# Patient Record
Sex: FEMALE | Race: WHITE | NOT HISPANIC OR LATINO | Employment: OTHER | URBAN - METROPOLITAN AREA
[De-identification: names, ages, dates, MRNs, and addresses within clinical notes are randomized per-mention and may not be internally consistent; named-entity substitution may affect disease eponyms.]

---

## 2017-02-17 ENCOUNTER — OFFICE VISIT (OUTPATIENT)
Dept: ORTHOPEDIC SURGERY | Facility: CLINIC | Age: 52
End: 2017-02-17

## 2017-02-17 VITALS — BODY MASS INDEX: 30.08 KG/M2 | HEIGHT: 66 IN | WEIGHT: 187.2 LBS | TEMPERATURE: 98 F

## 2017-02-17 DIAGNOSIS — M79.605 LOW BACK PAIN RADIATING TO BOTH LEGS: Primary | ICD-10-CM

## 2017-02-17 DIAGNOSIS — M54.50 LOW BACK PAIN RADIATING TO BOTH LEGS: Primary | ICD-10-CM

## 2017-02-17 DIAGNOSIS — M79.604 LOW BACK PAIN RADIATING TO BOTH LEGS: Primary | ICD-10-CM

## 2017-02-17 DIAGNOSIS — M70.62 TROCHANTERIC BURSITIS, LEFT HIP: ICD-10-CM

## 2017-02-17 PROCEDURE — 73521 X-RAY EXAM HIPS BI 2 VIEWS: CPT | Performed by: ORTHOPAEDIC SURGERY

## 2017-02-17 PROCEDURE — 72100 X-RAY EXAM L-S SPINE 2/3 VWS: CPT | Performed by: ORTHOPAEDIC SURGERY

## 2017-02-17 PROCEDURE — 99204 OFFICE O/P NEW MOD 45 MIN: CPT | Performed by: ORTHOPAEDIC SURGERY

## 2017-02-17 PROCEDURE — 20610 DRAIN/INJ JOINT/BURSA W/O US: CPT | Performed by: ORTHOPAEDIC SURGERY

## 2017-02-17 RX ORDER — TRAMADOL HYDROCHLORIDE 50 MG/1
50 TABLET ORAL EVERY 6 HOURS PRN
COMMUNITY
Start: 2017-02-01 | End: 2018-02-20 | Stop reason: RX

## 2017-02-17 RX ADMIN — BUPIVACAINE HYDROCHLORIDE 2 ML: 5 INJECTION, SOLUTION PERINEURAL at 20:25

## 2017-02-17 RX ADMIN — METHYLPREDNISOLONE ACETATE 160 MG: 80 INJECTION, SUSPENSION INTRA-ARTICULAR; INTRALESIONAL; INTRAMUSCULAR; SOFT TISSUE at 20:25

## 2017-02-17 RX ADMIN — LIDOCAINE HYDROCHLORIDE 2 ML: 10 INJECTION, SOLUTION INFILTRATION; PERINEURAL at 20:25

## 2017-02-20 ENCOUNTER — TELEPHONE (OUTPATIENT)
Dept: ORTHOPEDIC SURGERY | Facility: CLINIC | Age: 52
End: 2017-02-20

## 2017-02-20 RX ORDER — BUPIVACAINE HYDROCHLORIDE 5 MG/ML
2 INJECTION, SOLUTION PERINEURAL
Status: COMPLETED | OUTPATIENT
Start: 2017-02-17 | End: 2017-02-17

## 2017-02-20 RX ORDER — METHYLPREDNISOLONE ACETATE 80 MG/ML
160 INJECTION, SUSPENSION INTRA-ARTICULAR; INTRALESIONAL; INTRAMUSCULAR; SOFT TISSUE
Status: COMPLETED | OUTPATIENT
Start: 2017-02-17 | End: 2017-02-17

## 2017-02-20 RX ORDER — LIDOCAINE HYDROCHLORIDE 10 MG/ML
2 INJECTION, SOLUTION INFILTRATION; PERINEURAL
Status: COMPLETED | OUTPATIENT
Start: 2017-02-17 | End: 2017-02-17

## 2017-02-21 ENCOUNTER — TELEPHONE (OUTPATIENT)
Dept: ORTHOPEDIC SURGERY | Facility: CLINIC | Age: 52
End: 2017-02-21

## 2017-02-21 NOTE — PROGRESS NOTES
Patient: Ema Cisse    YOB: 1965    Medical Record Number: [id number]    Chief Complaints:  Left hip and leg pain    History of Present Illness:     51 y.o. female patient who presents for evaluation of her hip and leg.  She has a history of radiculopathy on the left side for which she previously had epidural steroid injections.  She tells me that those worked well approximately 2-3 years ago.  She started a new job recently and has been on her feet quite a bit.  This new job has aggravated her symptoms.  She is now having pain shooting down the back and into the posterior aspect of her left hip and leg, similar to previously.  Denies any weakness, numbness, bowel or bladder dysfunction.  She describes the pain as shooting and burning.  Moderate in severity.  She has not noticed any alleviating factors.  She has also been having some intermittent lateral sided hip pain.  This pain is moderate and aching.  This pain is typically worse at night if she rolls over onto her left side.    Allergies:   Allergies   Allergen Reactions   • Sulfa Antibiotics Rash       Home Medications:      Current Outpatient Prescriptions:   •  Cetirizine HCl 10 MG capsule, Take 10 mg by mouth Daily., Disp: , Rfl:   •  pantoprazole (PROTONIX) 40 MG EC tablet, 40 mg Daily., Disp: , Rfl:   •  SUMAtriptan (IMITREX) 50 MG tablet, Take 50 mg by mouth 1 (One) Time As Needed., Disp: , Rfl:   •  tiZANidine (ZANAFLEX) 4 MG tablet, Take 4 mg by mouth Every 8 (Eight) Hours As Needed., Disp: , Rfl:   •  traMADol (ULTRAM) 50 MG tablet, Take 50 mg by mouth Every 6 (Six) Hours As Needed., Disp: , Rfl:   •  traZODone (DESYREL) 50 MG tablet, Take 50 mg by mouth Every Night., Disp: , Rfl:     Past Medical History   Diagnosis Date   • Bilateral leg pain    • Low back pain    • Trochanteric bursitis, left hip    • Uterine leiomyoma        Past Surgical History   Procedure Laterality Date   • Cholecystectomy  1984   • Sinus surgery  2010   •  "Bunionectomy  2008       Social History     Occupational History   • Not on file.     Social History Main Topics   • Smoking status: Never Smoker   • Smokeless tobacco: Never Used   • Alcohol use No   • Drug use: No   • Sexual activity: Not on file      Social History     Social History Narrative       Family History   Problem Relation Age of Onset   • Hypertension Other    • Heart disease Other        Review of Systems:      Constitutional: Denies fever, shaking or chills   Eyes: Denies change in visual acuity   HEENT: Denies nasal congestion or sore throat   Respiratory: Denies cough or shortness of breath   Cardiovascular: Denies chest pain or edema  Endocrine: Denies tremors, palpitations, intolerance of heat or cold, polyuria, polydipsia.  GI: Denies abdominal pain, nausea, vomiting, bloody stools or diarrhea  : Denies frequency, urgency, incontinence, retention, or nocturia.  Musculoskeletal: Denies numbness tingling or loss of motor function except as above  Integument: Denies rash, lesion or ulceration   Neurologic: Denies headache or focal weakness, deficits  Heme: Denies epistaxis, spontaneous or excessive bleeding, epistaxis, hematuria, melena, fatigue, enlarged or tender lymph nodes.      All other pertinent positives and negatives as noted above in HPI.      Physical Exam: 51 y.o. female    Vitals:    02/17/17 1441   Temp: 98 °F (36.7 °C)   TempSrc: Temporal Artery    Weight: 187 lb 3.2 oz (84.9 kg)   Height: 66\" (167.6 cm)       General:  Patient is awake and alert.  Appears in no acute distress or discomfort.    Psych:  Affect and demeanor are appropriate.    Eyes:  Conjunctiva and sclera appear grossly normal.  Eyes track well and EOM seem to be intact.    Ears:  No gross abnormalities.  Hearing adequate for the exam.    Cardiovascular:  Regular rate and rhythm.    Lungs:  Good chest expansion.  Breathing unlabored.    Lymph:  No palpable masses or adenopathy in the left lower extremity    Back:  " Skin is benign.  No tenderness or step-offs.  Good motion.  Straight leg raise on the left does reproduce pain shooting down the back of her hip and upper thigh.    Extremities:  Left hip is examined.  Skin is benign.  No palpable swellings or masses.  No adenopathy.  Focal tenderness over the trochanteric bursa.  Good hip motion.  No instability.  Good strength with hip flexion and abduction.  Thigh and calf are soft.  Good strength with plantar flexion and dorsi flexion of the ankle and toes.  Intact sensation.  Brisk capillary refill in the toes with good skin turgor.         Radiology:   AP pelvis and lateral view left hip are ordered and reviewed.  AP and lateral views of lumbar spine are also ordered and reviewed.  No comparison films are immediately available.  I do not see any significant degenerative changes, lesions, masses, malalignment, or other concerning findings on any of the x-rays.    Assessment/Plan:  1.  Lumbar radiculopathy  2.  Left hip trochanteric bursitis    The radiculopathy has been a long-standing issue for her to which she previously responded well to epidurals.  She is interested in getting those repeated.  I have suggested that we start with a new MRI.  She was instructed to call for the results of that study.  We will come up with a plan pending review of the MRI.    For the hip, we discussed options, I recommended that we try an injection.  The risks, benefits, and alternatives were thoroughly discussed.  She consented to proceed as described below.  She can follow-up as needed for the hip going forward.    Large Joint Arthrocentesis  Date/Time: 2/17/2017 8:25 PM  Consent given by: patient  Site marked: site marked  Timeout: Immediately prior to procedure a time out was called to verify the correct patient, procedure, equipment, support staff and site/side marked as required   Supporting Documentation  Indications: pain   Procedure Details  Location: hip -   Preparation: Patient was  prepped and draped in the usual sterile fashion  Needle size: 25 G  Approach: anterolateral  Medications administered: 2 mL lidocaine 1 %; 160 mg methylPREDNISolone acetate 80 MG/ML; 2 mL bupivacaine  Patient tolerance: patient tolerated the procedure well with no immediate complications            William Shirley MD    02/17/2017    CC to SHELBIE Borges

## 2017-02-22 ENCOUNTER — TELEPHONE (OUTPATIENT)
Dept: ORTHOPEDIC SURGERY | Facility: CLINIC | Age: 52
End: 2017-02-22

## 2017-02-27 ENCOUNTER — TELEPHONE (OUTPATIENT)
Dept: ORTHOPEDIC SURGERY | Facility: CLINIC | Age: 52
End: 2017-02-27

## 2017-02-28 ENCOUNTER — TELEPHONE (OUTPATIENT)
Dept: ORTHOPEDIC SURGERY | Facility: CLINIC | Age: 52
End: 2017-02-28

## 2017-03-14 ENCOUNTER — CONSULT (OUTPATIENT)
Dept: ORTHOPEDIC SURGERY | Facility: CLINIC | Age: 52
End: 2017-03-14

## 2017-03-14 VITALS — TEMPERATURE: 98 F | HEIGHT: 66 IN | BODY MASS INDEX: 30.05 KG/M2 | WEIGHT: 187 LBS

## 2017-03-14 DIAGNOSIS — M53.3 SACROILIAC JOINT PAIN: Primary | ICD-10-CM

## 2017-03-14 DIAGNOSIS — M51.26 HERNIATED LUMBAR INTERVERTEBRAL DISC: ICD-10-CM

## 2017-03-14 PROCEDURE — 99214 OFFICE O/P EST MOD 30 MIN: CPT | Performed by: ORTHOPAEDIC SURGERY

## 2017-03-14 RX ORDER — CEFDINIR 300 MG/1
300 CAPSULE ORAL DAILY
COMMUNITY
Start: 2017-03-06 | End: 2017-04-26

## 2017-03-14 RX ORDER — ESTRADIOL AND NORETHINDRONE ACETATE 1; .5 MG/1; MG/1
1 TABLET, FILM COATED ORAL DAILY
COMMUNITY
Start: 2017-02-21 | End: 2017-12-19 | Stop reason: SDUPTHER

## 2017-03-14 NOTE — PROGRESS NOTES
New patient or new problem visit    Chief Complaint   Patient presents with   • Lumbar Spine - Establish Care       HPI: She complains of back pain radiating to the left hip ongoing chronically responding favorably last summer to epidural injections.  Not so well to trochanteric injections.  Physical therapy and pain pills.  Not helped much.  Pain is chronic severe when present intermittent worse with standing.  Thenar visit she mentioned some neck pain.  No radiation.    PFSH: See chart- reviewed    Review of Systems   Constitutional: Negative for chills, fever and unexpected weight change.   HENT: Negative.  Negative for trouble swallowing and voice change.    Eyes: Negative.  Negative for visual disturbance.   Respiratory: Negative.  Negative for cough and shortness of breath.    Cardiovascular: Negative.  Negative for chest pain and leg swelling.   Gastrointestinal: Negative.  Negative for abdominal pain, nausea and vomiting.   Endocrine: Negative.  Negative for cold intolerance and heat intolerance.   Genitourinary: Negative.  Negative for difficulty urinating, frequency and urgency.   Musculoskeletal: Positive for arthralgias, back pain, gait problem, neck pain and neck stiffness.   Skin: Negative.  Negative for rash and wound.   Allergic/Immunologic: Negative.  Negative for immunocompromised state.   Neurological: Positive for weakness and numbness (back, leg, hip).   Hematological: Negative.  Does not bruise/bleed easily.   Psychiatric/Behavioral: Negative.  Negative for dysphoric mood. The patient is not nervous/anxious.        PE: Constitutional: Vital signs above-noted.  Awake, alert and oriented    Psychiatric: Affect and insight do not appear grossly disturbed.    Pulmonary: Breathing is unlabored, color is good.    Skin: Warm, dry and normal turgor    Cardiac:  pedal pulses intact.  No edema.    Eyesight and hearing appear adequate for examination purposes      Musculoskeletal:  There is mild  tenderness to percussion and palpation of the spine. Motion appears undisturbed.  Posture is unremarkable to coronal and sagittal inspection.    The skin about the area is intact.  There is no palpable or visible deformity.  There is no local spasm.  Figure 4 test is positive on the left.       Neurologic:   Reflexes are 2+ and symmetrical in the patellae and achilles.   Motor function is undisturbed in quadriceps, EHL, and gastrocnemius      Sensation appears symmetrically intact to light touch   .  In the bilateral lower extremities there is no evidence of atrophy.   Clonus is absent..  Gait appears undisturbed.  SLR test negative      MEDICAL DECISION MAKING    XRAY: Plain film x-rays of lumbar spine show L3 4 spondylolisthesis and then disc degeneration at 4551.  Fairly well-preserved lordosis.  MRI scan the lumbar spine shows a left the extruded herniated disc at L23 and the degenerative changes otherwise MRI report reviewed I agree with it do not think that the herniation is source of her pain.    Other: n/a    Impression: Left sacroiliac pain.  The lumbar back pain including L2 3 herniated disc    Plan: I've recommended pain management to include left sacroiliac injection and epidural injections, possibly even the guided the left nerve root sleeve injection at L2-3.  Not sure what causing her buttock pain but that seems unlikely to emanate from L2 3.  She inquired about laser surgery and I roundly condemned it.

## 2017-04-26 ENCOUNTER — OFFICE VISIT (OUTPATIENT)
Dept: PAIN MEDICINE | Facility: CLINIC | Age: 52
End: 2017-04-26

## 2017-04-26 VITALS
HEIGHT: 66 IN | DIASTOLIC BLOOD PRESSURE: 93 MMHG | BODY MASS INDEX: 29.41 KG/M2 | WEIGHT: 183 LBS | OXYGEN SATURATION: 98 % | HEART RATE: 91 BPM | SYSTOLIC BLOOD PRESSURE: 134 MMHG | RESPIRATION RATE: 16 BRPM | TEMPERATURE: 98 F

## 2017-04-26 DIAGNOSIS — G89.29 OTHER CHRONIC PAIN: ICD-10-CM

## 2017-04-26 DIAGNOSIS — M51.9 LUMBAR DISC DISORDER: ICD-10-CM

## 2017-04-26 DIAGNOSIS — M99.04 SOMATIC DYSFUNCTION OF SACROILIAC JOINT: Primary | ICD-10-CM

## 2017-04-26 PROCEDURE — 99203 OFFICE O/P NEW LOW 30 MIN: CPT | Performed by: ANESTHESIOLOGY

## 2017-04-26 NOTE — PROGRESS NOTES
"CHIEF COMPLAINT  Pt has had bilateral \"sciatic\" area pain,L > R with L sided groin and L upper leg pain,since about 6-9 months ago,gradually worsening. No back surgery.  Hx of 2 LESIs,most recently in 2015 in [facility]with moderate relief for 6 months.  Hx of PT for neck but not back.  No chiropractic treatment for current back pain.  Standing,walking > .5 mile increases pain.  Heat and ice provides some relief. Pt does not take Tramadol daily.    Subjective   Ema Cisse is a 51 y.o. female.   She presents to the office for evaluation of lumbrosacral area pain. She was referred here by Dr. Mcallister, with a request to consider diagnostic Sacroiliac injection.         Back Pain   This is a chronic problem. The current episode started more than 1 month ago. The problem occurs constantly. The problem has been gradually worsening since onset. The pain is present in the sacro-iliac. The quality of the pain is described as aching. The pain radiates to the left thigh. The pain is moderate. The symptoms are aggravated by bending, standing and sitting. Pertinent negatives include no chest pain, dysuria or numbness. She has tried home exercises, heat and ice for the symptoms. The treatment provided no relief.        PEG Assessment   What number best describes your pain on average in the past week?8  What number best describes how, during the past week, pain has interfered with your enjoyment of life?7  What number best describes how, during the past week, pain has interfered with your general activity?  7        Current Outpatient Prescriptions:   •  Cetirizine HCl 10 MG capsule, Take 10 mg by mouth Daily., Disp: , Rfl:   •  MIMVEY 1-0.5 MG per tablet, Take 1 tablet by mouth Daily., Disp: , Rfl:   •  pantoprazole (PROTONIX) 40 MG EC tablet, 40 mg Daily., Disp: , Rfl:   •  SUMAtriptan (IMITREX) 50 MG tablet, Take 50 mg by mouth 1 (One) Time As Needed., Disp: , Rfl:   •  tiZANidine (ZANAFLEX) 4 MG tablet, Take 4 mg " "by mouth Every 8 (Eight) Hours As Needed., Disp: , Rfl:   •  traMADol (ULTRAM) 50 MG tablet, Take 50 mg by mouth Every 6 (Six) Hours As Needed., Disp: , Rfl:   •  traZODone (DESYREL) 50 MG tablet, Take 50 mg by mouth Every Night., Disp: , Rfl:     The following portions of the patient's history were reviewed and updated as appropriate: allergies, current medications, past family history, past medical history, past social history, past surgical history and problem list.      REVIEW OF PERTINENT MEDICAL DATA    MRI lumbar, from [facility]... .4-10-15...  Leftward 10mm L2-3 disc herniation, with moderate to severe left lateral recess and left intervertebral foraminal stenosis... effectng the left L2 nerve root.  MInimal L45 bulge.        Review of Systems   Constitutional: Positive for activity change (decreased) and appetite change (decreased).   Respiratory: Negative for apnea, chest tightness and shortness of breath.    Cardiovascular: Negative for chest pain.   Gastrointestinal: Negative for constipation (IBS), diarrhea and nausea.   Genitourinary: Negative for difficulty urinating and dysuria.   Musculoskeletal: Positive for back pain.   Allergic/Immunologic: Negative for immunocompromised state.   Neurological: Negative for dizziness, light-headedness and numbness.   Hematological: Does not bruise/bleed easily.   Psychiatric/Behavioral: Positive for sleep disturbance. Negative for suicidal ideas.         Vitals:    04/26/17 1341   BP: 134/93   Pulse: 91   Resp: 16   Temp: 98 °F (36.7 °C)   SpO2: 98%   Weight: 183 lb (83 kg)   Height: 66\" (167.6 cm)   PainSc: 5  Comment: LBP bilaterally ranges from 5-8/10   PainLoc: Back         Objective   Physical Exam   Constitutional: She is oriented to person, place, and time. Vital signs are normal. She appears well-developed and well-nourished.  Non-toxic appearance. No distress.   HENT:   Head: Normocephalic and atraumatic.   Right Ear: Hearing and external ear " normal.   Left Ear: Hearing and external ear normal.   Nose: Nose normal.   Eyes: Conjunctivae and lids are normal. Pupils are equal, round, and reactive to light.   Pulmonary/Chest: Effort normal. No respiratory distress.   Abdominal: Normal appearance.   Musculoskeletal:        Right hip: She exhibits tenderness (tender of the SI joint). She exhibits normal range of motion and normal strength.        Left hip: She exhibits tenderness (Tender of the left SI joint.  Much more tender than the right). She exhibits normal range of motion and normal strength.        Lumbar back: She exhibits tenderness and bony tenderness. She exhibits no spasm.   Gen positive left, gen equivocal right.    Little to no pain extension past 10 degrees and also flexion of the low back past 75 degrees.   Neurological: She is alert and oriented to person, place, and time. She has normal strength. No cranial nerve deficit or sensory deficit. Gait normal.   Reflex Scores:       Patellar reflexes are 2+ on the right side and 2+ on the left side.  Psychiatric: She has a normal mood and affect. Her behavior is normal.   Nursing note and vitals reviewed.      Assessment/Plan   Ema was seen today for back pain.    Diagnoses and all orders for this visit:    Somatic dysfunction of sacroiliac joint  -     Case Request    Other chronic pain    Lumbar disc disorder        --- Follow-up for procedure  -- Plan A:  Diagnostic Left Sacroiliac joint injection, x2, 3-4 wks apart  (avoid Fentanyl for sedation)  -- Plan B:  If the SI injection is diagnostically negative, then the plan would be for Left L2 LTFESI, x2, 3 wks apart         ----------  Education about Sacroiliac joint injections:  This Sacroiliac joint injection (blockade) we have suggested is intended for diagnostic purposes, with the intent of offering the patient Radiofrequency thermal rhizotomy (RF) of the sensory branches to the joint if the block is diagnostically effective.  The  diagnostic blockade is necessary to determine the likelihood that RF therapy could be efficacious in providing long term relief to the patient.    In this procedure, the sacroiliac joint is aligned with imaging, and under image guidance a needle is placed with the needle tip into the joint.  The needle position is confirmed to be appropriately in the joint before injection of medication into the joint.  When xray fluoroscopy is used, contrast dye is used to confirm a proper arthrogram (i.e., outline of the joint).  When ultrasound is used, IV fluid (normal saline) is injected to see the flow of the fluid into the joint.  Once confirmed, then the medication can be injected into the joint.  Oftentimes this medication is a combination of local anesthetics (for diagnostic purposes) and also a steroid (to decrease irritation & inflammation in the joint, also known as sacroilitis).      Medically, a successful RF procedure should provide a patient with 50% pain relief or more for at least 6 months.  Clinical experience suggests that successful patients receive relief more in the range of 12 months on average.  We also discussed that many patients receive therapeutic success from the intraarticular joint injection, and may not require RF ablation.  If a patient receives more than 8 weeks of relief from joint injection, then occasional repeat joint blocks for therapeutic purposes is a very reasonable alternative therapy.  This course of therapy is consistent with our LCDs according to our CMS  in the area, and therefore other insurance providers should follow accordingly.  We will monitor our patients to screen for these therapeutic responders and will offer RF therapy only when necessary.      We discussed that joint injections & also RF procedures are not without risks.  Best practices regarding anticoagulant use & neuraxial procedures will be respected.  Oftentimes a patient on an anticoagulant can be  offered a joint injection safely, but again this is not risk-free, and such patients give consent with regards to this increased bleeding risk, which could cause problems including but not limited to worsening of pain, nerve damage, or muscle damage.  Patients that are ill or otherwise may be at risk for sepsis will not have their spines accessed by neuraxial injections of any type.  This patient will not be offered these therapies if there is an increased risk.   We discussed that there is a risk of postprocedural pain and also a risk of worsening of clinical picture with these procedures as with any neuraxial procedure.    ----------        EMR Dragon/Transcription disclaimer:   Much of this encounter note is an electronic transcription/translation of spoken language to printed text. The electronic translation of spoken language may permit erroneous, or at times, nonsensical words or phrases to be inadvertently transcribed; Although I have reviewed the note for such errors, some may still exist.

## 2017-04-26 NOTE — PATIENT INSTRUCTIONS
----------  Education about Sacroiliac joint injections:  This Sacroiliac joint injection (blockade) we have suggested is intended for diagnostic purposes, with the intent of offering the patient Radiofrequency thermal rhizotomy (RF) of the sensory branches to the joint if the block is diagnostically effective.  The diagnostic blockade is necessary to determine the likelihood that RF therapy could be efficacious in providing long term relief to the patient.    In this procedure, the sacroiliac joint is aligned with imaging, and under image guidance a needle is placed with the needle tip into the joint.  The needle position is confirmed to be appropriately in the joint before injection of medication into the joint.  When xray fluoroscopy is used, contrast dye is used to confirm a proper arthrogram (i.e., outline of the joint).  When ultrasound is used, IV fluid (normal saline) is injected to see the flow of the fluid into the joint.  Once confirmed, then the medication can be injected into the joint.  Oftentimes this medication is a combination of local anesthetics (for diagnostic purposes) and also a steroid (to decrease irritation & inflammation in the joint, also known as sacroilitis).      Medically, a successful RF procedure should provide a patient with 50% pain relief or more for at least 6 months.  Clinical experience suggests that successful patients receive relief more in the range of 12 months on average.  We also discussed that many patients receive therapeutic success from the intraarticular joint injection, and may not require RF ablation.  If a patient receives more than 8 weeks of relief from joint injection, then occasional repeat joint blocks for therapeutic purposes is a very reasonable alternative therapy.  This course of therapy is consistent with our LCDs according to our CMS  in the area, and therefore other insurance providers should follow accordingly.  We will monitor our  patients to screen for these therapeutic responders and will offer RF therapy only when necessary.      We discussed that joint injections & also RF procedures are not without risks.  Best practices regarding anticoagulant use & neuraxial procedures will be respected.  Oftentimes a patient on an anticoagulant can be offered a joint injection safely, but again this is not risk-free, and such patients give consent with regards to this increased bleeding risk, which could cause problems including but not limited to worsening of pain, nerve damage, or muscle damage.  Patients that are ill or otherwise may be at risk for sepsis will not have their spines accessed by neuraxial injections of any type.  This patient will not be offered these therapies if there is an increased risk.   We discussed that there is a risk of postprocedural pain and also a risk of worsening of clinical picture with these procedures as with any neuraxial procedure.    ----------

## 2017-05-18 ENCOUNTER — OUTSIDE FACILITY SERVICE (OUTPATIENT)
Dept: PAIN MEDICINE | Facility: CLINIC | Age: 52
End: 2017-05-18

## 2017-05-18 PROCEDURE — 27096 INJECT SACROILIAC JOINT: CPT | Performed by: ANESTHESIOLOGY

## 2017-06-15 ENCOUNTER — OUTSIDE FACILITY SERVICE (OUTPATIENT)
Dept: PAIN MEDICINE | Facility: CLINIC | Age: 52
End: 2017-06-15

## 2017-06-15 PROCEDURE — 27096 INJECT SACROILIAC JOINT: CPT | Performed by: ANESTHESIOLOGY

## 2017-06-29 ENCOUNTER — OFFICE VISIT (OUTPATIENT)
Dept: PAIN MEDICINE | Facility: CLINIC | Age: 52
End: 2017-06-29

## 2017-06-29 VITALS
SYSTOLIC BLOOD PRESSURE: 135 MMHG | RESPIRATION RATE: 16 BRPM | HEART RATE: 93 BPM | TEMPERATURE: 97.7 F | DIASTOLIC BLOOD PRESSURE: 95 MMHG | WEIGHT: 184.4 LBS | OXYGEN SATURATION: 99 % | HEIGHT: 66 IN | BODY MASS INDEX: 29.63 KG/M2

## 2017-06-29 DIAGNOSIS — M99.04 SOMATIC DYSFUNCTION OF SACROILIAC JOINT: Primary | ICD-10-CM

## 2017-06-29 DIAGNOSIS — M51.9 LUMBAR DISC DISORDER: ICD-10-CM

## 2017-06-29 PROCEDURE — 99213 OFFICE O/P EST LOW 20 MIN: CPT | Performed by: NURSE PRACTITIONER

## 2017-06-29 NOTE — PROGRESS NOTES
"CHIEF COMPLAINT    Pt had left SI joint injection on 5-18 and 6-15 for left lower back, left leg sciatica pain. She states after the first injection she was having no pain afterwards, and after the second she just had a little pain. Overall she reports 80% relief. Now, she is experiencing more pain in her right side, except it is not radiating down the right leg, and she states it is \"not that bad.\"    Initial Evaluation by Hermila MORFIN  Subjective   Ema Cisse is a 51 y.o. female  who presents to the office for follow-up of procedure.  She completed a left SI joint injection   on  6-15-17 and 5-18-17 performed by Dr. Lind for management of low back pain. Patient reports 80% ongoing relief from the procedure.     Patient was initially evaluated as new patient by Dr. Lind on 4/26/17.  She is being seen for low back pain.  She is ordered have a diagnostic left SI joint injection ×2-4 weeks apart.  She completed these.    After the initial procedure, she had almost no pain for a few weeks. After the second procedure, she was pain free for one day. Noticed a slight increase in stiffness the following day. Is no longer having leg stiffness and pain. Now it feels as if her pain has moved to the right side.    Complains of pain in her right low back. Today her pain is 3/10VAS. Describes the pain as intermittent.  Pain increases with standing, walking and working; pain decreases rest and injections. ADL's by self.     Back Pain   This is a chronic problem. The current episode started more than 1 month ago. The problem occurs constantly. Progression since onset: improved since last office visit. The pain is present in the sacro-iliac. The quality of the pain is described as aching. The pain radiates to the left thigh. The pain is at a severity of 3/10. The pain is moderate. The symptoms are aggravated by bending, standing and sitting. Pertinent negatives include no chest pain, dysuria, headaches or numbness. " "She has tried home exercises, heat and ice (LEFT SI joint injection--significant ongoing relief) for the symptoms. The treatment provided no relief.      PEG Assessment   What number best describes your pain on average in the past week?3  What number best describes how, during the past week, pain has interfered with your enjoyment of life?0  What number best describes how, during the past week, pain has interfered with your general activity?  1    The following portions of the patient's history were reviewed and updated as appropriate: allergies, current medications, past family history, past medical history, past social history, past surgical history and problem list.    Review of Systems   Constitutional: Positive for activity change (increased) and appetite change (increased).   Respiratory: Negative for apnea, chest tightness and shortness of breath.    Cardiovascular: Negative for chest pain.   Gastrointestinal: Negative for constipation (IBS), diarrhea and nausea.   Genitourinary: Negative for difficulty urinating and dysuria.   Musculoskeletal: Positive for back pain.   Allergic/Immunologic: Negative for immunocompromised state.   Neurological: Negative for dizziness, light-headedness, numbness and headaches.   Hematological: Does not bruise/bleed easily.   Psychiatric/Behavioral: Positive for sleep disturbance (pt states somewhat but not due to the pain). Negative for confusion, hallucinations and suicidal ideas.       Vitals:    06/29/17 0949   BP: 135/95   Pulse: 93   Resp: 16   Temp: 97.7 °F (36.5 °C)   SpO2: 99%   Weight: 184 lb 6.4 oz (83.6 kg)   Height: 66\" (167.6 cm)   PainSc:   3   PainLoc: Back  Comment: and leg     Objective   Physical Exam   Constitutional: She is oriented to person, place, and time. Vital signs are normal. She appears well-developed and well-nourished.  Non-toxic appearance. No distress.   HENT:   Head: Normocephalic and atraumatic.   Right Ear: Hearing and external ear normal. "   Left Ear: Hearing and external ear normal.   Nose: Nose normal.   Eyes: Conjunctivae and lids are normal. Pupils are equal, round, and reactive to light.   Pulmonary/Chest: Effort normal. No respiratory distress.   Abdominal: Normal appearance.   Musculoskeletal:        Right hip: She exhibits tenderness (moderate tenderness of the SI joint). She exhibits normal range of motion and normal strength.        Left hip: She exhibits tenderness (minimal tenderness of left SI joint). She exhibits normal range of motion and normal strength.        Lumbar back: She exhibits tenderness and bony tenderness. She exhibits no spasm.   Negative SLR bilaterally   Neurological: She is alert and oriented to person, place, and time. She has normal strength. No cranial nerve deficit or sensory deficit. Gait normal.   Reflex Scores:       Patellar reflexes are 2+ on the right side and 2+ on the left side.  Psychiatric: She has a normal mood and affect. Her behavior is normal.   Nursing note and vitals reviewed.      Assessment/Plan   Ema was seen today for back pain.    Diagnoses and all orders for this visit:    Somatic dysfunction of sacroiliac joint  -     Case Request    Lumbar disc disorder      -- Right SI joint injection. No blood thinners. Reviewed the procedure at length with the patient.  Included in the review was expectations, complications, risk and benefits.The procedure was described in detail and the risks, benefits and alternatives were discussed with the patient (including but not limited to: bleeding, infection, nerve damage, worsening of pain, inability to perform injection, paralysis, seizures, and death) who agreed to proceed.  Discussed the potential for sedation if warranted/wanted.  Questions were answered and in a way the patient could understand.  Patient verbalized understanding and wishes to proceed.  This intervention will be ordered.  --- Left Si joint--improved with interventions.   --- Follow-up  after procedure or sooner if needed.       PATTI REPORT    PATTI report has been reviewed and scanned into the patient's chart.    Date of last PATTI : 6-28-17          EMR Dragon/Transcription disclaimer:   Much of this encounter note is an electronic transcription/translation of spoken language to printed text. The electronic translation of spoken language may permit erroneous, or at times, nonsensical words or phrases to be inadvertently transcribed; Although I have reviewed the note for such errors, some may still exist.

## 2017-07-13 ENCOUNTER — OUTSIDE FACILITY SERVICE (OUTPATIENT)
Dept: PAIN MEDICINE | Facility: CLINIC | Age: 52
End: 2017-07-13

## 2017-07-13 ENCOUNTER — DOCUMENTATION (OUTPATIENT)
Dept: PAIN MEDICINE | Facility: CLINIC | Age: 52
End: 2017-07-13

## 2017-07-13 PROCEDURE — 27096 INJECT SACROILIAC JOINT: CPT | Performed by: ANESTHESIOLOGY

## 2017-07-13 NOTE — PROGRESS NOTES
RIGHT Sacroiliac Joint Injection    [facility]      PREOPERATIVE DIAGNOSIS:   Sacroiliac joint dysfunction on the RIGHT    POSTOPERATIVE DIAGNOSIS:  Sacroiliac joint dysfunction on the RIGHT    PROCEDURE:  Sacroiliac Joint Injection, on the RIGHT, with fluoroscopic guidance    PRE-PROCEDURE DISCUSSION WITH PATIENT:    Risks and complications were discussed with the patient prior to starting the procedure and informed consent was obtained.  We discussed various topics including but not limited to bleeding, infection, injury, postprocedural site soreness, painful flareup, worsening of clinical picture, paralysis, coma, and death.     SURGEON:  Jeronimo Lind MD    REASON FOR PROCEDURE:    This very pleasant 51-year-old woman has a history of sacroiliitis.  Previously the bilateral sacroiliitis was worse on the left.  After a left sacroiliac joint injection she continues to have significant therapeutic response.  The right side is flared therefore diagnostic injection in the right sacroiliac joint is indicated.  She does have a history of lumbar disc disorder with his lumbosacral area pain seems more consistent with a sacroiliac pathology as she has a positive pointer finger test and positive palpation and positive Fabere and positive Gaenslen's.        SEDATION:  Versed 2 mg fentanyl 100 µg  ANESTHETIC AGENT:  Marcaine 0.5%  STEROID AGENT:  40mg DepoMedrol    DESCRIPTON OF PROCEDURE:  After obtaining informed consent, IV access was obtained in the preoperative area.  The patient was transported to the operative suite and placed in the prone position with a pillow under the pelvic area. EKG, blood pressure, and pulse oximeter were monitored. The lumbosacral area was prepped with Chloraprep and draped in a sterile fashion.     Under fluoroscopic guidance the inferior most portion of the RIGHT sacroiliac joint was identified. The overlying skin and subcutaneous tissue was anesthetized with 1%  lidocaine. A 22-gauge, 3.5-inch spinal needle was introduced from the inferior most portion of the joint into the sacroiliac joint under fluoroscopic guidance in the AP dimension with slight oblique rotation to the contralateral side.  Aspiration was negative.  After confirming the position of the needle with fluoroscopy, 1 mL of Omnipaque was injected and after seeing appropriate spread into the joint a total of 2.5 mL of Marcaine, with approximately 40 mg of DepoMedrol, was injected very slowly.  Vital signs remained stable.  The onset of analgesia was noted.    ESTIMATED BLOOD LOSS:  minimal  SPECIMENS:  None    COMPLICATIONS:  None.  There was no indication of vascular uptake on live Injection of contrast dye.    TOLERANCE & DISCHARGE CONDITION:    The patient tolerated the procedure well.  The patient was transported to the recovery area without difficulties.  The patient was discharged to home under the care of family in stable and satisfactory condition.    PLAN OF CARE:  1. The patient was given our standard instruction sheet and will resume all medications as per the medication reconciliation sheet.  2. The patient will return for an office visit in 2 weeks with the nurse practitioner.  3. The patient is instructed to keep a pain log hourly for 8 hours after the procedure.

## 2017-09-20 ENCOUNTER — OFFICE VISIT (OUTPATIENT)
Dept: PAIN MEDICINE | Facility: CLINIC | Age: 52
End: 2017-09-20

## 2017-09-20 VITALS
SYSTOLIC BLOOD PRESSURE: 151 MMHG | WEIGHT: 186.8 LBS | HEIGHT: 66 IN | TEMPERATURE: 98.3 F | HEART RATE: 92 BPM | OXYGEN SATURATION: 98 % | BODY MASS INDEX: 30.02 KG/M2 | RESPIRATION RATE: 18 BRPM | DIASTOLIC BLOOD PRESSURE: 98 MMHG

## 2017-09-20 DIAGNOSIS — M47.816 LUMBAR FACET ARTHROPATHY: ICD-10-CM

## 2017-09-20 DIAGNOSIS — M99.04 SOMATIC DYSFUNCTION OF SACROILIAC JOINT: ICD-10-CM

## 2017-09-20 DIAGNOSIS — M51.9 LUMBAR DISC DISORDER: ICD-10-CM

## 2017-09-20 DIAGNOSIS — G89.29 OTHER CHRONIC PAIN: Primary | ICD-10-CM

## 2017-09-20 PROCEDURE — 99213 OFFICE O/P EST LOW 20 MIN: CPT | Performed by: NURSE PRACTITIONER

## 2017-09-20 RX ORDER — IBUPROFEN 800 MG/1
TABLET ORAL
COMMUNITY
Start: 2017-08-10 | End: 2017-11-22

## 2017-09-20 NOTE — PROGRESS NOTES
CHIEF COMPLAINT  Follow-up back pain.    Subjective   Ema Cisse is a 51 y.o. female  who presents to the office for follow-up of procedure.  She completed a RIGHT Sacroiliac Joint Injection  on  7/13/17 performed by Dr. Lind for management of back pain. Patient reports 100% relief from the procedure for 2 weeks.     She completed a left SI joint injection   on  6-15-17 and 5-18-17 performed by Dr. Lind for management of low back pain. Patient reported 80% ongoing relief from the procedure at her last office visit.    Complains of pain in her low back, neither side worse. Today her pain is 6/10VAS. Describes the pain as continuous and waxing/waning.  Pain increases with bending, standing, walking and sitting; pain decreases with injections and rest.  ADL's by self.     Back Pain   This is a chronic problem. The current episode started more than 1 month ago. The problem occurs constantly. Progression since onset: improved since last office visit. The pain is present in the sacro-iliac. The quality of the pain is described as aching. The pain radiates to the left thigh. The pain is at a severity of 6/10. The pain is moderate. The symptoms are aggravated by bending, standing and sitting. Pertinent negatives include no chest pain, dysuria, headaches, numbness or weakness. She has tried home exercises, heat and ice (LEFT SI joint injection--significant ongoing relief; right SI jointi njection--significant relief for 2 weeks) for the symptoms. The treatment provided no relief.      PEG Assessment   What number best describes your pain on average in the past week?8  What number best describes how, during the past week, pain has interfered with your enjoyment of life?7  What number best describes how, during the past week, pain has interfered with your general activity?  7    The following portions of the patient's history were reviewed and updated as appropriate: allergies, current medications, past family  "history, past medical history, past social history, past surgical history and problem list.    Review of Systems   Constitutional: Negative for fatigue.   HENT: Negative for congestion.    Eyes: Negative for visual disturbance.   Respiratory: Negative for cough, shortness of breath and wheezing.    Cardiovascular: Negative.  Negative for chest pain.   Gastrointestinal: Negative for constipation and diarrhea.   Genitourinary: Negative for difficulty urinating and dysuria.   Musculoskeletal: Positive for back pain.   Neurological: Negative for weakness, numbness and headaches.   Psychiatric/Behavioral: Positive for sleep disturbance. Negative for suicidal ideas. The patient is nervous/anxious.        Vitals:    09/20/17 1458   BP: 151/98   Pulse: 92   Resp: 18   Temp: 98.3 °F (36.8 °C)   SpO2: 98%   Weight: 186 lb 12.8 oz (84.7 kg)   Height: 66\" (167.6 cm)   PainSc:   6   PainLoc: Back     Objective   Physical Exam   Constitutional: She is oriented to person, place, and time. Vital signs are normal. She appears well-developed and well-nourished.  Non-toxic appearance. No distress.   HENT:   Head: Normocephalic and atraumatic.   Right Ear: Hearing and external ear normal.   Left Ear: Hearing and external ear normal.   Nose: Nose normal.   Eyes: Conjunctivae and lids are normal. Pupils are equal, round, and reactive to light.   Pulmonary/Chest: Effort normal. No respiratory distress.   Abdominal: Normal appearance.   Musculoskeletal:        Right hip: She exhibits tenderness (moderate tenderness of the SI joint). She exhibits normal range of motion and normal strength.        Left hip: She exhibits tenderness (minimal tenderness of left SI joint). She exhibits normal range of motion and normal strength.        Lumbar back: She exhibits tenderness and bony tenderness (moderate tenderness bilateral L4-S3 facets-- + facet loading manuever). She exhibits no spasm.   Negative SLR bilaterally   Neurological: She is alert and " "oriented to person, place, and time. Gait normal.   Reflex Scores:       Patellar reflexes are 2+ on the right side and 2+ on the left side.  Psychiatric: She has a normal mood and affect. Her behavior is normal.   Nursing note and vitals reviewed.      Assessment/Plan   Ema was seen today for back pain.    Diagnoses and all orders for this visit:    Other chronic pain    Somatic dysfunction of sacroiliac joint    Lumbar disc disorder    Lumbar facet arthropathy  -     Case Request      --- bilateral L4-S3 FJN x2, 1-2 weeks apart. No blood thinners. Reviewed the procedure at length with the patient.  Included in the review was expectations, complications, risk and benefits.The procedure was described in detail and the risks, benefits and alternatives were discussed with the patient (including but not limited to: bleeding, infection, nerve damage, worsening of pain, inability to perform injection, paralysis, seizures, and death) who agreed to proceed.  Discussed the potential for sedation if warranted/wanted.  Questions were answered and in a way the patient could understand.  Patient verbalized understanding and wishes to proceed.  This intervention will be ordered.  --- Follow-up after procedure or sooner if needed.    -------  Education about Medial Branch Blockade and RF Therapy:    This medial branch blockade (MBB) suggested is intended for diagnostic purposes, with the intent of offering the patient Radiofrequency thermal rhizotomy (RF) if the MBB is diagnostically effective.  The diagnostic blockade is necessary to determine the likelihood that RF therapy could be efficacious in providing long term relief to the patient.    Medial branches are sensory nerve branches that connect to a facet joint and transmit sensations & pain signals from that joint.  Facet is a term for the type of joints found in the spine.  Medial branches are the nerves that go to a facet, and therefore are also sometimes called \"facet " "joint nerves\" (FJNs).      In a medial branch blockade procedure, xray fluoroscopy is used to verify the locations of the outside of the joint lines which are being targeted.  Under xray guidance, needles are placed to these areas.  Contrast dye is injected to confirm proper placement, with dye flowing over the joint area, and to ensure that the dye does not flow into unintended areas such as a vein.  When this is confirmed, local anesthetic is injected to block the medial branch at that joint level.      If MBBs are diagnostically successful in blocking pain, then the patient is most likely a great candidate for Radiofrequency of those facet joint nerves.  In the RF procedure, needles are placed to the joint lines in the same fashion, and after testing, the needle tips are heated to thermally treat the nerves, blocking the nerves by in essence damaging the nerves with the heat treatment.       Medically, a successful RF procedure should provide a patient with 50% pain relief or more for at least 6 months.  Clinical experience suggests that successful patients receive relief more in the range of 12 months on average.  We also discussed that a fortunate minority of patients receive therapeutic success from the MBB, and may not require RF ablation.  If a patient receives more than 8 weeks of relief from MBB, then occasional repeat MBB for therapeutic purposes is a very reasonable alternative therapy.  This course of therapy is consistent with our LCDs according to our CMS  in the area, and therefore other insurance providers should follow accordingly.  We will monitor our patients to screen for these therapeutic responders and will offer RF therapy only when necessary.        We discussed that MBB & RF are not without risks.  Guidelines regarding anticoagulant use & neuraxial procedures will be respected.  Patients that are ill or otherwise may be at risk for sepsis will not have their spines accessed by " neuraxial injections of any type.  This patient will not be offered these therapies if there is an increased risk.   We discussed that there is a risk of postprocedural pain and also a risk of worsening of clinical picture with these procedures as with any neuraxial procedure.    -------      ----------  Education about Sacroiliac joint injections:  This Sacroiliac joint injection (blockade) we have suggested is intended for diagnostic purposes, with the intent of offering the patient Radiofrequency thermal rhizotomy (RF) of the sensory branches to the joint if the block is diagnostically effective.  The diagnostic blockade is necessary to determine the likelihood that RF therapy could be efficacious in providing long term relief to the patient.    In this procedure, the sacroiliac joint is aligned with imaging, and under image guidance a needle is placed with the needle tip into the joint.  The needle position is confirmed to be appropriately in the joint before injection of medication into the joint.  When xray fluoroscopy is used, contrast dye is used to confirm a proper arthrogram (i.e., outline of the joint).  When ultrasound is used, IV fluid (normal saline) is injected to see the flow of the fluid into the joint.  Once confirmed, then the medication can be injected into the joint.  Oftentimes this medication is a combination of local anesthetics (for diagnostic purposes) and also a steroid (to decrease irritation & inflammation in the joint, also known as sacroilitis).      Medically, a successful RF procedure should provide a patient with 50% pain relief or more for at least 6 months.  Clinical experience suggests that successful patients receive relief more in the range of 12 months on average.  We also discussed that many patients receive therapeutic success from the intraarticular joint injection, and may not require RF ablation.  If a patient receives more than 8 weeks of relief from joint injection,  then occasional repeat joint blocks for therapeutic purposes is a very reasonable alternative therapy.  This course of therapy is consistent with our LCDs according to our CMS  in the area, and therefore other insurance providers should follow accordingly.  We will monitor our patients to screen for these therapeutic responders and will offer RF therapy only when necessary.      We discussed that joint injections & also RF procedures are not without risks.  Best practices regarding anticoagulant use & neuraxial procedures will be respected.  Oftentimes a patient on an anticoagulant can be offered a joint injection safely, but again this is not risk-free, and such patients give consent with regards to this increased bleeding risk, which could cause problems including but not limited to worsening of pain, nerve damage, or muscle damage.  Patients that are ill or otherwise may be at risk for sepsis will not have their spines accessed by neuraxial injections of any type.  This patient will not be offered these therapies if there is an increased risk.   We discussed that there is a risk of postprocedural pain and also a risk of worsening of clinical picture with these procedures as with any neuraxial procedure.    ----------         PATTI REPORT    PATTI report has been reviewed and scanned into the patient's chart.    Date of last PATTI : 9-18-17          EMR Dragon/Transcription disclaimer:   Much of this encounter note is an electronic transcription/translation of spoken language to printed text. The electronic translation of spoken language may permit erroneous, or at times, nonsensical words or phrases to be inadvertently transcribed; Although I have reviewed the note for such errors, some may still exist.

## 2017-09-20 NOTE — PATIENT INSTRUCTIONS
"-------  Education about Medial Branch Blockade and RF Therapy:    This medial branch blockade (MBB) suggested is intended for diagnostic purposes, with the intent of offering the patient Radiofrequency thermal rhizotomy (RF) if the MBB is diagnostically effective.  The diagnostic blockade is necessary to determine the likelihood that RF therapy could be efficacious in providing long term relief to the patient.    Medial branches are sensory nerve branches that connect to a facet joint and transmit sensations & pain signals from that joint.  Facet is a term for the type of joints found in the spine.  Medial branches are the nerves that go to a facet, and therefore are also sometimes called \"facet joint nerves\" (FJNs).      In a medial branch blockade procedure, xray fluoroscopy is used to verify the locations of the outside of the joint lines which are being targeted.  Under xray guidance, needles are placed to these areas.  Contrast dye is injected to confirm proper placement, with dye flowing over the joint area, and to ensure that the dye does not flow into unintended areas such as a vein.  When this is confirmed, local anesthetic is injected to block the medial branch at that joint level.      If MBBs are diagnostically successful in blocking pain, then the patient is most likely a great candidate for Radiofrequency of those facet joint nerves.  In the RF procedure, needles are placed to the joint lines in the same fashion, and after testing, the needle tips are heated to thermally treat the nerves, blocking the nerves by in essence damaging the nerves with the heat treatment.       Medically, a successful RF procedure should provide a patient with 50% pain relief or more for at least 6 months.  Clinical experience suggests that successful patients receive relief more in the range of 12 months on average.  We also discussed that a fortunate minority of patients receive therapeutic success from the MBB, and may " not require RF ablation.  If a patient receives more than 8 weeks of relief from MBB, then occasional repeat MBB for therapeutic purposes is a very reasonable alternative therapy.  This course of therapy is consistent with our LCDs according to our CMS  in the area, and therefore other insurance providers should follow accordingly.  We will monitor our patients to screen for these therapeutic responders and will offer RF therapy only when necessary.        We discussed that MBB & RF are not without risks.  Guidelines regarding anticoagulant use & neuraxial procedures will be respected.  Patients that are ill or otherwise may be at risk for sepsis will not have their spines accessed by neuraxial injections of any type.  This patient will not be offered these therapies if there is an increased risk.   We discussed that there is a risk of postprocedural pain and also a risk of worsening of clinical picture with these procedures as with any neuraxial procedure.    -------      ----------  Education about Sacroiliac joint injections:  This Sacroiliac joint injection (blockade) we have suggested is intended for diagnostic purposes, with the intent of offering the patient Radiofrequency thermal rhizotomy (RF) of the sensory branches to the joint if the block is diagnostically effective.  The diagnostic blockade is necessary to determine the likelihood that RF therapy could be efficacious in providing long term relief to the patient.    In this procedure, the sacroiliac joint is aligned with imaging, and under image guidance a needle is placed with the needle tip into the joint.  The needle position is confirmed to be appropriately in the joint before injection of medication into the joint.  When xray fluoroscopy is used, contrast dye is used to confirm a proper arthrogram (i.e., outline of the joint).  When ultrasound is used, IV fluid (normal saline) is injected to see the flow of the fluid into the joint.   Once confirmed, then the medication can be injected into the joint.  Oftentimes this medication is a combination of local anesthetics (for diagnostic purposes) and also a steroid (to decrease irritation & inflammation in the joint, also known as sacroilitis).      Medically, a successful RF procedure should provide a patient with 50% pain relief or more for at least 6 months.  Clinical experience suggests that successful patients receive relief more in the range of 12 months on average.  We also discussed that many patients receive therapeutic success from the intraarticular joint injection, and may not require RF ablation.  If a patient receives more than 8 weeks of relief from joint injection, then occasional repeat joint blocks for therapeutic purposes is a very reasonable alternative therapy.  This course of therapy is consistent with our LCDs according to our CMS  in the area, and therefore other insurance providers should follow accordingly.  We will monitor our patients to screen for these therapeutic responders and will offer RF therapy only when necessary.      We discussed that joint injections & also RF procedures are not without risks.  Best practices regarding anticoagulant use & neuraxial procedures will be respected.  Oftentimes a patient on an anticoagulant can be offered a joint injection safely, but again this is not risk-free, and such patients give consent with regards to this increased bleeding risk, which could cause problems including but not limited to worsening of pain, nerve damage, or muscle damage.  Patients that are ill or otherwise may be at risk for sepsis will not have their spines accessed by neuraxial injections of any type.  This patient will not be offered these therapies if there is an increased risk.   We discussed that there is a risk of postprocedural pain and also a risk of worsening of clinical picture with these procedures as with any neuraxial procedure.     ----------

## 2017-10-03 ENCOUNTER — DOCUMENTATION (OUTPATIENT)
Dept: PAIN MEDICINE | Facility: CLINIC | Age: 52
End: 2017-10-03

## 2017-10-03 ENCOUNTER — OUTSIDE FACILITY SERVICE (OUTPATIENT)
Dept: PAIN MEDICINE | Facility: CLINIC | Age: 52
End: 2017-10-03

## 2017-10-03 PROCEDURE — 64494 INJ PARAVERT F JNT L/S 2 LEV: CPT | Performed by: ANESTHESIOLOGY

## 2017-10-03 PROCEDURE — 64493 INJ PARAVERT F JNT L/S 1 LEV: CPT | Performed by: ANESTHESIOLOGY

## 2017-10-03 NOTE — PROGRESS NOTES
Bilateral S1-S3 Dorsal Branch Blockade  [facility]      PREOPERATIVE DIAGNOSIS:  Sacroiliac Dysfunction, bilaterally.   POSTOPERATIVE DIAGNOSIS:  Same as above.     PROCEDURE:   Diagnostic Bilateral Dorsal Branch Nerve Blockades, with fluoroscopy:   - Bilateral Dorsal Branches of S1-3      PRE-PROCEDURE DISCUSSION WITH PATIENT:    Risks and complications were discussed with the patient prior to starting the procedure and informed consent was obtained.      SURGEON:  Jeronimo Lind MD    REASON FOR PROCEDURE:    Patient has pain consistent with SI pathology on history and physical exam. Previous diagnostic positivity of SI joint injection.   Positive Benjie Test. On careful exam under fluoro, she does not have pain of the L5S1 facets on deep palpation bilaterally      SEDATION:  Versed 2mg & Fentanyl 100 mcg IV  ANESTHETIC:  Marcaine 0.5%  STEROID:  Methylprednisolone (DEPO MEDROL) 60mg  TOTAL VOLUME OF SOLUTION:  6 mL    DESCRIPTON OF PROCEDURE:  After obtaining informed consent, IV access was  obtained in the preoperative area.   The patient was taken to the operating room.  The patient was placed in the prone position with a pillow under the abdomen. All pressure points were well padded.  EKG, blood pressure, and pulse oximeter were monitored.  The patient was monitored and sedated by the RN under my direction. The lumbosacral area was prepped with Chloraprep and draped in a sterile fashion.     Under fluoroscopic guidance I identified the points on the lateral margin of the S1 & S2 & S3 posterior foramina bilaterally.  Skin and subcutaneous tissue were anesthetized with 1% lidocaine above each of these points. A spinal needle was introduced under fluoroscopic guidance at the above junctions. Aspiration was negative for blood and CSF.  After confirming the position of the needle with fluoroscope in all views, 0.25 mL of Omnipaque was injected, and after seeing the proper spread along the  lateral aspect of each joint line, a total of 1 mL of the anesthetic solution noted above was injected at each of these points.  Needles were removed intact from each of the areas.  A similar procedure was repeated to block the S1-S3 nerves on the contralateral side.   Onset of analgesia was noted.  Vital signs remained stable throughout.      ESTIMATED BLOOD LOSS:  <5 mL  SPECIMENS:  none    COMPLICATIONS:   No complications were noted. and There was no indication of vascular uptake on live injection of contrast dye.    TOLERANCE & DISCHARGE CONDITION:    The patient tolerated the procedure well.  The patient was transported to the recovery area without difficulties.  The patient was discharged to home under the care of family in stable and satisfactory condition.    PLAN OF CARE:  1. The patient was given our standard instruction sheet.  2. We discussed that Lumbar Medial Branch Blockade is a diagnostic procedure in consideration for radiofrequency ablation if two diagnostic procedures prove to be positive for significant benefit.  If sustained relief of 6 to eight weeks is obtained, then an alternative plan could be therapeutic lumbar branch blockades.  3. The patient is asked to keep a pain log each hour for 8 hours after the procedure today.  4. The patient will  Repeat injection 2 wks  5. The patient will resume all medications as per the medication reconciliation sheet.

## 2017-10-09 ENCOUNTER — OFFICE VISIT (OUTPATIENT)
Dept: ORTHOPEDIC SURGERY | Facility: CLINIC | Age: 52
End: 2017-10-09

## 2017-10-09 VITALS — HEIGHT: 65 IN | TEMPERATURE: 98.9 F | WEIGHT: 185 LBS | BODY MASS INDEX: 30.82 KG/M2

## 2017-10-09 DIAGNOSIS — M77.42 METATARSALGIA OF LEFT FOOT: ICD-10-CM

## 2017-10-09 DIAGNOSIS — M79.672 FOOT PAIN, LEFT: Primary | ICD-10-CM

## 2017-10-09 PROCEDURE — 73630 X-RAY EXAM OF FOOT: CPT | Performed by: ORTHOPAEDIC SURGERY

## 2017-10-09 PROCEDURE — 99214 OFFICE O/P EST MOD 30 MIN: CPT | Performed by: ORTHOPAEDIC SURGERY

## 2017-10-09 NOTE — PATIENT INSTRUCTIONS
When you know when test is scheduled, call office immediately to make appt for at least 3 days after test is complete

## 2017-10-10 ENCOUNTER — DOCUMENTATION (OUTPATIENT)
Dept: PAIN MEDICINE | Facility: CLINIC | Age: 52
End: 2017-10-10

## 2017-10-10 ENCOUNTER — OUTSIDE FACILITY SERVICE (OUTPATIENT)
Dept: PAIN MEDICINE | Facility: CLINIC | Age: 52
End: 2017-10-10

## 2017-10-10 PROCEDURE — 64450 NJX AA&/STRD OTHER PN/BRANCH: CPT | Performed by: ANESTHESIOLOGY

## 2017-10-10 NOTE — PROGRESS NOTES
New Patient Complaint      Patient: Ema Cisse  YOB: 1965 51 y.o. female  Medical Record Number: [id number]    Chief Complaints: Foot hurts    History of Present Illness:   Patient reports a 12 week history of severe intermittent aching pain and swelling in the left forefoot worse with walking and improved somewhat ice and rest.    She has a history of bunionectomy performed November 2009 by podiatrist and has not had any problems until recently.       HPI    Allergies:   Allergies   Allergen Reactions   • Sulfa Antibiotics Rash       Medications:   Current Outpatient Prescriptions on File Prior to Visit   Medication Sig   • Cetirizine HCl 10 MG capsule Take 10 mg by mouth Daily.   • SUMAtriptan (IMITREX) 50 MG tablet Take 50 mg by mouth 1 (One) Time As Needed.   • traZODone (DESYREL) 50 MG tablet Take 50 mg by mouth Every Night.   • ibuprofen (ADVIL,MOTRIN) 800 MG tablet    • MIMVEY 1-0.5 MG per tablet Take 1 tablet by mouth Daily.   • pantoprazole (PROTONIX) 40 MG EC tablet 40 mg Daily.   • tiZANidine (ZANAFLEX) 4 MG tablet Take 4 mg by mouth Every 8 (Eight) Hours As Needed.   • traMADol (ULTRAM) 50 MG tablet Take 50 mg by mouth Every 6 (Six) Hours As Needed.     No current facility-administered medications on file prior to visit.        Past Medical History:   Diagnosis Date   • Bilateral leg pain    • Low back pain    • Peripheral neuropathy    • Stress fracture     left foot   • Trochanteric bursitis, left hip    • Uterine leiomyoma      Past Surgical History:   Procedure Laterality Date   • BUNIONECTOMY  2008   • CHOLECYSTECTOMY  1984   • KNEE SURGERY  07/2005    ACL Removal   • SINUS SURGERY  2010     Social History     Occupational History   • Not on file.     Social History Main Topics   • Smoking status: Never Smoker   • Smokeless tobacco: Never Used   • Alcohol use Yes      Comment: socially   • Drug use: No   • Sexual activity: Defer      Social History     Social History Narrative  "    Family History   Problem Relation Age of Onset   • Hypertension Other    • Heart disease Other    • Hypertension Mother    • Cancer Father      lung   • Heart disease Father        Review of Systems: 14 point review of systems performed, positive pertinent findings identified in HPI. All remaining systems negative Except stiffness    Review of Systems      Physical Exam:   Vitals:    10/09/17 1317   Temp: 98.9 °F (37.2 °C)   TempSrc: Temporal Artery    Weight: 185 lb (83.9 kg)   Height: 65\" (165.1 cm)     Physical Exam   Constitutional: pleasant, well developed   Eyes: sclera non icteric  Hearing : adequate for exam  Cardiovascular: palpable pulses in left foot, left calf/ thigh NT without sign of DVT  Respiratoy: breathing unlabored   Neurological: grossly sensate to LT throughout left LE  Psychiatric: oriented with normal mood and affect.   Lymphatic: No palpable popliteal lymphadenopathy left LE  Skin: intact throughout left leg/foot  Musculoskeletal: Nonantalgic gait.  Left foot showed no warmth erythema and mild swelling distally.  Well-healed incision about the first MTP joint.  Minimal if any discomfort with range of motion of first MTP joint.  There is mild discomfort over the second and third metatarsals with discomfort on anterior drawer bilaterally but no gross irritability or instability.  There was slight valgus deviation of the PIP joint of the second toe discomfort beneath the second and third metatarsal heads.  I was unable to elicit any neuritic symptoms in the second or third webspace.  Neutral dorsiflexion of the heel cord  Physical Exam  Ortho Exam    Radiology: 3 views of the left foot were ordered to evaluate pain reviewed and there are no prior x-rays available for comparison.  There is a well-healed osteotomy of the first metatarsal distally with some relative shortening of the first metatarsal and some mild arthritic changes of first MTP joint.  There is relative elongation of the second " and third metatarsals with some questionable flattening of the third more so than second metatarsal head with some sclerotic change.  There is valgus deviation of the DIP joint of the second toe    Assessment/Plan: Left foot metatarsalgia of unclear etiology.  The major concern review stress fracture or osteonecrosis.  This may be metatarsal overload secondary to relative elongation and a tight heel cord.    As pain is worsening when head and fitted her with a short boot today allow weightbearing as tolerated over this will help unload her metatarsals.    Also demonstrated heel cord stretching exercises.  Instruction she was provided and demonstrated for her and discussed etiology of heel cord stretching to forefoot overload.    We need to get an MRI of her left forefoot to evaluate for stress fracture or osteonecrosis.    She understands to call to schedule follow-up appointment after MRI has been scheduled

## 2017-10-10 NOTE — PROGRESS NOTES
Bilateral S1-S3 Dorsal Branch Blockade  [facility]      PREOPERATIVE DIAGNOSIS:  Sacroiliac Dysfunction, bilaterally.   POSTOPERATIVE DIAGNOSIS:  Same as above.     PROCEDURE:   Diagnostic Bilateral Dorsal Branch Nerve Blockades, with fluoroscopy:   - Bilateral Dorsal Branches of S1-3      PRE-PROCEDURE DISCUSSION WITH PATIENT:    Risks and complications were discussed with the patient prior to starting the procedure and informed consent was obtained.      SURGEON:  Jeronimo Lind MD    REASON FOR PROCEDURE:    Tender to palpation over the affected SI joint. Previous diagnostic positivity of medial/dorsal branch blockades of the sensory innervation to the SI joint.   Positive Benjie Test.      SEDATION:  Versed 2mg & Fentanyl 100 mcg IV  ANESTHETIC:  Marcaine 0.5%  STEROID:  Methylprednisolone (DEPO MEDROL) 60mg  TOTAL VOLUME OF SOLUTION:  6 mL    DESCRIPTON OF PROCEDURE:  After obtaining informed consent, IV access was  obtained in the preoperative area.   The patient was taken to the operating room.  The patient was placed in the prone position with a pillow under the abdomen. All pressure points were well padded.  EKG, blood pressure, and pulse oximeter were monitored.  The patient was monitored and sedated by the RN under my direction. The lumbosacral area was prepped with Chloraprep and draped in a sterile fashion.     Under fluoroscopic guidance I identified the points on the lateral margin of the S1 & S2 & S3 posterior foramina bilaterally.  Skin and subcutaneous tissue were anesthetized with 1% lidocaine above each of these points. A spinal needle was introduced under fluoroscopic guidance at the above junctions. Aspiration was negative for blood and CSF.  After confirming the position of the needle with fluoroscope in all views, 0.25 mL of Omnipaque was injected, and after seeing the proper spread along the lateral aspect of each joint line, a total of 1 mL of the anesthetic solution  noted above was injected at each of these points.  Needles were removed intact from each of the areas.  A similar procedure was repeated to block the S1-S3 nerves on the contralateral side.   Onset of analgesia was noted.  Vital signs remained stable throughout.      ESTIMATED BLOOD LOSS:  <5 mL  SPECIMENS:  none    COMPLICATIONS:   No complications were noted. and There was no indication of vascular uptake on live injection of contrast dye.    TOLERANCE & DISCHARGE CONDITION:    The patient tolerated the procedure well.  The patient was transported to the recovery area without difficulties.  The patient was discharged to home under the care of family in stable and satisfactory condition.    PLAN OF CARE:  1. The patient was given our standard instruction sheet.  2. We discussed that Lumbar Medial Branch Blockade is a diagnostic procedure in consideration for radiofrequency ablation if two diagnostic procedures prove to be positive for significant benefit.  If sustained relief of 6 to eight weeks is obtained, then an alternative plan could be therapeutic lumbar branch blockades.  3. The patient is asked to keep a pain log each hour for 8 hours after the procedure today.  4. The patient will  Return to clinic 1 wk  5. The patient will resume all medications as per the medication reconciliation sheet.

## 2017-10-19 ENCOUNTER — OFFICE VISIT (OUTPATIENT)
Dept: ORTHOPEDIC SURGERY | Facility: CLINIC | Age: 52
End: 2017-10-19

## 2017-10-19 ENCOUNTER — OFFICE VISIT (OUTPATIENT)
Dept: PAIN MEDICINE | Facility: CLINIC | Age: 52
End: 2017-10-19

## 2017-10-19 VITALS
HEART RATE: 82 BPM | RESPIRATION RATE: 16 BRPM | OXYGEN SATURATION: 100 % | BODY MASS INDEX: 30.82 KG/M2 | SYSTOLIC BLOOD PRESSURE: 145 MMHG | TEMPERATURE: 97.9 F | WEIGHT: 185 LBS | HEIGHT: 65 IN | DIASTOLIC BLOOD PRESSURE: 96 MMHG

## 2017-10-19 VITALS — HEIGHT: 65 IN | BODY MASS INDEX: 30.99 KG/M2 | WEIGHT: 186 LBS | TEMPERATURE: 97.9 F

## 2017-10-19 DIAGNOSIS — M87.875: ICD-10-CM

## 2017-10-19 DIAGNOSIS — G89.29 OTHER CHRONIC PAIN: Primary | ICD-10-CM

## 2017-10-19 DIAGNOSIS — M99.04 SOMATIC DYSFUNCTION OF SACROILIAC JOINT: ICD-10-CM

## 2017-10-19 DIAGNOSIS — M47.817 LUMBAR AND SACRAL ARTHRITIS: ICD-10-CM

## 2017-10-19 DIAGNOSIS — M77.42 METATARSALGIA OF LEFT FOOT: Primary | ICD-10-CM

## 2017-10-19 DIAGNOSIS — M47.816 LUMBAR FACET ARTHROPATHY: ICD-10-CM

## 2017-10-19 PROCEDURE — 99214 OFFICE O/P EST MOD 30 MIN: CPT | Performed by: NURSE PRACTITIONER

## 2017-10-19 PROCEDURE — 99213 OFFICE O/P EST LOW 20 MIN: CPT | Performed by: ORTHOPAEDIC SURGERY

## 2017-10-19 NOTE — PATIENT INSTRUCTIONS
--------  Education about Radiofrequency Lesioning of Medial Branches:    The medial branch blockade was intended for diagnostic purposes, with the intent of offering the patient Radiofrequency thermal rhizotomy if the MBB is diagnostically effective.  The diagnostic blockade is necessary to determine the likelihood that RF therapy could be efficacious in providing long term relief to the patient.  As indicated above, diagnostic efficacy was established.      In the RF procedure, needles are placed to the joint lines in the same fashion, and after testing, the needle tips are heated to thermally treat the nerves, blocking the nerves by in essence damaging the nerves with the heat treatment.      Medically, a successful RF procedure should provide a patient with 50% pain relief or more for at least 6 months.  We estimate a likelihood of about an 80% chance that medical success will be realized.  We discussed & educated once again that not all patients have a medically successful result, and the patient voices understanding.  However, our clinical experience suggests that successful patients receive relief more in the range of 12 months on average.  (We also discussed that a fortunate minority of patients receive therapeutic success from the MBB, and may not require RF ablation.  If a patient receives more than 8 weeks of relief from MBB, then occasional repeat MBB for therapeutic purposes is a very reasonable alternative therapy.  This course of therapy is consistent with our LCDs according to our CMS  in the area, and therefore other insurance providers should follow accordingly.  We will monitor our patients to screen for these therapeutic responders and will offer RF therapy only when necessary.  However, in this clinical scenario, this therapeutic result was not realized, and therefore Radiofrequency Lesioning is medically necessary.)      We discussed that MBB & RF are not without risks.  Guidelines  regarding anticoagulant use & neuraxial procedures will be respected.  Patients that are ill or otherwise may be at risk for sepsis will not have their spines accessed by neuraxial injections of any type.  This patient will not be offered these therapies if there is an increased risk.   We discussed that there is a risk of postprocedural pain and also a risk of worsening of clinical picture with these procedures as with any neuraxial procedure.  All invasive procedures have risks including but not limited to bleeding, infection, injury, nerve injury, paralysis, coma, death, lack of pain relief, and worsening of clinical picture.      In this education session, all of these topics were covered and the patient voiced understanding.    ---------

## 2017-10-19 NOTE — PROGRESS NOTES
CHIEF COMPLAINT  F/U back pain. Pt has had 100% relief up until yesterday and now only feeling mild pain. She states she thinks she has a stress fracture in left foot and has been wearing a boot for 13-14 weeks. She originally saw her podiatrist for it but got a second opinion from an ortho doctor and is seeing him to go over MRI.    Subjective   Ema Cisse is a 51 y.o. female  who presents to the office for follow-up of procedure.  She completed a Bilateral S1-S3 Dorsal Branch Block   on  10-3-2017 and 10- performed by Dr. Lind for management of back pain. Patient reports 100% relief from the procedure X 1 week.  She has also had previously diagnostically positive SI joint injections on  7/13/17, 6-15-17, and 5-18-17.      Back Pain   This is a chronic problem. The current episode started more than 1 month ago. The problem occurs constantly. Progression since onset: improved since last office visit. The pain is present in the sacro-iliac. The quality of the pain is described as aching. The pain radiates to the left thigh. The pain is at a severity of 1/10. The pain is moderate. The symptoms are aggravated by bending, standing and sitting. Associated symptoms include headaches (on and off). Pertinent negatives include no chest pain, dysuria, numbness or weakness. She has tried home exercises, heat and ice (LEFT SI joint injection--significant ongoing relief; right SI jointi njection--significant relief for 2 weeks) for the symptoms. The treatment provided no relief.      PEG Assessment   What number best describes your pain on average in the past week?1  What number best describes how, during the past week, pain has interfered with your enjoyment of life?0  What number best describes how, during the past week, pain has interfered with your general activity?  0    The following portions of the patient's history were reviewed and updated as appropriate: allergies, current medications, past family  "history, past medical history, past social history, past surgical history and problem list.     Review of Systems   Constitutional: Negative for fatigue.   HENT: Negative for congestion.    Eyes: Negative for visual disturbance.   Respiratory: Negative for cough, shortness of breath and wheezing.    Cardiovascular: Negative.  Negative for chest pain.   Gastrointestinal: Negative for constipation and diarrhea.   Genitourinary: Negative for difficulty urinating and dysuria.   Musculoskeletal: Positive for back pain.   Neurological: Positive for headaches (on and off). Negative for dizziness, weakness and numbness.   Psychiatric/Behavioral: Positive for sleep disturbance (off and on). Negative for confusion, hallucinations and suicidal ideas. The patient is nervous/anxious.        Vitals:    10/19/17 1256   BP: 145/96   Pulse: 82   Resp: 16   Temp: 97.9 °F (36.6 °C)   SpO2: 100%   Weight: 185 lb (83.9 kg)   Height: 65\" (165.1 cm)   PainSc:   1   PainLoc: Back         Objective   Physical Exam   Constitutional: She is oriented to person, place, and time. Vital signs are normal. She appears well-developed and well-nourished.  Non-toxic appearance. No distress.   HENT:   Head: Normocephalic and atraumatic.   Right Ear: Hearing normal.   Left Ear: Hearing normal.   Nose: Nose normal.   Eyes: Conjunctivae and lids are normal. Pupils are equal, round, and reactive to light.   Pulmonary/Chest: Effort normal. No respiratory distress.   Abdominal: Normal appearance.   Musculoskeletal:        Lumbar back: She exhibits decreased range of motion, tenderness and bony tenderness. She exhibits no spasm.   Negative SLR bilaterally  +lumbar facet tenderness/SI joint tenderness   Neurological: She is alert and oriented to person, place, and time. Gait normal.   Psychiatric: She has a normal mood and affect. Her behavior is normal.   Nursing note and vitals reviewed.      Assessment/Plan   Ema was seen today for back " pain.    Diagnoses and all orders for this visit:    Other chronic pain    Somatic dysfunction of sacroiliac joint  -     Case Request    Lumbar facet arthropathy    Lumbar and sacral arthritis  -     Case Request      --- Bilateral S1-S3 RFL   --------  Education about Radiofrequency Lesioning of Medial Branches:    The medial branch blockade was intended for diagnostic purposes, with the intent of offering the patient Radiofrequency thermal rhizotomy if the MBB is diagnostically effective.  The diagnostic blockade is necessary to determine the likelihood that RF therapy could be efficacious in providing long term relief to the patient.  As indicated above, diagnostic efficacy was established.      In the RF procedure, needles are placed to the joint lines in the same fashion, and after testing, the needle tips are heated to thermally treat the nerves, blocking the nerves by in essence damaging the nerves with the heat treatment.      Medically, a successful RF procedure should provide a patient with 50% pain relief or more for at least 6 months.  We estimate a likelihood of about an 80% chance that medical success will be realized.  We discussed & educated once again that not all patients have a medically successful result, and the patient voices understanding.  However, our clinical experience suggests that successful patients receive relief more in the range of 12 months on average.  (We also discussed that a fortunate minority of patients receive therapeutic success from the MBB, and may not require RF ablation.  If a patient receives more than 8 weeks of relief from MBB, then occasional repeat MBB for therapeutic purposes is a very reasonable alternative therapy.  This course of therapy is consistent with our LCDs according to our CMS  in the area, and therefore other insurance providers should follow accordingly.  We will monitor our patients to screen for these therapeutic responders and will  offer RF therapy only when necessary.  However, in this clinical scenario, this therapeutic result was not realized, and therefore Radiofrequency Lesioning is medically necessary.)      We discussed that MBB & RF are not without risks.  Guidelines regarding anticoagulant use & neuraxial procedures will be respected.  Patients that are ill or otherwise may be at risk for sepsis will not have their spines accessed by neuraxial injections of any type.  This patient will not be offered these therapies if there is an increased risk.   We discussed that there is a risk of postprocedural pain and also a risk of worsening of clinical picture with these procedures as with any neuraxial procedure.  All invasive procedures have risks including but not limited to bleeding, infection, injury, nerve injury, paralysis, coma, death, lack of pain relief, and worsening of clinical picture.      In this education session, all of these topics were covered and the patient voiced understanding.    ---------    --- Follow-up after procedure          PATTI REPORT  PATTI report has been reviewed and scanned into the patient's chart.    Date of last PATTI : 10/18/2017    Initial visit with SEHLBIE Najera

## 2017-10-19 NOTE — PROGRESS NOTES
"Foot Follow Up      Patient: Ema Cisse    YOB: 1965 51 y.o. female    Chief Complaints: Foot feels a little better    History of Present Illness: Follows up 13 week is now mild to moderate intermittent aching pain in the left forefoot.  At her last visit it was more around the second but is now more around the third metatarsal according to her.  It has been somewhat improved by use of the boot and she's been stretching twice a day although she does recall I recommended at least 4-5 times per day.    She had a history of bunionectomy done by podiatrist in 2009 but has not had any problems with the foot until recently.  HPI    ROS: Foot pain  Past Medical History:   Diagnosis Date   • Bilateral leg pain    • Low back pain    • Peripheral neuropathy    • Stress fracture     left foot   • Trochanteric bursitis, left hip    • Uterine leiomyoma      Physical Exam:   Vitals:    10/19/17 1452   Temp: 97.9 °F (36.6 °C)   Weight: 186 lb (84.4 kg)   Height: 65\" (165.1 cm)     Well developed with normal mood.Left foot shows no warmth or erythema there is some mild swelling around the third MTP joint but no warmth erythema.  There is slightly restricted motion to the third MTP joint but no gross irritability.  She really has no tenderness or irritability or instability to the second MTP joint today.      Radiology: MRI films and report of the left foot dated 10/12/17 from [facility] were reviewed and showed bone marrow edema in the distal head and neck of the third metatarsal extending proximally more mildly in the proximal metaphysis.    There was some cortical flattening and sclerosis of the distal head of the third metatarsal with surrounding soft tissue.  There is also arthritic changes of first MTP joint in the first metatarsal sesamoid joint.  Mild talonavicular arthrosis with marginal osseous ridging      Assessment/Plan:  1.  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  Left third " metatarsal head and neck and somewhat proximal edema suggestive of osteonecrosis or possible stress fracture.    I reviewed treatment options with her today and nothing is bothering her bad enough consider surgical treatment nor would I recommended at this time.    She will continue with her boot for now as she has been improved over the last 10 days with use of this.  Also recommended that she increase her stretching exercises as recommended to at least 4-5 times per day.    I will see her back in 4 weeks after she returns from her daughter's wedding in [place].    X-rays of her left foot at follow-up

## 2017-11-22 ENCOUNTER — OFFICE VISIT (OUTPATIENT)
Dept: ORTHOPEDIC SURGERY | Facility: CLINIC | Age: 52
End: 2017-11-22

## 2017-11-22 VITALS — TEMPERATURE: 98.4 F | WEIGHT: 185 LBS | BODY MASS INDEX: 30.82 KG/M2 | HEIGHT: 65 IN

## 2017-11-22 DIAGNOSIS — E55.9 VITAMIN D DEFICIENCY: Primary | ICD-10-CM

## 2017-11-22 DIAGNOSIS — M77.42 METATARSALGIA OF LEFT FOOT: ICD-10-CM

## 2017-11-22 DIAGNOSIS — M87.875: ICD-10-CM

## 2017-11-22 PROCEDURE — 99213 OFFICE O/P EST LOW 20 MIN: CPT | Performed by: ORTHOPAEDIC SURGERY

## 2017-11-22 PROCEDURE — 73630 X-RAY EXAM OF FOOT: CPT | Performed by: ORTHOPAEDIC SURGERY

## 2017-11-22 RX ORDER — MELOXICAM 15 MG/1
TABLET ORAL
Qty: 30 TABLET | Refills: 1 | Status: SHIPPED | OUTPATIENT
Start: 2017-11-22 | End: 2017-12-28

## 2017-11-23 NOTE — PROGRESS NOTES
"Foot Follow Up      Patient: Ema Cisse    YOB: 1965 52 y.o. female    Chief Complaints: Foot pain    History of Present Illness: Patient is seen back today with about an 18 week history of mild to moderate intermittent aching pain in the forefoot.  At her last visit she had been in her boot for about 10 days with some improvement.  Since her last visit she went to [place] and did not wear her boot for a week and has been back in  It since then for the last several weeks and it is some better when she is in the boot.  She's not really had much improvement with use of 600 mg ibuprofen twice daily.    At her last visit she had MRI reviewed shows marrow edema in the distal head and neck of the third metatarsal with some flattening and sclerosis of the third metatarsal head.  She had previous bunionectomy with this significantly shortened first metatarsal with some first MTP arthrosis but doesn't have really any focal pain around the first MTP joint  HPI    ROS: Foot pain  Past Medical History:   Diagnosis Date   • Bilateral leg pain    • Low back pain    • Peripheral neuropathy    • Stress fracture     left foot   • Trochanteric bursitis, left hip    • Uterine leiomyoma      Physical Exam:   Vitals:    11/22/17 0854   Temp: 98.4 °F (36.9 °C)   TempSrc: Temporal Artery    Weight: 185 lb (83.9 kg)   Height: 65\" (165.1 cm)     Well developed with normal mood.Left foot shows tenderness to palpation over the second and third MTP joints with some discomfort on range of motion.    There is neutral alignment of the first toe with really no focal discomfort with range of motion of the first MTP joint.      Radiology: 3 views left foot were ordered to evaluate pain reviewed and compared with previous x-rays.  There is not changed compared with previous views there appears to be some flattening of the third metatarsal head and relative elongation of the second metatarsal.  There is some mild arthritic change of " the first MTP joint with neutral alignment and congruent joint      Assessment/Plan:  1.  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    2 left third metatarsal head and neck edema suggestive of possible stress fracture with probable osteonecrosis of the metatarsal head.    3.  Second metatarsalgia most likely due to overload    We discussed treatment options and nothing I would know to do from an operative standpoint at this time , but we may need to do possible realignment osteotomy the third metatarsal or possible third metatarsal head partial resection with shortening osteotomy of the second.    I recommended that she use her boot and really unload this she would need to use a scooter and get office completely which she says she really can't do at this time.    We'll have her stop using intermittent ibuprofen and begin meloxicam 15 mg one by mouth daily with GI precautions #30 with 1 refill    We'll check a vitamin D level on her as this may need to be augmented MRI also going to get a CT scan with 3-D recon of her left foot to evaluate the extent of change in the third metatarsal head.    She understands to call to schedule follow-up appointment once CT

## 2017-11-29 ENCOUNTER — HOSPITAL ENCOUNTER (OUTPATIENT)
Dept: CT IMAGING | Facility: HOSPITAL | Age: 52
Discharge: HOME | End: 2017-11-29
Attending: ORTHOPAEDIC SURGERY | Admitting: ORTHOPAEDIC SURGERY

## 2017-11-29 ENCOUNTER — LAB (OUTPATIENT)
Dept: LAB | Facility: HOSPITAL | Age: 52
End: 2017-11-29

## 2017-11-29 DIAGNOSIS — M77.42 METATARSALGIA OF LEFT FOOT: ICD-10-CM

## 2017-11-29 DIAGNOSIS — M87.875: ICD-10-CM

## 2017-11-29 DIAGNOSIS — E55.9 VITAMIN D DEFICIENCY: ICD-10-CM

## 2017-11-29 LAB — 25(OH)D3 SERPL-MCNC: 70.1 NG/ML (ref 30–100)

## 2017-11-29 PROCEDURE — 73700 CT LOWER EXTREMITY W/O DYE: CPT

## 2017-11-29 PROCEDURE — 82306 VITAMIN D 25 HYDROXY: CPT

## 2017-11-29 PROCEDURE — 36415 COLL VENOUS BLD VENIPUNCTURE: CPT

## 2017-11-29 PROCEDURE — 76377 3D RENDER W/INTRP POSTPROCES: CPT

## 2017-12-04 ENCOUNTER — TELEPHONE (OUTPATIENT)
Dept: ORTHOPEDIC SURGERY | Facility: CLINIC | Age: 52
End: 2017-12-04

## 2017-12-04 DIAGNOSIS — M77.42 METATARSALGIA OF LEFT FOOT: ICD-10-CM

## 2017-12-04 DIAGNOSIS — M19.079 ARTHRITIS OF JOINT OF TOE: Primary | ICD-10-CM

## 2017-12-04 NOTE — TELEPHONE ENCOUNTER
I spoke with patient she still having quite a bit of discomfort and is using her boot.  Her vitamin D level was very good at around 70.    Her CT scan did show some cartilage abnormalities of the third metatarsal phalangeal joint.    We discussed operative treatment options with can tell debridement of that joint and probable shortening osteotomy of the second metatarsal.    She like to hold on anything from a surgical standpoint at this time.    We discussed temporizing measures and we'll see about having her get a radiographically guided very small steroid injection in the left third MTP joint.  She clearly understands to call to schedule follow-up appointment once this has been scheduled in her new point he should be about 2 weeks after the injection.  She appreciated the call

## 2017-12-07 ENCOUNTER — TELEPHONE (OUTPATIENT)
Dept: ORTHOPEDIC SURGERY | Facility: CLINIC | Age: 52
End: 2017-12-07

## 2017-12-07 RX ORDER — DIAZEPAM 10 MG/1
10 TABLET ORAL ONCE
Qty: 1 TABLET | Refills: 0 | Status: SHIPPED | OUTPATIENT
Start: 2017-12-07 | End: 2017-12-07

## 2017-12-07 NOTE — TELEPHONE ENCOUNTER
PT CALLED WANTS TO KNOW IF SHE CAN BE PUT UNDER WHEN SHE HAS INJECTION IN HER FOOT NEXT WEEK YOU ORDERED FOR HER, PLEASE ADVISE/DM

## 2017-12-07 NOTE — TELEPHONE ENCOUNTER
"I spoke with patient she was somewhat anxious about her joint injection to be done under fluoroscopic guidance by the radiology department next week.  I told her that she could not be \"put under\" for this.  That they will use numbing medicine prior to the injection and I could call in Valium 10 mg per to take about an hour before the procedure but she should not drive to her from the procedure or for at least 12 hours after.  Also instructed her to continue to use her boot after the injection until follow-up appointment  "

## 2017-12-13 ENCOUNTER — HOSPITAL ENCOUNTER (OUTPATIENT)
Dept: GENERAL RADIOLOGY | Facility: HOSPITAL | Age: 52
Discharge: HOME | End: 2017-12-13
Attending: ORTHOPAEDIC SURGERY | Admitting: ORTHOPAEDIC SURGERY

## 2017-12-13 DIAGNOSIS — M77.42 METATARSALGIA OF LEFT FOOT: ICD-10-CM

## 2017-12-13 DIAGNOSIS — M19.079 ARTHRITIS OF JOINT OF TOE: ICD-10-CM

## 2017-12-13 PROCEDURE — 25010000002 METHYLPREDNISOLONE PER 40 MG: Performed by: RADIOLOGY

## 2017-12-13 PROCEDURE — 77002 NEEDLE LOCALIZATION BY XRAY: CPT

## 2017-12-13 PROCEDURE — 0 IOPAMIDOL 61 % SOLUTION: Performed by: RADIOLOGY

## 2017-12-13 RX ORDER — METHYLPREDNISOLONE SODIUM SUCCINATE 40 MG/ML
40 INJECTION, POWDER, LYOPHILIZED, FOR SOLUTION INTRAMUSCULAR; INTRAVENOUS
Status: COMPLETED | OUTPATIENT
Start: 2017-12-13 | End: 2017-12-13

## 2017-12-13 RX ORDER — BUPIVACAINE HYDROCHLORIDE 2.5 MG/ML
10 INJECTION, SOLUTION EPIDURAL; INFILTRATION; INTRACAUDAL ONCE
Status: COMPLETED | OUTPATIENT
Start: 2017-12-13 | End: 2017-12-13

## 2017-12-13 RX ORDER — LIDOCAINE HYDROCHLORIDE 10 MG/ML
10 INJECTION, SOLUTION INFILTRATION; PERINEURAL ONCE
Status: COMPLETED | OUTPATIENT
Start: 2017-12-13 | End: 2017-12-13

## 2017-12-13 RX ADMIN — IOPAMIDOL 0.5 ML: 612 INJECTION, SOLUTION INTRAVENOUS at 14:34

## 2017-12-13 RX ADMIN — LIDOCAINE HYDROCHLORIDE 1 ML: 10 INJECTION, SOLUTION INFILTRATION; PERINEURAL at 14:34

## 2017-12-13 RX ADMIN — METHYLPREDNISOLONE SODIUM SUCCINATE 20 MG: 40 INJECTION, POWDER, LYOPHILIZED, FOR SOLUTION INTRAMUSCULAR; INTRAVENOUS at 14:34

## 2017-12-13 RX ADMIN — BUPIVACAINE HYDROCHLORIDE 0.5 ML: 2.5 INJECTION, SOLUTION EPIDURAL; INFILTRATION; INTRACAUDAL; PERINEURAL at 14:34

## 2017-12-19 ENCOUNTER — OFFICE VISIT (OUTPATIENT)
Dept: OBSTETRICS AND GYNECOLOGY | Age: 52
End: 2017-12-19

## 2017-12-19 VITALS — DIASTOLIC BLOOD PRESSURE: 78 MMHG | SYSTOLIC BLOOD PRESSURE: 138 MMHG | HEIGHT: 64 IN

## 2017-12-19 DIAGNOSIS — K21.9 GASTROESOPHAGEAL REFLUX DISEASE, ESOPHAGITIS PRESENCE NOT SPECIFIED: ICD-10-CM

## 2017-12-19 DIAGNOSIS — Z00.00 ANNUAL PHYSICAL EXAM: Primary | ICD-10-CM

## 2017-12-19 PROCEDURE — 99396 PREV VISIT EST AGE 40-64: CPT | Performed by: OBSTETRICS & GYNECOLOGY

## 2017-12-19 RX ORDER — ESTRADIOL AND NORETHINDRONE ACETATE 1; .5 MG/1; MG/1
1 TABLET, FILM COATED ORAL DAILY
Qty: 90 TABLET | Refills: 3 | Status: SHIPPED | OUTPATIENT
Start: 2017-12-19 | End: 2018-12-26

## 2017-12-19 RX ORDER — VORTIOXETINE 20 MG/1
TABLET, FILM COATED ORAL
COMMUNITY
Start: 2017-12-07 | End: 2017-12-19

## 2017-12-19 NOTE — PROGRESS NOTES
Subjective   Ema Cisse is a 52 y.o. female is being seen today for an annual exam.  Chief Complaint   Patient presents with   • Gynecologic Exam   .    History of Present Illness  Patient is here for an annual check.  Overall she doing well so she GYN wise no problems there bowel some bladder doing well there is no new family history.  Her daughter did get  a month ago [place] that went well.  1 major problem is she's had a foot problem since July has 1 a boot since July said MRI CT scan and we went over the dose in her in the office and she did seek a second opinion and distal try to figure out exactly what's going on with possible surgery.  She also discussed and I brought up again breast reduction again at this point was good for done first and go from there.  No other complaints  The following portions of the patient's history were reviewed and updated as appropriate: allergies, current medications, past family history, past medical history, past social history, past surgical history and problem list.    Vitals:    12/19/17 1429   BP: 138/78       PAST MEDICAL HISTORY  Past Medical History:   Diagnosis Date   • Bilateral leg pain    • Low back pain    • Peripheral neuropathy    • Stress fracture     left foot   • Trochanteric bursitis, left hip    • Uterine leiomyoma      OB History     No data available        Past Surgical History:   Procedure Laterality Date   • BUNIONECTOMY  2008   • CHOLECYSTECTOMY  1984   • KNEE SURGERY  07/2005    ACL Removal   • SINUS SURGERY  2010     Family History   Problem Relation Age of Onset   • Hypertension Other    • Heart disease Other    • Hypertension Mother    • Cancer Father      lung   • Heart disease Father      History   Smoking Status   • Never Smoker   Smokeless Tobacco   • Never Used       Current Outpatient Prescriptions:   •  Cetirizine HCl 10 MG capsule, Take 10 mg by mouth Daily., Disp: , Rfl:   •  meloxicam (MOBIC) 15 MG tablet, 1 PO Daily with food.,  Disp: 30 tablet, Rfl: 1  •  MIMVEY 1-0.5 MG per tablet, Take 1 tablet by mouth Daily., Disp: 90 tablet, Rfl: 3  •  pantoprazole (PROTONIX) 40 MG EC tablet, 40 mg Daily., Disp: , Rfl:   •  SUMAtriptan (IMITREX) 50 MG tablet, Take 50 mg by mouth 1 (One) Time As Needed., Disp: , Rfl:   •  tiZANidine (ZANAFLEX) 4 MG tablet, Take 4 mg by mouth Every 8 (Eight) Hours As Needed., Disp: , Rfl:   •  traMADol (ULTRAM) 50 MG tablet, Take 50 mg by mouth Every 6 (Six) Hours As Needed., Disp: , Rfl:   •  traZODone (DESYREL) 50 MG tablet, Take 50 mg by mouth Every Night., Disp: , Rfl:     There is no immunization history on file for this patient.    Review of Systems   Constitutional: Negative for chills, fatigue, fever and unexpected weight change.   Respiratory: Negative for shortness of breath and wheezing.    Cardiovascular: Negative for chest pain.   Gastrointestinal: Negative for abdominal distention, abdominal pain, blood in stool, constipation, diarrhea and nausea.   Genitourinary: Negative for difficulty urinating, dyspareunia, dysuria, frequency, hematuria, menstrual problem, pelvic pain, urgency and vaginal discharge.   Musculoskeletal: Positive for gait problem.   Skin: Negative for rash.       Objective   Physical Exam   Constitutional: She is oriented to person, place, and time. Vital signs are normal. She appears well-developed and well-nourished.   Neck: No thyromegaly present.   Cardiovascular: Normal rate, regular rhythm and normal heart sounds.    Pulmonary/Chest: Effort normal. Right breast exhibits no inverted nipple, no mass, no nipple discharge, no skin change and no tenderness. Left breast exhibits no inverted nipple, no mass, no nipple discharge, no skin change and no tenderness. Breasts are symmetrical. There is no breast swelling.   Abdominal: Soft.   Genitourinary: Vagina normal and uterus normal. No breast tenderness, discharge or bleeding. Pelvic exam was performed with patient supine. No labial  fusion. There is no rash, tenderness, lesion or injury on the right labia. There is no rash, tenderness, lesion or injury on the left labia. Cervix exhibits no motion tenderness, no discharge and no friability. Right adnexum displays no mass, no tenderness and no fullness. Left adnexum displays no mass, no tenderness and no fullness.   Neurological: She is alert and oriented to person, place, and time.   Psychiatric: She has a normal mood and affect.   Vitals reviewed.        Assessment/Plan   Ema was seen today for gynecologic exam.    Diagnoses and all orders for this visit:    Annual physical exam    Gastroesophageal reflux disease, esophagitis presence not specified    Other orders  -     MIMVEY 1-0.5 MG per tablet; Take 1 tablet by mouth Daily.    Overall normal exam today.  Pap smear will be due next year.  Mammogram was done today.  Colonoscopy in bones are up-to-date.  Diet and excise were discussed.  I did refill her HRT we talked by cutting one half each week.  Come back in a year

## 2017-12-28 ENCOUNTER — OFFICE VISIT (OUTPATIENT)
Dept: ORTHOPEDIC SURGERY | Facility: CLINIC | Age: 52
End: 2017-12-28

## 2017-12-28 VITALS — WEIGHT: 192.8 LBS | BODY MASS INDEX: 32.91 KG/M2 | TEMPERATURE: 97.2 F | HEIGHT: 64 IN

## 2017-12-28 DIAGNOSIS — M87.875: ICD-10-CM

## 2017-12-28 DIAGNOSIS — M77.42 METATARSALGIA OF LEFT FOOT: Primary | ICD-10-CM

## 2017-12-28 PROCEDURE — 99213 OFFICE O/P EST LOW 20 MIN: CPT | Performed by: ORTHOPAEDIC SURGERY

## 2017-12-28 RX ORDER — ALBUTEROL SULFATE 2.5 MG/3ML
SOLUTION RESPIRATORY (INHALATION)
COMMUNITY
Start: 2017-12-27 | End: 2018-02-20

## 2017-12-28 RX ORDER — PREDNISONE 1 MG/1
TABLET ORAL
COMMUNITY
Start: 2017-12-27 | End: 2018-02-20

## 2017-12-28 RX ORDER — PROMETHAZINE HYDROCHLORIDE AND CODEINE PHOSPHATE 6.25; 1 MG/5ML; MG/5ML
SYRUP ORAL
COMMUNITY
Start: 2017-12-26 | End: 2018-02-20

## 2017-12-29 NOTE — PROGRESS NOTES
"Foot Follow Up      Patient: Ema Cisse    YOB: 1965 52 y.o. female    Chief Complaints: Toe feels better    History of Present Illness: Patient follows up with a chronic history about 6 months mild to moderate intermittent aching pain in the forefoot mainly around the third MTP joint.  She's had previous bunion surgery and MRI had shown some marrow edema in the distal head and neck of the third metatarsal with some flattening and sclerosis of the third metatarsal head.    Since her last visit she had a CT scan and I spoke with her on the phone about this and she was socially sent for radiographically guided injection of the third MTP joint and is had significant pain relief from that.  She has no complaints of pain in the second today.    She's been getting around the house without wearing the boot very much and going barefoot area and only bothers her a little bit but \"not a lot\" she's only been stretching once or twice a day.  At her last visit she was also sent to check her vitamin D level  HPI    ROS: Foot pain  Past Medical History:   Diagnosis Date   • Bilateral leg pain    • Low back pain    • Peripheral neuropathy    • Stress fracture     left foot   • Trochanteric bursitis, left hip    • Uterine leiomyoma      Physical Exam:   Vitals:    12/28/17 1435   Temp: 97.2 °F (36.2 °C)   TempSrc: Temporal Artery    Weight: 87.5 kg (192 lb 12.8 oz)   Height: 162.6 cm (64\")     Well developed with normal mood.She is with her mother.  Left foot showed no warmth erythema or swelling.  There was neutral alignment to the second and third toes and really had no pain irritability or instability to the second or third MTP joints.  Neutral dorsiflexion a heel cord.      Radiology: CT scan films and report of the left foot dated 11/29/17 were reviewed shows flattening and deformity of the articular surface of the third metatarsal head with a 3-4 mm subcortical cyst and small ridge of marginal osteophyte " along the lateral side of the metatarsal head.  There is some partially calcified material within the joint capsule dorsal to the metatarsal head measuring about 8 x 3 x 5 mm there is some degenerative changes of first MTP joint with prior bunionectomy    Vitamin D 11/29/17 70.1          Assessment/Plan: 1.  History of prior bunion correction with subsequent second and third metatarsal overload with probable osteonecrosis and stress reaction of the third metatarsal head with flattening and subcortical cyst formation and calcified material.    We discussed treatment options and as her toe is feeling much better I would not recommend surgical treatment.  After review the CT scans a don't feel that she would be a candidate for rotational osteotomy and should she fail to continue to improve with conservative measures would most likely require debridement of the third metatarsal head and may possibly need to do a second metatarsal shortening osteotomy to prevent overload.    For now we'll have her wean out of the boot into accommodative sturdy shoes with good cushion an counseled her to increase her stretching at least 3-4 times daily.    Her vitamin D level was in the normal range and does not need to be augmented.    She'll continue with meloxicam and I will see her back in 6 weeks' x-rays of her left foot if she still having pain

## 2018-01-09 ENCOUNTER — DOCUMENTATION (OUTPATIENT)
Dept: PAIN MEDICINE | Facility: CLINIC | Age: 53
End: 2018-01-09

## 2018-01-09 ENCOUNTER — OUTSIDE FACILITY SERVICE (OUTPATIENT)
Dept: PAIN MEDICINE | Facility: CLINIC | Age: 53
End: 2018-01-09

## 2018-01-09 PROCEDURE — 64636 DESTROY L/S FACET JNT ADDL: CPT | Performed by: ANESTHESIOLOGY

## 2018-01-09 PROCEDURE — 64635 DESTROY LUMB/SAC FACET JNT: CPT | Performed by: ANESTHESIOLOGY

## 2018-01-09 NOTE — PROGRESS NOTES
Bilateral S1-S3 Medial and Dorsal Branch Radiofrequency Lesioning  [facility]      PREOPERATIVE DIAGNOSIS:  Sacroiliac Dysfunction, bilaterally.      POSTOPERATIVE DIAGNOSIS:  Same as above.      PROCEDURE:   Bilateral Lumbrosacral Medial Branch and Sacral Dorsal Branch Nerve Radiofrequency lesioning, with fluoroscopy:  The Dorsal Branches of S1-3      PRE-PROCEDURE DISCUSSION WITH PATIENT:    Risks and complications were discussed with the patient prior to starting the procedure and informed consent was obtained.      SURGEON:  Jeronimo Lind MD    REASON FOR PROCEDURE:    Previous Dx+ blockades.  SI dysf on hist & phys exam    SEDATION:  Versed 2mg and Fentanyl 200mcg IV  TIME OF PROCEDURE:  The intraoperative procedure time after administration of the sedatives was 28 minutes.      ANESTHETIC:  Lidocaine 2%  STEROID:  NONE  TOTAL VOLUME OF SOLUTION:  18 ml      DESCRIPTON OF PROCEDURE:  After obtaining informed consent, IV access was  obtained in the preoperative area.   The patient was taken to the operating room.  The patient was placed in the prone position with a pillow under the abdomen. All pressure points were well padded.  EKG, blood pressure, and pulse oximeter were monitored.  The patient was monitored and sedated by the RN under my direction. The lumbosacral area was prepped with Chloraprep and draped in a sterile fashion.     Under fluoroscopic guidance was identified the points on the lateral margin of the S1 & S2 & S3 posterior foramina.  Skin and subcutaneous tissue were anesthetized with 1% lidocaine above each of these points.     At the lateral margins of the S1-S3 foramina bilaterally, two needles were placed in parallel, about 2-3 mm apart, contacting periosteum at the lateral margin of the foramen at each level and not entering any of the foramina.  Lateral fluoroscopic imaging confirmed.   Aspiration was negative for blood and CSF.  Motor testing was confirmed to be  negative at 3V and 2Hz for any radicular stimulation.  Then 1mL of the local anesthetic was instilled in each needle.  Two minutes elapsed, and during this time a lateral fluoroscopic view was confirmed again to ensure the needles had not advanced nor retracted.  Then, Radiofrequency Lesioning was initiated for 1.5 minutes at 80 degrees Celsius.  Needles were removed intact from each of the areas.       Vital signs remained stable throughout.        ESTIMATED BLOOD LOSS:  minimal  SPECIMENS:  none    COMPLICATIONS:   No complications were noted.    TOLERANCE & DISCHARGE CONDITION:    The patient tolerated the procedure well.  The patient was transported to the recovery area without difficulties.  The patient was discharged to home under the care of family in stable and satisfactory condition.    PLAN OF CARE:  1. The patient was given our standard instruction sheet.  2. The patient is asked to keep a pain log each hour for 8 hours after the procedure today.  3. The patient will  Return to clinic 6 wks  4. The patient will resume all medications as per the medication reconciliation sheet.

## 2018-01-29 ENCOUNTER — OFFICE VISIT CONVERTED (OUTPATIENT)
Dept: FAMILY MEDICINE CLINIC | Age: 53
End: 2018-01-29
Attending: NURSE PRACTITIONER

## 2018-02-09 ENCOUNTER — OFFICE VISIT (OUTPATIENT)
Dept: ORTHOPEDIC SURGERY | Facility: CLINIC | Age: 53
End: 2018-02-09

## 2018-02-09 VITALS — TEMPERATURE: 99.2 F | BODY MASS INDEX: 33.73 KG/M2 | HEIGHT: 64 IN | WEIGHT: 197.6 LBS

## 2018-02-09 DIAGNOSIS — M77.42 METATARSALGIA OF LEFT FOOT: ICD-10-CM

## 2018-02-09 DIAGNOSIS — M87.875: Primary | ICD-10-CM

## 2018-02-09 PROCEDURE — 99213 OFFICE O/P EST LOW 20 MIN: CPT | Performed by: ORTHOPAEDIC SURGERY

## 2018-02-09 PROCEDURE — 73630 X-RAY EXAM OF FOOT: CPT | Performed by: ORTHOPAEDIC SURGERY

## 2018-02-09 NOTE — PROGRESS NOTES
"Foot Follow Up      Patient: Ema Cisse    YOB: 1965 52 y.o. female    Chief Complaints: Foot pain    History of Present Illness: Patient has been followed for chronic history of about 7-8 months mild to moderate intermittent aching pain in the forefoot mainly around the third MTP joint.  She had radiographically guided injection on 12/13/17 with significant relief and was still experiencing decent relief at her last visit on 12/18/17.  However about 7-10 days ago she had increased pain not only at the third MTP joint but also some along the first and second with some associated swelling.  HPI    ROS: Foot pain  Past Medical History:   Diagnosis Date   • Bilateral leg pain    • Low back pain    • Peripheral neuropathy    • Stress fracture     left foot   • Trochanteric bursitis, left hip    • Uterine leiomyoma      Physical Exam:   Vitals:    02/09/18 1538   Temp: 99.2 °F (37.3 °C)   TempSrc: Temporal Artery    Weight: 89.6 kg (197 lb 9.6 oz)   Height: 162.6 cm (64\")     Well developed with normal mood.She is with a family member today.  There is mild to moderate swelling around the third MTP joint but no warmth erythema there is some palpable prominence and discomfort with range of motion but no instability.  There was some discomfort along the second metatarsal neck and first metatarsal head/neck area with palpation.      Radiology: 3 views left foot were ordered to evaluate pain reviewed with patient and her family member and compared with previous x-rays.  There is been some increased flattening and sclerosis of the third metatarsal head with some questionable area of increased periosteal thickening along the second.  There is some degenerative change the first MTP joint      Assessment/Plan:  1.  Left third metatarsal head stress fracture or Freiberg's infraction  2.  Onset of new second and first metatarsal pain    She can get back into her boot which she already has we need to get a new MRI " as her symptoms have changed to evaluate her third metatarsal head as well as to make sure there is no stress fracture of the first or second metatarsal.  Hopefully this is just an exacerbation of arthritis of the first MTP joint possible stress overload of the second metatarsal.    We discussed possible surgical treatment options would include debridement of the third MTP joint and possible shortening osteotomy of second.    She says she really can't do anything from a surgical standpoint at least until the summer.    We really need to get this MRI to see if there is a stress fracture as this will change our treatment algorithm it involves the first or second.    We had a pleasant visit and she clearly understands to call to schedule follow-up appointment after MRI has been scheduled to review results in the office.

## 2018-02-20 ENCOUNTER — OFFICE VISIT (OUTPATIENT)
Dept: PAIN MEDICINE | Facility: CLINIC | Age: 53
End: 2018-02-20

## 2018-02-20 VITALS
WEIGHT: 200 LBS | SYSTOLIC BLOOD PRESSURE: 139 MMHG | HEART RATE: 89 BPM | HEIGHT: 64 IN | DIASTOLIC BLOOD PRESSURE: 97 MMHG | RESPIRATION RATE: 16 BRPM | OXYGEN SATURATION: 99 % | TEMPERATURE: 97.7 F | BODY MASS INDEX: 34.15 KG/M2

## 2018-02-20 DIAGNOSIS — G89.29 OTHER CHRONIC PAIN: Primary | ICD-10-CM

## 2018-02-20 DIAGNOSIS — M47.816 LUMBAR FACET ARTHROPATHY: ICD-10-CM

## 2018-02-20 DIAGNOSIS — M99.04 SOMATIC DYSFUNCTION OF SACROILIAC JOINT: ICD-10-CM

## 2018-02-20 PROCEDURE — 99214 OFFICE O/P EST MOD 30 MIN: CPT | Performed by: NURSE PRACTITIONER

## 2018-02-20 RX ORDER — NAPROXEN 500 MG/1
500 TABLET ORAL 2 TIMES DAILY WITH MEALS
Qty: 60 TABLET | Refills: 4 | Status: SHIPPED | OUTPATIENT
Start: 2018-02-20 | End: 2019-01-21 | Stop reason: SDUPTHER

## 2018-02-20 NOTE — PROGRESS NOTES
CHIEF COMPLAINT  Pt continues to have adequate (40%) pain reduction following 1/9/18 Bilat. S1-S3 RF.    Subjective   Ema Cisse is a 52 y.o. female  who presents to the office for follow-up of procedure.  She completed a bilateral S1-S3   on  1/9/2018 performed by Dr. Lind for management of low back pain, sacroiliac joint pain. Patient reports 40% relief from the procedure. She reports that she does still have some pain and stiffness first thing in the mornings.  It tends to improve with activity. She sometimes takes OTC Naproxen or Meloxicam, she does not see much relief from these.      Back Pain   This is a chronic problem. The current episode started more than 1 month ago. The problem occurs constantly. The problem has been gradually improving since onset. The pain is present in the sacro-iliac. The quality of the pain is described as aching. The pain radiates to the left thigh. The pain is at a severity of 0/10 (0-3/10 depending on activity). The pain is moderate. The symptoms are aggravated by bending, standing and sitting. Pertinent negatives include no chest pain, dysuria, headaches (on and off), numbness or weakness. She has tried home exercises, heat and ice (lumbar/si RFL) for the symptoms. The treatment provided moderate relief.      PEG Assessment   What number best describes your pain on average in the past week?2  What number best describes how, during the past week, pain has interfered with your enjoyment of life?3  What number best describes how, during the past week, pain has interfered with your general activity?  3    The following portions of the patient's history were reviewed and updated as appropriate: allergies, current medications, past family history, past medical history, past social history, past surgical history and problem list.    Review of Systems   Constitutional: Negative for activity change, appetite change and fatigue.   HENT: Negative for congestion.    Eyes: Negative  "for visual disturbance.   Respiratory: Negative for cough, shortness of breath and wheezing.    Cardiovascular: Negative.  Negative for chest pain.   Gastrointestinal: Negative for constipation, diarrhea and nausea.   Genitourinary: Negative for difficulty urinating and dysuria.   Musculoskeletal: Positive for back pain.   Neurological: Negative for dizziness, weakness, numbness and headaches (on and off).   Psychiatric/Behavioral: Positive for sleep disturbance (off and on). Negative for confusion, hallucinations and suicidal ideas. The patient is nervous/anxious.      Vitals:    02/20/18 1527   BP: 139/97   Pulse: 89   Resp: 16   Temp: 97.7 °F (36.5 °C)   SpO2: 99%   Weight: 90.7 kg (200 lb)   Height: 162.6 cm (64.02\")   PainSc: 0-No pain  Comment: LBP ranges from 0-3/10   PainLoc: Back       Objective   Physical Exam   Constitutional: She is oriented to person, place, and time. Vital signs are normal. She appears well-developed and well-nourished.  Non-toxic appearance. No distress.   HENT:   Head: Normocephalic and atraumatic.   Right Ear: Hearing normal.   Left Ear: Hearing normal.   Nose: Nose normal.   Eyes: Conjunctivae and lids are normal. Pupils are equal, round, and reactive to light.   Pulmonary/Chest: Effort normal. No respiratory distress.   Abdominal: Normal appearance.   Musculoskeletal:        Lumbar back: She exhibits no spasm.   Negative SLR bilaterally  +lumbar facet tenderness/SI joint tenderness   Neurological: She is alert and oriented to person, place, and time. Gait (wearing walking boot) abnormal.   Psychiatric: She has a normal mood and affect. Her behavior is normal.   Nursing note and vitals reviewed.      Assessment/Plan   Ema was seen today for back pain.    Diagnoses and all orders for this visit:    Other chronic pain    Somatic dysfunction of sacroiliac joint    Lumbar facet arthropathy    Other orders  -     naproxen (NAPROSYN) 500 MG tablet; Take 1 tablet by mouth 2 (Two) Times " a Day With Meals.      --- Trial of Naproxen.  Discussed medication with the patient.  Included in this discussion was the potential for side effects and adverse events.  Patient verbalized understanding and wished to proceed.  Prescription will be sent to pharmacy.  --- Follow-up PRN         PATTI REPORT    As part of the patient's treatment plan, I am prescribing controlled substances. The patient has been made aware of appropriate use of such medications, including potential risk of somnolence, limited ability to drive and/or work safely, and the potential for dependence or overdose. It has also bee made clear that these medications are for use by this patient only, without concomitant use of alcohol or other substances unless prescribed.     Patient has completed prescribing agreement detailing terms of continued prescribing of controlled substances, including monitoring PATTI reports, urine drug screening, and pill counts if necessary. The patient is aware that inappropriate use will results in cessation of prescribing such medications.    PATTI report has been reviewed and scanned into the patient's chart.    Date of last PATTI : 2/19/2018    History and physical exam exhibit continued safe and appropriate use of controlled substances.     EMR Dragon/Transcription disclaimer:   Much of this encounter note is an electronic transcription/translation of spoken language to printed text. The electronic translation of spoken language may permit erroneous, or at times, nonsensical words or phrases to be inadvertently transcribed; Although I have reviewed the note for such errors, some may still exist.

## 2018-03-15 ENCOUNTER — OFFICE VISIT (OUTPATIENT)
Dept: ORTHOPEDIC SURGERY | Facility: CLINIC | Age: 53
End: 2018-03-15

## 2018-03-15 VITALS — BODY MASS INDEX: 33.97 KG/M2 | TEMPERATURE: 99.5 F | WEIGHT: 199 LBS | HEIGHT: 64 IN

## 2018-03-15 DIAGNOSIS — M77.42 METATARSALGIA OF LEFT FOOT: Primary | ICD-10-CM

## 2018-03-15 DIAGNOSIS — M87.875: ICD-10-CM

## 2018-03-15 PROCEDURE — 99213 OFFICE O/P EST LOW 20 MIN: CPT | Performed by: ORTHOPAEDIC SURGERY

## 2018-03-15 NOTE — PROGRESS NOTES
"Foot Follow Up      Patient: Ema Cisse    YOB: 1965 52 y.o. female    Chief Complaints: Foot pain    History of Present Illness: Patient is followed with an 8-9 month the chronic moderate intermittent aching pain in the forefoot mainly around the third more so than second MTP joint.  She had injection done on 12/13/17 with significant relief that lasted for about a month or so.    She was last seen on 2/9/18 and at that point had some increased pain to that area also along the first and second metatarsal with some associated swelling.  She was placed back into her boot and sent for MRI.  She canceled several follow-up appointments and has remained in the boot with some improvement in the pain that she was having along the first more so than second metatarsal but still with some to the third MTP joint.  HPI    ROS: Foot pain  Past Medical History:   Diagnosis Date   • Bilateral leg pain    • Low back pain    • Peripheral neuropathy    • Stress fracture     left foot   • Trochanteric bursitis, left hip    • Uterine leiomyoma      Physical Exam:   Vitals:    03/15/18 1427   Temp: 99.5 °F (37.5 °C)   TempSrc: Temporal Artery    Weight: 90.3 kg (199 lb)   Height: 162.6 cm (64\")     Well developed with normal mood. She is with her mother.  Examination of left foot shows mild swelling around the third MTP joint with limited motion with mild irritability but no gross instability.  There was mild discomfort beneath the second metatarsal head but minimal if any swelling around the second MTP joint and no gross instability or irritability.      Radiology: MRI films and report of the left foot dated 2/20/18 from [facility] were reviewed which showed persistent moderate cortical flattening with impaction deformity of the third metatarsal head with some bony edema in the metatarsal head and metatarsal neck.  However the degree of bony edema has improved slightly since previous exam last fall.  There is some " thickening of the MTP capsule but no gross extensile effusion.  There are postoperative changes from her bunion correction with some mild edema and spurring at the sesamoid articulations.      Assessment/Plan:  Left third metatarsal head subchondral impaction injury with cortical deformity  2.  Left first MTP mild arthritic change    Thankfully did not see any stress fracture the second metatarsal or the first    We discussed treatment options and she really can't do anything from an operative standpoint until the summer if it comes to that.  She asked about getting another cortisone injection which I think should be okay to do that short he has significant cartilage injury.    She will continue with stretching exercises but is only been doing it twice a day and I redemonstrated this for her to do at least 4 times a day to help offload this area and we'll send her for radiographically guided steroid injection of the third MTP joint.    Subsequent to that she will try weaning out of her boot around the house but using a sturdy stiff soled shoe.  If she does well with that for a period of 2 weeks she will think about it boot altogether.    We'll check her back in 6 weeks x-rays of her left foot

## 2018-04-03 ENCOUNTER — HOSPITAL ENCOUNTER (OUTPATIENT)
Dept: GENERAL RADIOLOGY | Facility: HOSPITAL | Age: 53
Discharge: HOME | End: 2018-04-03
Attending: ORTHOPAEDIC SURGERY | Admitting: ORTHOPAEDIC SURGERY

## 2018-04-03 DIAGNOSIS — M87.875: ICD-10-CM

## 2018-04-03 DIAGNOSIS — M77.42 METATARSALGIA OF LEFT FOOT: ICD-10-CM

## 2018-04-03 PROCEDURE — 25010000002 METHYLPREDNISOLONE PER 40 MG: Performed by: RADIOLOGY

## 2018-04-03 PROCEDURE — 77002 NEEDLE LOCALIZATION BY XRAY: CPT

## 2018-04-03 PROCEDURE — 25010000002 IOPAMIDOL 61 % SOLUTION: Performed by: RADIOLOGY

## 2018-04-03 RX ORDER — LIDOCAINE HYDROCHLORIDE 10 MG/ML
10 INJECTION, SOLUTION INFILTRATION; PERINEURAL ONCE
Status: COMPLETED | OUTPATIENT
Start: 2018-04-03 | End: 2018-04-03

## 2018-04-03 RX ORDER — METHYLPREDNISOLONE SODIUM SUCCINATE 40 MG/ML
40 INJECTION, POWDER, LYOPHILIZED, FOR SOLUTION INTRAMUSCULAR; INTRAVENOUS
Status: COMPLETED | OUTPATIENT
Start: 2018-04-03 | End: 2018-04-03

## 2018-04-03 RX ORDER — BUPIVACAINE HYDROCHLORIDE 2.5 MG/ML
10 INJECTION, SOLUTION EPIDURAL; INFILTRATION; INTRACAUDAL ONCE
Status: COMPLETED | OUTPATIENT
Start: 2018-04-03 | End: 2018-04-03

## 2018-04-03 RX ADMIN — METHYLPREDNISOLONE SODIUM SUCCINATE 40 MG: 40 INJECTION, POWDER, LYOPHILIZED, FOR SOLUTION INTRAMUSCULAR; INTRAVENOUS at 13:47

## 2018-04-03 RX ADMIN — LIDOCAINE HYDROCHLORIDE 1 ML: 10 INJECTION, SOLUTION INFILTRATION; PERINEURAL at 13:47

## 2018-04-03 RX ADMIN — BUPIVACAINE HYDROCHLORIDE 1 ML: 2.5 INJECTION, SOLUTION EPIDURAL; INFILTRATION; INTRACAUDAL; PERINEURAL at 13:47

## 2018-04-03 RX ADMIN — IOPAMIDOL 1 ML: 612 INJECTION, SOLUTION INTRAVENOUS at 13:47

## 2018-10-22 ENCOUNTER — OFFICE (OUTPATIENT)
Dept: URBAN - METROPOLITAN AREA CLINIC 66 | Facility: CLINIC | Age: 53
End: 2018-10-22

## 2018-10-22 VITALS
DIASTOLIC BLOOD PRESSURE: 98 MMHG | WEIGHT: 205 LBS | HEART RATE: 76 BPM | SYSTOLIC BLOOD PRESSURE: 130 MMHG | HEIGHT: 65 IN

## 2018-10-22 DIAGNOSIS — Z12.11 ENCOUNTER FOR SCREENING FOR MALIGNANT NEOPLASM OF COLON: ICD-10-CM

## 2018-10-22 DIAGNOSIS — K21.9 GASTRO-ESOPHAGEAL REFLUX DISEASE WITHOUT ESOPHAGITIS: ICD-10-CM

## 2018-10-22 DIAGNOSIS — Z83.71 FAMILY HISTORY OF COLONIC POLYPS: ICD-10-CM

## 2018-10-22 DIAGNOSIS — K30 FUNCTIONAL DYSPEPSIA: ICD-10-CM

## 2018-10-22 DIAGNOSIS — K58.0 IRRITABLE BOWEL SYNDROME WITH DIARRHEA: ICD-10-CM

## 2018-10-22 PROCEDURE — 99243 OFF/OP CNSLTJ NEW/EST LOW 30: CPT | Performed by: NURSE PRACTITIONER

## 2018-11-12 ENCOUNTER — OFFICE (OUTPATIENT)
Dept: URBAN - METROPOLITAN AREA PATHOLOGY 4 | Facility: PATHOLOGY | Age: 53
End: 2018-11-12

## 2018-11-12 ENCOUNTER — AMBULATORY SURGICAL CENTER (OUTPATIENT)
Dept: URBAN - METROPOLITAN AREA SURGERY 20 | Facility: SURGERY | Age: 53
End: 2018-11-12
Payer: COMMERCIAL

## 2018-11-12 VITALS — HEIGHT: 65 IN

## 2018-11-12 DIAGNOSIS — K58.0 IRRITABLE BOWEL SYNDROME WITH DIARRHEA: ICD-10-CM

## 2018-11-12 DIAGNOSIS — K30 FUNCTIONAL DYSPEPSIA: ICD-10-CM

## 2018-11-12 DIAGNOSIS — Z83.71 FAMILY HISTORY OF COLONIC POLYPS: ICD-10-CM

## 2018-11-12 DIAGNOSIS — K44.9 DIAPHRAGMATIC HERNIA WITHOUT OBSTRUCTION OR GANGRENE: ICD-10-CM

## 2018-11-12 DIAGNOSIS — K31.7 POLYP OF STOMACH AND DUODENUM: ICD-10-CM

## 2018-11-12 DIAGNOSIS — K31.89 OTHER DISEASES OF STOMACH AND DUODENUM: ICD-10-CM

## 2018-11-12 DIAGNOSIS — K21.9 GASTRO-ESOPHAGEAL REFLUX DISEASE WITHOUT ESOPHAGITIS: ICD-10-CM

## 2018-11-12 DIAGNOSIS — K52.9 NONINFECTIVE GASTROENTERITIS AND COLITIS, UNSPECIFIED: ICD-10-CM

## 2018-11-12 DIAGNOSIS — K29.70 GASTRITIS, UNSPECIFIED, WITHOUT BLEEDING: ICD-10-CM

## 2018-11-12 LAB
GI HISTOLOGY: A. SELECT: (no result)
GI HISTOLOGY: B. SELECT: (no result)
GI HISTOLOGY: C. SELECT: (no result)
GI HISTOLOGY: D. SELECT: (no result)
GI HISTOLOGY: E. SELECT: (no result)
GI HISTOLOGY: PDF REPORT: (no result)

## 2018-11-12 PROCEDURE — 43250 EGD CAUTERY TUMOR POLYP: CPT | Performed by: INTERNAL MEDICINE

## 2018-11-12 PROCEDURE — 43239 EGD BIOPSY SINGLE/MULTIPLE: CPT | Mod: 59 | Performed by: INTERNAL MEDICINE

## 2018-11-12 PROCEDURE — 88305 TISSUE EXAM BY PATHOLOGIST: CPT | Performed by: INTERNAL MEDICINE

## 2018-11-12 PROCEDURE — 45380 COLONOSCOPY AND BIOPSY: CPT | Performed by: INTERNAL MEDICINE

## 2018-11-27 ENCOUNTER — OFFICE (OUTPATIENT)
Dept: URBAN - METROPOLITAN AREA CLINIC 66 | Facility: CLINIC | Age: 53
End: 2018-11-27

## 2018-11-27 VITALS
SYSTOLIC BLOOD PRESSURE: 136 MMHG | WEIGHT: 200 LBS | HEART RATE: 86 BPM | HEIGHT: 65 IN | DIASTOLIC BLOOD PRESSURE: 90 MMHG

## 2018-11-27 DIAGNOSIS — K21.9 GASTRO-ESOPHAGEAL REFLUX DISEASE WITHOUT ESOPHAGITIS: ICD-10-CM

## 2018-11-27 DIAGNOSIS — K58.2 MIXED IRRITABLE BOWEL SYNDROME: ICD-10-CM

## 2018-11-27 PROCEDURE — 99213 OFFICE O/P EST LOW 20 MIN: CPT | Performed by: NURSE PRACTITIONER

## 2018-11-30 ENCOUNTER — OFFICE VISIT CONVERTED (OUTPATIENT)
Dept: FAMILY MEDICINE CLINIC | Age: 53
End: 2018-11-30
Attending: NURSE PRACTITIONER

## 2018-12-26 ENCOUNTER — PROCEDURE VISIT (OUTPATIENT)
Dept: OBSTETRICS AND GYNECOLOGY | Age: 53
End: 2018-12-26

## 2018-12-26 ENCOUNTER — OFFICE VISIT (OUTPATIENT)
Dept: OBSTETRICS AND GYNECOLOGY | Age: 53
End: 2018-12-26

## 2018-12-26 ENCOUNTER — APPOINTMENT (OUTPATIENT)
Dept: WOMENS IMAGING | Facility: HOSPITAL | Age: 53
End: 2018-12-26

## 2018-12-26 VITALS
WEIGHT: 193.8 LBS | SYSTOLIC BLOOD PRESSURE: 110 MMHG | HEIGHT: 64 IN | DIASTOLIC BLOOD PRESSURE: 70 MMHG | BODY MASS INDEX: 33.09 KG/M2

## 2018-12-26 VITALS — HEIGHT: 64 IN | BODY MASS INDEX: 34.16 KG/M2

## 2018-12-26 DIAGNOSIS — Z00.00 ANNUAL PHYSICAL EXAM: ICD-10-CM

## 2018-12-26 DIAGNOSIS — K64.8 OTHER HEMORRHOIDS: ICD-10-CM

## 2018-12-26 DIAGNOSIS — Z12.31 VISIT FOR SCREENING MAMMOGRAM: Primary | ICD-10-CM

## 2018-12-26 DIAGNOSIS — K58.2 IRRITABLE BOWEL SYNDROME WITH BOTH CONSTIPATION AND DIARRHEA: ICD-10-CM

## 2018-12-26 DIAGNOSIS — N95.1 SYMPTOMS, SUCH AS FLUSHING, SLEEPLESSNESS, HEADACHE, LACK OF CONCENTRATION, ASSOCIATED WITH THE MENOPAUSE: ICD-10-CM

## 2018-12-26 DIAGNOSIS — Z00.00 ENCOUNTER FOR WELLNESS EXAMINATION IN ADULT: Primary | ICD-10-CM

## 2018-12-26 PROCEDURE — 99396 PREV VISIT EST AGE 40-64: CPT | Performed by: OBSTETRICS & GYNECOLOGY

## 2018-12-26 PROCEDURE — 77067 SCR MAMMO BI INCL CAD: CPT | Performed by: RADIOLOGY

## 2018-12-26 RX ORDER — ESTRADIOL AND NORETHINDRONE ACETATE .5; .1 MG/1; MG/1
1 TABLET ORAL DAILY
Qty: 90 TABLET | Refills: 1 | Status: SHIPPED | OUTPATIENT
Start: 2018-12-26 | End: 2019-12-26

## 2018-12-26 NOTE — PROGRESS NOTES
Subjective   Ema Cisse is a 53 y.o. female is being seen today for   Chief Complaint   Patient presents with   • Gynecologic Exam     MG 17, DEXA 16   .    History of Present Illness  Patient is here for her annual check.  She doing well overall still on HRT takes a half a tablet a day.  Also would like something for hemorrhoids and we did discuss her IBS and she has back-and-forth constipation diarrhea.  She had colonoscopy recently negative for any polyps and EGD which so some stomach polyps.  Her family is doing well daughter is  up in [facility] nothing new in the family and a bladder is appropriate and she exercises.  No other complaints  The following portions of the patient's history were reviewed and updated as appropriate: allergies, current medications, past family history, past medical history, past social history, past surgical history and problem list.    There were no vitals filed for this visit.    PAST MEDICAL HISTORY  Past Medical History:   Diagnosis Date   • Bilateral leg pain    • Low back pain    • Peripheral neuropathy    • Stress fracture     left foot   • Trochanteric bursitis, left hip    • Uterine leiomyoma      OB History      Para Term  AB Living        0          SAB TAB Ectopic Molar Multiple Live Births                       Past Surgical History:   Procedure Laterality Date   • BUNIONECTOMY     • CHOLECYSTECTOMY     • KNEE SURGERY  2005    ACL Removal   • SINUS SURGERY       Family History   Problem Relation Age of Onset   • Hypertension Other    • Heart disease Other    • Hypertension Mother    • Cancer Father         lung   • Heart disease Father    • No Known Problems Sister    • No Known Problems Brother    • No Known Problems Daughter    • No Known Problems Son    • No Known Problems Maternal Grandmother    • No Known Problems Paternal Grandmother    • No Known Problems Maternal Aunt    • No Known Problems Paternal Aunt     • BRCA 1/2 Neg Hx    • Breast cancer Neg Hx    • Colon cancer Neg Hx    • Endometrial cancer Neg Hx    • Ovarian cancer Neg Hx      Social History     Tobacco Use   Smoking Status Never Smoker   Smokeless Tobacco Never Used       Current Outpatient Medications:   •  Cetirizine HCl 10 MG capsule, Take 10 mg by mouth Daily., Disp: , Rfl:   •  Estradiol-Norethindrone Acet (MIMVEY LO) 0.5-0.1 MG per tablet, Take 1 tablet by mouth Daily., Disp: 90 tablet, Rfl: 1  •  hydrocortisone (ANUSOL-HC) 2.5 % rectal cream, Insert  into the rectum Daily., Disp: 15 g, Rfl: 1  •  naproxen (NAPROSYN) 500 MG tablet, Take 1 tablet by mouth 2 (Two) Times a Day With Meals., Disp: 60 tablet, Rfl: 4  •  pantoprazole (PROTONIX) 40 MG EC tablet, 40 mg Daily., Disp: , Rfl:   •  SUMAtriptan (IMITREX) 50 MG tablet, Take 50 mg by mouth 1 (One) Time As Needed., Disp: , Rfl:   •  traZODone (DESYREL) 50 MG tablet, Take 50 mg by mouth Every Night., Disp: , Rfl:     There is no immunization history on file for this patient.    Review of Systems   Constitutional: Negative for chills, fatigue, fever and unexpected weight change.   Respiratory: Negative for shortness of breath and wheezing.    Cardiovascular: Negative for chest pain.   Gastrointestinal: Positive for constipation and diarrhea. Negative for abdominal distention, abdominal pain, blood in stool and nausea.   Genitourinary: Negative for difficulty urinating, dyspareunia, dysuria, frequency, hematuria, menstrual problem, pelvic pain, urgency and vaginal discharge.   Skin: Negative for rash.       Objective   Physical Exam   Constitutional: She is oriented to person, place, and time. Vital signs are normal. She appears well-developed and well-nourished.   Neck: No thyromegaly present.   Cardiovascular: Normal rate, regular rhythm and normal heart sounds.   Pulmonary/Chest: Effort normal. Right breast exhibits no inverted nipple, no mass, no nipple discharge, no skin change and no tenderness.  Left breast exhibits no inverted nipple, no mass, no nipple discharge, no skin change and no tenderness. Breasts are symmetrical. There is no breast swelling.   Abdominal: Soft.   Genitourinary: Vagina normal and uterus normal. No breast tenderness, discharge or bleeding. Pelvic exam was performed with patient supine. No labial fusion. There is no rash, tenderness, lesion or injury on the right labia. There is no rash, tenderness, lesion or injury on the left labia. Cervix exhibits no motion tenderness, no discharge and no friability. Right adnexum displays no mass, no tenderness and no fullness. Left adnexum displays no mass, no tenderness and no fullness.   Neurological: She is alert and oriented to person, place, and time.   Psychiatric: She has a normal mood and affect.   Vitals reviewed.        Assessment/Plan   Ema was seen today for gynecologic exam.    Diagnoses and all orders for this visit:    Encounter for wellness examination in adult  -     Cancel: IGP, Apt HPV,rfx 16 / 18,45; Future  -     IGP, Apt HPV,rfx 16 / 18,45    Annual physical exam    Other hemorrhoids    Symptoms, such as flushing, sleeplessness, headache, lack of concentration, associated with the menopause    Irritable bowel syndrome with both constipation and diarrhea    Other orders  -     hydrocortisone (ANUSOL-HC) 2.5 % rectal cream; Insert  into the rectum Daily.  -     Estradiol-Norethindrone Acet (MIMVEY LO) 0.5-0.1 MG per tablet; Take 1 tablet by mouth Daily.      Overall normal exam.  I'll continue her on HRT she does have some insomnia but otherwise is doing well on that.  Pap smear was done today.  Again colonoscopy up-to-date mammogram done today and bone density up-to-date.  Talked about diet again come back in year

## 2018-12-27 ENCOUNTER — TELEPHONE (OUTPATIENT)
Dept: OBSTETRICS AND GYNECOLOGY | Age: 53
End: 2018-12-27

## 2018-12-31 LAB
CYTOLOGIST CVX/VAG CYTO: ABNORMAL
CYTOLOGY CVX/VAG DOC THIN PREP: ABNORMAL
DX ICD CODE: ABNORMAL
DX ICD CODE: ABNORMAL
HIV 1 & 2 AB SER-IMP: ABNORMAL
HPV I/H RISK 4 DNA CVX QL PROBE+SIG AMP: NEGATIVE
OTHER STN SPEC: ABNORMAL
PATH REPORT.FINAL DX SPEC: ABNORMAL
PATHOLOGIST CVX/VAG CYTO: ABNORMAL
RECOM F/U CVX/VAG CYTO: ABNORMAL
STAT OF ADQ CVX/VAG CYTO-IMP: ABNORMAL

## 2019-01-11 ENCOUNTER — OFFICE VISIT CONVERTED (OUTPATIENT)
Dept: FAMILY MEDICINE CLINIC | Age: 54
End: 2019-01-11
Attending: NURSE PRACTITIONER

## 2019-01-22 RX ORDER — NAPROXEN 500 MG/1
500 TABLET ORAL 2 TIMES DAILY WITH MEALS
Qty: 60 TABLET | Refills: 0 | Status: SHIPPED | OUTPATIENT
Start: 2019-01-22 | End: 2020-09-11 | Stop reason: ALTCHOICE

## 2019-03-12 ENCOUNTER — HOSPITAL ENCOUNTER (OUTPATIENT)
Dept: OTHER | Facility: HOSPITAL | Age: 54
Discharge: HOME | End: 2019-03-12
Attending: NURSE PRACTITIONER

## 2019-03-13 LAB
ALBUMIN SERPL-MCNC: 4.1 G/DL (ref 3.5–5)
ALBUMIN/GLOB SERPL: 1.5 {RATIO} (ref 1.4–2.6)
ALP SERPL-CCNC: 80 U/L (ref 53–141)
ALT SERPL-CCNC: 17 U/L (ref 10–40)
ANION GAP SERPL CALC-SCNC: 15 MMOL/L (ref 8–19)
APPEARANCE UR: ABNORMAL
AST SERPL-CCNC: 17 U/L (ref 15–50)
BACTERIA UR CULT: NORMAL
BACTERIA UR QL AUTO: ABNORMAL
BILIRUB SERPL-MCNC: 0.21 MG/DL (ref 0.2–1.3)
BILIRUB UR QL: NEGATIVE
BUN SERPL-MCNC: 8 MG/DL (ref 5–25)
BUN/CREAT SERPL: 8 {RATIO} (ref 6–20)
CALCIUM SERPL-MCNC: 9.3 MG/DL (ref 8.7–10.4)
CASTS URNS QL MICRO: ABNORMAL /[LPF]
CHLORIDE SERPL-SCNC: 109 MMOL/L (ref 99–111)
CHOLEST SERPL-MCNC: 156 MG/DL (ref 107–200)
CHOLEST/HDLC SERPL: 3.3 {RATIO} (ref 3–6)
COLOR UR: YELLOW
CONV CO2: 21 MMOL/L (ref 22–32)
CONV LEUKOCYTE ESTERASE: ABNORMAL
CONV TOTAL PROTEIN: 6.9 G/DL (ref 6.3–8.2)
CONV UROBILINOGEN IN URINE BY AUTOMATED TEST STRIP: 0.2 {EHRLICHU}/DL (ref 0.1–1)
CREAT UR-MCNC: 0.98 MG/DL (ref 0.5–0.9)
EPI CELLS #/AREA URNS HPF: ABNORMAL /[HPF]
ERYTHROCYTE [DISTWIDTH] IN BLOOD BY AUTOMATED COUNT: 13.1 % (ref 11.5–14.5)
GFR SERPLBLD BASED ON 1.73 SQ M-ARVRAT: >60 ML/MIN/{1.73_M2}
GLOBULIN UR ELPH-MCNC: 2.8 G/DL (ref 2–3.5)
GLUCOSE 24H UR-MCNC: NEGATIVE MG/DL
GLUCOSE SERPL-MCNC: 92 MG/DL (ref 65–99)
HBA1C MFR BLD: 13.9 G/DL (ref 12–16)
HCT VFR BLD AUTO: 41.4 % (ref 37–47)
HDLC SERPL-MCNC: 48 MG/DL (ref 40–60)
HGB UR QL STRIP: NEGATIVE
KETONES UR QL STRIP: NEGATIVE MG/DL
LDLC SERPL CALC-MCNC: 84 MG/DL (ref 70–100)
MCH RBC QN AUTO: 30 PG (ref 27–31)
MCHC RBC AUTO-ENTMCNC: 33.6 G/DL (ref 33–37)
MCV RBC AUTO: 89.2 FL (ref 81–99)
MUCOUS THREADS URNS QL MICRO: ABNORMAL
NITRITE UR-MCNC: NEGATIVE MG/ML
OSMOLALITY SERPL CALC.SUM OF ELEC: 290 MOSM/KG (ref 273–304)
PH UR STRIP.AUTO: 6.5 [PH] (ref 5–8)
PLATELET # BLD AUTO: 341 10*3/UL (ref 130–400)
PMV BLD AUTO: 10.2 FL (ref 7.4–10.4)
POTASSIUM SERPL-SCNC: 4.1 MMOL/L (ref 3.5–5.3)
PROT UR-MCNC: NEGATIVE MG/DL
RBC # BLD AUTO: 4.64 10*6/UL (ref 4.2–5.4)
RBC # BLD AUTO: ABNORMAL /[HPF]
SODIUM SERPL-SCNC: 141 MMOL/L (ref 135–147)
SP GR UR STRIP: 1.02 (ref 1–1.03)
SPECIMEN SOURCE: ABNORMAL
TRIGL SERPL-MCNC: 120 MG/DL (ref 40–150)
TSH SERPL-ACNC: 2.12 M[IU]/L (ref 0.27–4.2)
UNIDENT CRYS URNS QL MICRO: ABNORMAL /[HPF]
VLDLC SERPL-MCNC: 24 MG/DL (ref 5–37)
WBC # BLD AUTO: 6.69 10*3/UL (ref 4.8–10.8)
WBC #/AREA URNS HPF: ABNORMAL /[HPF]

## 2019-03-15 LAB — BACTERIA UR CULT: NORMAL

## 2019-04-30 ENCOUNTER — OFFICE VISIT CONVERTED (OUTPATIENT)
Dept: FAMILY MEDICINE CLINIC | Age: 54
End: 2019-04-30
Attending: NURSE PRACTITIONER

## 2019-09-04 ENCOUNTER — OFFICE VISIT CONVERTED (OUTPATIENT)
Dept: FAMILY MEDICINE CLINIC | Age: 54
End: 2019-09-04
Attending: NURSE PRACTITIONER

## 2019-09-04 ENCOUNTER — HOSPITAL ENCOUNTER (OUTPATIENT)
Dept: OTHER | Facility: HOSPITAL | Age: 54
Discharge: HOME | End: 2019-09-04
Attending: NURSE PRACTITIONER

## 2019-09-06 LAB
BACTERIA SPEC AEROBE CULT: ABNORMAL
CIPROFLOXACIN SUSC ISLT: <=0.5
CLINDAMYCIN SUSC ISLT: 0.25
DAPTOMYCIN SUSC ISLT: 0.25
DOXYCYCLINE SUSC ISLT: <=0.5
ERYTHROMYCIN SUSC ISLT: 1
GENTAMICIN SUSC ISLT: <=0.5
LEVOFLOXACIN SUSC ISLT: 0.25
OXACILLIN SUSC ISLT: >=4
RIFAMPIN SUSC ISLT: <=0.5
TETRACYCLINE SUSC ISLT: <=1
TMP SMX SUSC ISLT: <=10
VANCOMYCIN SUSC ISLT: 1

## 2019-09-23 ENCOUNTER — HOSPITAL ENCOUNTER (OUTPATIENT)
Dept: OTHER | Facility: HOSPITAL | Age: 54
Discharge: HOME | End: 2019-09-23
Attending: NURSE PRACTITIONER

## 2019-09-23 ENCOUNTER — OFFICE VISIT CONVERTED (OUTPATIENT)
Dept: FAMILY MEDICINE CLINIC | Age: 54
End: 2019-09-23
Attending: NURSE PRACTITIONER

## 2019-09-25 LAB — BACTERIA UR CULT: NORMAL

## 2019-11-21 ENCOUNTER — OFFICE VISIT CONVERTED (OUTPATIENT)
Dept: FAMILY MEDICINE CLINIC | Age: 54
End: 2019-11-21
Attending: NURSE PRACTITIONER

## 2020-01-15 ENCOUNTER — TELEPHONE (OUTPATIENT)
Dept: OBSTETRICS AND GYNECOLOGY | Age: 55
End: 2020-01-15

## 2020-01-20 ENCOUNTER — APPOINTMENT (OUTPATIENT)
Dept: WOMENS IMAGING | Facility: HOSPITAL | Age: 55
End: 2020-01-20

## 2020-01-20 ENCOUNTER — PROCEDURE VISIT (OUTPATIENT)
Dept: OBSTETRICS AND GYNECOLOGY | Age: 55
End: 2020-01-20

## 2020-01-20 ENCOUNTER — OFFICE VISIT (OUTPATIENT)
Dept: OBSTETRICS AND GYNECOLOGY | Age: 55
End: 2020-01-20

## 2020-01-20 VITALS
HEIGHT: 64 IN | DIASTOLIC BLOOD PRESSURE: 92 MMHG | WEIGHT: 182.2 LBS | SYSTOLIC BLOOD PRESSURE: 142 MMHG | BODY MASS INDEX: 31.1 KG/M2

## 2020-01-20 DIAGNOSIS — Z11.51 SPECIAL SCREENING EXAMINATION FOR HUMAN PAPILLOMAVIRUS (HPV): ICD-10-CM

## 2020-01-20 DIAGNOSIS — Z12.31 VISIT FOR SCREENING MAMMOGRAM: Primary | ICD-10-CM

## 2020-01-20 DIAGNOSIS — Z00.00 ANNUAL PHYSICAL EXAM: ICD-10-CM

## 2020-01-20 DIAGNOSIS — Z12.4 ROUTINE CERVICAL SMEAR: Primary | ICD-10-CM

## 2020-01-20 DIAGNOSIS — N95.1 SYMPTOMS, SUCH AS FLUSHING, SLEEPLESSNESS, HEADACHE, LACK OF CONCENTRATION, ASSOCIATED WITH THE MENOPAUSE: ICD-10-CM

## 2020-01-20 PROCEDURE — 99396 PREV VISIT EST AGE 40-64: CPT | Performed by: OBSTETRICS & GYNECOLOGY

## 2020-01-20 PROCEDURE — 77067 SCR MAMMO BI INCL CAD: CPT | Performed by: RADIOLOGY

## 2020-01-20 PROCEDURE — 77067 SCR MAMMO BI INCL CAD: CPT | Performed by: OBSTETRICS & GYNECOLOGY

## 2020-01-20 NOTE — PROGRESS NOTES
Subjective   Ema Cisse is a 54 y.o. female is being seen today for   Chief Complaint   Patient presents with   • Gynecologic Exam     Pap 2018, MG today, DEXA 2016, C-scope 2017   .    History of Present Illness  Patient is here for an annual exam.  She had her first grand daughter 5 months ago she is in [facility].  Her daughter had a little bleeding problem afterwards had some hypertension but all has resolved.  She also had a dermoid taken out day after [place].  But overall is healthy is nothing else in the family.  Her bowels are the same little more constipated than used to be but still IBS type stuff in the bladder is doing fine and.  Only new factor she had a stage I melanoma taken off her leg and so I be watching that closely.  She also went off the HRT about a month ago has felt fine but just lately a few flashes coming back so she is willing to wait little longer if he gets worse she may go back on hormones.  No other complaints  The following portions of the patient's history were reviewed and updated as appropriate: allergies, current medications, past family history, past medical history, past social history, past surgical history and problem list.    Vitals:    20 1325   BP: 142/92       PAST MEDICAL HISTORY  Past Medical History:   Diagnosis Date   • Bilateral leg pain    • Low back pain    • Peripheral neuropathy    • Stress fracture     left foot   • Trochanteric bursitis, left hip    • Uterine leiomyoma      OB History             Para        Term   0            AB        Living           SAB        TAB        Ectopic        Molar        Multiple        Live Births                  Past Surgical History:   Procedure Laterality Date   • BUNIONECTOMY     • CHOLECYSTECTOMY     • KNEE SURGERY  2005    ACL Removal   • SINUS SURGERY       Family History   Problem Relation Age of Onset   • Hypertension Other    • Heart disease Other    • Hypertension  Mother    • Cancer Father         lung   • Heart disease Father    • No Known Problems Sister    • No Known Problems Brother    • No Known Problems Daughter    • No Known Problems Son    • No Known Problems Maternal Grandmother    • No Known Problems Paternal Grandmother    • No Known Problems Maternal Aunt    • No Known Problems Paternal Aunt    • BRCA 1/2 Neg Hx    • Breast cancer Neg Hx    • Colon cancer Neg Hx    • Endometrial cancer Neg Hx    • Ovarian cancer Neg Hx      Social History     Tobacco Use   Smoking Status Never Smoker   Smokeless Tobacco Never Used       Current Outpatient Medications:   •  Cetirizine HCl 10 MG capsule, Take 10 mg by mouth Daily., Disp: , Rfl:   •  hydrocortisone (ANUSOL-HC) 2.5 % rectal cream, Insert  into the rectum Daily., Disp: 15 g, Rfl: 1  •  naproxen (NAPROSYN) 500 MG tablet, Take 1 tablet by mouth 2 (Two) Times a Day With Meals., Disp: 60 tablet, Rfl: 0  •  pantoprazole (PROTONIX) 40 MG EC tablet, 40 mg Daily., Disp: , Rfl:   •  SUMAtriptan (IMITREX) 50 MG tablet, Take 50 mg by mouth 1 (One) Time As Needed., Disp: , Rfl:   •  traZODone (DESYREL) 50 MG tablet, Take 50 mg by mouth Every Night., Disp: , Rfl:     There is no immunization history on file for this patient.    Review of Systems   Constitutional: Negative for chills, fatigue, fever and unexpected weight change.   Respiratory: Negative for shortness of breath and wheezing.    Cardiovascular: Negative for chest pain.   Gastrointestinal: Negative for abdominal distention, abdominal pain, blood in stool, constipation, diarrhea and nausea.   Genitourinary: Negative for difficulty urinating, dyspareunia, dysuria, frequency, hematuria, menstrual problem, pelvic pain, urgency and vaginal discharge.   Skin: Negative for rash.       Objective   Physical Exam   Constitutional: She is oriented to person, place, and time. Vital signs are normal. She appears well-developed and well-nourished.   Neck: No thyromegaly present.    Cardiovascular: Normal rate, regular rhythm and normal heart sounds.   Pulmonary/Chest: Effort normal. Right breast exhibits no inverted nipple, no mass, no nipple discharge, no skin change and no tenderness. Left breast exhibits no inverted nipple, no mass, no nipple discharge, no skin change and no tenderness. No breast swelling, tenderness, discharge or bleeding. Breasts are symmetrical.   Abdominal: Soft.   Genitourinary: Vagina normal and uterus normal. No breast swelling, tenderness, discharge or bleeding. Pelvic exam was performed with patient supine. No labial fusion. There is no rash, tenderness, lesion or injury on the right labia. There is no rash, tenderness, lesion or injury on the left labia. Cervix exhibits no motion tenderness, no discharge and no friability. Right adnexum displays no mass, no tenderness and no fullness. Left adnexum displays no mass, no tenderness and no fullness.   Neurological: She is alert and oriented to person, place, and time.   Psychiatric: She has a normal mood and affect.   Vitals reviewed.        Assessment/Plan   Ema was seen today for gynecologic exam.    Diagnoses and all orders for this visit:    Routine cervical smear  -     IGP, Aptima HPV, Rfx 16 / 18,45    Special screening examination for human papillomavirus (HPV)  -     IGP, Aptima HPV, Rfx 16 / 18,45    Annual physical exam    Symptoms, such as flushing, sleeplessness, headache, lack of concentration, associated with the menopause      Normal exam today.  Pap smear done is repeat for ASCUS last year.  Mammogram done today.  Bone density 16 was normal and colonoscopy 17 up-to-date.  We talked but exercise she could be little bit better back in a year

## 2020-01-23 ENCOUNTER — TELEPHONE (OUTPATIENT)
Dept: OBSTETRICS AND GYNECOLOGY | Age: 55
End: 2020-01-23

## 2020-01-23 LAB
CYTOLOGIST CVX/VAG CYTO: NORMAL
CYTOLOGY CVX/VAG DOC CYTO: NORMAL
CYTOLOGY CVX/VAG DOC THIN PREP: NORMAL
DX ICD CODE: NORMAL
HIV 1 & 2 AB SER-IMP: NORMAL
HPV I/H RISK 4 DNA CVX QL PROBE+SIG AMP: NEGATIVE
OTHER STN SPEC: NORMAL
STAT OF ADQ CVX/VAG CYTO-IMP: NORMAL

## 2020-02-05 ENCOUNTER — OFFICE VISIT CONVERTED (OUTPATIENT)
Dept: FAMILY MEDICINE CLINIC | Age: 55
End: 2020-02-05
Attending: NURSE PRACTITIONER

## 2020-02-05 ENCOUNTER — HOSPITAL ENCOUNTER (OUTPATIENT)
Dept: OTHER | Facility: HOSPITAL | Age: 55
Discharge: HOME | End: 2020-02-05
Attending: NURSE PRACTITIONER

## 2020-02-05 LAB
ALBUMIN SERPL-MCNC: 4.4 G/DL (ref 3.5–5)
ALBUMIN/GLOB SERPL: 1.7 {RATIO} (ref 1.4–2.6)
ALP SERPL-CCNC: 87 U/L (ref 53–141)
ALT SERPL-CCNC: 16 U/L (ref 10–40)
ANION GAP SERPL CALC-SCNC: 16 MMOL/L (ref 8–19)
APPEARANCE UR: CLEAR
AST SERPL-CCNC: 16 U/L (ref 15–50)
BASOPHILS # BLD AUTO: 0.05 10*3/UL (ref 0–0.2)
BASOPHILS NFR BLD AUTO: 0.7 % (ref 0–3)
BILIRUB SERPL-MCNC: 0.19 MG/DL (ref 0.2–1.3)
BILIRUB UR QL: NEGATIVE
BUN SERPL-MCNC: 8 MG/DL (ref 5–25)
BUN/CREAT SERPL: 9 {RATIO} (ref 6–20)
CALCIUM SERPL-MCNC: 9.8 MG/DL (ref 8.7–10.4)
CHLORIDE SERPL-SCNC: 104 MMOL/L (ref 99–111)
CHOLEST SERPL-MCNC: 149 MG/DL (ref 107–200)
CHOLEST/HDLC SERPL: 3.9 {RATIO} (ref 3–6)
COLOR UR: YELLOW
CONV ABS IMM GRAN: 0.01 10*3/UL (ref 0–0.2)
CONV BACTERIA: NEGATIVE
CONV CO2: 24 MMOL/L (ref 22–32)
CONV COLLECTION SOURCE (UA): NORMAL
CONV IMMATURE GRAN: 0.1 % (ref 0–1.8)
CONV TOTAL PROTEIN: 7 G/DL (ref 6.3–8.2)
CONV UROBILINOGEN IN URINE BY AUTOMATED TEST STRIP: 0.2 {EHRLICHU}/DL (ref 0.1–1)
CREAT UR-MCNC: 0.9 MG/DL (ref 0.5–0.9)
DEPRECATED RDW RBC AUTO: 42 FL (ref 36.4–46.3)
EOSINOPHIL # BLD AUTO: 0.13 10*3/UL (ref 0–0.7)
EOSINOPHIL # BLD AUTO: 1.8 % (ref 0–7)
ERYTHROCYTE [DISTWIDTH] IN BLOOD BY AUTOMATED COUNT: 12.7 % (ref 11.7–14.4)
GFR SERPLBLD BASED ON 1.73 SQ M-ARVRAT: >60 ML/MIN/{1.73_M2}
GLOBULIN UR ELPH-MCNC: 2.6 G/DL (ref 2–3.5)
GLUCOSE SERPL-MCNC: 76 MG/DL (ref 65–99)
GLUCOSE UR QL: NEGATIVE MG/DL
HCT VFR BLD AUTO: 42.3 % (ref 37–47)
HDLC SERPL-MCNC: 38 MG/DL (ref 40–60)
HGB BLD-MCNC: 13.8 G/DL (ref 12–16)
HGB UR QL STRIP: NEGATIVE
KETONES UR QL STRIP: NEGATIVE MG/DL
LDLC SERPL CALC-MCNC: 81 MG/DL (ref 70–100)
LEUKOCYTE ESTERASE UR QL STRIP: NEGATIVE
LYMPHOCYTES # BLD AUTO: 2.03 10*3/UL (ref 1–5)
LYMPHOCYTES NFR BLD AUTO: 28.4 % (ref 20–45)
MCH RBC QN AUTO: 29.7 PG (ref 27–31)
MCHC RBC AUTO-ENTMCNC: 32.6 G/DL (ref 33–37)
MCV RBC AUTO: 91.2 FL (ref 81–99)
MONOCYTES # BLD AUTO: 0.6 10*3/UL (ref 0.2–1.2)
MONOCYTES NFR BLD AUTO: 8.4 % (ref 3–10)
NEUTROPHILS # BLD AUTO: 4.34 10*3/UL (ref 2–8)
NEUTROPHILS NFR BLD AUTO: 60.6 % (ref 30–85)
NITRITE UR QL STRIP: NEGATIVE
NRBC CBCN: 0 % (ref 0–0.7)
OSMOLALITY SERPL CALC.SUM OF ELEC: 287 MOSM/KG (ref 273–304)
PH UR STRIP.AUTO: 6.5 [PH] (ref 5–8)
PLATELET # BLD AUTO: 318 10*3/UL (ref 130–400)
PMV BLD AUTO: 10.1 FL (ref 9.4–12.3)
POTASSIUM SERPL-SCNC: 4.3 MMOL/L (ref 3.5–5.3)
PROT UR QL: NEGATIVE MG/DL
RBC # BLD AUTO: 4.64 10*6/UL (ref 4.2–5.4)
RBC #/AREA URNS HPF: NORMAL /[HPF]
SODIUM SERPL-SCNC: 140 MMOL/L (ref 135–147)
SP GR UR: 1 (ref 1–1.03)
TRIGL SERPL-MCNC: 149 MG/DL (ref 40–150)
TSH SERPL-ACNC: 1.68 M[IU]/L (ref 0.27–4.2)
VLDLC SERPL-MCNC: 30 MG/DL (ref 5–37)
WBC # BLD AUTO: 7.16 10*3/UL (ref 4.8–10.8)
WBC #/AREA URNS HPF: NORMAL /[HPF]

## 2020-06-10 ENCOUNTER — OFFICE VISIT CONVERTED (OUTPATIENT)
Dept: FAMILY MEDICINE CLINIC | Age: 55
End: 2020-06-10
Attending: NURSE PRACTITIONER

## 2020-06-10 ENCOUNTER — HOSPITAL ENCOUNTER (OUTPATIENT)
Dept: OTHER | Facility: HOSPITAL | Age: 55
Discharge: HOME | End: 2020-06-10
Attending: NURSE PRACTITIONER

## 2020-07-14 ENCOUNTER — OFFICE VISIT (OUTPATIENT)
Dept: PAIN MEDICINE | Facility: CLINIC | Age: 55
End: 2020-07-14

## 2020-07-14 VITALS
OXYGEN SATURATION: 98 % | DIASTOLIC BLOOD PRESSURE: 93 MMHG | SYSTOLIC BLOOD PRESSURE: 123 MMHG | RESPIRATION RATE: 18 BRPM | WEIGHT: 175.4 LBS | BODY MASS INDEX: 29.94 KG/M2 | HEIGHT: 64 IN | HEART RATE: 89 BPM | TEMPERATURE: 99.4 F

## 2020-07-14 DIAGNOSIS — G89.29 OTHER CHRONIC PAIN: Primary | ICD-10-CM

## 2020-07-14 DIAGNOSIS — M53.3 SACROILIAC JOINT DYSFUNCTION OF BOTH SIDES: ICD-10-CM

## 2020-07-14 DIAGNOSIS — M54.2 NECK PAIN: ICD-10-CM

## 2020-07-14 DIAGNOSIS — M47.816 LUMBAR FACET ARTHROPATHY: ICD-10-CM

## 2020-07-14 PROCEDURE — 99214 OFFICE O/P EST MOD 30 MIN: CPT | Performed by: NURSE PRACTITIONER

## 2020-07-14 RX ORDER — TRIAMCINOLONE ACETONIDE 1 MG/G
CREAM TOPICAL
COMMUNITY
Start: 2020-07-01 | End: 2021-12-15

## 2020-07-14 RX ORDER — MONTELUKAST SODIUM 10 MG/1
TABLET ORAL
COMMUNITY
Start: 2020-06-27 | End: 2022-04-29 | Stop reason: SDUPTHER

## 2020-07-14 RX ORDER — METHOCARBAMOL 750 MG/1
TABLET, FILM COATED ORAL
COMMUNITY
Start: 2020-06-10 | End: 2020-09-11 | Stop reason: ALTCHOICE

## 2020-07-14 NOTE — PROGRESS NOTES
CHIEF COMPLAINT  Follow-up for back pain. Ms. Cisse states that her back pain has progressively gotten worse in the last 9 months.    Subjective   Ema Cisse is a 54 y.o. female  who presents for follow-up.  She has a history of back pain.    bilateral S1-S3  RFL on  1/9/2018 -- 50% relief from the procedure for over a year.  Pain has gradually worsened over the past few months.      New problem. C/o neck pain over the past year. Worsening. Issues with stiffness and reduced ROM. Has seen a chiropractor, helps temporarily.  Naproxen helps temporarily.        Back Pain   This is a chronic problem. The current episode started more than 1 year ago. The problem occurs daily. The problem has been waxing and waning since onset. The pain is present in the lumbar spine and sacro-iliac. The pain is at a severity of 5/10. The pain is moderate. Exacerbated by: prolonged walking, standing. Stiffness is present in the morning. Associated symptoms include weakness (left leg). Pertinent negatives include no numbness. Risk factors include obesity. She has tried NSAIDs and analgesics for the symptoms. The treatment provided significant (with RFL) relief.     PEG Assessment   What number best describes your pain on average in the past week?4  What number best describes how, during the past week, pain has interfered with your enjoyment of life?8  What number best describes how, during the past week, pain has interfered with your general activity?  8    The following portions of the patient's history were reviewed and updated as appropriate: allergies, current medications, past family history, past medical history, past social history, past surgical history and problem list.    Review of Systems   Constitutional: Negative for fatigue.   HENT: Negative for congestion.    Eyes: Negative for visual disturbance.   Respiratory: Negative for cough, shortness of breath and wheezing.    Cardiovascular: Negative.    Gastrointestinal:  "Positive for diarrhea. Negative for constipation.   Genitourinary: Negative for difficulty urinating.   Musculoskeletal: Positive for arthralgias (bilateral hips) and back pain.   Neurological: Positive for weakness (left leg). Negative for numbness.   Psychiatric/Behavioral: Negative for sleep disturbance and suicidal ideas. The patient is not nervous/anxious.      Vitals:    07/14/20 1541   BP: 123/93   Pulse: 89   Resp: 18   Temp: 99.4 °F (37.4 °C)   SpO2: 98%   Weight: 79.6 kg (175 lb 6.4 oz)   Height: 162.6 cm (64\")   PainSc:   5   PainLoc: Back     Objective   Physical Exam   Constitutional: She is oriented to person, place, and time. She appears well-developed and well-nourished. No distress.   HENT:   Head: Atraumatic.   Eyes: Conjunctivae are normal.   Cardiovascular: Normal rate.   Pulmonary/Chest: Effort normal. No respiratory distress.   Musculoskeletal:        Cervical back: She exhibits decreased range of motion, tenderness, pain and spasm.        Lumbar back: She exhibits tenderness and pain.   +bilateral SI joint tenderness  +SHAHLA bilaterally    Neurological: She is alert and oriented to person, place, and time. She has normal strength. No sensory deficit. Gait normal.   Skin: Skin is warm and dry. She is not diaphoretic.   Psychiatric: She has a normal mood and affect. Her behavior is normal.   Nursing note and vitals reviewed.      Assessment/Plan   Ema was seen today for back pain.    Diagnoses and all orders for this visit:    Other chronic pain    Sacroiliac joint dysfunction of both sides  -     Case Request    Lumbar facet arthropathy    Neck pain  -     XR Spine Cervical Complete With Flex Ext; Future  -     Ambulatory Referral to Physical Therapy Evaluate and treat      --- Bilateral SI joint injection X 1, goal to repeat RFL   ----------  Education about Sacroiliac joint injections:  This Sacroiliac joint injection (blockade) we have suggested is intended for diagnostic purposes, with " the intent of offering the patient Radiofrequency thermal rhizotomy (RF) of the sensory branches to the joint if the block is diagnostically effective.  The diagnostic blockade is necessary to determine the likelihood that RF therapy could be efficacious in providing long term relief to the patient.    In this procedure, the sacroiliac joint is aligned with imaging, and under image guidance a needle is placed with the needle tip into the joint.  The needle position is confirmed to be appropriately in the joint before injection of medication into the joint.  When xray fluoroscopy is used, contrast dye is used to confirm a proper arthrogram (i.e., outline of the joint).  When ultrasound is used, IV fluid (normal saline) is injected to see the flow of the fluid into the joint.  Once confirmed, then the medication can be injected into the joint.  Oftentimes this medication is a combination of local anesthetics (for diagnostic purposes) and also a steroid (to decrease irritation & inflammation in the joint, also known as sacroilitis).      Medically, a successful RF procedure should provide a patient with 50% pain relief or more for at least 6 months.  Clinical experience suggests that successful patients receive relief more in the range of 12 months on average.  We also discussed that many patients receive therapeutic success from the intraarticular joint injection, and may not require RF ablation.  If a patient receives more than 8 weeks of relief from joint injection, then occasional repeat joint blocks for therapeutic purposes is a very reasonable alternative therapy.  This course of therapy is consistent with our LCDs according to our CMS  in the area, and therefore other insurance providers should follow accordingly.  We will monitor our patients to screen for these therapeutic responders and will offer RF therapy only when necessary.      We discussed that joint injections & also RF procedures are not  without risks.  Best practices regarding anticoagulant use & neuraxial procedures will be respected.  Oftentimes a patient on an anticoagulant can be offered a joint injection safely, but again this is not risk-free, and such patients give consent with regards to this increased bleeding risk, which could cause problems including but not limited to worsening of pain, nerve damage, or muscle damage.  Patients that are ill or otherwise may be at risk for sepsis will not have their spines accessed by neuraxial injections of any type.  This patient will not be offered these therapies if there is an increased risk.   We discussed that there is a risk of postprocedural pain and also a risk of worsening of clinical picture with these procedures as with any neuraxial procedure.    ----------  --- Xray cervical spine  --- PT to eval/treat neck pain   --- Follow-up after procedure          PATTI REPORT  PATTI report has been reviewed and scanned into the patient's chart.    As the clinician, I personally reviewed the PATTI from 7/14/2020 .    EMR Dragon/Transcription disclaimer:   Much of this encounter note is an electronic transcription/translation of spoken language to printed text. The electronic translation of spoken language may permit erroneous, or at times, nonsensical words or phrases to be inadvertently transcribed; Although I have reviewed the note for such errors, some may still exist.

## 2020-08-03 ENCOUNTER — LAB REQUISITION (OUTPATIENT)
Dept: LAB | Facility: HOSPITAL | Age: 55
End: 2020-08-03

## 2020-08-03 DIAGNOSIS — Z00.00 ENCOUNTER FOR GENERAL ADULT MEDICAL EXAMINATION WITHOUT ABNORMAL FINDINGS: ICD-10-CM

## 2020-08-04 PROCEDURE — U0004 COV-19 TEST NON-CDC HGH THRU: HCPCS | Performed by: ANESTHESIOLOGY

## 2020-08-05 LAB
REF LAB TEST METHOD: NORMAL
SARS-COV-2 RNA RESP QL NAA+PROBE: NOT DETECTED

## 2020-08-06 ENCOUNTER — OUTSIDE FACILITY SERVICE (OUTPATIENT)
Dept: PAIN MEDICINE | Facility: CLINIC | Age: 55
End: 2020-08-06

## 2020-08-06 ENCOUNTER — HOSPITAL ENCOUNTER (OUTPATIENT)
Dept: OTHER | Facility: HOSPITAL | Age: 55
Discharge: HOME | End: 2020-08-06

## 2020-08-06 ENCOUNTER — DOCUMENTATION (OUTPATIENT)
Dept: PAIN MEDICINE | Facility: CLINIC | Age: 55
End: 2020-08-06

## 2020-08-06 PROCEDURE — 27096 INJECT SACROILIAC JOINT: CPT | Performed by: ANESTHESIOLOGY

## 2020-08-06 NOTE — PROGRESS NOTES
Bilateral Sacroiliac Joint Injection  [facility]      PREOPERATIVE DIAGNOSIS:   Sacroiliac joint dysfunction, bilateral    POSTOPERATIVE DIAGNOSIS:  Sacroiliac joint dysfunction, bilateral    PROCEDURE:  Sacroiliac Joint Injection, Bilaterally, with fluoroscopic guidance    PRE-PROCEDURE DISCUSSION WITH PATIENT:    Risks and complications were discussed with the patient prior to starting the procedure and informed consent was obtained.  We discussed various topics including but not limited to bleeding, infection, injury, postprocedural site soreness, painful flareup, worsening of clinical picture, paralysis, coma, and death.     SURGEON:  Jeronimo Lind MD    REASON FOR PROCEDURE:    Patient has pain consistent with SI pathology on history and physical exam. Sacral medial/dorsal branch Radiofrequency lesioning was therapeutically significant previously.    SEDATION:  Versed 2mg & Fentanyl 50 mcg IV  ANESTHETIC AGENT:  Marcaine 0.5%  STEROID AGENT:  Methylprednisolone (DEPO MEDROL) 80mg/ml    DESCRIPTON OF PROCEDURE:  After obtaining informed consent, IV access was obtained in the preoperative area.  The patient was transported to the operative suite and placed in the prone position with a pillow under the pelvic area. EKG, blood pressure, and pulse oximeter were monitored. The lumbosacral area was prepped with Chloraprep and draped in a sterile fashion. Under fluoroscopic guidance the inferior most portion of the right SI joint was identified. The overlying skin and subcutaneous tissue was anesthetized with 1% lidocaine. A 22-gauge spinal needle was introduced from the inferior most portion of the joint into the RIGHT SI joint under fluoroscopic guidance in the AP dimension with slight oblique rotation to the contralateral side.  Aspiration was negative.  After confirming the position of the needle with fluoroscopy, 1 mL of Omnipaque was injected and after seeing appropriate spread into the  joint a total of 2mL Marcaine, with the steroid, was injected very slowly.  Needle was removed intact.  A similar procedure was performed on the LEFT side.   Vital signs remained stable.  The onset of analgesia was noted.      ESTIMATED BLOOD LOSS:  minimal  SPECIMENS:  None  COMPLICATIONS:  No complications were noted., There was no indication of vascular uptake on live injection of contrast dye., There was no indication of intrathecal uptake on live injection of contrast dye., There was not any evidence of dural puncture.   and The patient did not have any signs of postprocedure numbness nor weakness.    TOLERANCE & DISCHARGE CONDITION:    The patient tolerated the procedure well.  The patient was transported to the recovery area without difficulties.  The patient was discharged to home under the care of family in stable and satisfactory condition.    PLAN OF CARE:  1. The patient was given our standard instruction sheet and will resume all medications as per the medication reconciliation sheet.  2. The patient will Return to clinic 2 wks.  3. The patient is instructed to keep a pain log hourly for 8 hours after the procedure.

## 2020-08-27 ENCOUNTER — OFFICE VISIT (OUTPATIENT)
Dept: ORTHOPEDIC SURGERY | Facility: CLINIC | Age: 55
End: 2020-08-27

## 2020-08-27 VITALS — HEIGHT: 65 IN | WEIGHT: 175 LBS | TEMPERATURE: 97.4 F | BODY MASS INDEX: 29.16 KG/M2

## 2020-08-27 DIAGNOSIS — M75.42 IMPINGEMENT SYNDROME OF LEFT SHOULDER: Primary | ICD-10-CM

## 2020-08-27 PROCEDURE — 99213 OFFICE O/P EST LOW 20 MIN: CPT | Performed by: ORTHOPAEDIC SURGERY

## 2020-08-27 RX ORDER — BUPROPION HYDROCHLORIDE 100 MG/1
300 TABLET ORAL 2 TIMES DAILY
COMMUNITY
End: 2020-09-11

## 2020-09-11 ENCOUNTER — OFFICE VISIT (OUTPATIENT)
Dept: PAIN MEDICINE | Facility: CLINIC | Age: 55
End: 2020-09-11

## 2020-09-11 ENCOUNTER — TELEPHONE (OUTPATIENT)
Dept: PAIN MEDICINE | Facility: CLINIC | Age: 55
End: 2020-09-11

## 2020-09-11 VITALS
HEIGHT: 65 IN | HEART RATE: 89 BPM | SYSTOLIC BLOOD PRESSURE: 120 MMHG | OXYGEN SATURATION: 98 % | RESPIRATION RATE: 18 BRPM | DIASTOLIC BLOOD PRESSURE: 92 MMHG | BODY MASS INDEX: 29.59 KG/M2 | WEIGHT: 177.6 LBS | TEMPERATURE: 98.9 F

## 2020-09-11 DIAGNOSIS — M47.816 LUMBAR FACET ARTHROPATHY: ICD-10-CM

## 2020-09-11 DIAGNOSIS — G89.29 OTHER CHRONIC PAIN: Primary | ICD-10-CM

## 2020-09-11 DIAGNOSIS — M54.2 NECK PAIN: ICD-10-CM

## 2020-09-11 DIAGNOSIS — M99.04 SOMATIC DYSFUNCTION OF SACROILIAC JOINT: ICD-10-CM

## 2020-09-11 PROCEDURE — 99214 OFFICE O/P EST MOD 30 MIN: CPT | Performed by: NURSE PRACTITIONER

## 2020-09-11 RX ORDER — BUPROPION HYDROCHLORIDE 200 MG/1
TABLET, EXTENDED RELEASE ORAL
COMMUNITY
Start: 2020-07-17 | End: 2021-01-21

## 2020-09-11 RX ORDER — CHLORZOXAZONE 500 MG/1
500 TABLET ORAL 2 TIMES DAILY PRN
Qty: 60 TABLET | Refills: 1 | Status: SHIPPED | OUTPATIENT
Start: 2020-09-11 | End: 2021-01-21

## 2020-09-11 RX ORDER — MELOXICAM 15 MG/1
15 TABLET ORAL DAILY
Qty: 30 TABLET | Refills: 1 | Status: SHIPPED | OUTPATIENT
Start: 2020-09-11 | End: 2020-11-10

## 2020-09-11 NOTE — TELEPHONE ENCOUNTER
Ro from [facility] just called about mobic Rx, just wanted to make you aware that pt also filled naproxen on 9/3/20 from PCP. Pt cannot take both, what would you like for me to tell pharmacy? Ro said she can tell the pt to hold her naproxen and take mobic if that is what you prefer, they just wanted to let you know pt has been getting naproxen from PCP for quite some time. Please advise. Thank you.

## 2020-09-11 NOTE — PROGRESS NOTES
CHIEF COMPLAINT  Follow-up for back pain.    Subjective   Ema Cisse is a 54 y.o. female  who presents to the office for follow-up of procedure.  She completed a Bilateral Sacroiliac Joint Injection   on  8/6/2020 performed by Dr. Lind for management of low back pain. Patient reports 100% relief from the procedure for a couple of weeks.     Last visit reported neck pain worsening. Has improved some with PT.  She will continue with PT for now.      C/o multiple joint pain, worst in the back. Pain today 7/10 in severity.  Takes Naproxen 500 mg bid. Helps some with her pain.  Wants to know if something else might help more.  Did not tolerate Robaxin, caused nausea/vomiting.      Back Pain   This is a chronic problem. The current episode started more than 1 year ago. The problem occurs daily. The problem has been waxing and waning since onset. The pain is present in the lumbar spine and sacro-iliac. The pain is at a severity of 7/10. The pain is moderate. Exacerbated by: prolonged walking, standing. Stiffness is present in the morning. Associated symptoms include numbness and weakness. Risk factors include obesity. She has tried NSAIDs and analgesics for the symptoms. The treatment provided significant (with RFL) relief.      PEG Assessment   What number best describes your pain on average in the past week?7  What number best describes how, during the past week, pain has interfered with your enjoyment of life?5  What number best describes how, during the past week, pain has interfered with your general activity?  5    Review of pertinent medical information      The following portions of the patient's history were reviewed and updated as appropriate: allergies, current medications, past family history, past medical history, past social history, past surgical history and problem list.    Review of Systems   Constitutional: Negative for fatigue.   HENT: Negative for congestion.    Eyes: Negative for visual  "disturbance.   Respiratory: Negative for cough, shortness of breath and wheezing.    Cardiovascular: Negative.    Gastrointestinal: Negative for constipation and diarrhea.   Genitourinary: Negative for difficulty urinating.   Musculoskeletal: Positive for arthralgias (right hip) and back pain.   Neurological: Positive for weakness and numbness.   Psychiatric/Behavioral: Positive for sleep disturbance. Negative for suicidal ideas. The patient is nervous/anxious.      I have reviewed and confirmed the accuracy of the ROS as documented by the MA/LPN/RN SHELBIE Najera     Vitals:    09/11/20 1506   BP: 120/92   Pulse: 89   Resp: 18   Temp: 98.9 °F (37.2 °C)   SpO2: 98%   Weight: 80.6 kg (177 lb 9.6 oz)   Height: 165.1 cm (65\")   PainSc:   7   PainLoc: Hip         Objective   Physical Exam   Constitutional: She is oriented to person, place, and time. She appears well-developed and well-nourished. No distress.   HENT:   Head: Atraumatic.   Eyes: Conjunctivae are normal.   Cardiovascular: Normal rate.   Pulmonary/Chest: Effort normal. No respiratory distress.   Musculoskeletal:        Left shoulder: She exhibits tenderness.        Right hip: She exhibits tenderness.        Cervical back: She exhibits decreased range of motion, tenderness, pain and spasm.        Lumbar back: She exhibits tenderness and pain.   +bilateral SI joint tenderness  +SHAHLA bilaterally    Neurological: She is alert and oriented to person, place, and time. She has normal strength. No sensory deficit. Gait normal.   Skin: Skin is warm and dry. She is not diaphoretic.   Psychiatric: She has a normal mood and affect. Her behavior is normal.   Nursing note and vitals reviewed.    Assessment/Plan   Ema was seen today for back pain.    Diagnoses and all orders for this visit:    Other chronic pain    Lumbar facet arthropathy    Somatic dysfunction of sacroiliac joint  -     Case Request    Neck pain    Other orders  -     meloxicam (MOBIC) 15 " MG tablet; Take 1 tablet by mouth Daily for 60 days.  -     chlorzoxazone (PARAFON FORTE) 500 MG tablet; Take 1 tablet by mouth 2 (Two) Times a Day As Needed for Muscle Spasms.      --- Bilateral S1-S3 RFL.   --------  Education about Radiofrequency Lesioning of Medial Branches:    The medial branch blockade was intended for diagnostic purposes, with the intent of offering the patient Radiofrequency thermal rhizotomy if the MBB is diagnostically effective.  The diagnostic blockade is necessary to determine the likelihood that RF therapy could be efficacious in providing long term relief to the patient.  As indicated above, diagnostic efficacy was established.      In the RF procedure, needles are placed to the joint lines in the same fashion, and after testing, the needle tips are heated to thermally treat the nerves, blocking the nerves by in essence damaging the nerves with the heat treatment.      Medically, a successful RF procedure should provide a patient with 50% pain relief or more for at least 6 months.  We estimate a likelihood of about an 80% chance that medical success will be realized.  We discussed & educated once again that not all patients have a medically successful result, and the patient voices understanding.  However, our clinical experience suggests that successful patients receive relief more in the range of 12 months on average.  (We also discussed that a fortunate minority of patients receive therapeutic success from the MBB, and may not require RF ablation.  If a patient receives more than 8 weeks of relief from MBB, then occasional repeat MBB for therapeutic purposes is a very reasonable alternative therapy.  This course of therapy is consistent with our LCDs according to our CMS  in the area, and therefore other insurance providers should follow accordingly.  We will monitor our patients to screen for these therapeutic responders and will offer RF therapy only when necessary.   However, in this clinical scenario, this therapeutic result was not realized, and therefore Radiofrequency Lesioning is medically necessary.)      We discussed that MBB & RF are not without risks.  Guidelines regarding anticoagulant use & neuraxial procedures will be respected.  Patients that are ill or otherwise may be at risk for sepsis will not have their spines accessed by neuraxial injections of any type.  This patient will not be offered these therapies if there is an increased risk.   We discussed that there is a risk of postprocedural pain and also a risk of worsening of clinical picture with these procedures as with any neuraxial procedure.  All invasive procedures have risks including but not limited to bleeding, infection, injury, nerve injury, paralysis, coma, death, lack of pain relief, and worsening of clinical picture.      In this education session, all of these topics were covered and the patient voiced understanding.    ---------    --- Trial a different NSAID. Meloxicam sent to pharmacy.  Trial a different muscle relaxant.  Chlorzoxazone sent to pharmacy. Discussed medication with the patient.  Included in this discussion was the potential for side effects and adverse events.  Patient verbalized understanding and wished to proceed.  Prescription will be sent to pharmacy.  --- Continue PT for neck pain. Consider interventional options in the future if no improvement    --- Follow-up after procedure            PATTI REPORT  PATTI report has been reviewed and scanned into the patient's chart.    As the clinician, I personally reviewed the PATTI from 9/10/2020 while the patient was in the office today.  EMR Dragon/Transcription disclaimer:   Much of this encounter note is an electronic transcription/translation of spoken language to printed text. The electronic translation of spoken language may permit erroneous, or at times, nonsensical words or phrases to be inadvertently transcribed; Although I  have reviewed the note for such errors, some may still exist.

## 2020-10-15 ENCOUNTER — LAB REQUISITION (OUTPATIENT)
Dept: LAB | Facility: HOSPITAL | Age: 55
End: 2020-10-15

## 2020-10-15 DIAGNOSIS — Z00.00 ENCOUNTER FOR GENERAL ADULT MEDICAL EXAMINATION WITHOUT ABNORMAL FINDINGS: ICD-10-CM

## 2020-10-17 PROCEDURE — U0004 COV-19 TEST NON-CDC HGH THRU: HCPCS | Performed by: ANESTHESIOLOGY

## 2020-10-19 LAB — SARS-COV-2 RNA RESP QL NAA+PROBE: NOT DETECTED

## 2020-10-20 ENCOUNTER — DOCUMENTATION (OUTPATIENT)
Dept: PAIN MEDICINE | Facility: CLINIC | Age: 55
End: 2020-10-20

## 2020-10-20 ENCOUNTER — OUTSIDE FACILITY SERVICE (OUTPATIENT)
Dept: PAIN MEDICINE | Facility: CLINIC | Age: 55
End: 2020-10-20

## 2020-10-20 PROCEDURE — 64625 RF ABLTJ NRV NRVTG SI JT: CPT | Performed by: ANESTHESIOLOGY

## 2020-10-20 PROCEDURE — 99152 MOD SED SAME PHYS/QHP 5/>YRS: CPT | Performed by: ANESTHESIOLOGY

## 2020-10-20 NOTE — PROGRESS NOTES
Bilateral S1-S3  Branch Radiofrequency Lesioning  [facility]      PREOPERATIVE DIAGNOSIS:  Sacroiliac Dysfunction, bilaterally.      POSTOPERATIVE DIAGNOSIS:  Same as above.      PROCEDURE:   Bilateral Lumbrosacral Medial Branch and Sacral Dorsal Branch Nerve Radiofrequency lesioning, with fluoroscopy:  The Dorsal Branches of S1-3      PRE-PROCEDURE DISCUSSION WITH PATIENT:    Risks and complications were discussed with the patient prior to starting the procedure and informed consent was obtained.      SURGEON:  Jeronimo Lind MD    REASON FOR PROCEDURE:    Previous dx+ inj x2.  Persistent SIJ pain.    SEDATION:  Versed 4mg & Fentanyl 100 mcg IV  TIME OF PROCEDURE:  The intraoperative procedure time after administration of the sedatives was 25 minutes.      ANESTHETIC:  Lidocaine 1.5 %  STEROID:  NONE  TOTAL VOLUME OF SOLUTION:  qs      DESCRIPTON OF PROCEDURE:  After obtaining informed consent, IV access was  obtained in the preoperative area.   The patient was taken to the operating room.  The patient was placed in the prone position with a pillow under the abdomen. All pressure points were well padded.  EKG, blood pressure, and pulse oximeter were monitored.  The patient was monitored and sedated by the RN under my direction. The lumbosacral area was prepped with Chloraprep and draped in a sterile fashion.     Under fluoroscopic guidance was identified the points on the lateral margin of the S1 & S2 & S3 posterior foramina.  Skin and subcutaneous tissue were anesthetized with 1% lidocaine above each of these points.     At the lateral margins of the S1-S3 foramina bilaterally, two needles were placed in parallel, about 2-3 mm apart, contacting periosteum at the lateral margin of the foramen at each level and not entering any of the foramina.  Lateral fluoroscopic imaging confirmed.   Aspiration was negative for blood and CSF.  Motor testing was confirmed to be negative at 3V and 2Hz for any  radicular stimulation.  Then 1mL of the local anesthetic was instilled in each needle.  Two minutes elapsed, and during this time a lateral fluoroscopic view was confirmed again to ensure the needles had not advanced nor retracted.  Then, Radiofrequency Lesioning was initiated for 2.5 minutes at 85 degrees Celsius.  Needles were removed intact from each of the areas.       Vital signs remained stable throughout.        ESTIMATED BLOOD LOSS:  minimal  SPECIMENS:  none    COMPLICATIONS:   No complications were noted. and The patient did not have any signs of postprocedure numbness nor weakness.    TOLERANCE & DISCHARGE CONDITION:    The patient tolerated the procedure well.  The patient was transported to the recovery area without difficulties.  The patient was discharged to home under the care of family in stable and satisfactory condition.    PLAN OF CARE:  1. The patient was given our standard instruction sheet.  2. The patient is asked to keep a pain log each hour for 8 hours after the procedure today.  3. The patient will  Return to clinic 3 wks  4. The patient will resume all medications as per the medication reconciliation sheet.

## 2020-11-10 ENCOUNTER — OFFICE VISIT (OUTPATIENT)
Dept: PAIN MEDICINE | Facility: CLINIC | Age: 55
End: 2020-11-10

## 2020-11-10 VITALS
OXYGEN SATURATION: 98 % | BODY MASS INDEX: 29.92 KG/M2 | HEIGHT: 65 IN | SYSTOLIC BLOOD PRESSURE: 134 MMHG | HEART RATE: 99 BPM | RESPIRATION RATE: 18 BRPM | TEMPERATURE: 97.1 F | DIASTOLIC BLOOD PRESSURE: 88 MMHG | WEIGHT: 179.6 LBS

## 2020-11-10 DIAGNOSIS — M75.42 IMPINGEMENT SYNDROME OF LEFT SHOULDER: ICD-10-CM

## 2020-11-10 DIAGNOSIS — G89.29 OTHER CHRONIC PAIN: Primary | ICD-10-CM

## 2020-11-10 DIAGNOSIS — M54.2 NECK PAIN: ICD-10-CM

## 2020-11-10 DIAGNOSIS — M99.04 SOMATIC DYSFUNCTION OF SACROILIAC JOINT: ICD-10-CM

## 2020-11-10 DIAGNOSIS — M47.816 LUMBAR FACET ARTHROPATHY: ICD-10-CM

## 2020-11-10 PROCEDURE — 99214 OFFICE O/P EST MOD 30 MIN: CPT | Performed by: NURSE PRACTITIONER

## 2020-11-10 RX ORDER — GABAPENTIN 300 MG/1
300 CAPSULE ORAL 3 TIMES DAILY
Qty: 90 CAPSULE | Refills: 1 | Status: SHIPPED | OUTPATIENT
Start: 2020-11-10 | End: 2021-01-21

## 2020-11-10 RX ORDER — NAPROXEN 500 MG/1
TABLET ORAL
COMMUNITY
Start: 2020-10-23 | End: 2021-04-06 | Stop reason: ALTCHOICE

## 2020-12-07 ENCOUNTER — HOSPITAL ENCOUNTER (OUTPATIENT)
Dept: MRI IMAGING | Facility: HOSPITAL | Age: 55
Discharge: HOME | End: 2020-12-07

## 2020-12-07 ENCOUNTER — TELEPHONE (OUTPATIENT)
Dept: PAIN MEDICINE | Facility: CLINIC | Age: 55
End: 2020-12-07

## 2020-12-07 DIAGNOSIS — M54.2 NECK PAIN: ICD-10-CM

## 2020-12-07 DIAGNOSIS — M75.42 IMPINGEMENT SYNDROME OF LEFT SHOULDER: ICD-10-CM

## 2020-12-07 LAB — CREAT BLDA-MCNC: 0.9 MG/DL (ref 0.6–1.3)

## 2020-12-07 PROCEDURE — 82565 ASSAY OF CREATININE: CPT

## 2020-12-07 PROCEDURE — 0 GADOBENATE DIMEGLUMINE 529 MG/ML SOLUTION: Performed by: NURSE PRACTITIONER

## 2020-12-07 PROCEDURE — 73223 MRI JOINT UPR EXTR W/O&W/DYE: CPT

## 2020-12-07 PROCEDURE — 72156 MRI NECK SPINE W/O & W/DYE: CPT

## 2020-12-07 PROCEDURE — A9577 INJ MULTIHANCE: HCPCS | Performed by: NURSE PRACTITIONER

## 2020-12-07 RX ORDER — DIAZEPAM 5 MG/1
TABLET ORAL
Qty: 2 TABLET | Refills: 0 | Status: SHIPPED | OUTPATIENT
Start: 2020-12-07 | End: 2021-01-21

## 2020-12-07 RX ADMIN — GADOBENATE DIMEGLUMINE 17 ML: 529 INJECTION, SOLUTION INTRAVENOUS at 15:07

## 2020-12-07 NOTE — TELEPHONE ENCOUNTER
Will send in valium 5 mg #2. NO DRIVING. She needed to call much sooner than 1145 prior to 2 PM appointment. Not sure pharmacy will be able to dispense that quickly. Thanks. BB

## 2020-12-07 NOTE — TELEPHONE ENCOUNTER
Pt last saw Donna, but since Donna is not here today, would you be able to prescribe antianxiety med for pt to undergo MRI today at 2pm?

## 2020-12-08 NOTE — PROGRESS NOTES
Arthritis and degenerative changes at multiple levels. Possible narrowing around a nerve on the left side.  We will discuss in further detail tomorrow.

## 2020-12-09 ENCOUNTER — OFFICE VISIT (OUTPATIENT)
Dept: PAIN MEDICINE | Facility: CLINIC | Age: 55
End: 2020-12-09

## 2020-12-09 DIAGNOSIS — G89.29 CHRONIC LEFT SHOULDER PAIN: ICD-10-CM

## 2020-12-09 DIAGNOSIS — M54.12 CERVICAL RADICULITIS: ICD-10-CM

## 2020-12-09 DIAGNOSIS — M25.512 CHRONIC LEFT SHOULDER PAIN: ICD-10-CM

## 2020-12-09 DIAGNOSIS — M47.816 LUMBAR FACET ARTHROPATHY: ICD-10-CM

## 2020-12-09 DIAGNOSIS — M50.30 DDD (DEGENERATIVE DISC DISEASE), CERVICAL: ICD-10-CM

## 2020-12-09 DIAGNOSIS — M99.04 SOMATIC DYSFUNCTION OF SACROILIAC JOINT: ICD-10-CM

## 2020-12-09 DIAGNOSIS — G89.29 OTHER CHRONIC PAIN: Primary | ICD-10-CM

## 2020-12-09 PROCEDURE — 99443 PR PHYS/QHP TELEPHONE EVALUATION 21-30 MIN: CPT | Performed by: NURSE PRACTITIONER

## 2020-12-09 NOTE — PROGRESS NOTES
"TELEPHONE VISIT  You have chosen to receive care through a telephone visit. Do you consent to use a telephone visit for your medical care today? Yes    CHIEF COMPLAINT  Back pain, neck pain    Subjective   Ema Cisse is a 55 y.o. female  who presents for a telephonic follow-up.She has a history of back pain, neck pain.    She completed a Bilateral S1-S3  Branch Radiofrequency Lesioning on 10/20/2020 - she reports that it is \"some better\".       C/o multiple joint pain, worst in the back, neck and left shoulder. Pain today 6/10 in severity.  Takes Naproxen 500 mg bid. Helps some with her pain.  Did not tolerate Robaxin, caused nausea/vomiting.  Tried Meloxicam and Chlorzoxazone without benefit.   Says she could not function with Tramadol.  Started Gabapentin 300 mg bid, seems to be helping, may be causing dizziness, has only been on for about 2 weeks, has not made it up to tid yet.       Has been reporting worsening neck pain over the past couple of months. Has been going to physical therapy which has helped some.  Most severe on the left side. Does radiate into shoulder.  Hurts to turn head to the left.  Improves a little with stretching.  New MRI to review today.       Saw Dr. Buck (ortho) On 8/27/2020 for her left shoulder pain.  Recommended physical therapy which she has completed.  Offered patient steroid injection to the AC joint which patient said she would think about.  Also discussed consideration for an MRI of the shoulder to rule out any metastatic lesion and also make sure underlying rotator cuff functioning well.  I did order this MRI.      Back Pain  This is a chronic problem. The current episode started more than 1 year ago. The problem occurs daily. The problem has been waxing and waning since onset. The pain is present in the lumbar spine and sacro-iliac. The pain is at a severity of 6/10. The pain is moderate. Exacerbated by: prolonged walking, standing. Stiffness is present in the morning. " Associated symptoms include numbness, tingling (left arm first thing in the morning) and weakness. Risk factors include obesity. She has tried NSAIDs and analgesics for the symptoms. The treatment provided significant (with RFL) relief.   Neck Pain   This is a chronic problem. The current episode started more than 1 month ago. The problem occurs daily. The problem has been gradually worsening. The pain is present in the left side (left shoulder and arm as well ). The quality of the pain is described as aching and burning. The pain is at a severity of 7/10. The pain is moderate. The symptoms are aggravated by position. Associated symptoms include numbness, tingling (left arm first thing in the morning) and weakness. She has tried NSAIDs, muscle relaxants, neck support and home exercises for the symptoms. The treatment provided mild relief.       MRI CERVICAL and left SHOULDER [facility] 12/7/2020    IMPRESSION:  1. Mild degenerative change at the acromioclavicular joint with  prominence along the cephalad and caudad sides of that articulation. The  prominence on the cephalad side accounts for the reportedly clinically  palpable area of fullness.  2. There is mild supraspinatus tendinitis and evidence of mild synovitis  in the subacromial subdeltoid bursa.  3. SLAP tear of the glenoid labrum superiorly.  4. Abnormal loss of normal fat signal in the rotator interval associated  with some low signal thickening of the axillary pouch. These may be  related to the finding in the subacromial subdeltoid bursa and represent  some element of global synovitis. However, there is no evidence of  inflammatory arthritis. Other differential consideration is adhesive  capsulitis.  5. There is no evidence of tumor anywhere around the left shoulder.     This report was finalized on 12/7/2020 5:43 PM by Dr. Barrett Sheth M.D.    IMPRESSION:  1. There is diffuse cervical spondylosis as described above. There is  disc space  narrowing and degenerative endplate changes from C3 to C7,  posterior disc osteophyte complexes result in mild canal narrowing at  C4-C5 and C5-C6, posterior spurs result in essentially no canal  narrowing at C3-C4 and C6-C7. There is no disc herniation, no additional  canal narrowing is seen in the cervical spine.  2. There is multilevel bony foraminal narrowing in the cervical spine.  At C2-C3, moderate left facet overgrowth mildly narrows the left neural  foramen. At C3-C4, moderate left facet overgrowth and some left-sided  uncovertebral joint hypertrophy results in up to mild-to-moderate left  foraminal narrowing. At C4-C5, there is moderate-to-severe left facet  overgrowth, bilateral uncovertebral joint hypertrophy and there is mild  right and there is moderate-to-severe left bony foraminal narrowing  could affect the exiting left C5 nerve root. At C5-C6, there is  mild-to-moderate left and there is moderate right bony foraminal  narrowing that could affect the exiting C6 nerve roots bilaterally,  right greater than left. There is mild right and moderate left bony  foraminal narrowing at C6-C7 that could affect the exiting left C7 nerve  root. The remainder of the cervical spine MRI is unremarkable.     This report was finalized on 12/8/2020 8:32 AM by Dr. Jeff Sandoval M.D.    The following portions of the patient's history were reviewed and updated as appropriate: allergies, current medications, past family history, past medical history, past social history, past surgical history and problem list.    Review of Systems   Musculoskeletal: Positive for back pain and neck pain.   Neurological: Positive for tingling (left arm first thing in the morning), weakness and numbness.     Vitals:    12/09/20 1536   PainSc:   7   PainLoc: Neck     Objective   Physical Exam  As this is a telephone check-in, the ability to perform a routine physical exam is extremely limited. The patient seems alert and is oriented  appropriately.   On this phone call there is not any evidence of respiratory distress.   The patient seems of normal mood.   The remainder of a routine physical exam is deferred.    Assessment/Plan   Diagnoses and all orders for this visit:    1. Other chronic pain (Primary)    2. Lumbar facet arthropathy  -     Ambulatory Referral to Physical Therapy Evaluate and treat    3. Somatic dysfunction of sacroiliac joint    4. DDD (degenerative disc disease), cervical  -     Case Request  -     Ambulatory Referral to Physical Therapy Evaluate and treat    5. Cervical radiculitis  -     Case Request    6. Chronic left shoulder pain  -     Ambulatory Referral to Physical Therapy Evaluate and treat      ----------------    Our practice is offering alternative &/or electronic methods to continue to follow our patients while at the same time further the efforts toward social distancing, in accordance with our organizational policies, professional societies' guidance, and gubernatorial mandates.  I support the Healthy at Home campaign and in this visit I have counseled the patient on our needs to limit in-person office visits and physical encounters with medical facilities whenever possible.  I have also educated the patient on the medical necessities of maintaining social distancing while we continue to function during this crisis period.      The patient was counseled on the need to consider telehealth options. The patient was offered the opportunity for a Video Visit using [employer]. The patient declined a Video Visit. The patient agreed to a Telephone Check-In. The patient was counseled on the need for a check-in visit. The patient was educated about our efforts to comply with monitoring standards when prescribing potent medications.  ----------------  This visit has been rescheduled as a phone visit to comply with patient safety concerns in accordance with CDC recommendations. Total time of discussion was 21 minutes.    ---  BONILLA. Reviewed the procedure at length with the patient.  Included in the review was expectations, complications, risk and benefits.The procedure was described in detail and the risks, benefits and alternatives were discussed with the patient (including but not limited to: bleeding, infection, nerve damage, worsening of pain, inability to perform injection, paralysis, seizures, and death) who agreed to proceed.  Discussed the potential for sedation if warranted/wanted.  The procedure will plan to be performed at [facility] with fluoroscopic guidance(unless ultrasound is indicated) and could potentially have steroids and contrast dye used. Questions were answered and in a way the patient could understand.  Patient verbalized understanding and wishes to proceed.  This intervention will be ordered.  Discussed with patient that all procedures are part of a multimodal plan of care and include either formal PT or a home exercise program.  Patient has no evidence of coagulopathy or current infection.  --- Patient requests to return to physical therapy. I have placed order for this. PT to eval/treat back, neck, shoulder  --- Continue Gabapentin  --- Follow-up after procedure          PATTI REPORT    PATTI report has been reviewed and scanned into the patient's chart.    History and physical exam exhibit continued safe and appropriate use of controlled substances.

## 2020-12-10 ENCOUNTER — TELEPHONE (OUTPATIENT)
Dept: PAIN MEDICINE | Facility: CLINIC | Age: 55
End: 2020-12-10

## 2020-12-10 NOTE — TELEPHONE ENCOUNTER
Pt called stg she was referred to PT but it was sent to [facility]. [facility] to [facility] [place]. Could you change referral to [place] location for pt? Please advise. Thank you.

## 2020-12-11 NOTE — TELEPHONE ENCOUNTER
Halima, per pt request, can you please change PT referral to [facility] in [place]? Thank you.

## 2020-12-23 ENCOUNTER — LAB REQUISITION (OUTPATIENT)
Dept: LAB | Facility: HOSPITAL | Age: 55
End: 2020-12-23

## 2020-12-23 DIAGNOSIS — Z00.00 ENCOUNTER FOR GENERAL ADULT MEDICAL EXAMINATION WITHOUT ABNORMAL FINDINGS: ICD-10-CM

## 2020-12-28 PROCEDURE — U0004 COV-19 TEST NON-CDC HGH THRU: HCPCS | Performed by: ANESTHESIOLOGY

## 2020-12-29 LAB — SARS-COV-2 RNA RESP QL NAA+PROBE: NOT DETECTED

## 2020-12-30 ENCOUNTER — DOCUMENTATION (OUTPATIENT)
Dept: PAIN MEDICINE | Facility: CLINIC | Age: 55
End: 2020-12-30

## 2020-12-30 ENCOUNTER — OUTSIDE FACILITY SERVICE (OUTPATIENT)
Dept: PAIN MEDICINE | Facility: CLINIC | Age: 55
End: 2020-12-30

## 2020-12-30 PROCEDURE — 62321 NJX INTERLAMINAR CRV/THRC: CPT | Performed by: ANESTHESIOLOGY

## 2020-12-30 NOTE — PROGRESS NOTES
Cervical Epidural Steroid Injection @ C5-C6  [facility]    PREOPERATIVE DIAGNOSIS:  Cervical Degenerative Disc Disease and Left Cervical Radiculopathy  POSTOPERATIVE DIAGNOSIS:  Same as preop diagnosis    PROCEDURE:   Cervical Epidural Steroid Injection, Therapeutic Translaminar Injection, with epidurogram, at  C5-C6 level    PRE-PROCEDURE DISCUSSION WITH PATIENT:    Risks and complications were discussed with the patient prior to starting the procedure and informed consent was obtained.  We discussed various topics including but not limited to bleeding, infection, injury, paralysis, nerve injury, dural puncture, coma, death, worsening of clinical picture, lack of pain relief, and postprocedural soreness.    SURGEON:  Jeronimo Lind MD    REASON FOR PROCEDURE:   Degenerative changes are noted in the area., Stenotic area is noted, and is likely contributing to this chronic &/or recurrent pain.  and Radiating pattern of pain is likely consistent with degenerative changes in the area.    SEDATION:  Versed 4mg & Fentanyl 50 mcg IV  ANESTHETIC:  Marcaine 0.25%  STEROID:   Methylprednisolone (DEPO MEDROL) 80mg/ml    DESCRIPTON OF PROCEDURE:    After obtaining informed consent, I.V. was started in the preop area.   The patient was taken to the operating room and placed in the prone position.  EKG, blood pressure, and pulse oximeter were monitored throughout, and sedation was provided as needed by the RN under my guidance. All pressure points were well padded.  The cervicothoracic spine area was prepped with Chloraprep and draped in a sterile fashion.  Under fluoroscopic guidance, the aforementioned interlaminar space was identified. Skin and subcutaneous tissues were anesthetized with 1% lidocaine in the middle of the space. A Tuohy needle was introduced through the skin and advanced to this interlaminar space and into the epidural space under fluoroscopic guidance and verified with loss-of-resistance  technique to air.  After confirming the position of the needle with the fluoroscope with all the views, and after aspiration was confirmed negative for blood and CSF, 1.5 mL of Omnipaque was injected.  After seeing appropriate epidurogram with lateral and PA views, a total of 3 cc solution was injected, consisting of 1cc of local anesthetic as above, with normal saline and injectable steroid as above.     ESTIMATED BLOOD LOSS:  <5 mL  SPECIMENS:  None    COMPLICATIONS:     No complications were noted., There was no indication of vascular uptake on live injection of contrast dye., There was no indication of intrathecal uptake on live injection of contrast dye., There was not any evidence of dural puncture.   and The patient did not have any signs of postprocedure numbness nor weakness.    TOLERANCE & DISCHARGE CONDITION:    The patient tolerated the procedure well.  The patient was transported to the recovery area without difficulties.  The patient was discharged to home under the care of family in stable and satisfactory condition.    PLAN OF CARE:  1. The patient was given our standard instruction sheet.  2. The patient will Return to clinic 4-6 wks and Repeat injection 2-4 wks.  3. The patient will resume all medications as per the medication reconciliation sheet.

## 2021-01-05 ENCOUNTER — TELEPHONE (OUTPATIENT)
Dept: PAIN MEDICINE | Facility: CLINIC | Age: 56
End: 2021-01-05

## 2021-01-06 ENCOUNTER — TELEPHONE (OUTPATIENT)
Dept: PAIN MEDICINE | Facility: CLINIC | Age: 56
End: 2021-01-06

## 2021-01-21 ENCOUNTER — APPOINTMENT (OUTPATIENT)
Dept: WOMENS IMAGING | Facility: HOSPITAL | Age: 56
End: 2021-01-21

## 2021-01-21 ENCOUNTER — PROCEDURE VISIT (OUTPATIENT)
Dept: OBSTETRICS AND GYNECOLOGY | Age: 56
End: 2021-01-21

## 2021-01-21 ENCOUNTER — OFFICE VISIT (OUTPATIENT)
Dept: OBSTETRICS AND GYNECOLOGY | Age: 56
End: 2021-01-21

## 2021-01-21 VITALS
WEIGHT: 183 LBS | HEIGHT: 64 IN | BODY MASS INDEX: 31.24 KG/M2 | SYSTOLIC BLOOD PRESSURE: 114 MMHG | DIASTOLIC BLOOD PRESSURE: 78 MMHG

## 2021-01-21 DIAGNOSIS — Z12.31 VISIT FOR SCREENING MAMMOGRAM: Primary | ICD-10-CM

## 2021-01-21 DIAGNOSIS — Z01.419 WELL WOMAN EXAM WITH ROUTINE GYNECOLOGICAL EXAM: Primary | ICD-10-CM

## 2021-01-21 PROCEDURE — 77067 SCR MAMMO BI INCL CAD: CPT | Performed by: OBSTETRICS & GYNECOLOGY

## 2021-01-21 PROCEDURE — 99396 PREV VISIT EST AGE 40-64: CPT | Performed by: OBSTETRICS & GYNECOLOGY

## 2021-01-21 PROCEDURE — 77067 SCR MAMMO BI INCL CAD: CPT | Performed by: RADIOLOGY

## 2021-01-21 RX ORDER — BUSPIRONE HYDROCHLORIDE 15 MG/1
15 TABLET ORAL 2 TIMES DAILY
Qty: 60 TABLET | Refills: 2 | Status: SHIPPED | OUTPATIENT
Start: 2021-01-21 | End: 2021-07-22 | Stop reason: ALTCHOICE

## 2021-01-21 NOTE — PROGRESS NOTES
Routine Annual Visit    2021    Patient: Ema Cisse          MR#:[id number]      Chief Complaint   Patient presents with   • Gynecologic Exam     Former [facility] pt. AE and MG before exam.  Last pap 20 = neg/neg. PM no HRT. Dexa 2016.  C-scope .        History of Present Illness    55 y.o. female  who presents for annual exam.   Pt feeling ok, just anxiety off and on, has tried a few things, a friend on buspar and pt wants to try  New pt to me, [facility] pt for over 30 years  mammo today  UTD pap and CSC and dexa  Discussed exercise          No LMP recorded (lmp unknown). Patient is postmenopausal.  Obstetric History:  OB History        1    Para   1    Term   1            AB        Living   1       SAB        TAB        Ectopic        Molar        Multiple        Live Births   1               Menstrual History:     No LMP recorded (lmp unknown). Patient is postmenopausal.       Sexual History:       ________________________________________  Patient Active Problem List   Diagnosis   • Somatic dysfunction of sacroiliac joint   • Lumbar disc disorder   • Chronic pain   • Lumbar facet arthropathy   • Metatarsalgia of left foot   • Other osteonecrosis, left foot (CMS/HCC)   • Vitamin D deficiency   • Gastroesophageal reflux disease   • Benign essential HTN   • Other hemorrhoids   • Symptoms, such as flushing, sleeplessness, headache, lack of concentration, associated with the menopause   • Irritable bowel syndrome with both constipation and diarrhea   • Impingement syndrome of left shoulder       Past Medical History:   Diagnosis Date   • Bilateral leg pain    • Low back pain    • Peripheral neuropathy    • Stress fracture     left foot   • Trochanteric bursitis, left hip    • Uterine leiomyoma        Past Surgical History:   Procedure Laterality Date   • BUNIONECTOMY     • CHOLECYSTECTOMY     • COLONOSCOPY     • COLONOSCOPY     • KNEE SURGERY  2005    ACL Removal  "  • SINUS SURGERY  2010       Social History     Tobacco Use   Smoking Status Never Smoker   Smokeless Tobacco Never Used       has a current medication list which includes the following prescription(s): cetirizine hcl, hydrocortisone, montelukast, naproxen, pantoprazole, sumatriptan, trazodone, triamcinolone, and buspirone.  ________________________________________    Current contraception: post menopausal status  History of abnormal Pap smear: no  Family history of Breast cancer: no  Family history of uterine or ovarian cancer: no  Family History of colon cancer/colon polyps: no  History of abnormal mammogram: no      The following portions of the patient's history were reviewed and updated as appropriate: allergies, current medications, past family history, past medical history, past social history, past surgical history and problem list.    Review of Systems    Pertinent items are noted in HPI.     Objective   Physical Exam    /78   Ht 162.6 cm (64\")   Wt 83 kg (183 lb)   LMP  (LMP Unknown)   Breastfeeding No   BMI 31.41 kg/m²    BP Readings from Last 3 Encounters:   01/21/21 114/78   11/10/20 134/88   09/11/20 120/92      Wt Readings from Last 3 Encounters:   01/21/21 83 kg (183 lb)   11/10/20 81.5 kg (179 lb 9.6 oz)   09/11/20 80.6 kg (177 lb 9.6 oz)      BMI: Estimated body mass index is 31.41 kg/m² as calculated from the following:    Height as of this encounter: 162.6 cm (64\").    Weight as of this encounter: 83 kg (183 lb).      General:   alert, appears stated age and cooperative   Abdomen: soft, non-tender, without masses or organomegaly   Breast: inspection negative, no nipple discharge or bleeding, no masses or nodularity palpable   Vulva: normal   Vagina: normal mucosa   Cervix: no cervical motion tenderness and no lesions   Uterus: normal size, mobile or non-tender   Adnexa: no mass, fullness, tenderness     Assessment:    1. Normal annual exam   Assessment     ICD-10-CM ICD-9-CM   1. Well " woman exam with routine gynecological exam  Z01.419 V72.31     Plan:    Plan     [x]  Mammogram request made  []  PAP done  []  Labs:   []  GC/Chl/TV  []  DEXA scan   []  Referral for colonoscopy:       Diagnoses and all orders for this visit:    1. Well woman exam with routine gynecological exam (Primary)    Other orders  -     busPIRone (BUSPAR) 15 MG tablet; Take 1 tablet by mouth 2 (Two) Times a Day.  Dispense: 60 tablet; Refill: 2    requested buspar for anxiety, will try it, start with am since she takes trazodone in evening, can add an afternoon dose if needed  Pt has already tried wellbutrin and prozac  HF and NS now mostly with anxiety, not too bad  UTD pap  mammo done        Counseling:  --Nutrition: Stressed importance of moderation and caloric balance, stressed fresh fruit and vegetables  --Exercise: Stressed the importance of regular exercise. 3-5 times weekly   - Discussed screening mammogram recommendations.   --Discussed benefits of screening colonoscopy- age 45 unless FH  --Discussed pap smear screening recommendations

## 2021-02-01 ENCOUNTER — TELEPHONE (OUTPATIENT)
Dept: ORTHOPEDICS | Facility: OTHER | Age: 56
End: 2021-02-01

## 2021-02-01 NOTE — TELEPHONE ENCOUNTER
Provider: NICOLLE SHEPHERD   Caller: MRS MARIE   Relationship to Patient: PATIENT   Phone Number: [phone]  Reason for Call: PATIENT CALLED IN REG HER LAST TELEPHONE VISIT BACK IN DEC PATIENT SAYS RECEIVED A BILL FROM HER INS STATING THIS WAS NOT COVERED, PT IS ASKING TO SPEAK WITH SOMEONE ABOUT THIS DUE TO HER ASKING BEFORE HAND IF THIS WAS GOING TO BE COVERED BEFORE SCHEDULING. PLEASE GIVE PATIENT A CALL BACK.

## 2021-02-01 NOTE — TELEPHONE ENCOUNTER
We don't handle billing in the office. Please direct pt to billing number on the billing statement. Thank you.

## 2021-02-02 NOTE — TELEPHONE ENCOUNTER
PATIENT STATED SHE DIDN'T WANT TO SPEAK WITH BILLING OFFICE SHE ALREADY CALLED THEM, SHE WANTED TO SPEAK WITH CLINIC BECAUSE SHE WAS TOLD THAT TELEHEALTH VISIT WOULD BE COVERED. HUB DOES NOT FOLLOW UP ON TELEPHONE ENCOUNTERS.

## 2021-03-24 ENCOUNTER — HOSPITAL ENCOUNTER (OUTPATIENT)
Dept: OTHER | Facility: HOSPITAL | Age: 56
Discharge: HOME | End: 2021-03-24
Attending: NURSE PRACTITIONER

## 2021-03-24 ENCOUNTER — OFFICE VISIT CONVERTED (OUTPATIENT)
Dept: FAMILY MEDICINE CLINIC | Age: 56
End: 2021-03-24
Attending: NURSE PRACTITIONER

## 2021-03-24 LAB
APPEARANCE UR: CLEAR
BACTERIA UR QL AUTO: NORMAL
BILIRUB UR QL: NEGATIVE
CASTS URNS QL MICRO: NORMAL /[LPF]
COLOR UR: YELLOW
CONV LEUKOCYTE ESTERASE: NEGATIVE
CONV UROBILINOGEN IN URINE BY AUTOMATED TEST STRIP: 0.2 {EHRLICHU}/DL (ref 0.1–1)
EPI CELLS #/AREA URNS HPF: NORMAL /[HPF]
GLUCOSE 24H UR-MCNC: NEGATIVE MG/DL
HGB UR QL STRIP: NEGATIVE
KETONES UR QL STRIP: NEGATIVE MG/DL
MUCOUS THREADS URNS QL MICRO: NORMAL
NITRITE UR-MCNC: NEGATIVE MG/ML
PH UR STRIP.AUTO: 6 [PH] (ref 5–8)
PROT UR-MCNC: NEGATIVE MG/DL
RBC # BLD AUTO: NORMAL /[HPF]
SP GR UR STRIP: 1.01 (ref 1–1.03)
SPECIMEN SOURCE: NORMAL
UNIDENT CRYS URNS QL MICRO: NORMAL /[HPF]
WBC #/AREA URNS HPF: NORMAL /[HPF]

## 2021-03-25 ENCOUNTER — HOSPITAL ENCOUNTER (OUTPATIENT)
Dept: OTHER | Facility: HOSPITAL | Age: 56
Discharge: HOME | End: 2021-03-25
Attending: NURSE PRACTITIONER

## 2021-03-26 LAB
ALBUMIN SERPL-MCNC: 4.1 G/DL (ref 3.5–5)
ALBUMIN/GLOB SERPL: 1.6 {RATIO} (ref 1.4–2.6)
ALP SERPL-CCNC: 93 U/L (ref 53–141)
ALT SERPL-CCNC: 17 U/L (ref 10–40)
ANION GAP SERPL CALC-SCNC: 16 MMOL/L (ref 8–19)
APPEARANCE UR: CLEAR
AST SERPL-CCNC: 17 U/L (ref 15–50)
BACTERIA UR CULT: NORMAL
BACTERIA UR QL AUTO: ABNORMAL
BASOPHILS # BLD MANUAL: 0.04 10*3/UL (ref 0–0.2)
BASOPHILS NFR BLD MANUAL: 0.7 % (ref 0–3)
BILIRUB SERPL-MCNC: 0.22 MG/DL (ref 0.2–1.3)
BILIRUB UR QL: NEGATIVE
BNP SERPL-MCNC: 35 PG/ML (ref 0–900)
BUN SERPL-MCNC: 9 MG/DL (ref 5–25)
BUN/CREAT SERPL: 9 {RATIO} (ref 6–20)
CALCIUM SERPL-MCNC: 9.3 MG/DL (ref 8.7–10.4)
CASTS URNS QL MICRO: ABNORMAL /[LPF]
CHLORIDE SERPL-SCNC: 106 MMOL/L (ref 99–111)
CHOLEST SERPL-MCNC: 168 MG/DL (ref 107–200)
CHOLEST/HDLC SERPL: 3.6 {RATIO} (ref 3–6)
COLOR UR: YELLOW
CONV CO2: 24 MMOL/L (ref 22–32)
CONV LEUKOCYTE ESTERASE: ABNORMAL
CONV TOTAL PROTEIN: 6.7 G/DL (ref 6.3–8.2)
CONV UROBILINOGEN IN URINE BY AUTOMATED TEST STRIP: 0.2 {EHRLICHU}/DL (ref 0.1–1)
CREAT UR-MCNC: 0.95 MG/DL (ref 0.5–0.9)
DEPRECATED RDW RBC AUTO: 42.9 FL
EOSINOPHIL # BLD MANUAL: 0.17 10*3/UL (ref 0–0.7)
EOSINOPHIL NFR BLD MANUAL: 3.2 % (ref 0–7)
EPI CELLS #/AREA URNS HPF: ABNORMAL /[HPF]
ERYTHROCYTE [DISTWIDTH] IN BLOOD BY AUTOMATED COUNT: 12.9 % (ref 11.5–14.5)
GFR SERPLBLD BASED ON 1.73 SQ M-ARVRAT: >60 ML/MIN/{1.73_M2}
GLOBULIN UR ELPH-MCNC: 2.6 G/DL (ref 2–3.5)
GLUCOSE 24H UR-MCNC: NEGATIVE MG/DL
GLUCOSE SERPL-MCNC: 99 MG/DL (ref 65–99)
GRANS (ABSOLUTE): 2.5 10*3/UL (ref 2–8)
GRANS: 46.7 % (ref 30–85)
HBA1C MFR BLD: 13.9 G/DL (ref 12–16)
HCT VFR BLD AUTO: 42 % (ref 37–47)
HDLC SERPL-MCNC: 47 MG/DL (ref 40–60)
HGB UR QL STRIP: NEGATIVE
IMM GRANULOCYTES # BLD: 0.01 10*3/UL (ref 0–0.54)
IMM GRANULOCYTES NFR BLD: 0.2 % (ref 0–0.43)
KETONES UR QL STRIP: NEGATIVE MG/DL
LDLC SERPL CALC-MCNC: 93 MG/DL (ref 70–100)
LYMPHOCYTES # BLD MANUAL: 2.23 10*3/UL (ref 1–5)
LYMPHOCYTES NFR BLD MANUAL: 7.5 % (ref 3–10)
MCH RBC QN AUTO: 29.8 PG (ref 27–31)
MCHC RBC AUTO-ENTMCNC: 33.1 G/DL (ref 33–37)
MCV RBC AUTO: 89.9 FL (ref 81–99)
MONOCYTES # BLD AUTO: 0.4 10*3/UL (ref 0.2–1.2)
MUCOUS THREADS URNS QL MICRO: ABNORMAL
NITRITE UR-MCNC: NEGATIVE MG/ML
OSMOLALITY SERPL CALC.SUM OF ELEC: 293 MOSM/KG (ref 273–304)
PH UR STRIP.AUTO: 6 [PH] (ref 5–8)
PLATELET # BLD AUTO: 337 10*3/UL (ref 130–400)
PMV BLD AUTO: 10.4 FL (ref 7.4–10.4)
POTASSIUM SERPL-SCNC: 4.1 MMOL/L (ref 3.5–5.3)
PROT UR-MCNC: NEGATIVE MG/DL
RBC # BLD AUTO: 4.67 10*6/UL (ref 4.2–5.4)
RBC # BLD AUTO: ABNORMAL /[HPF]
SODIUM SERPL-SCNC: 142 MMOL/L (ref 135–147)
SP GR UR STRIP: 1.02 (ref 1–1.03)
SPECIMEN SOURCE: ABNORMAL
TRIGL SERPL-MCNC: 138 MG/DL (ref 40–150)
TSH SERPL-ACNC: 2.08 M[IU]/L (ref 0.27–4.2)
UNIDENT CRYS URNS QL MICRO: ABNORMAL /[HPF]
VARIANT LYMPHS NFR BLD MANUAL: 41.7 % (ref 20–45)
VLDLC SERPL-MCNC: 28 MG/DL (ref 5–37)
WBC # BLD AUTO: 5.35 10*3/UL (ref 4.8–10.8)
WBC #/AREA URNS HPF: ABNORMAL /[HPF]

## 2021-03-27 LAB
ASO AB SERPL-ACNC: 23 [IU]/ML (ref 0–200)
CONV RHEUMATOID FACTOR IGM: <10 [IU]/ML (ref 0–14)
CRP SERPL-MCNC: <3 MG/L (ref 0–5)
DSDNA AB SER-ACNC: NEGATIVE [IU]/ML
ENA AB SER IA-ACNC: ABNORMAL {RATIO}
ERYTHROCYTE [SEDIMENTATION RATE] IN BLOOD: 7 MM/H (ref 0–30)
PHOSPHATE SERPL-MCNC: 4 MG/DL (ref 2.4–4.5)
URATE SERPL-MCNC: 6.7 MG/DL (ref 2.5–7.5)

## 2021-03-28 LAB — BACTERIA UR CULT: NORMAL

## 2021-03-30 ENCOUNTER — HOSPITAL ENCOUNTER (OUTPATIENT)
Dept: OTHER | Facility: HOSPITAL | Age: 56
Discharge: HOME | End: 2021-03-30
Attending: NURSE PRACTITIONER

## 2021-03-31 LAB
CENTROMERE AB TITR SER IF: <0.4 [IU]/ML (ref 0–7)
ENA SCL70 AB SER QL IA: 1.2 [IU]/ML (ref 0–7)
JO-1 (WB)*: <0.3 [IU]/ML (ref 0–7)
RNP: 1.3 [IU]/ML (ref 0–5)
RNP: <0.3 [IU]/ML (ref 0–7)
SMI: 2.2 [IU]/ML (ref 0–7)
SSA (RO) (ENA) AB, IGG: 0.4 [IU]/ML (ref 0–7)
SSB (LA) ENA AB, IGG: 12 [IU]/ML (ref 0–7)

## 2021-04-06 ENCOUNTER — OFFICE VISIT (OUTPATIENT)
Dept: PAIN MEDICINE | Facility: CLINIC | Age: 56
End: 2021-04-06

## 2021-04-06 VITALS
WEIGHT: 188 LBS | HEART RATE: 88 BPM | BODY MASS INDEX: 32.1 KG/M2 | OXYGEN SATURATION: 98 % | RESPIRATION RATE: 18 BRPM | HEIGHT: 64 IN | DIASTOLIC BLOOD PRESSURE: 94 MMHG | TEMPERATURE: 96.7 F | SYSTOLIC BLOOD PRESSURE: 142 MMHG

## 2021-04-06 DIAGNOSIS — M99.04 SOMATIC DYSFUNCTION OF SACROILIAC JOINT: ICD-10-CM

## 2021-04-06 DIAGNOSIS — G89.29 OTHER CHRONIC PAIN: Primary | ICD-10-CM

## 2021-04-06 DIAGNOSIS — M50.30 DDD (DEGENERATIVE DISC DISEASE), CERVICAL: ICD-10-CM

## 2021-04-06 DIAGNOSIS — M47.816 LUMBAR FACET ARTHROPATHY: ICD-10-CM

## 2021-04-06 PROCEDURE — 99214 OFFICE O/P EST MOD 30 MIN: CPT | Performed by: NURSE PRACTITIONER

## 2021-04-06 RX ORDER — CELECOXIB 200 MG/1
200 CAPSULE ORAL DAILY
Qty: 30 CAPSULE | Refills: 1 | Status: SHIPPED | OUTPATIENT
Start: 2021-04-06 | End: 2021-06-02

## 2021-04-06 RX ORDER — METHOCARBAMOL 750 MG/1
750 TABLET, FILM COATED ORAL DAILY PRN
Qty: 30 TABLET | Refills: 1 | Status: SHIPPED | OUTPATIENT
Start: 2021-04-06 | End: 2021-09-01

## 2021-04-06 NOTE — PROGRESS NOTES
"CHIEF COMPLAINT  Back and neck are unchanged since last visit.    Subjective   Ema Cisse is a 55 y.o. female  who presents to the office for follow-up of procedure.  She completed a Cervical Epidural Steroid Injection @ C5-C6   on  12/30/21 performed by Dr. Lind for management of neck pain. Patient reports a few days of relief, but minimal relief thereafter from the procedure.  It is a little more tolerable.     She completed a Bilateral S1-S3  Branch Radiofrequency Lesioning on 10/20/2020 - she reports that it is \"some better\".      C/o multiple joint pain, worst in the back, neck and left shoulder. Pain today 6/10 in severity.  Takes Naproxen 500 mg bid. Helps some with her pain.  Did not tolerate Robaxin, caused nausea/vomiting.  Tried Meloxicam and Chlorzoxazone without benefit.   Says she could not function with Tramadol or Gabapentin.      Saw Dr. Buck (ortho) On 8/27/2020 for her left shoulder pain.  Recommended physical therapy which she has completed.  Offered patient steroid injection to the AC joint which patient said she would think about.  Also discussed consideration for an MRI of the shoulder to rule out any metastatic lesion and also make sure underlying rotator cuff functioning well.  I did order this MRI.      Referred to rheumatologist by her pcp due to positive ROSALIA     Patient remained masked during entire encounter. No cough present. I donned a mask and eye protection throughout entire visit. Prior to donning mask and eye protection, hand hygiene was performed, as well as when it was doffed.  I was closer than 6 feet, but not for an extended period of time. No obvious exposure to any bodily fluids.    Back Pain  This is a chronic problem. The current episode started more than 1 year ago. The problem occurs daily. The problem has been waxing and waning since onset. The pain is present in the lumbar spine and sacro-iliac. The pain is at a severity of 6/10. The pain is moderate. " Exacerbated by: prolonged walking, standing. Stiffness is present in the morning. Associated symptoms include numbness, tingling (left arm first thing in the morning) and weakness. Pertinent negatives include no chest pain, dysuria, fever or headaches. Risk factors include obesity. She has tried NSAIDs and analgesics for the symptoms. The treatment provided significant (with RFL) relief.   Neck Pain   This is a chronic problem. The current episode started more than 1 month ago. The problem occurs daily. The problem has been gradually worsening. The pain is present in the left side (left shoulder and arm as well ). The quality of the pain is described as aching and burning. The pain is at a severity of 6/10. The pain is moderate. The symptoms are aggravated by position. Associated symptoms include numbness, tingling (left arm first thing in the morning) and weakness. Pertinent negatives include no chest pain, fever or headaches. She has tried NSAIDs, muscle relaxants, neck support and home exercises for the symptoms. The treatment provided mild relief.     PEG Assessment   What number best describes your pain on average in the past week?4  What number best describes how, during the past week, pain has interfered with your enjoyment of life?5  What number best describes how, during the past week, pain has interfered with your general activity?  4    The following portions of the patient's history were reviewed and updated as appropriate: allergies, current medications, past family history, past medical history, past social history, past surgical history and problem list.    Review of Systems   Constitutional: Negative for chills and fever.   Respiratory: Positive for shortness of breath.    Cardiovascular: Negative for chest pain.   Gastrointestinal: Positive for constipation and diarrhea. Negative for nausea and vomiting.   Genitourinary: Negative for difficulty urinating, dyspareunia and dysuria.   Musculoskeletal:  "Positive for back pain and neck pain.   Neurological: Positive for tingling (left arm first thing in the morning), weakness and numbness. Negative for dizziness, light-headedness and headaches.   Psychiatric/Behavioral: Negative for confusion, hallucinations, self-injury, sleep disturbance and suicidal ideas. The patient is not nervous/anxious.    All other systems reviewed and are negative.    I have reviewed and confirmed the accuracy of the ROS as documented by the MA/LPN/RN SHELBIE Najera     Vitals:    04/06/21 1259   BP: 142/94   Pulse: 88   Resp: 18   Temp: 96.7 °F (35.9 °C)   SpO2: 98%   Weight: 85.3 kg (188 lb)   Height: 162.6 cm (64\")   PainSc:   6   PainLoc: Neck     Objective   Physical Exam  Vitals and nursing note reviewed.   Constitutional:       General: She is not in acute distress.     Appearance: Normal appearance. She is not ill-appearing.   Pulmonary:      Effort: Pulmonary effort is normal. No respiratory distress.   Musculoskeletal:      Cervical back: Tenderness and bony tenderness present.      Lumbar back: Tenderness and bony tenderness present.   Skin:     General: Skin is warm and dry.   Neurological:      Mental Status: She is alert and oriented to person, place, and time.      Motor: Motor function is intact. No weakness.      Gait: Gait normal.   Psychiatric:         Mood and Affect: Mood normal.         Behavior: Behavior normal.       Assessment/Plan   Diagnoses and all orders for this visit:    1. Other chronic pain (Primary)    2. DDD (degenerative disc disease), cervical    3. Lumbar facet arthropathy    4. Somatic dysfunction of sacroiliac joint    Other orders  -     celecoxib (CeleBREX) 200 MG capsule; Take 1 capsule by mouth Daily.  Dispense: 30 capsule; Refill: 1  -     methocarbamol (ROBAXIN) 750 MG tablet; Take 1 tablet by mouth Daily As Needed for Muscle Spasms.  Dispense: 30 tablet; Refill: 1      --- Declines repeat BONILLA or consideration for cervical MBB at this " time   --- Try Celebrex. D/c Naproxen. Discussed medication with the patient.  Included in this discussion was the potential for side effects and adverse events.  Patient verbalized understanding and wished to proceed.  Prescription will be sent to pharmacy.  --- Try Robaxin HS prn. Discussed medication with the patient.  Included in this discussion was the potential for side effects and adverse events.  Patient verbalized understanding and wished to proceed.  Prescription will be sent to pharmacy.  --- Obtain recent CBC and CMP from pcp  --- Follow-up 2 months/sooner if needed          PATTI REPORT  PATTI report has been reviewed and scanned into the patient's chart.    As the clinician, I personally reviewed the PATTI from 4/6/2021 while the patient was in the office today.    EMR Dragon/Transcription disclaimer:   Much of this encounter note is an electronic transcription/translation of spoken language to printed text. The electronic translation of spoken language may permit erroneous, or at times, nonsensical words or phrases to be inadvertently transcribed; Although I have reviewed the note for such errors, some may still exist.

## 2021-05-17 ENCOUNTER — OFFICE VISIT CONVERTED (OUTPATIENT)
Dept: FAMILY MEDICINE CLINIC | Age: 56
End: 2021-05-17
Attending: NURSE PRACTITIONER

## 2021-05-18 NOTE — PROGRESS NOTES
"Ema CisseRenuka 1965     Office/Outpatient Visit    Visit Date:  03:58 pm    Provider: Anais Mari N.P. (Assistant: Caro Amaya MA)    Location: [facility]        Electronically signed by Anais Mari N.P. on  2018 06:24:50 PM                             SUBJECTIVE:        CC:     Amy is a 52 year old White female.  cough,congestion;         HPI:         Patient to be evaluated for acute bronchitis.  These have been present for the past 12 weeks.  The symptoms include chest congestion, cough, nasal congestion, nasal discharge, sinus pain/pressure and scant, purulent sputum production.  She denies body aches, fever or sore throat.  She denies exposure to ill contacts.  She has already tried to relieve the symptoms with antihistamines and decongestants.      ROS:     CONSTITUTIONAL:  Negative for chills, fatigue, fever, and weight change.      E/N/T:  Positive for nasal congestion and sinus pressure.   Negative for sore throat.      CARDIOVASCULAR:  Negative for chest pain, orthopnea, paroxysmal nocturnal dyspnea and pedal edema.      RESPIRATORY:  Positive for persistent cough ( typically dry; with scant amounts of clear or white sputum; with scant amounts of purulent sputum; has been going on for over 2months, has gotten better with steroids but comes right back ).   Negative for dyspnea.      GASTROINTESTINAL:  Negative for abdominal pain, constipation, diarrhea, nausea and vomiting.      NEUROLOGICAL:  Positive for headaches ( \"sinus\" ).          PMH/FMH/SH:     Last Reviewed on 2018 04:18 PM by Anais Mari    Past Medical History:                 PAST MEDICAL HISTORY         Osteoarthritis: primarily affecting the low back;     raynauld's (getting epidural's )     Migraine Headaches         GYNECOLOGICAL HISTORY:             CURRENT MEDICAL PROVIDERS:    podiatrist fritz     Diverticulosis    Hemorrhoids         PREVENTIVE " HEALTH MAINTENANCE             COLORECTAL CANCER SCREENING: Up to date (colonoscopy q10y; sigmoidoscopy q5y; Cologuard q3y) was last done 5/2012, Results are in chart; colonoscopy with normal results; [place]     MAMMOGRAM: Done within last 2 years and results in are chart was last done 12/2016 with normal results     PAP SMEAR: was last done 12/2016 with normal results          Surgical History:         Cholecystectomy      oophrectomy lef t2-2012;    sinus surgery 2011;         Family History:     Father: Coronary Artery Disease     Mother: Hypertension         Social History:     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              Current Problems:     Last Reviewed on 1/29/2018 04:18 PM by Anais Mari    Anxiety with depression     Low back pain     Hyperlipidemia     Classic migraine     Vertigo     Anxiety     Pleurisy     Nasal lesion     GERD     Hypertension     Acute bronchitis     Influenza, with other manifestations         Immunizations:     Fluzone (3 + years dose) 10/10/2016     Fluzone (3 + years dose) 10/30/2017         Allergies:     Last Reviewed on 1/29/2018 04:18 PM by Anais Mari    Trintellix: itching    Sulfas:        Current Medications:     Last Reviewed on 1/29/2018 04:18 PM by Anais Mari    Trazodone HCl 50mg Tablet 1 tab hs/prn     Imitrex 50mg Tablet Take 1 tablet(s) by mouth prn for migraine     Protonix 40mg Tablets, Delayed Release 1 tab daily     Albuterol 0.083% Nebulizer Solution 1 vial q 4 hours prn     Mimvey 1mg/0.5mg Tablet Take 1 tablet(s) by mouth daily     Zyrtec 10mg Tablet 1 tab daily     Multivitamins         OBJECTIVE:        Vitals:         Current: 1/29/2018 4:02:11 PM    Ht:  5 ft, 5.5 in;  Wt: 198.9 lbs;  BMI: 32.6    T: 96.9 F  (oral);  BP: 154/98 mm Hg (right arm, sitting);  P: 87 bpm (right arm (BP Cuff), sitting);  sCr: 0.9 mg/dL;  GFR: 82.13        Repeat:     4:06:26 PM     BP:   138/84mm Hg (right arm, sitting)         Exams:     PHYSICAL EXAM:     GENERAL: vital signs recorded - well developed, well nourished;  no apparent distress;     E/N/T: NOSE: right maxillary and right frontal sinus tenderness present;     NECK: range of motion is normal; thyroid is non-palpable;     RESPIRATORY: normal respiratory rate and pattern with no distress; normal breath sounds with no rales, rhonchi, wheezes or rubs;     CARDIOVASCULAR: normal rate; rhythm is regular;  no systolic murmur; no edema;     GASTROINTESTINAL: nontender; normal bowel sounds; no organomegaly;         ASSESSMENT:           461.9   J01.90  Acute sinusitis, unspecified              DDx:     477.9   J30.9  Allergic rhinitis              DDx:     786.2   R05  Persistent cough              DDx:         ORDERS:         Meds Prescribed:       Allegra Allergy 24 Hour (Fexofenadine HCl) 180mg Tablet 1 tab daily  #30 (Thirty) tablet(s) Refills: 2       Augmentin (Amoxicillin/Clavulanate) 875mg/125mg Tablet 1 po bid with food  #20 (Twenty) tablet(s) Refills: 0       Diflucan (Fluconazole) 150mg Tablet Take 1 tablet(s) by mouth now and repeat in three days  #2 (Two) tablet(s) Refills: 0         Radiology/Test Orders:       [id number]  Radiologic exam chest 2 views  (Send-Out)           Procedures Ordered:       REFER  Referral to Specialist or Other Facility  (Send-Out)                   PLAN:          Acute sinusitis, unspecified         FOLLOW-UP: Advised to call if there is no improvement 2 weeks.   Schedule follow-up appointments on a p.r.n. basis..            Prescriptions:       Augmentin (Amoxicillin/Clavulanate) 875mg/125mg Tablet 1 po bid with food  #20 (Twenty) tablet(s) Refills: 0       Diflucan (Fluconazole) 150mg Tablet Take 1 tablet(s) by mouth now and repeat in three days  #2  (Two) tablet(s) Refills: 0          Allergic rhinitis         REFERRALS:  Referral initiated to an allergist ( [facility] ).            Prescriptions:       Allegra Allergy 24 Hour (Fexofenadine HCl) 180mg Tablet 1 tab daily  #30 (Thirty) tablet(s) Refills: 2 Or can try Xyzal otc for allergies           Orders:       REFER  Referral to Specialist or Other Facility  (Send-Out)            Persistent cough CXR WNL dmt         RADIOLOGY:  I have ordered a chest x-ray (PA and lateral) to be done today.            Orders:       [id number]  Radiologic exam chest 2 views  (Send-Out)               Patient Recommendations:        For  Acute sinusitis, unspecified:     Follow-up by phone if no improvement in 2 weeks. Schedule follow-up appointments as needed.                APPOINTMENT INFORMATION:        Monday Tuesday Wednesday Thursday Friday Saturday Sunday            Time:___________________AM  PM   Date:_____________________             CHARGE CAPTURE:           Primary Diagnosis:     461.9 Acute sinusitis, unspecified            J01.90    Acute sinusitis, unspecified              Orders:          [id number]   Office/outpatient visit; established patient, level 4  (In-House)           477.9 Allergic rhinitis            J30.9    Allergic rhinitis, unspecified    786.2 Persistent cough            R05    Cough

## 2021-05-18 NOTE — PROGRESS NOTES
"Ema CisseRenuka 1965     Office/Outpatient Visit    Visit Date:  03:06 pm    Provider: Anais Mari N.P. (Assistant: Evangelista Tidwell)    Location: [facility]        Electronically signed by Anais Mari N.P. on  2019 05:10:42 PM                             SUBJECTIVE:        CC:     Amy is a 53 year old White female.  back pain, dysuria, headache, stuffy nose;         HPI:         Amy presents with dysuria.  This began within the last 2 weeks.  Associated symptoms include flank pain, urgency and urinary frequency.  She denies associated fever, hematuria or nausea.  Amy states that she had multiple prior episodes of similar discomfort.  Pertinent medical history is unremarkable.  Treatments tried thus far include include decreased consumption of soft drinks.  Treatment effectivenss has been generally ineffective.      ROS:     CONSTITUTIONAL:  Negative for chills, fatigue, fever, and weight change.      E/N/T:  Positive for nasal congestion.      CARDIOVASCULAR:  Negative for chest pain, orthopnea, paroxysmal nocturnal dyspnea and pedal edema.      RESPIRATORY:  Negative for dyspnea.      GASTROINTESTINAL:  Negative for abdominal pain, constipation, diarrhea, nausea and vomiting.      GENITOURINARY:  Positive for dysuria.      NEUROLOGICAL:  Positive for headaches ( \"sinus\" ).      PSYCHIATRIC:  Negative for anxiety, depression, and sleep disturbances.          PMH/FMH/SH:     Last Reviewed on 2019 05:10 PM by Anais Mari    Past Medical History:                 PAST MEDICAL HISTORY         Osteoarthritis: primarily affecting the low back;     raynauld's (getting epidural's )     Migraine Headaches         GYNECOLOGICAL HISTORY:             CURRENT MEDICAL PROVIDERS:    podiatrist fritz     Diverticulosis    Hemorrhoids         PREVENTIVE HEALTH MAINTENANCE             COLORECTAL CANCER SCREENING: Up to date (colonoscopy q10y; " sigmoidoscopy q5y; Cologuard q3y) was last done 5/2012, Results are in chart; colonoscopy with normal results; [place]     MAMMOGRAM: Done within last 2 years and results in are chart was last done 12/2017 with normal results     PAP SMEAR: was last done 12/2017 with normal results          Surgical History:         Cholecystectomy      oophrectomy lef t2-2012;    sinus surgery 2011;         Family History:     Father: Coronary Artery Disease     Mother: Hypertension         Social History:     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              Current Problems:     Last Reviewed on 1/11/2019 05:10 PM by Anais Mari    Anxiety with depression     Hyperlipidemia     Classic migraine     GERD     Hypertension     Acute sinusitis, unspecified     Dysuria     Fever blister     Insomnia     Allergic rhinitis         Immunizations:     Hep A, adult dose 5/2/2018     Hep A, adult dose 11/20/2018     Fluzone (3 + years dose) 10/10/2016     Fluzone (3 + years dose) 10/30/2017         Allergies:     Last Reviewed on 1/11/2019 05:10 PM by Anais Mari    Trintellix: itching    Sulfas:        Current Medications:     Last Reviewed on 1/11/2019 05:10 PM by Anais Mari    Acyclovir 200mg Capsules 1 capsules bid x 5 days at onset of fever blister     Protonix 40mg Tablets, Delayed Release 1 tab daily     Trazodone HCl 50mg Tablet 1-2 qhs     Imitrex 50mg Tablet Take 1 tablet(s) by mouth prn for migraine     Mimvey 1mg/0.5mg Tablet Take 1 tablet(s) by mouth daily     Zyrtec 10mg Tablet 1 tab daily     Multivitamins         OBJECTIVE:        Vitals:         Current: 1/11/2019 3:12:00 PM    Ht:  5 ft, 5.5 in;  Wt: 190.6 lbs;  BMI: 31.2    T: 98.5 F (oral);  BP: 133/93 mm Hg (right arm, sitting);  P:  104 bpm (right arm (BP Cuff), sitting);  sCr: 0.9 mg/dL;  GFR: 79.76        Repeat:     3:13:13 PM     BP:   129/89mm Hg (right arm, sitting)         Exams:     PHYSICAL EXAM:     GENERAL: vital signs recorded - well developed, well nourished;  no apparent distress;     E/N/T: NOSE: right maxillary and right frontal sinus tenderness present;     RESPIRATORY: normal respiratory rate and pattern with no distress; normal breath sounds with no rales, rhonchi, wheezes or rubs;     CARDIOVASCULAR: normal rate; rhythm is regular;  no systolic murmur; no edema;     GASTROINTESTINAL: nontender; normal bowel sounds; no organomegaly;     GENITOURINARY: Mild CVA tenderness cinthia;     NEUROLOGIC: GROSSLY INTACT     PSYCHIATRIC:  appropriate affect and demeanor; normal speech pattern; grossly normal memory;         Lab/Test Results:             Glucose, Urine:  Neg (01/11/2019),     Bilirubin, urine:  Negative (01/11/2019),     Ketones, Urine Strip:  Negative (01/11/2019),     Specific Gravity, urine:  1.010 (01/11/2019),     Blood in Urine:  negative (01/11/2019),     pH, urine:  5.5 (01/11/2019),     Protein Urine QL:  negative (01/11/2019),     Urobilinogen, urine:  0.2 E.U./dL (01/11/2019),     Nitrite, Urine:  Negative (01/11/2019),     Leukoctyes, urine:  Negative (01/11/2019),     Appearance:  Clear (01/11/2019),     collection source:  Clean-catch (01/11/2019),     Color:  Yellow (01/11/2019),     Performed by::  and (01/11/2019),             ASSESSMENT:           Dysuria    788.1   R30.0  Dysuria              DDx:     461.9   J01.90  Acute sinusitis, unspecified              DDx:         ORDERS:         Meds Prescribed:       Pyridium (Phenazopyridine HCl) 200mg Tablet Take 1 tablet(s) by mouth tid  #6 (Six) tablet(s) Refills: 0       Cipro (Ciprofloxacin HCl) 500mg Tablet 1 tab  bid x10 days  #20 (Twenty) tablet(s) Refills: 0         Lab Orders:       [id number]  Urinalysis, automated, without microscopy  (In-House)                    PLAN:          Dysuria           Prescriptions:       Pyridium (Phenazopyridine HCl) 200mg Tablet Take 1 tablet(s) by mouth tid  #6 (Six) tablet(s) Refills: 0           Orders:       [id number]  Urinalysis, automated, without microscopy  (In-House)            Acute sinusitis, unspecified           Prescriptions:       Cipro (Ciprofloxacin HCl) 500mg Tablet 1 tab  bid x10 days  #20 (Twenty) tablet(s) Refills: 0             CHARGE CAPTURE:           Primary Diagnosis:     Dysuria    788.1 Dysuria            R30.0    Dysuria              Orders:          [id number]   Office/outpatient visit; established patient, level 3  (In-House)             [id number]   Urinalysis, automated, without microscopy  (In-House)           461.9 Acute sinusitis, unspecified            J01.90    Acute sinusitis, unspecified

## 2021-05-18 NOTE — PROGRESS NOTES
Ema Cisse  1965     Office/Outpatient Visit    Visit Date:  10:33 am    Provider: Yaquelin Mcdowell N.P. (Assistant: Evangelista Tidwell, )    Location: [facility]        Electronically signed by Yaquelin Mcdowell N.P. on  2019 12:34:49 PM                             Subjective:        CC: Amy is a 54 year old White female.  presents today due to thinks she has a sinus infection, also having neck pain x 2 weeks         HPI:           Amy presents with cervicalgia.  Pt states L neck pain x 2 weeks. She went to chiropractor, Dr. Addison. States it helped for about a day but then returned.            Headache details; pt states headache, sinus congestion, ear pain x 2 weeks. Denies sore throat, fever. She has been sneezing. Taking IBF and xyzal with not much relief.      ROS:     CONSTITUTIONAL:  Negative for chills, fatigue, fever, and weight change.      EYES:  Negative for blurred vision.      CARDIOVASCULAR:  Negative for chest pain, orthopnea, paroxysmal nocturnal dyspnea and pedal edema.      RESPIRATORY:  Negative for dyspnea.      GASTROINTESTINAL:  Negative for abdominal pain, constipation, diarrhea, nausea and vomiting.      MUSCULOSKELETAL:  Positive for neck pain.      NEUROLOGICAL:  Positive for headaches.      PSYCHIATRIC:  Negative for anxiety, depression, and sleep disturbances.          Past Medical History / Family History / Social History:         Last Reviewed on 2019 09:30 AM by Anais Mari    Past Medical History:                 PAST MEDICAL HISTORY         Osteoarthritis: primarily affecting the low back; (getting epidural's )     Migraine Headaches     Skin cancer: [place] , right LE;         GYNECOLOGICAL HISTORY:             CURRENT MEDICAL PROVIDERS:    podiatrist fritz     Diverticulosis    Hemorrhoids         PREVENTIVE HEALTH MAINTENANCE             COLORECTAL CANCER SCREENING: Up to date (colonoscopy q10y;  sigmoidoscopy q5y; Cologuard q3y) was last done 5/2012, Results are in chart; colonoscopy with normal results; IN [place]     MAMMOGRAM: Done within last 2 years and results in are chart was last done 12/2018 with normal results     PAP SMEAR: was last done 12/2018 with normal results          Surgical History:         Cholecystectomy     oophrectomy lef t2-2012;    sinus surgery 2011;    Melanoma removed right leg 7/11/19 , Referred by Dr Murdock;         Family History:     Father: Coronary Artery Disease     Mother: Hypertension         Social History:     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          Substance Abuse History:     Last Reviewed on 9/24/2019 09:30 AM by Anais Mari        Mental Health History:     Last Reviewed on 9/24/2019 09:30 AM by Anais Mari        Communicable Diseases (eg STDs):     Last Reviewed on 9/24/2019 09:30 AM by Anais Mari        Current Problems:     Last Reviewed on 9/24/2019 09:30 AM by Anais Mari    Allergic rhinitis    Allergic rhinitis, unspecified    Gastro-esophageal reflux disease without esophagitis    Essential (primary) hypertension    GERD    Hypertension    Classic migraine    Migraine with aura, not intractable, without status migrainosus    Hyperlipidemia, unspecified    Hyperlipidemia    Anxiety with depression    Insomnia    Persistent mood [affective] disorder, unspecified    Primary insomnia    Dysthymic disorder    Body mass index (BMI) 30.0-30.9, adult    Central obesity    Flank pain    Unspecified renal colic        Immunizations:     Hep A, adult dose 5/2/2018    Hep A, adult dose 11/20/2018    Fluzone (3 + years dose) 10/10/2016    Fluzone (3 + years dose) 10/30/2017    Adacel (Tdap) 5/29/2019         Allergies:     Last Reviewed on 9/24/2019 09:30 AM by Anais Mari    Trintellix: itching     Sulfas:          Current Medications:     Last Reviewed on 9/24/2019 09:30 AM by Anais Mari    Imitrex 50 mg oral tablet [Take 1 tablet(s) by mouth prn for migraine]    Singulair  10mg Tablet [1 tab daily]    Naproxen 500 mg oral tablet [1 tab bid]    Multivitamins     Trazodone HCl 50mg Tablet [1-2 qhs]    Zyrtec 10 mg oral tablet [1 tab daily]    Protonix 40mg Tablets, Delayed Release [1 tab daily]    Wellbutrin XL 300mg Tablets, Extended Release [1 tab daily]    Mimvey 1-0.5 mg oral tablet [Take 1 tablet(s) by mouth daily]    Acyclovir 200mg Capsules [1 capsules bid x 5 days at onset of fever blister]    Saxenda 18mg/3ml Injection [inject 0.6 mg SC daily week one, then 1.2 mg SC daily week 2, then 1.8 mg SQ daily week 3, then 2.4 mg SQ week 4, then 3 mg SQ daily.]    Fluticasone Propionate 50 mcg/actuation Intranasal Spray, Suspension [1 spray each nostil daily]        Objective:        Vitals:         Current: 11/21/2019 10:38:38 AM    Ht:  5 ft, 5.5 in;  Wt: 188.8 lbs;  BMI: 30.9T: 97.2 F (oral);  BP: 127/92 mm Hg (right arm, sitting);  P: 89 bpm (right arm (BP Cuff), sitting);  sCr: 0.98 mg/dL;  GFR: 72.13        Exams:     PHYSICAL EXAM:     GENERAL: vital signs recorded - well developed, well nourished;  no apparent distress;     EYES: extraocular movements intact; conjunctiva and cornea are normal; PERRLA;     E/N/T: EARS:  normal external auditory canals and tympanic membranes;  grossly normal hearing; NOSE: nasal mucosa is erythematous;  bilateral maxillary sinus tenderness present; OROPHARYNX: oral mucosa reveals erythema;     NECK: range of motion is normal; thyroid is non-palpable;     RESPIRATORY: normal respiratory rate and pattern with no distress; normal breath sounds with no rales, rhonchi, wheezes or rubs;     CARDIOVASCULAR: normal rate; rhythm is regular;  no systolic murmur; no edema;      GASTROINTESTINAL: nontender; normal bowel sounds; no organomegaly;     MUSCULOSKELETAL: Neck tenderness, full ROM;     NEUROLOGICAL:  cranial nerves, motor and sensory function, reflexes, gait and coordination are all intact;     PSYCHIATRIC:  appropriate affect and demeanor; normal speech pattern; grossly normal memory;         Assessment:         M54.2   Cervicalgia       J01   Acute sinusitis           ORDERS:         Meds Prescribed:       [New Rx] cyclobenzaprine 10 mg oral tablet [take 1 tablet (10 mg) by oral route at bedtime.], #20 (twenty) tablets, Refills: 0 (zero)       [New Rx] Augmentin 875-125 mg oral tablet [take 1 tablet by oral route every 12 hours for 10 days], #20 (twenty) tablets, Refills: 0 (zero)       [New Rx] Medrol (Cheko) 4 mg oral Tablet, Dose Pack [per package insert], #1 (one) packet, Refills: 0 (zero)                 Plan:         Cervicalgia          Prescriptions:       [New Rx] cyclobenzaprine 10 mg oral tablet [take 1 tablet (10 mg) by oral route at bedtime.], #20 (twenty) tablets, Refills: 0 (zero)       [New Rx] Medrol (Cheko) 4 mg oral Tablet, Dose Pack [per package insert], #1 (one) packet, Refills: 0 (zero)         Acute sinusitis          Prescriptions:       [New Rx] Augmentin 875-125 mg oral tablet [take 1 tablet by oral route every 12 hours for 10 days], #20 (twenty) tablets, Refills: 0 (zero)             Charge Capture:         Primary Diagnosis:     M54.2  Cervicalgia           Orders:      [id number]  Office/outpatient visit; established patient, level 3  (In-House)              J01  Acute sinusitis

## 2021-05-18 NOTE — PROGRESS NOTES
Yovani Cissedeangelo GONZALEZ 1965     Office/Outpatient Visit    Visit Date: Fri, Nov 30, 2018 04:21 pm    Provider: Anais Mari N.P. (Assistant: Nely Velasquez MA)    Location: [facility]        Electronically signed by Anais Mari N.P. on  11/30/2018 05:27:16 PM                             SUBJECTIVE:        CC:     Amy is a 53 year old White female.  This is a follow-up visit.  for trazodone refill;         HPI:         PHQ-9 Depression Screening: Completed form scanned and in chart; Total Score 2         Concerning annual exam, her last physical exam was 2 years ago.  She is menopausal.  She is not currently using any form of contraception.  She performs breast self-exams occasionally.    Her last Pap smear was in 12/2017 and was normal.   Her last mammogram was in 12/2017 and was normal.   She underwent colonoscopy in 5/2012 with normal results.   Preventative Health updated today.  She is current with her Td, influenza and Hep A immunization.  Amy denies any history of abnormal Pap smears.  Tobacco: She has never smoked.      ROS:     CONSTITUTIONAL:  Negative for chills, fatigue, fever, and weight change.      CARDIOVASCULAR:  Positive for Hx HTN in the past , controlled with diet and exercise now.   Negative for chest pain, orthopnea, paroxysmal nocturnal dyspnea or pedal edema.      RESPIRATORY:  Negative for dyspnea.      GASTROINTESTINAL:  Negative for abdominal pain, constipation, diarrhea, nausea and vomiting.      GENITOURINARY:  Negative for dysuria and frequent urination.      MUSCULOSKELETAL:  Negative for arthralgias, back pain, and myalgias.      INTEGUMENTARY/BREAST:  Positive for fever blister on her lip x 1 week.      PSYCHIATRIC:  Positive for insomnia; Improved with Trazodone.          PMH/FMH/SH:     Last Reviewed on 11/30/2018 04:45 PM by Anais Mari    Past Medical History:                 PAST MEDICAL HISTORY         Osteoarthritis: primarily affecting  the low back;     raynauld's (getting epidural's )     Migraine Headaches         GYNECOLOGICAL HISTORY:             CURRENT MEDICAL PROVIDERS:    podiatrist fritz     Diverticulosis    Hemorrhoids         PREVENTIVE HEALTH MAINTENANCE             COLORECTAL CANCER SCREENING: Up to date (colonoscopy q10y; sigmoidoscopy q5y; Cologuard q3y) was last done 2012, Results are in chart; colonoscopy with normal results; IN [place]     MAMMOGRAM: Done within last 2 years and results in are chart was last done 2017 with normal results     PAP SMEAR: was last done 2017 with normal results          Surgical History:         Cholecystectomy      oophrectomy lef t2-;    sinus surgery ;         Family History:     Father: Coronary Artery Disease     Mother: Hypertension         Social History:     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          Substance Abuse History:     Last Reviewed on 2018 04:45 PM by Anais Mari        Mental Health History:     Last Reviewed on 2018 04:45 PM by Anais Mari        Communicable Diseases (eg STDs):     Last Reviewed on 2018 04:45 PM by Anais Mari            Current Problems:     Last Reviewed on 2018 04:45 PM by Anais Mari    Anxiety with depression     Low back pain     Hyperlipidemia     Classic migraine     Vertigo     Anxiety     Pleurisy     Nasal lesion     GERD     Hypertension     Influenza, with other manifestations     Insomnia     Allergic rhinitis         Immunizations:     Hep A, adult dose 2018     Hep A, adult dose 2018     Fluzone (3 + years dose) 10/10/2016     Fluzone (3 + years dose) 10/30/2017         Allergies:     Last Reviewed on 2018 04:45 PM by Anais Mari     Trintellix: itching    Sulfas:        Current Medications:     Last Reviewed on 11/30/2018 04:45 PM by Anais Mari    Trazodone HCl 50mg Tablet 1 tab hs/prn     Protonix 40mg Tablets, Delayed Release 1 tab daily     Imitrex 50mg Tablet Take 1 tablet(s) by mouth prn for migraine     Mimvey 1mg/0.5mg Tablet Take 1 tablet(s) by mouth daily     Zyrtec 10mg Tablet 1 tab daily     Multivitamins         OBJECTIVE:        Vitals:         Current: 11/30/2018 4:26:55 PM    Ht:  5 ft, 5.5 in;  Wt: 200.2 lbs;  BMI: 32.8    T: 98.8 F (oral);  BP: 132/92 mm Hg (left arm, sitting);  P: 89 bpm (left arm (BP Cuff), sitting);  sCr: 0.9 mg/dL;  GFR: 81.44        Repeat:     4:33:14 PM     BP:   124/84mm Hg (left arm, sitting)         Exams:     PHYSICAL EXAM:     GENERAL: vital signs recorded - well developed, well nourished;  no apparent distress;     E/N/T:  normal EACs, TMs, nasal/oral mucosa, teeth, gingiva, and oropharynx;     RESPIRATORY: normal respiratory rate and pattern with no distress; normal breath sounds with no rales, rhonchi, wheezes or rubs;     CARDIOVASCULAR: normal rate; rhythm is regular;  no systolic murmur; no edema;     GASTROINTESTINAL: nontender; normal bowel sounds; no organomegaly;     BREAST/INTEGUMENT: oral herpetic lesions;     NEUROLOGICAL:  cranial nerves, motor and sensory function, reflexes, gait and coordination are all intact;     PSYCHIATRIC:  appropriate affect and demeanor; normal speech pattern; grossly normal memory;         ASSESSMENT:           V79.0   Z13.89  Screening for depression              DDx:     054.9   B00.9  Fever blister              DDx:     V77.91   Z13.220  Screening for cholesterol level              DDx:     401.1   I10  Hypertension              DDx:     V70.0   Z00.00  Annual exam              DDx:     307.41   F51.01  Insomnia              DDx:         ORDERS:         Meds Prescribed:       Refill of: Trazodone HCl 50mg Tablet 1-2 qhs  #135 (One [place] and  [place]) tablet(s) Refills: 2       Refill of: Protonix (Pantoprazole) 40mg Tablets, Delayed Release 1 tab daily  #90 (Ninety) tablet(s) Refills: 1       Acyclovir 200mg Capsules 1 capsules bid x 5 days at onset of fever blister  #25 (Twenty Five) capsule(s) Refills: 2         Radiology/Test Orders:       3014F  Screening mammography results documented and reviewed (PV)1  (In-House)         3017F  Colorectal CA screen results documented and reviewed (PV)  (In-House)           Lab Orders:       79200  LPDP - [facility] Lipid Panel  (Send-Out)         94415  BDCB2 - [facility] CBC w/o diff  (Send-Out)         20869  COMP - H Comp. Metabolic Panel  (Send-Out)         85345  TSH - [facility] TSH  (Send-Out)         02079  BDUAM - [facility] Urinalysis, automated, with micro  (Send-Out)         APPTO  Appointment need  (In-House)           Other Orders:         Depression screen negative  (In-House)         1101F  Pt screen for fall risk; document no falls in past year or only 1 fall w/o injury in past year (YA)  (In-House)           Negative EtOH screen  (In-House)           Calculated BMI above the upper parameter and a follow-up plan was documented in the medical record  (In-House)                   PLAN:          Screening for depression     MIPS PHQ-9 Depression Screening Completed form scanned and in chart; Total Score 2           Orders:         Depression screen negative  (In-House)            Fever blister           Prescriptions:       Acyclovir 200mg Capsules 1 capsules bid x 5 days at onset of fever blister  #25 (Twenty Five) capsule(s) Refills: 2          Screening for cholesterol level     LABORATORY:  Labs ordered to be performed today include lipid panel.            Orders:       98181  LPDP - [facility] Lipid Panel  (Send-Out)            Hypertension     LABORATORY:  Labs ordered to be performed today include CBC W/O DIFF, Comprehensive metabolic panel, TSH, and urinalysis with micro.            Orders:        17384  BDCB2 - [facility] CBC w/o diff  (Send-Out)         58237  COMP - [facility] Comp. Metabolic Panel  (Send-Out)         19449  TSH - [facility] TSH  (Send-Out)         03433  BDUAM - [facility] Urinalysis, automated, with micro  (Send-Out)            Annual exam     ALEXANDRU Has had no falls in the past year Vaccines Flu and Pneumonia updated in Shot record Negative alcohol screen     MAMMOGRAM: Done within last 2 years and results in are chart     COLORECTAL CANCER SCREENING: Results are in chart     BMI Elevated - Follow-Up Plan: She was provided education on weight loss strategies           Orders:       1101F  Pt screen for fall risk; document no falls in past year or only 1 fall w/o injury in past year (YA)  (In-House)           Negative EtOH screen  (In-House)         3014F  Screening mammography results documented and reviewed (PV)1  (In-House)         3017F  Colorectal CA screen results documented and reviewed (PV)  (In-House)           Calculated BMI above the upper parameter and a follow-up plan was documented in the medical record  (In-House)            Insomnia         RECOMMENDATIONS given include: Further recommendation to be given after test results are complete.      FOLLOW-UP: Schedule follow-up appointments on a p.r.n. basis. Schedule a follow-up visit in 6 months..            Prescriptions:       Refill of: Trazodone HCl 50mg Tablet 1-2 qhs  #135 (One [place] and Thirty Five) tablet(s) Refills: 2           Orders:       APPTO  Appointment need  (In-House)               Other Prescriptions:       Refill of: Protonix (Pantoprazole) 40mg Tablets, Delayed Release 1 tab daily  #90 (Ninety) tablet(s) Refills: 1         Patient Recommendations:        For  Insomnia:     Schedule follow-up appointments as needed. Schedule a follow-up visit in 6 months.                APPOINTMENT INFORMATION:        Monday Tuesday Wednesday Thursday Friday Saturday Sunday            Time:___________________AM  PM    Date:_____________________             CHARGE CAPTURE:           Primary Diagnosis:     V79.0 Screening for depression            Z13.89    Encounter for screening for other disorder              Orders:             Depression screen negative  (In-House)           054.9 Fever blister            B00.9    Herpesviral infection, unspecified    V77.91 Screening for cholesterol level            Z13.220    Encounter for screening for lipoid disorders    401.1 Hypertension            I10    Essential (primary) hypertension    V70.0 Annual exam            Z00.00    Encounter for general adult medical examination without abnormal findings              Orders:          39749   Preventive medicine, established patient, age 40-64 years  (In-House)             1101F   Pt screen for fall risk; document no falls in past year or only 1 fall w/o injury in past year (YA)  (In-House)                Negative EtOH screen  (In-House)             3014F   Screening mammography results documented and reviewed (PV)1  (In-House)             3017F   Colorectal CA screen results documented and reviewed (PV)  (In-House)                Calculated BMI above the upper parameter and a follow-up plan was documented in the medical record  (In-House)           307.41 Insomnia            F51.01    Primary insomnia              Orders:          APPTO   Appointment need  (In-House)

## 2021-05-18 NOTE — PROGRESS NOTES
Ema Cisse CARLOS 1965     Office/Outpatient Visit    Visit Date: Tue, Apr 30, 2019 03:07 pm    Provider: Anais Mari N.P. (Assistant: Nely Velasquez MA)    Location: [facility]        Electronically signed by Anais Mari N.P. on  04/30/2019 04:31:35 PM                             SUBJECTIVE:        CC:     Amy is a 53 year old White female.  elevated bp;         HPI:     Here to follow up on elevated BP . Has been elevated at her work when they did her wellness exam. She also noticed it was up at [employer] and another location when she checked it, has been average 160-170 over  , but today BP has been normal. Discussed how to take BP bid x 2 weeks resting and then we will see if she needs medication. Denies fever, headaches, CP , SOB or vision changes.     ROS:     CONSTITUTIONAL:  Positive for unintentional weight gain ( gradual; was taking Saxenda but other provider is no longer aval and wants to start getting here ).      E/N/T:  Positive for nasal congestion and runny nose, sneezing , allergies bothering her.      CARDIOVASCULAR:  Positive for BP elevated at work and 2 other locations, 160-170  over 90s over 100 , denies symptoms.   Negative for chest pain, orthopnea, paroxysmal nocturnal dyspnea or pedal edema.      RESPIRATORY:  Negative for dyspnea.      GASTROINTESTINAL:  Negative for abdominal pain, constipation, diarrhea, nausea and vomiting.      INTEGUMENTARY/BREAST:  Positive for has intermittent blisters on her toes , worse in the summer , wearing sandels.      NEUROLOGICAL:  Negative for dizziness, headaches, paresthesias, and weakness.      PSYCHIATRIC:  Positive for anxiety ( (was taking medication but stopped thought she did not need anymore) ), crying spells and feelings of stress.   Negative for sleep disturbance or suicidal thoughts.          PMH/FMH/SH:     Last Reviewed on 4/30/2019 04:29 PM by Anais Mari    Past Medical History:                  PAST MEDICAL HISTORY         Osteoarthritis: primarily affecting the low back;     raynauld's (getting epidural's )     Migraine Headaches         GYNECOLOGICAL HISTORY:             CURRENT MEDICAL PROVIDERS:    podiatrist fritz     Diverticulosis    Hemorrhoids         PREVENTIVE HEALTH MAINTENANCE             COLORECTAL CANCER SCREENING: Up to date (colonoscopy q10y; sigmoidoscopy q5y; Cologuard q3y) was last done 2012, Results are in chart; colonoscopy with normal results; [place]     MAMMOGRAM: Done within last 2 years and results in are chart was last done 2017 with normal results     PAP SMEAR: was last done 2017 with normal results          Surgical History:         Cholecystectomy      oophrectomy lef t2-;    sinus surgery ;         Family History:     Father: Coronary Artery Disease     Mother: Hypertension         Social History:     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              Current Problems:     Last Reviewed on 2019 04:29 PM by Anais Mari    Anxiety with depression     Central obesity     Hyperlipidemia     Classic migraine     GERD     Hypertension     Insomnia     Allergic rhinitis         Immunizations:     Hep A, adult dose 2018     Hep A, adult dose 2018     Fluzone (3 + years dose) 10/10/2016     Fluzone (3 + years dose) 10/30/2017         Allergies:     Last Reviewed on 2019 04:29 PM by Anais Mari    Trintellix: itching    Sulfas:        Current Medications:     Last Reviewed on 2019 04:29 PM by Anais Mari    Imitrex 50mg Tablet Take 1 tablet(s) by mouth prn for migraine     Acyclovir 200mg Capsules 1 capsules bid x 5 days at onset of fever blister     Protonix 40mg Tablets, Delayed Release 1 tab daily  "    Trazodone HCl 50mg Tablet 1-2 qhs     Naproxen 500mg Tablet 1 tab bid     Mimvey 1mg/0.5mg Tablet Take 1 tablet(s) by mouth daily     Zyrtec 10mg Tablet 1 tab daily     Multivitamins         OBJECTIVE:        Vitals:         Current: 4/30/2019 3:13:30 PM    Ht:  5 ft, 5.5 in;  Wt: 203.2 lbs;  BMI: 33.3    T: 98.5 F (oral);  BP: 134/66 mm Hg (left arm, sitting);  P: 93 bpm (left arm (BP Cuff), sitting);  sCr: 0.98 mg/dL;  GFR: 75.27        Exams:     PHYSICAL EXAM:     GENERAL: vital signs recorded - well developed,  mildly obese;  no apparent distress;     E/N/T:  normal EACs, TMs, nasal/oral mucosa, teeth, gingiva, and oropharynx;     RESPIRATORY: normal respiratory rate and pattern with no distress; normal breath sounds with no rales, rhonchi, wheezes or rubs;     CARDIOVASCULAR: normal rate; rhythm is regular;  no systolic murmur; no edema;     GASTROINTESTINAL: nontender; normal bowel sounds; no organomegaly;     BREAST/INTEGUMENT: Fluid filled blister right foot mid toe;     NEUROLOGIC: GROSSLY INTACT     PSYCHIATRIC: affect/demeanor: anxious;  normal psychomotor function; normal speech pattern;         Procedures:     Allergic rhinitis     1. Kenalog 40 mg given IM in the right hip; administered by PB;  lot number [id number]; expires 11/2020; Patient tolerated well             ASSESSMENT:           401.1   I10  Hypertension              DDx:     477.9   J30.9  Allergic rhinitis              DDx:     278.1   Z68.30  Central obesity              DDx:     300.4   F34.1  Anxiety with depression              DDx:         ORDERS:         Meds Prescribed:       Saxenda (Liraglutide) 18mg/3ml Injection inject 0.6 mg SC daily week one, then 1.2 mg SC daily week 2, then 1.8 mg SQ daily week 3, then 2.4 mg SQ week 4, then 3 mg SQ daily.  #5 (Five) prefilled pen Refills: 1       Novofine Needle 32G x 6mm (1/4\") (Insulin Pen Device) Pen Needle Inject Daily  #100 (One [place]) each Refills: 1       Wellbutrin XL " (Bupropion HCl) 150mg Tablets, Extended Release 1 tab daily  #30 (Thirty) tablet(s) Refills: 2         Other Orders:       [id number]  Therapeutic injection  (In-House)           Kenalog, per 10 mg (x4)                 PLAN:          Hypertension Will need to check resting Blood pressures at home to see if working to control BP.  Proper process is     Seated with back supported, feet on the floor , resting for at least 5-10 minutes with the right size cuff, take BP in the morning and in the evening for 2 weeks and call readings into office. Will then see if BP is controlled.        CARE PLAN:  This plan was designed by your provider to assist in improving your health and well being.      GOALS for better health: lower blood pressure          Allergic rhinitis     Steroids Kenalog 40 mg 1 ml           Orders:       78349  Therapeutic injection  (In-House)                     Kenalog, per 10 mg (x4)          Central obesity     CARE PLAN:  This plan was designed by your provider to assist in improving your health and well being.      GOALS for better health: achieve healthy weight, be more active     NUTRITION Your provider advises limiting foods that taste sweet including: soda, candy, cake, pie, jam, canned fruit in syrup and cookies, limiting foods high in fat like processed meats (sausage), nuts, cheeses, butter, and packaged foods with hydrogenated oils, limiting foods high in carbs like baked goods, low fat packaged foods, potatoes, pizza, and sugary cereals, and staying hydrated: the [employer] [employer] determined men need roughly 3 liters (about 13 cups) of total beverages a day. Women need about 2.2 liters (about 9 cups) of total beverages a day.            Prescriptions:       Saxenda (Liraglutide) 18mg/3ml Injection inject 0.6 mg SC daily week one, then 1.2 mg SC daily week 2, then 1.8 mg SQ daily week 3, then 2.4 mg SQ week 4, then 3 mg SQ daily.  #5 (Five) prefilled pen Refills: 1       Novofine  "Needle 32G x 6mm (1/4\") (Insulin Pen Device) Pen Needle Inject Daily  #100 (One [place]) each Refills: 1          Anxiety with depression           Prescriptions:       Wellbutrin XL (Bupropion HCl) 150mg Tablets, Extended Release 1 tab daily  #30 (Thirty) tablet(s) Refills: 2             CHARGE CAPTURE:           Primary Diagnosis:     401.1 Hypertension            I10    Essential (primary) hypertension              Orders:          22400   Office/outpatient visit; established patient, level 4  (In-House)           477.9 Allergic rhinitis            J30.9    Allergic rhinitis, unspecified              Orders:          11281   Therapeutic injection  (In-House)                                           Kenalog, per 10 mg (x4)         278.1 Central obesity            Z68.30    Body mass index (BMI) 30.0-30.9, adult    300.4 Anxiety with depression            F34.1    Dysthymic disorder           "

## 2021-05-18 NOTE — PROGRESS NOTES
Ema Cisse CARLOS 1965     Office/Outpatient Visit    Visit Date: Wed, Sep 4, 2019 03:56 pm    Provider: Anais Mari N.P. (Assistant: Jessica Meadows MA)    Location: [facility]        Electronically signed by Anais Mari N.P. on  09/04/2019 05:52:01 PM                             SUBJECTIVE:        CC:     Amy is a 53 year old White female.  Patient presents today for follow up on Saxenda, also has complaints of sinus issues X 1 month;         HPI:     Not currently taking Saxenda but wants to restart soon. Down about 20 pounds total with taking it, been very busy lately. Daughter had baby and having to drive from here to [place] to help take care of the baby , daughter had HELP syndrome after delivery. But states Saxenda helps with controlling appetite and weight loss.     ROS:     CONSTITUTIONAL:  Positive for unintentional weight gain ( gradual ).      E/N/T:  Positive for nasal congestion, sinus pressure and seasonal allergies.   Negative for sore throat.      CARDIOVASCULAR:  Negative for chest pain, orthopnea, paroxysmal nocturnal dyspnea and pedal edema.      RESPIRATORY:  Negative for dyspnea.      GASTROINTESTINAL:  Negative for abdominal pain, constipation, diarrhea, nausea and vomiting.      MUSCULOSKELETAL:  Positive for Right low back pain that goes down right leg at times. Has taken NSAIDS with some relief , asking if Chiro would be helpful.      INTEGUMENTARY/BREAST:  Positive for Right lower leg wound, had melamona removed a few months ago and now has small open sore in that area , worried it may be infected , does not hurt no fever.      NEUROLOGICAL:  Negative for dizziness, headaches, paresthesias, and weakness.      PSYCHIATRIC:  Negative for anxiety, depression, and sleep disturbances.          PMH/FMH/SH:     Last Reviewed on 9/04/2019 05:45 PM by Anais Mari    Past Medical History:                 PAST MEDICAL HISTORY         Osteoarthritis:  primarily affecting the low back; (getting epidural's )     Migraine Headaches     Skin cancer: Melamona , right LE;         GYNECOLOGICAL HISTORY:             CURRENT MEDICAL PROVIDERS:    podiatrist fritz     Diverticulosis    Hemorrhoids         PREVENTIVE HEALTH MAINTENANCE             COLORECTAL CANCER SCREENING: Up to date (colonoscopy q10y; sigmoidoscopy q5y; Cologuard q3y) was last done 2012, Results are in chart; colonoscopy with normal results; IN [place]     MAMMOGRAM: Done within last 2 years and results in are chart was last done 2018 with normal results     PAP SMEAR: was last done 2017 with normal results          Surgical History:         Cholecystectomy      oophrectomy lef t2-;    sinus surgery ;    Melanoma removed right leg 19 , Referred by Dr Murdock;         Family History:     Father: Coronary Artery Disease     Mother: Hypertension         Social History:     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          Substance Abuse History:     Last Reviewed on 2019 05:45 PM by Anais Mari        Mental Health History:     Last Reviewed on 2019 05:45 PM by Anais Mari        Communicable Diseases (eg STDs):     Last Reviewed on 2019 05:45 PM by Anais Mari            Current Problems:     Last Reviewed on 2019 05:45 PM by Anais Mari    Anxiety with depression     Central obesity     Hyperlipidemia     Classic migraine     GERD     Hypertension     Sciatica     Seasonal allergies     Open wound of lower extremity     Insomnia     Allergic rhinitis         Immunizations:     Hep A, adult dose 2018     Hep A, adult dose 2018     Fluzone (3 + years dose) 10/10/2016     Fluzone (3 + years dose)  10/30/2017     Adacel (Tdap) 5/29/2019         Allergies:     Last Reviewed on 9/04/2019 05:45 PM by Anais Mari    Trintellix: itching    Sulfas:        Current Medications:     Last Reviewed on 9/04/2019 05:45 PM by Anais Mari    Wellbutrin XL 300mg Tablets, Extended Release 1 tab daily     Protonix 40mg Tablets, Delayed Release 1 tab daily     Imitrex 50mg Tablet Take 1 tablet(s) by mouth prn for migraine     Acyclovir 200mg Capsules 1 capsules bid x 5 days at onset of fever blister     Trazodone HCl 50mg Tablet 1-2 qhs     Naproxen 500mg Tablet 1 tab bid     Mimvey 1mg/0.5mg Tablet Take 1 tablet(s) by mouth daily     Zyrtec 10mg Tablet 1 tab daily     Multivitamins         OBJECTIVE:        Vitals:         Current: 9/4/2019 4:00:47 PM    Ht:  5 ft, 5.5 in;  Wt: 189.2 lbs;  BMI: 31.0    T: 98.3 F (oral);  BP: 142/91 mm Hg (left arm, sitting);  P: 86 bpm (left arm (BP Cuff), sitting);  sCr: 0.98 mg/dL;  GFR: 73.02        Repeat:     4:11:49 PM     BP:   128/88mm Hg (left arm, sitting)         Exams:     PHYSICAL EXAM:     GENERAL: vital signs recorded - well developed,  mildly obese;  no apparent distress;     E/N/T:  normal EACs, TMs, nasal/oral mucosa, teeth, gingiva, and oropharynx;     RESPIRATORY: normal respiratory rate and pattern with no distress; normal breath sounds with no rales, rhonchi, wheezes or rubs;     CARDIOVASCULAR: normal rate; rhythm is regular;  no systolic murmur; no edema;     GASTROINTESTINAL: nontender; normal bowel sounds; no organomegaly;     BREAST/INTEGUMENT: Right lower leg , eraser size wound without exudate, warmth or induration , culture taken;     MUSCULOSKELETAL: Right lower back tenderness with radiating tenderness down right leg , FROM ,  DTR wnl     NEUROLOGIC: GROSSLY INTACT     PSYCHIATRIC:  appropriate affect and demeanor; normal speech pattern; grossly normal memory;         Procedures:     Sciatica     1. Kenalog 40 mg given IM in the right hip;  administered by AS;  lot number [id number]; expires 04/2021             ASSESSMENT:           278.1   Z68.30  Central obesity              DDx:     891.0   S81.801A  Open wound of lower extremity              DDx:     477.0   J30.1  Seasonal allergies              DDx:     724.3   M54.31  Sciatica              DDx:         ORDERS:         Meds Prescribed:       Fluticasone Propionate 50mcg/1actuation Nasal Spray 1 spray each nostil daily  #1 (One) gm Refills: 2       Singulair  (Montelukast Sodium) 10mg Tablet 1 tab daily  #90 (Ninety) tablet(s) Refills: 0         Lab Orders:       [id number]  [facility] Wound Culture  (Send-Out)  (Right lower leg )         Procedures Ordered:       REFER  Referral to Specialist or Other Facility  (Send-Out)           Other Orders:       [id number]  Therapeutic injection  (In-House)           Kenalog, per 10 mg (x4)                 PLAN:          Central obesity     CARE PLAN:  This plan was designed by your provider to assist in improving your health and well being.      GOALS for better health: achieve healthy weight, be more active, improve mobility     EXERCISE Your provider has outlined the following exercise regiment:    CARDIO at medium intensity (exercise that raises your heart rate to a point where you sweat and feel you're working, yet you're able to carry on a conversation) for 30-45 minutes   three times a week     NUTRITION Your provider advises limiting foods that taste sweet including: soda, candy, cake, pie, jam, canned fruit in syrup and cookies, limiting foods high in fat like processed meats (sausage), nuts, cheeses, butter, and packaged foods with hydrogenated oils, limiting foods high in carbs like baked goods, low fat packaged foods, potatoes, pizza, and sugary cereals, and staying hydrated: the [place] [employer] determined men need roughly 3 liters (about 13 cups) of total beverages a day. Women need about 2.2 liters (about 9 cups) of total beverages a day.             Prescriptions: Continue Saxenda., has rx sent in already. dmt          Open wound of lower extremity     LABORATORY:  Labs ordered to be performed today include wound culture.            Orders:       [id number]  [facility] Wound Culture  (Send-Out)  (Right lower leg )          Seasonal allergies           Prescriptions:       Fluticasone Propionate 50mcg/1actuation Nasal Spray 1 spray each nostil daily  #1 (One) gm Refills: 2       Singulair  (Montelukast Sodium) 10mg Tablet 1 tab daily  #90 (Ninety) tablet(s) Refills: 0          Sciatica         REFERRALS:  Referral initiated to a chiropractor ( Dr. Moeller of choice, pt to make her own apt. ).  Steroids Kenalog 40 mg 1 ml           Orders:       [id number]  Therapeutic injection  (In-House)                     Kenalog, per 10 mg (x4)       REFER  Referral to Specialist or Other Facility  (Send-Out)               CHARGE CAPTURE:           Primary Diagnosis:     278.1 Central obesity            Z68.30    Body mass index (BMI) 30.0-30.9, adult              Orders:          [id number]   Office/outpatient visit; established patient, level 4  (In-House)           891.0 Open wound of lower extremity            S81.801A    Unspecified open wound, right lower leg, initial encounter    477.0 Seasonal allergies            J30.1    Allergic rhinitis due to pollen    724.3 Sciatica            M54.31    Sciatica, right side              Orders:          [id number]   Therapeutic injection  (In-House)                                           Kenalog, per 10 mg (x4)

## 2021-05-18 NOTE — PROGRESS NOTES
Ema Cisse CARLOS 1965     Office/Outpatient Visit    Visit Date: Mon, Sep 23, 2019 04:01 pm    Provider: Anais Mari N.P. (Assistant: Nely Velasquez MA)    Location: [facility]        Electronically signed by Anais Mari N.P. on  2019 09:32:28 AM                             SUBJECTIVE:        CC:     Amy is a 53 year old White female.  presents today due to flank pain, wants leg wound checked         HPI:         Amy presents with flank pain.  This began within the last 3 days.  Associated symptoms include bilateral flank pain.  Amy states that she had multiple prior episodes of similar discomfort.  Treatments tried thus far include Increasing fluid intake.      ROS:     CONSTITUTIONAL:  Negative for chills, fatigue, fever, and weight change.      CARDIOVASCULAR:  Negative for chest pain, orthopnea, paroxysmal nocturnal dyspnea and pedal edema.      RESPIRATORY:  Negative for dyspnea.      GASTROINTESTINAL:  Negative for abdominal pain, constipation, diarrhea, nausea and vomiting.      GENITOURINARY:  Positive for cinthia flank pain, thinks she may have UTI.      INTEGUMENTARY/BREAST:  Positive for had infection in right lower leg from previous bx , culture done at last OV , since then area has cleared up and is not draining anymore.      NEUROLOGICAL:  Negative for dizziness, headaches, paresthesias, and weakness.      PSYCHIATRIC:  Negative for anxiety, depression, and sleep disturbances.          PMH/FMH/SH:     Last Reviewed on 2019 09:30 AM by Anais Mari    Past Medical History:                 PAST MEDICAL HISTORY         Osteoarthritis: primarily affecting the low back; (getting epidural's )     Migraine Headaches     Skin cancer: Melamona , right LE;         GYNECOLOGICAL HISTORY:             CURRENT MEDICAL PROVIDERS:    podiatrist fritz     Diverticulosis    Hemorrhoids         PREVENTIVE HEALTH MAINTENANCE             COLORECTAL CANCER  SCREENING: Up to date (colonoscopy q10y; sigmoidoscopy q5y; Cologuard q3y) was last done 5/2012, Results are in chart; colonoscopy with normal results; IN [place]     MAMMOGRAM: Done within last 2 years and results in are chart was last done 12/2018 with normal results     PAP SMEAR: was last done 12/2018 with normal results          Surgical History:         Cholecystectomy      oophrectomy lef t2-2012;    sinus surgery 2011;    Melanoma removed right leg 7/11/19 , Referred by Dr Murdock;         Family History:     Father: Coronary Artery Disease     Mother: Hypertension         Social History:     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          Mental Health History:     Last Reviewed on 9/24/2019 09:30 AM by Anais Mari            Current Problems:     Last Reviewed on 9/24/2019 09:30 AM by Anais Mari    Anxiety with depression     Central obesity     Hyperlipidemia     Classic migraine     GERD     Hypertension     Flank pain     Sciatica     Seasonal allergies     Open wound of lower extremity     Insomnia     Allergic rhinitis         Immunizations:     Hep A, adult dose 5/2/2018     Hep A, adult dose 11/20/2018     Fluzone (3 + years dose) 10/10/2016     Fluzone (3 + years dose) 10/30/2017     Adacel (Tdap) 5/29/2019         Allergies:     Last Reviewed on 9/24/2019 09:30 AM by Anais Mari    Trintellix: itching    Sulfas:        Current Medications:     Last Reviewed on 9/24/2019 09:30 AM by Anais Mari    Singulair  10mg Tablet 1 tab daily     Wellbutrin XL 300mg Tablets, Extended Release 1 tab daily     Protonix 40mg Tablets, Delayed Release 1 tab daily     Saxenda 18mg/3ml Injection inject 0.6 mg SC daily week one, then 1.2 mg SC daily week 2, then  1.8 mg SQ daily week 3, then 2.4 mg SQ week 4, then 3 mg SQ daily.     Imitrex 50mg Tablet Take 1 tablet(s) by mouth prn for migraine     Acyclovir 200mg Capsules 1 capsules bid x 5 days at onset of fever blister     Trazodone HCl 50mg Tablet 1-2 qhs     Fluticasone Propionate 50mcg/1actuation Nasal Spray 1 spray each nostil daily     Mimvey 1mg/0.5mg Tablet Take 1 tablet(s) by mouth daily     Zyrtec 10mg Tablet 1 tab daily     Multivitamins         OBJECTIVE:        Vitals:         Current: 9/23/2019 4:05:35 PM    Ht:  5 ft, 5.5 in;  Wt: 187.6 lbs;  BMI: 30.7    T: 98.2 F (oral);  BP: 143/90 mm Hg (left arm, sitting);  P: 90 bpm (left arm (BP Cuff), sitting);  sCr: 0.98 mg/dL;  GFR: 72.75        Repeat:     4:35:04 PM     BP:   145/95mm Hg        Exams:     PHYSICAL EXAM:     GENERAL: vital signs recorded - well developed, well nourished;  no apparent distress;     RESPIRATORY: normal respiratory rate and pattern with no distress; normal breath sounds with no rales, rhonchi, wheezes or rubs;     CARDIOVASCULAR: normal rate; rhythm is regular;  no systolic murmur; no edema;     GASTROINTESTINAL: nontender; normal bowel sounds; no organomegaly;     BREAST/INTEGUMENT: Right lower leg wound healed , scar formation present;     NEUROLOGIC: GROSSLY INTACT     PSYCHIATRIC:  appropriate affect and demeanor; normal speech pattern; grossly normal memory;         Lab/Test Results:             Glucose, Urine:  Neg (09/23/2019),     Bilirubin, urine:  Negative (09/23/2019),     Ketones, Urine Strip:  Negative (09/23/2019),     Specific Gravity, urine:  less than 1.005 (09/23/2019),     Blood in Urine:  negative (09/23/2019),     pH, urine:  7.0 (09/23/2019),     Protein Urine QL:  negative (09/23/2019),     Urobilinogen, urine:  0.2 E.U./dL (09/23/2019),     Nitrite, Urine:  Negative (09/23/2019),     Leukoctyes, urine:  Trace (09/23/2019),     Appearance:  Clear (09/23/2019),     collection source:  Clean-catch (09/23/2019),      Color:  Light Yellow (09/23/2019),     Performed by::  irwin (09/23/2019),             ASSESSMENT:           724.8   N23  Flank pain              DDx:         ORDERS:         Lab Orders:       [id number]  Urinalysis, automated, without microscopy  (In-House)         [id number]  [facility] Urine Culture  (Send-Out)                   PLAN:          Flank pain Will culture urine, does not appear to be infected today . Leg wound looks fine, no discharge and healed well. dmt           Orders:       [id number]  Urinalysis, automated, without microscopy  (In-House)         [id number]  [facility] - [facility] Urine Culture  (Send-Out)               CHARGE CAPTURE:           Primary Diagnosis:     724.8 Flank pain            N23    Unspecified renal colic              Orders:          [id number]   Office/outpatient visit; established patient, level 3  (In-House)             [id number]   Urinalysis, automated, without microscopy  (In-House)               ADDENDUMS:      ____________________________________    Addendum: 09/28/2019 08:02 VIVIENNE - Anais Mari        ADDENDUM: Add R10.9; Remove N23 dmt

## 2021-05-18 NOTE — PROGRESS NOTES
Ema Cisse CHAIM  1965     Office/Outpatient Visit    Visit Date: Wed, Isiaah 10, 2020 02:04 pm    Provider: Anais Mari N.P. (Assistant: Joi Klein MA)    Location: [facility]        Electronically signed by Anais Mari N.P. on  2020 01:12:40 AM                             Subjective:        CC: Amy is a 54 year old White female.  She presents with knot on left shoulder.          HPI:       Here for abnormal feeling nodule under left shoulder , is only painful when pressing on it , just noticed it about 2 weeks ago and has not changed, but with her skin cancer worried it could be something else     ROS:     CONSTITUTIONAL:  Positive for unintentional weight gain ( gradual ).   Negative for fatigue or fever.      CARDIOVASCULAR:  Negative for chest pain, palpitations, tachycardia, orthopnea, and edema.      RESPIRATORY:  Negative for recent cough, dyspnea and frequent wheezing.      MUSCULOSKELETAL:  Positive for pain in left shoulder.   Negative for arthralgias or myalgias.      NEUROLOGICAL:  Negative for dizziness, memory loss, paresthesias, tremor and weakness.      PSYCHIATRIC:  Negative for anxiety, depression, and sleep disturbances.          Past Medical History / Family History / Social History:         Last Reviewed on 6/10/2020 02:11 PM by Anais Mari    Past Medical History:                 PAST MEDICAL HISTORY         Osteoarthritis: primarily affecting the low back; (getting epidural's )     Migraine Headaches     Skin cancer: Melamona , right LE;         GYNECOLOGICAL HISTORY:             CURRENT MEDICAL PROVIDERS:    podiatrist fritz     Diverticulosis    Hemorrhoids         PREVENTIVE HEALTH MAINTENANCE             COLORECTAL CANCER SCREENING: Up to date (colonoscopy q10y; sigmoidoscopy q5y; Cologuard q3y) was last done 2012, Results are in chart; colonoscopy with normal results; IN [place]     MAMMOGRAM: Done within last 2 years  and results in are chart was last done 1/2020 with normal results Dr Trevino     PAP SMEAR: was last done 1/2020 with normal results          Surgical History:         Cholecystectomy     oophrectomy lef t2-2012;    sinus surgery 2011;    Melanoma removed right leg 7/11/19 , Referred by Dr Murdock;         Family History:     Father: Coronary Artery Disease     Mother: Hypertension         Social History:     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          Current Problems:     Last Reviewed on 6/10/2020 02:11 PM by Anais Mari    Essential (primary) hypertension    Gastro-esophageal reflux disease without esophagitis    Migraine with aura, not intractable, without status migrainosus    Hyperlipidemia, unspecified    Persistent mood [affective] disorder, unspecified    Primary insomnia    Body mass index (BMI) 30.0-30.9, adult    Dysthymic disorder    Cervicalgia    Dry mouth, unspecified        Immunizations:     Hep A, adult dose 5/2/2018    Hep A, adult dose 11/20/2018    Fluzone (3 + years dose) 10/10/2016    Fluzone (3 + years dose) 10/30/2017    Adacel (Tdap) 5/29/2019        Allergies:     Last Reviewed on 6/10/2020 02:11 PM by Anais Mari    Trintellix: itching     Sulfas:          Current Medications:     Last Reviewed on 6/10/2020 02:11 PM by Anais Mari    Imitrex 50 mg oral tablet [Take 1 tablet(s) by mouth prn for migraine]    Singulair  10 mg oral tablet [1 tab daily]    Naproxen 500 mg oral tablet [1 tab bid]    traZODone 50 mg oral tablet [1-2 qhs]    Multivitamins     Zyrtec 10 mg oral tablet [1 tab daily]    Protonix 40 mg oral tablet, delayed release (enteric coated) [1 tab daily]    Acyclovir 200mg Capsules [1 capsules bid x 5 days at onset of fever  blister]    Saxenda 3 mg/0.5 mL (18 mg/3 mL) subcutaneous Pen Injector [inject 0.6 mg SC daily week one, then 1.2 mg SC daily week 2, then 1.8 mg SQ daily week 3, then 2.4 mg SQ week 4, then 3 mg SQ daily.]    cyclobenzaprine 10 mg oral tablet [take 1 tablet (10 mg) by oral route at bedtime.]    buPROPion  mg oral tablet, sustained-release 12 hr [take 1 tablet (200 mg) by oral route 2 times per day]        Objective:        Vitals:         Current: 6/10/2020 2:08:15 PM    Ht:  5 ft, 5.5 in;  Wt: 175.4 lbs;  BMI: 28.7T: 97.8 F (oral);  BP: 119/86 mm Hg (right arm, sitting);  P: 93 bpm (right arm (BP Cuff), sitting);  sCr: 0.9 mg/dL;  GFR: 76.12        Exams:     PHYSICAL EXAM:     GENERAL: vital signs recorded - well developed, well nourished;  well groomed;  no apparent distress;     NECK:  supple, full ROM; no thyromegaly; no carotid bruits;     RESPIRATORY: normal respiratory rate and pattern with no distress; normal breath sounds with no rales, rhonchi, wheezes or rubs;     CARDIOVASCULAR: normal rate; rhythm is regular;  no systolic murmur; no edema;     MUSCULOSKELETAL: Tenderness in left shoulder , olive size firm nodule anterior left shoulder;     NEUROLOGIC: GROSSLY INTACT     PSYCHIATRIC:  appropriate affect and demeanor; normal speech pattern; grossly normal memory;         Assessment:         M25.512   Pain in left shoulder       Z68.30   Body mass index (BMI) 30.0-30.9, adult           ORDERS:         Meds Prescribed:       [New Rx] Robaxin-750 750 mg oral tablet [1 tab q 8 hrs], #45 (forty five) tablets, Refills: 3 (three)       [Refilled] Saxenda 3 mg/0.5 mL (18 mg/3 mL) subcutaneous Pen Injector [inject 0.6 mg SC daily week one, then 1.2 mg SC daily week 2, then 1.8 mg SQ daily week 3, then 2.4 mg SQ week 4, then 3 mg SQ daily.], #5 (five) milliliters, Refills: 3 (three)         Radiology/Test Orders:       [id number]  Left Radiologic exam, shoulder; comp, 2 views  (Send-Out; Stat)                       Plan:         Pain in left shoulder        RADIOLOGY:  I have ordered Shoulder x-ray: left shoulder to be done today.      RECOMMENDATIONS given include: Further recommendation to be given after test results are complete.      FOLLOW-UP: Schedule follow-up appointments on a p.r.n. basis.:.            Prescriptions:       [New Rx] Robaxin-750 750 mg oral tablet [1 tab q 8 hrs], #45 (forty five) tablets, Refills: 3 (three)           Orders:       [id number]  Left Radiologic exam, shoulder; comp, 2 views  (Send-Out; Stat)              Body mass index (BMI) 30.0-30.9, adult    CARE PLAN:  This plan was designed by your provider to assist in improving your health and well being.      GOALS for better health: achieve healthy weight, be more active, modify eating habits     EXERCISE Your provider has outlined the following exercise regiment:    CARDIO at medium intensity (exercise that raises your heart rate to a point where you sweat and feel you're working, yet you're able to carry on a conversation) for 15-30 minutes three times a week     NUTRITION Your provider advises limiting foods high in fat like processed meats (sausage), nuts, cheeses, butter, and packaged foods with hydrogenated oils, limiting foods high in carbs like baked goods, low fat packaged foods, potatoes, pizza, and sugary cereals, and staying hydrated: the [employer] determined men need roughly 3 liters (about 13 cups) of total beverages a day. Women need about 2.2 liters (about 9 cups) of total beverages a day.            Prescriptions:       [Refilled] Saxenda 3 mg/0.5 mL (18 mg/3 mL) subcutaneous Pen Injector [inject 0.6 mg SC daily week one, then 1.2 mg SC daily week 2, then 1.8 mg SQ daily week 3, then 2.4 mg SQ week 4, then 3 mg SQ daily.], #5 (five) milliliters, Refills: 3 (three)             Patient Recommendations:        For  Pain in left shoulder:    Schedule follow-up appointments as needed.                APPOINTMENT  INFORMATION:        Monday Tuesday Wednesday Thursday Friday Saturday Sunday            Time:___________________AM  PM   Date:_____________________             Charge Capture:         Primary Diagnosis:     M25.512  Pain in left shoulder           Orders:      [id number]  Office/outpatient visit; established patient, level 3  (In-House)              Z68.30  Body mass index (BMI) 30.0-30.9, adult

## 2021-05-18 NOTE — PROGRESS NOTES
Ema Cisse  1965     Office/Outpatient Visit    Visit Date: Wed, Mar 24, 2021 02:29 pm    Provider: Anais Mari N.P. (Assistant: Randa Villalobos, )    Location: [facility]        Electronically signed by Anais Mari N.P. on  03/28/2021 09:24:48 PM                             Subjective:        CC: Amy is a 55 year old White female.  med refills;         HPI:           Patient complains of encounter for general adult medical examination without abnormal findings.  Her last physical exam was 1 year ago.  She is menopausal.  She performs breast self-exams occasionally.   Her last Pap smear was in 1/2020 and was normal.   Her last mammogram was in 1/2020 and was normal.   She underwent colonoscopy in 5/2012 with normal results.   Preventative Health updated today.  She is not current with her influenza immunization.  Tobacco: She has never smoked.      ROS:     CONSTITUTIONAL:  Positive for fatigue ( moderate ) and unintentional weight gain ( gradual ).   Negative for fever.      E/N/T:  Negative for diminished hearing and nasal congestion.      CARDIOVASCULAR:  Negative for chest pain, palpitations, tachycardia, orthopnea, and edema.      RESPIRATORY:  Negative for recent cough, dyspnea and frequent wheezing.      GASTROINTESTINAL:  Negative for abdominal pain, constipation, diarrhea, nausea and vomiting.      GENITOURINARY:  Negative for dysuria and hematuria.      MUSCULOSKELETAL:  Positive for arthralgias (Neck , back , hands , feet , hips , shoulders, knees and elbows are ok).   Negative for myalgias.      INTEGUMENTARY/BREAST:  Negative for atypical mole(s) and rash.      NEUROLOGICAL:  Negative for dizziness, memory loss, paresthesias, tremor and weakness.      PSYCHIATRIC:  Negative for anxiety, depression, and sleep disturbances.          Past Medical History / Family History / Social History:         Last Reviewed on 3/24/2021 03:01 PM by Anais Mari     Past Medical History:                 PAST MEDICAL HISTORY         Osteoarthritis: primarily affecting the low back; (getting epidural's )     Migraine Headaches     Skin cancer: Melamona , right LE;         GYNECOLOGICAL HISTORY:             CURRENT MEDICAL PROVIDERS:    podiatrist fritz     Diverticulosis    Hemorrhoids         PREVENTIVE HEALTH MAINTENANCE             COLORECTAL CANCER SCREENING: Up to date (colonoscopy q10y; sigmoidoscopy q5y; Cologuard q3y) was last done 2012, Results are in chart; colonoscopy with normal results; IN [place]     MAMMOGRAM: Done within last 2 years and results in are chart was last done 2020 with normal results Dr Trevino     PAP SMEAR: was last done 2020 with normal results          Surgical History:         Cholecystectomy     oophrectomy lef t2-;    sinus surgery ;    Melanoma removed right leg 19 , Referred by Dr Murdock;         Family History:     Father: Coronary Artery Disease     Mother: Hypertension         Social History:     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          Mental Health History:     Last Reviewed on 3/24/2021 03:01 PM by Anais Mari        Current Problems:     Last Reviewed on 3/24/2021 03:01 PM by Anais Mari    Gastro-esophageal reflux disease without esophagitis    Migraine with aura, not intractable, without status migrainosus    Hyperlipidemia, unspecified    Persistent mood [affective] disorder, unspecified    Primary insomnia    Body mass index (BMI) 30.0-30.9, adult    Dysthymic disorder    Cervicalgia    Shortness of breath    Morbid (severe) obesity due to excess calories    Primary generalized (osteo)arthritis        Immunizations:     Hep A, adult dose 2018    Hep A,  "adult dose 11/20/2018    Fluzone (3 + years dose) 10/10/2016    Fluzone (3 + years dose) 10/30/2017    Adacel (Tdap) 5/29/2019        Allergies:     Last Reviewed on 3/24/2021 03:01 PM by Anais Mari    Trintellix: itching     Sulfas:          Current Medications:     Last Reviewed on 3/24/2021 03:01 PM by Anais Mari    Imitrex 50 mg oral tablet [Take 1 tablet(s) by mouth prn for migraine]    Singulair  10 mg oral tablet [1 tab daily]    naproxen 500 mg oral tablet [1 tab bid]    traZODone 50 mg oral tablet [1-2 qhs]    Multivitamins     Zyrtec 10 mg oral tablet [1 tab daily]    Protonix 40 mg oral tablet, delayed release (enteric coated) [1 tab daily]    Saxenda 3 mg/0.5 mL (18 mg/3 mL) subcutaneous Pen Injector [inject 0.6 mg SC daily week one, then 1.2 mg SC daily week 2, then 1.8 mg SQ daily week 3, then 2.4 mg SQ week 4, then 3 mg SQ daily.]    Novofine 32 32 gauge x 1/4\" needle  [Inject Daily]        Objective:        Vitals:         Current: 3/24/2021 2:35:11 PM    Ht:  5 ft, 5.5 in;  Wt: 193.2 lbs;  BMI: 31.7T: 97.3 F (temporal);  BP: 133/82 mm Hg (right arm, sitting);  P: 88 bpm (right arm (BP Cuff), sitting);  sCr: 0.9 mg/dL;  GFR: 78.40        Exams:     PHYSICAL EXAM:     GENERAL: vital signs recorded - well developed, well nourished;  well groomed;  no apparent distress;     E/N/T:  normal EACs, TMs, nasal/oral mucosa, teeth, gingiva, and oropharynx;     RESPIRATORY: normal respiratory rate and pattern with no distress; normal breath sounds with no rales, rhonchi, wheezes or rubs;     CARDIOVASCULAR: normal rate; rhythm is regular;  no systolic murmur; no edema;     GASTROINTESTINAL: nontender, nondistended; no hepatosplenomegaly or masses; no bruits;     MUSCULOSKELETAL: Diffuse joint tenderness;     NEUROLOGIC: GROSSLY INTACT     PSYCHIATRIC:  appropriate affect and demeanor; normal speech pattern; grossly normal memory;         Lab/Test Results:         Urine temperature: confirmed " (03/24/2021),     All urine drug screen levels confirmed negative: yes (03/24/2021),     Performed by: tls (03/24/2021),     Collection Time: 1505 (03/24/2021),             Assessment:         Z00.00   Encounter for general adult medical examination without abnormal findings       M15.0   Primary generalized (osteo)arthritis       E66.01   Morbid (severe) obesity due to excess calories       R06.02   Shortness of breath       Z20.822   Contact with and (suspected) exposure to COVID-19           ORDERS:         Meds Prescribed:       [New Rx] phentermine 37.5 mg oral tablet [take 1/2 tablet in am and 1/2 tablet at noon], #30 (thirty) tablets, Refills: 0 (zero)       [Refilled] traZODone 50 mg oral tablet [1-2 qhs], #180 (one hundred and eighty) tablets, Refills: 3 (three)       [Refilled] Singulair  10 mg oral tablet [1 tab daily], #90 (ninety) tablets, Refills: 3 (three)       [Refilled] Protonix 40 mg oral tablet, delayed release (enteric coated) [1 tab daily], #90 (ninety) tablets, Refills: 3 (three)       [New Rx] Ventolin HFA 90 mcg/actuation Inhalation HFA Aerosol Inhaler [1 puff inhaled every 4 hours  SOB/wheezing or insurance covered], #1 (one) each, Refills: 1 (one)         Radiology/Test Orders:       [id number]  Echocardiography, transthoracic, real-time w image (2D), w M-mode, w spectral & color flow Doppler  (Send-Out)            64127  Radiologic exam chest 2 views  (Send-Out)              Lab Orders:       [id number]  PHYSF - [facility] PHYSICAL: CMP, CBC, TSH, LIPID: [id number], [id number], [id number], [id number]  (Send-Out)            53684  BDUAM - [facility] Urinalysis, automated, with micro  (Send-Out)            35238  RAPII - [facility] Arthritis Profile  (Send-Out)            90161  Drug test prsmv qual dir optical obs per day  (In-House)            24162  NTBNP - [facility] B-Type Natriurectic peptide  (Send-Out)                      Plan:         Encounter for general adult medical examination without abnormal findings    LABORATORY:  Labs ordered  to be performed today include PHYSICAL PANEL; CMP, CBC, TSH, LIPID and urinalysis with micro.            Prescriptions:       [Refilled] Singulair  10 mg oral tablet [1 tab daily], #90 (ninety) tablets, Refills: 3 (three)       [Refilled] Protonix 40 mg oral tablet, delayed release (enteric coated) [1 tab daily], #90 (ninety) tablets, Refills: 3 (three)           Orders:       [id number]  [facility] PHYSICAL: CMP, CBC, TSH, LIPID: [id number], [id number], [id number], [id number]  (Send-Out)            750[facility]  [facility], with micro  (Send-Out)              Primary generalized (osteo)arthritis    LABORATORY:  Labs ordered to be performed today include Arthritis Profile.            Orders:       [id number]  [facility] Arthritis Profile  (Send-Out)              Morbid (severe) obesity due to excess calories        FOLLOW-UP:.  :for Weight every month, must be seen every 3 mo Controlled substance documentation: Moustapha reviewed; drug screen performed and appropriate; consent is reviewed and signed and on the chart.  She is aware of risk of addiction on this medication, understands that she will need to follow up for a review every 3 months and her medications will be adjusted or decreased as deemed appropriate at each visit.  No history of drug or alcohol abuse.  No concerns about diversion or abuse. She denies side effects related to the medication.  She is aware that she may be called in for pill counts.  The dosing of this medication will be reviewed on a regular basis and reduced if possible..  Ongoing use of a controlled substance is necessary for this patient to have a normal quality of life     Drug screen Controlled Substance Contract has been ordered           Prescriptions:       [New Rx] phentermine 37.5 mg oral tablet [take 1/2 tablet in am and 1/2 tablet at noon], #30 (thirty) tablets, Refills: 0 (zero)           Orders:       [id number]  Drug test prsmv qual dir optical obs per day  (In-House)              Shortness of  breath    LABORATORY:  Labs ordered to be performed today include BNP.      RADIOLOGY:  I have ordered a chest x-ray (PA and lateral) to be done today.      FOLLOW-UP TESTING #1:    FOLLOW-UP TESTS/PROCEDURES:  Will schedule Echocardiogram.            Prescriptions:       [New Rx] Ventolin HFA 90 mcg/actuation Inhalation HFA Aerosol Inhaler [1 puff inhaled every 4 hours  SOB/wheezing or insurance covered], #1 (one) each, Refills: 1 (one)           Orders:       [id number]  NTBNP - [facility] B-Type Natriurectic peptide  (Send-Out)            21858  Echocardiography, transthoracic, real-time w image (2D), w M-mode, w spectral & color flow Doppler  (Send-Out)            17270  Radiologic exam chest 2 views  (Send-Out)              Contact with and (suspected) exposure to COVID-19Wants to have Covid antibodies testing done             Other Prescriptions:       [Refilled] traZODone 50 mg oral tablet [1-2 qhs], #180 (one hundred and eighty) tablets, Refills: 3 (three)         Patient Recommendations:        For  Primary generalized (osteo)arthritis:    I also recommend ^.          For  Morbid (severe) obesity due to excess calories:                    APPOINTMENT INFORMATION:        Monday Tuesday Wednesday Thursday Friday Saturday Sunday            Time:___________________AM  PM   Date:_____________________             Charge Capture:         Primary Diagnosis:     Z00.00  Encounter for general adult medical examination without abnormal findings           Orders:      [id number]  Preventive medicine, established patient, age 40-64 years  (In-House)              M15.0  Primary generalized (osteo)arthritis     E66.01  Morbid (severe) obesity due to excess calories           Orders:      [id number]  Drug test prsmv qual dir optical obs per day  (In-House)              R06.02  Shortness of breath     Z20.822  Contact with and (suspected) exposure to COVID-19

## 2021-05-18 NOTE — PROGRESS NOTES
Ema Cisse  1965     Office/Outpatient Visit    Visit Date: Wed, Feb 5, 2020 04:24 pm    Provider: Anais Mari N.P. (Assistant: Nely Velasquez MA)    Location: [facility]        Electronically signed by Anais Mari N.P. on  02/07/2020 10:01:33 AM                             Subjective:        CC: Amy is a 54 year old White female.  med refill;         HPI:           Amy presents with encounter for general adult medical examination without abnormal findings.  Her last physical exam was 2 years ago.  She is menopausal.  She is not currently using any form of contraception.  She performs breast self-exams occasionally.   Her last Pap smear was in 1/2020 and was normal.   Her last mammogram was in 1/2020 and was normal.   She underwent colonoscopy in 5/2012 with normal results.   Preventative Health updated today.  She is not current with her influenza immunization.  Amy denies any history of abnormal Pap smears.  Tobacco: She has never smoked.            PHQ-9 Depression Screening: Completed form scanned and in chart; Total Score 3     ROS:     CONSTITUTIONAL:  Positive for Weight stable, with taking Saxenda, has not been taking in last few weeks but is getting ready to restart.      EYES:  Negative for blurred vision.      CARDIOVASCULAR:  Negative for chest pain, orthopnea, paroxysmal nocturnal dyspnea and pedal edema.      RESPIRATORY:  Negative for dyspnea.      GASTROINTESTINAL:  Negative for abdominal pain, constipation, diarrhea, nausea and vomiting.      NEUROLOGICAL:  Positive for headaches ( migraine; has prn meds that help when needed, does not have to take often ).      PSYCHIATRIC:  Positive for anxiety, depression, feelings of stress and Taking Trazodone and Wellbutrin with improvement , still having some anxiety, has taken 2 of her Wellbutrin 300mg before just to see if that would help, discussed correct use and not to take over 400mg daily.           Past Medical History / Family History / Social History:         Last Reviewed on 2020 09:56 AM by Anais Mari    Past Medical History:                 PAST MEDICAL HISTORY         Osteoarthritis: primarily affecting the low back; (getting epidural's )     Migraine Headaches     Skin cancer: Melamona , right LE;         GYNECOLOGICAL HISTORY:             CURRENT MEDICAL PROVIDERS:    podiatrist fritz     Diverticulosis    Hemorrhoids         PREVENTIVE HEALTH MAINTENANCE             COLORECTAL CANCER SCREENING: Up to date (colonoscopy q10y; sigmoidoscopy q5y; Cologuard q3y) was last done 2012, Results are in chart; colonoscopy with normal results; IN [place]     MAMMOGRAM: Done within last 2 years and results in are chart was last done 2020 with normal results Dr Trevino     PAP SMEAR: was last done 2020 with normal results          Surgical History:         Cholecystectomy     oophrectomy lef -;    sinus surgery ;    Melanoma removed right leg 19 , Referred by Dr Murdock;         Family History:     Father: Coronary Artery Disease     Mother: Hypertension         Social History:     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          Substance Abuse History:     Last Reviewed on 2020 09:56 AM by Anais Mari        Mental Health History:     Last Reviewed on 2020 09:56 AM by Anais Mari        Communicable Diseases (eg STDs):     Last Reviewed on 2020 09:56 AM by Anais Mari        Current Problems:     Last Reviewed on 2020 09:56 AM by Anais Mari    Gastro-esophageal reflux disease without esophagitis    Essential (primary) hypertension    Migraine with aura, not intractable, without status  migrainosus    Hyperlipidemia, unspecified    Persistent mood [affective] disorder, unspecified    Primary insomnia    Body mass index (BMI) 30.0-30.9, adult    Dysthymic disorder    Cervicalgia    Dry mouth, unspecified        Immunizations:     Hep A, adult dose 5/2/2018    Hep A, adult dose 11/20/2018    Fluzone (3 + years dose) 10/10/2016    Fluzone (3 + years dose) 10/30/2017    Adacel (Tdap) 5/29/2019        Allergies:     Last Reviewed on 2/07/2020 09:56 AM by Anais Mari    Trintellix: itching     Sulfas:          Current Medications:     Last Reviewed on 2/07/2020 09:56 AM by Anais Mari    Imitrex 50 mg oral tablet [Take 1 tablet(s) by mouth prn for migraine]    Singulair  10 mg oral tablet [1 tab daily]    Naproxen 500 mg oral tablet [1 tab bid]    traZODone 50 mg oral tablet [1-2 qhs]    Multivitamins     Zyrtec 10 mg oral tablet [1 tab daily]    Protonix 40 mg oral tablet, delayed release (enteric coated) [1 tab daily]    Acyclovir 200mg Capsules [1 capsules bid x 5 days at onset of fever blister]    Saxenda 18mg/3ml Injection [inject 0.6 mg SC daily week one, then 1.2 mg SC daily week 2, then 1.8 mg SQ daily week 3, then 2.4 mg SQ week 4, then 3 mg SQ daily.]    cyclobenzaprine 10 mg oral tablet [take 1 tablet (10 mg) by oral route at bedtime.]        Objective:        Vitals:         Current: 2/5/2020 4:29:36 PM    Ht:  5 ft, 5.5 in;  Wt: 183.6 lbs;  BMI: 30.1T: 98.2 F (oral);  BP: 127/91 mm Hg (left arm, sitting);  P: 95 bpm (left arm (BP Cuff), sitting);  sCr: 0.98 mg/dL;  GFR: 71.28        Exams:     PHYSICAL EXAM:     GENERAL: vital signs recorded - well developed, well nourished;  no apparent distress;     NECK: range of motion is normal; thyroid is non-palpable;     RESPIRATORY: normal respiratory rate and pattern with no distress; normal breath sounds with no rales, rhonchi, wheezes or rubs;     CARDIOVASCULAR: normal rate; rhythm is regular;  no systolic murmur; no edema;      GASTROINTESTINAL: nontender; normal bowel sounds; no organomegaly;     NEUROLOGIC: GROSSLY INTACT     PSYCHIATRIC:  appropriate affect and demeanor; normal speech pattern; grossly normal memory;         Assessment:         Z00.00   Encounter for general adult medical examination without abnormal findings       Z13.31   Encounter for screening for depression       F34.9   Persistent mood [affective] disorder, unspecified       R68.2   Dry mouth, unspecified           ORDERS:         Meds Prescribed:       [New Rx] buPROPion  mg oral tablet, sustained-release 12 hr [take 1 tablet (200 mg) by oral route 2 times per day], #180 (one hundred and eighty) tablets, Refills: 0 (zero)         Radiology/Test Orders:       3017F  Colorectal CA screen results documented and reviewed (PV)  (In-House)              Lab Orders:       [id number]  [facility] PHYSICAL: CMP, CBC, TSH, LIPID: [id number], [id number], [id number], [id number]  (Send-Out)            60407  [facility] - [facility] Urinalysis, automated, with micro  (Send-Out)              Other Orders:         Depression screen negative  (In-House)            1101F  Pt screen for fall risk; document no falls in past year or only 1 fall w/o injury in past year (YA)  (In-House)              Screening mammogram results documented  (Send-Out)                      Plan:         Encounter for general adult medical examination without abnormal findings    LABORATORY:  Labs ordered to be performed today include PHYSICAL PANEL; CMP, CBC, TSH, LIPID and urinalysis with micro.  MIPS Has had no falls or only one fall without injury in the past year Vaccines Flu and Pneumonia updated in Shot record Screening mammomgram done within last 2 years and results in are chart Colorectal Cancer Screening is up to date and the results are in the chart           Orders:       [id number]  [facility] - [facility] PHYSICAL: CMP, CBC, TSH, LIPID: [id number], [id number], [id number], [id number]  (Send-Out)            59065  [facility] - [facility] Urinalysis, automated,  with micro  (Send-Out)            1101F  Pt screen for fall risk; document no falls in past year or only 1 fall w/o injury in past year (YA)  (In-House)              Screening mammogram results documented  (Send-Out)            3017F  Colorectal CA screen results documented and reviewed (PV)  (In-House)              Encounter for screening for depression    MIPS PHQ-9 Depression Screening: Completed form scanned and in chart; Total Score 3; Negative Depression Screen           Orders:         Depression screen negative  (In-House)              Persistent mood [affective] disorder, unspecified          Prescriptions:       [New Rx] buPROPion  mg oral tablet, sustained-release 12 hr [take 1 tablet (200 mg) by oral route 2 times per day], #180 (one hundred and eighty) tablets, Refills: 0 (zero)         Dry mouth, unspecifiedTry Oramoist over the counter. dmt            Charge Capture:         Primary Diagnosis:     Z00.00  Encounter for general adult medical examination without abnormal findings           Orders:      [id number]  Preventive medicine, established patient, age 40-64 years  (In-House)            1101F  Pt screen for fall risk; document no falls in past year or only 1 fall w/o injury in past year (YA)  (In-House)            3017F  Colorectal CA screen results documented and reviewed (PV)  (In-House)              Z13.31  Encounter for screening for depression           Orders:        Depression screen negative  (In-House)              F34.9  Persistent mood [affective] disorder, unspecified     R68.2  Dry mouth, unspecified

## 2021-06-02 ENCOUNTER — TELEPHONE (OUTPATIENT)
Dept: PAIN MEDICINE | Facility: CLINIC | Age: 56
End: 2021-06-02

## 2021-06-02 DIAGNOSIS — M53.3 SACROILIAC JOINT DYSFUNCTION OF BOTH SIDES: ICD-10-CM

## 2021-06-02 DIAGNOSIS — M47.816 LUMBAR FACET ARTHROPATHY: Primary | ICD-10-CM

## 2021-06-02 RX ORDER — CELECOXIB 100 MG/1
100 CAPSULE ORAL 2 TIMES DAILY
Qty: 60 CAPSULE | Refills: 1 | Status: SHIPPED | OUTPATIENT
Start: 2021-06-02 | End: 2021-09-08 | Stop reason: SDUPTHER

## 2021-06-02 NOTE — TELEPHONE ENCOUNTER
Patient called and asked if you would change her prescription of celebrex to twice a day rather than once a day. She will need a refill. She states that the medication is working for her but believes it would work better if she can take it twice a day. Please advise.

## 2021-06-02 NOTE — TELEPHONE ENCOUNTER
200 mg/24 hours is typically the maximum dose. We can change to 100 mg BID if she would prefer that.

## 2021-06-05 NOTE — PROGRESS NOTES
"Ema Cisse CHAIM  1965     Office/Outpatient Visit    Visit Date: Mon, May 17, 2021 03:23 pm    Provider: Anais Mari N.P. (Assistant: Filomena Trammell MA)    Location: [facility]        Electronically signed by Anais Mari N.P. on  05/24/2021 12:58:42 PM                             Subjective:        CC: Amy is a 55 year old White female.  presents today due to sore throat, drainage, sinus pressure, swollen lymph nodes, ear ache, no fever pt no longer taking saxenda, please remove from med list.        HPI:           She complains of a sore throat.  This has been noted for the past 2 weeks.  The discomfort occurs constantly.  Associated symptoms include bilateral earache, \"sinus\" headache, nasal congestion, sinus pain and \"swollen glands\".  She denies chills, cough, diarrhea, fever, malaise, nausea, stiff neck, stomach ache or vomiting.  She denies exposure to ill contacts.  She has already tried to relieve the symptoms with a decongestant and an antihistamine.      ROS:     CONSTITUTIONAL:  Negative for fatigue and fever.      E/N/T:  Positive for ear pain ( bilateral ), nasal congestion, sinus pressure and sore throat.   Negative for diminished hearing.      CARDIOVASCULAR:  Negative for chest pain, palpitations, tachycardia, orthopnea, and edema.      RESPIRATORY:  Negative for recent cough, dyspnea and frequent wheezing.      GASTROINTESTINAL:  Negative for abdominal pain, constipation, diarrhea, nausea and vomiting.      GENITOURINARY:  Negative for dysuria and hematuria.      HEMATOLOGIC/LYMPHATIC:  Positive for lymphadenopathy ( cervical ).      NEUROLOGICAL:  Positive for headaches ( \"sinus\" ).   Negative for dizziness, memory loss, paresthesias, tremor or weakness.      PSYCHIATRIC:  Negative for anxiety, depression, and sleep disturbances.          Past Medical History / Family History / Social History:         Last Reviewed on 5/17/2021 04:02 PM by Kamaljit" Anais    Past Medical History:                 PAST MEDICAL HISTORY         Osteoarthritis: primarily affecting the low back; (getting epidural's )     Migraine Headaches     Skin cancer: Melamona , right LE;         GYNECOLOGICAL HISTORY:             CURRENT MEDICAL PROVIDERS:    podiatrist fritz     Diverticulosis    Hemorrhoids         PREVENTIVE HEALTH MAINTENANCE             COLORECTAL CANCER SCREENING: Up to date (colonoscopy q10y; sigmoidoscopy q5y; Cologuard q3y) was last done 2012, Results are in chart; colonoscopy with normal results; IN [place]     MAMMOGRAM: Done within last 2 years and results in are chart was last done 2020 with normal results Dr Trevino     PAP SMEAR: was last done 2020 with normal results          Surgical History:         Cholecystectomy     oophrectomy lef t2-;    sinus surgery ;    Melanoma removed right leg 19 , Referred by Dr Murdock;         Family History:     Father: Coronary Artery Disease     Mother: Hypertension         Social History:     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          Mental Health History:     Last Reviewed on 2021 04:02 PM by Anais Mari        Current Problems:     Last Reviewed on 3/24/2021 03:01 PM by Anais Mari    Allergic rhinitis    Gastro-esophageal reflux disease without esophagitis    GERD    Hypertension    Classic migraine    Migraine with aura, not intractable, without status migrainosus    Hyperlipidemia    Hyperlipidemia, unspecified    Persistent mood [affective] disorder, unspecified    Primary insomnia    Insomnia    Anxiety with depression    Central obesity    Body mass index (BMI) 30.0-30.9, adult    Dysthymic disorder    Cervicalgia    Shortness of  "breath    Contact with and (suspected) exposure to COVID-19    Morbid (severe) obesity due to excess calories    Primary generalized (osteo)arthritis        Immunizations:     Hep A, adult dose 5/2/2018    Hep A, adult dose 11/20/2018    Fluzone (3 + years dose) 10/10/2016    Fluzone (3 + years dose) 10/30/2017    Adacel (Tdap) 5/29/2019        Allergies:     Last Reviewed on 5/17/2021 03:28 PM by Filomena Trammell    Trintellix: itching     Sulfas:          Current Medications:     Last Reviewed on 5/17/2021 03:28 PM by Filomena Trammell    Imitrex 50 mg oral tablet [Take 1 tablet(s) by mouth prn for migraine]    Singulair  10 mg oral tablet [1 tab daily]    naproxen 500 mg oral tablet [1 tab bid]    traZODone 50 mg oral tablet [1-2 qhs]    Multivitamins     Zyrtec 10 mg oral tablet [1 tab daily]    Protonix 40 mg oral tablet, delayed release (enteric coated) [1 tab daily]    Saxenda 3 mg/0.5 mL (18 mg/3 mL) subcutaneous Pen Injector [inject 0.6 mg SC daily week one, then 1.2 mg SC daily week 2, then 1.8 mg SQ daily week 3, then 2.4 mg SQ week 4, then 3 mg SQ daily.]    Novofine 32 32 gauge x 1/4\" needle  [Inject Daily]    phentermine 37.5 mg oral tablet [take 1/2 tablet in am and 1/2 tablet at noon]    Ventolin HFA 90 mcg/actuation Inhalation HFA Aerosol Inhaler [1 puff inhaled every 4 hours  SOB/wheezing or insurance covered]    METHOCARBAM  TAB 750MG    CELECOXIB    CAP 200MG        Objective:        Vitals:         Current: 5/17/2021 3:25:06 PM    Ht:  5 ft, 5.5 in;  Wt: 188.2 lbs;  BMI: 30.8T: 97.6 F (temporal);  BP: 112/94 mm Hg (right arm, sitting);  P: 88 bpm (right arm (BP Cuff), sitting);  sCr: 0.95 mg/dL;  GFR: 73.45        Exams:     PHYSICAL EXAM:     GENERAL: vital signs recorded - well developed, well nourished;  well groomed;  no apparent distress;     E/N/T: EARS:  normal external auditory canals and tympanic membranes;  grossly normal hearing; NOSE: right frontal sinus tenderness present; " OROPHARYNX:  normal mucosa, dentition, gingiva, and posterior pharynx;     RESPIRATORY: normal respiratory rate and pattern with no distress; normal breath sounds with no rales, rhonchi, wheezes or rubs;     CARDIOVASCULAR: normal rate; rhythm is regular;  no systolic murmur; no edema;     GASTROINTESTINAL: nontender, nondistended; no hepatosplenomegaly or masses; no bruits;     NEUROLOGIC: GROSSLY INTACT     PSYCHIATRIC:  appropriate affect and demeanor; normal speech pattern; grossly normal memory;         Lab/Test Results:         Rapid Strep Screen: Negative (05/17/2021),     Performed by:: tls (05/17/2021),             Assessment:         J01.00   Acute maxillary sinusitis, unspecified           ORDERS:         Meds Prescribed:       [New Rx] Augmentin 875-125 mg oral tablet [take 1 tablet by oral route every 12 hours], #20 (twenty) tablets, Refills: 0 (zero)       [New Rx] Diflucan 150 mg oral tablet [take 1 tablet (150 mg) orally today and repeat in 3 days  disp 2 tabletes no refills], #2 (two) tablets, Refills: 0 (zero)         Lab Orders:       [id number]  Group A Streptococcus detection by immunoassay with direct optical observation  (In-House)                      Plan:         Acute maxillary sinusitis, unspecified          Prescriptions:       [New Rx] Augmentin 875-125 mg oral tablet [take 1 tablet by oral route every 12 hours], #20 (twenty) tablets, Refills: 0 (zero)       [New Rx] Diflucan 150 mg oral tablet [take 1 tablet (150 mg) orally today and repeat in 3 days  disp 2 tabletes no refills], #2 (two) tablets, Refills: 0 (zero)           Orders:       [id number]  Group A Streptococcus detection by immunoassay with direct optical observation  (In-House)                  Charge Capture:         Primary Diagnosis:     J01.00  Acute maxillary sinusitis, unspecified           Orders:      [id number]  Office/outpatient visit; established patient, level 3  (In-House)            [id number]  Group A Streptococcus detection by  immunoassay with direct optical observation  (In-House)

## 2021-06-18 ENCOUNTER — TELEPHONE (OUTPATIENT)
Dept: FAMILY MEDICINE CLINIC | Age: 56
End: 2021-06-18

## 2021-06-18 DIAGNOSIS — E66.9 OBESITY WITHOUT SERIOUS COMORBIDITY, UNSPECIFIED CLASSIFICATION, UNSPECIFIED OBESITY TYPE: Primary | ICD-10-CM

## 2021-06-18 NOTE — TELEPHONE ENCOUNTER
PA for Saxenda approved on 6/17/21, called [facility] to inf of approval and they are needing a new rx(current exp 6/15/21) for pens, queued and pended//ag

## 2021-07-01 VITALS
HEART RATE: 89 BPM | HEIGHT: 66 IN | DIASTOLIC BLOOD PRESSURE: 84 MMHG | BODY MASS INDEX: 32.17 KG/M2 | SYSTOLIC BLOOD PRESSURE: 124 MMHG | TEMPERATURE: 98.8 F | WEIGHT: 200.2 LBS

## 2021-07-01 VITALS
BODY MASS INDEX: 31.97 KG/M2 | HEART RATE: 87 BPM | HEIGHT: 66 IN | WEIGHT: 198.9 LBS | SYSTOLIC BLOOD PRESSURE: 138 MMHG | DIASTOLIC BLOOD PRESSURE: 84 MMHG | TEMPERATURE: 96.9 F

## 2021-07-01 VITALS
DIASTOLIC BLOOD PRESSURE: 89 MMHG | HEIGHT: 66 IN | HEART RATE: 104 BPM | TEMPERATURE: 98.5 F | SYSTOLIC BLOOD PRESSURE: 129 MMHG | BODY MASS INDEX: 30.63 KG/M2 | WEIGHT: 190.6 LBS

## 2021-07-01 VITALS
BODY MASS INDEX: 30.15 KG/M2 | DIASTOLIC BLOOD PRESSURE: 95 MMHG | HEIGHT: 66 IN | TEMPERATURE: 98.2 F | HEART RATE: 90 BPM | WEIGHT: 187.6 LBS | SYSTOLIC BLOOD PRESSURE: 145 MMHG

## 2021-07-01 VITALS
WEIGHT: 189.2 LBS | BODY MASS INDEX: 30.41 KG/M2 | HEIGHT: 66 IN | DIASTOLIC BLOOD PRESSURE: 88 MMHG | TEMPERATURE: 98.3 F | SYSTOLIC BLOOD PRESSURE: 128 MMHG | HEART RATE: 86 BPM

## 2021-07-01 VITALS
HEIGHT: 66 IN | TEMPERATURE: 97.2 F | SYSTOLIC BLOOD PRESSURE: 127 MMHG | DIASTOLIC BLOOD PRESSURE: 92 MMHG | BODY MASS INDEX: 30.34 KG/M2 | HEART RATE: 89 BPM | WEIGHT: 188.8 LBS

## 2021-07-01 VITALS
DIASTOLIC BLOOD PRESSURE: 66 MMHG | BODY MASS INDEX: 32.66 KG/M2 | HEIGHT: 66 IN | SYSTOLIC BLOOD PRESSURE: 134 MMHG | TEMPERATURE: 98.5 F | HEART RATE: 93 BPM | WEIGHT: 203.2 LBS

## 2021-07-02 VITALS
BODY MASS INDEX: 28.19 KG/M2 | WEIGHT: 175.4 LBS | SYSTOLIC BLOOD PRESSURE: 119 MMHG | HEART RATE: 93 BPM | DIASTOLIC BLOOD PRESSURE: 86 MMHG | TEMPERATURE: 97.8 F | HEIGHT: 66 IN

## 2021-07-02 VITALS
BODY MASS INDEX: 30.25 KG/M2 | HEART RATE: 88 BPM | WEIGHT: 188.2 LBS | TEMPERATURE: 97.6 F | DIASTOLIC BLOOD PRESSURE: 94 MMHG | HEIGHT: 66 IN | SYSTOLIC BLOOD PRESSURE: 112 MMHG

## 2021-07-02 VITALS
HEART RATE: 88 BPM | BODY MASS INDEX: 31.05 KG/M2 | WEIGHT: 193.2 LBS | DIASTOLIC BLOOD PRESSURE: 82 MMHG | SYSTOLIC BLOOD PRESSURE: 133 MMHG | TEMPERATURE: 97.3 F | HEIGHT: 66 IN

## 2021-07-02 VITALS
SYSTOLIC BLOOD PRESSURE: 127 MMHG | TEMPERATURE: 98.2 F | DIASTOLIC BLOOD PRESSURE: 91 MMHG | HEIGHT: 66 IN | BODY MASS INDEX: 29.51 KG/M2 | HEART RATE: 95 BPM | WEIGHT: 183.6 LBS

## 2021-07-08 RX ORDER — NAPROXEN 500 MG/1
500 TABLET ORAL 2 TIMES DAILY WITH MEALS
COMMUNITY
End: 2021-07-08 | Stop reason: SDUPTHER

## 2021-07-08 RX ORDER — PHENTERMINE HYDROCHLORIDE 37.5 MG/1
37.5 CAPSULE ORAL EVERY MORNING
COMMUNITY
End: 2021-07-22 | Stop reason: SDUPTHER

## 2021-07-08 RX ORDER — NAPROXEN 500 MG/1
500 TABLET ORAL 2 TIMES DAILY WITH MEALS
Qty: 180 TABLET | Refills: 0 | Status: SHIPPED | OUTPATIENT
Start: 2021-07-08 | End: 2021-10-20 | Stop reason: SDUPTHER

## 2021-07-09 RX ORDER — PHENTERMINE HYDROCHLORIDE 37.5 MG/1
37.5 CAPSULE ORAL EVERY MORNING
Qty: 30 CAPSULE | Refills: 0 | OUTPATIENT
Start: 2021-07-09

## 2021-07-12 NOTE — TELEPHONE ENCOUNTER
sophie     Caller: Ema Cisse    Relationship: Self    Best call back number: [phone]      What was the call regarding: PATIENT IS WANTING TO KNOW WHY MEDICATION phentermine 37.5 MG capsule WAS DENIED. SAID SHE GOT WEIRD MESSAGE ON [employer] SHE DOESN'T UNDERSTAND. PLEASE ADVISE    Do you require a callback: YES

## 2021-07-12 NOTE — TELEPHONE ENCOUNTER
LAST OV:5/17/21  NEXT OV:7/21/21  LAST REFILL:5/16/21  LAST UDS:3/24/21  WEIGHT:175.0    Pt has new pt appt with you as internal transfer from [facility] on 7/21/21. Pt is wanting to know if you can fill, if not I can send to [facility], please adv.

## 2021-07-16 RX ORDER — PHENTERMINE HYDROCHLORIDE 37.5 MG/1
37.5 CAPSULE ORAL EVERY MORNING
Qty: 30 CAPSULE | Refills: 0 | OUTPATIENT
Start: 2021-07-16

## 2021-07-22 ENCOUNTER — OFFICE VISIT (OUTPATIENT)
Dept: FAMILY MEDICINE CLINIC | Age: 56
End: 2021-07-22

## 2021-07-22 VITALS
OXYGEN SATURATION: 100 % | HEART RATE: 97 BPM | TEMPERATURE: 98 F | SYSTOLIC BLOOD PRESSURE: 112 MMHG | BODY MASS INDEX: 31.58 KG/M2 | DIASTOLIC BLOOD PRESSURE: 88 MMHG | WEIGHT: 185 LBS | HEIGHT: 64 IN

## 2021-07-22 DIAGNOSIS — E66.01 MORBID (SEVERE) OBESITY DUE TO EXCESS CALORIES (HCC): ICD-10-CM

## 2021-07-22 DIAGNOSIS — F34.9 PERSISTENT MOOD (AFFECTIVE) DISORDER, UNSPECIFIED (HCC): ICD-10-CM

## 2021-07-22 DIAGNOSIS — Z79.899 HIGH RISK MEDICATIONS (NOT ANTICOAGULANTS) LONG-TERM USE: ICD-10-CM

## 2021-07-22 DIAGNOSIS — F51.01 PRIMARY INSOMNIA: ICD-10-CM

## 2021-07-22 DIAGNOSIS — G89.29 OTHER CHRONIC PAIN: ICD-10-CM

## 2021-07-22 DIAGNOSIS — K58.2 IRRITABLE BOWEL SYNDROME WITH BOTH CONSTIPATION AND DIARRHEA: Primary | ICD-10-CM

## 2021-07-22 DIAGNOSIS — G43.109 MIGRAINE WITH AURA, NOT INTRACTABLE, WITHOUT STATUS MIGRAINOSUS: ICD-10-CM

## 2021-07-22 PROBLEM — E65 CENTRAL OBESITY: Status: ACTIVE | Noted: 2021-07-22

## 2021-07-22 PROBLEM — M54.2 CERVICALGIA: Status: ACTIVE | Noted: 2021-07-22

## 2021-07-22 PROBLEM — E78.5 HYPERLIPIDEMIA, UNSPECIFIED: Status: ACTIVE | Noted: 2021-07-22

## 2021-07-22 PROBLEM — M15.0 PRIMARY GENERALIZED (OSTEO)ARTHRITIS: Status: ACTIVE | Noted: 2021-07-22

## 2021-07-22 PROBLEM — F34.1 DYSTHYMIC DISORDER: Status: ACTIVE | Noted: 2021-07-22

## 2021-07-22 LAB
AMPHET+METHAMPHET UR QL: NEGATIVE
AMPHETAMINES UR QL: NEGATIVE
BARBITURATES UR QL SCN: NEGATIVE
BENZODIAZ UR QL SCN: NEGATIVE
BUPRENORPHINE SERPL-MCNC: NEGATIVE NG/ML
CANNABINOIDS SERPL QL: NEGATIVE
COCAINE UR QL: NEGATIVE
EXPIRATION DATE: NORMAL
Lab: NORMAL
MDMA UR QL SCN: NEGATIVE
METHADONE UR QL SCN: NEGATIVE
OPIATES UR QL: NEGATIVE
OXYCODONE UR QL SCN: NEGATIVE
PCP UR QL SCN: NEGATIVE

## 2021-07-22 PROCEDURE — 80305 DRUG TEST PRSMV DIR OPT OBS: CPT | Performed by: NURSE PRACTITIONER

## 2021-07-22 PROCEDURE — 99214 OFFICE O/P EST MOD 30 MIN: CPT | Performed by: NURSE PRACTITIONER

## 2021-07-22 RX ORDER — PHENTERMINE HYDROCHLORIDE 37.5 MG/1
37.5 CAPSULE ORAL EVERY MORNING
Qty: 30 CAPSULE | Refills: 0 | Status: SHIPPED | OUTPATIENT
Start: 2021-07-22 | End: 2021-09-10 | Stop reason: SDUPTHER

## 2021-07-22 RX ORDER — ALBUTEROL SULFATE 90 UG/1
2 AEROSOL, METERED RESPIRATORY (INHALATION) EVERY 4 HOURS PRN
COMMUNITY

## 2021-07-22 RX ORDER — ESCITALOPRAM OXALATE 10 MG/1
10 TABLET ORAL DAILY
Qty: 30 TABLET | Refills: 1 | Status: SHIPPED | OUTPATIENT
Start: 2021-07-22 | End: 2021-09-01 | Stop reason: SDUPTHER

## 2021-07-22 RX ORDER — DICYCLOMINE HYDROCHLORIDE 10 MG/1
10 CAPSULE ORAL
Qty: 120 CAPSULE | Refills: 0 | Status: SHIPPED | OUTPATIENT
Start: 2021-07-22 | End: 2021-09-30 | Stop reason: SDUPTHER

## 2021-07-22 RX ORDER — TRAZODONE HYDROCHLORIDE 50 MG/1
50 TABLET ORAL NIGHTLY
COMMUNITY
End: 2021-07-22 | Stop reason: SDUPTHER

## 2021-07-22 RX ORDER — MULTIPLE VITAMINS W/ MINERALS TAB 9MG-400MCG
1 TAB ORAL DAILY
COMMUNITY

## 2021-07-22 NOTE — PROGRESS NOTES
"Chief Complaint  Irritable Bowel Syndrome (episode for 3wks), Weight Loss (phentermine refill), and Establish Care (needs appt, prior DT pt)    Subjective          Ema Cisse presents to [facility]    Amy has history of chronic pain in back. Hx lumbar facet arthropathy. Seeing pain management has had injections. They also have her on celebrex daily and robaxin PRN.  History of allergies. Taking cetirizine, singulair, nasacort.   Also has history of migraines. Takes sumatriptan as needed. No refill needed at this time.   She is taking Trazodone to help her with sleep. She has been on several medication in the past for anxiety but none seemed to help. She has tried buspirone which was ineffective. Trintellix caused rash. There were others but she does not know the name of all of them.   History of IBS- diarrhea prominent. She notes increased symptoms recently over the past 3 weeks. Taking imodium. Feeling bloated and gassy. Reports has taken Dicylcomine in past with improvement but ran out of prescription. She reports UTD colonoscopy. Symptoms similar to past episodes. Has tried Linzess in past but that caused nausea.   She has been taking Phentermine for obesity and weight loss. This was started by former PCP 3/24/21. She previously has tried Saxenda. She does do some walking for exercise but this is limited due to back pain. She has [employer]itness [employer] but not currently using. Trying to make healthier choices. She has not taken for 2-3 weeks but would like to try to restart.           Objective   Vital Signs:   /88   Pulse 97   Temp 98 °F (36.7 °C)   Ht 162.6 cm (64\")   Wt 83.9 kg (185 lb)   SpO2 100%   BMI 31.76 kg/m²     Physical Exam  Vitals reviewed.   Constitutional:       General: She is not in acute distress.     Appearance: Normal appearance. She is well-developed.   HENT:      Head: Normocephalic and atraumatic.   Cardiovascular:      Rate and Rhythm: Normal " rate and regular rhythm.   Pulmonary:      Effort: Pulmonary effort is normal.      Breath sounds: Normal breath sounds.   Abdominal:      General: Bowel sounds are normal.      Palpations: Abdomen is soft.   Neurological:      Mental Status: She is alert and oriented to person, place, and time.   Psychiatric:         Mood and Affect: Mood and affect normal.          Result Review :   The following data was reviewed by: SHELBIE Mccormick on 07/22/2021:  Urine Culture - , (03/26/2021 07:43)  Urine Culture - , (03/26/2021 07:43)  Antinuclear Antibody With Reflex Cascade (03/26/2021 07:43)  Urinalysis With Microscopic - (03/26/2021 07:43)  RA Profile II (03/26/2021 07:43)  Physical [place] [facility] Panel (03/26/2021 07:43)  BNP (03/26/2021 07:43)           Assessment and Plan    Diagnoses and all orders for this visit:    1. Irritable bowel syndrome with both constipation and diarrhea (Primary)  Comments:  Low FODMAP diet. Handout provided. Will restart dicyclomine. Also starting SSRI as below which may help with symptoms.   Orders:  -     dicyclomine (Bentyl) 10 MG capsule; Take 1 capsule by mouth 4 (Four) Times a Day Before Meals & at Bedtime As Needed (diarrhea).  Dispense: 120 capsule; Refill: 0    2. Persistent mood (affective) disorder, unspecified (CMS/HCC)  -     escitalopram (Lexapro) 10 MG tablet; Take 1 tablet by mouth Daily.  Dispense: 30 tablet; Refill: 1    3. Morbid (severe) obesity due to excess calories (CMS/HCC)  Comments:  Healthy diet, exercise as tolerated.  Encouraged food journal.  Orders:  -     phentermine 37.5 MG capsule; Take 1 capsule by mouth Every Morning.  Dispense: 30 capsule; Refill: 0    4. Other chronic pain  Comments:  Followed by pain management    5. Migraine with aura, not intractable, without status migrainosus  Comments:  Sumatriptan as needed.  No refills needed at this time    6. Primary insomnia  Comments:  Taking trazodone.  No refills needed at this time    7. High  risk medications (not anticoagulants) long-term use  -     POC Urine Drug Screen [facility] [employer]    Controlled substance documentation: Moustapha reviewed; drug screen performed and appropriate; consent is reviewed and signed and on the chart.  Is aware of risk of addiction on this medication, understands will need to follow up for a review at least every 3 months and medications will be adjusted or decreased as deemed appropriate at each visit.  No history of drug or alcohol abuse.  No concerns about diversion or abuse. Denies side effects related to the medication.  Aware may be called in for pill counts.  The dosing of this medication will be reviewed on a regular basis and reduced if possible.  She is aware that this is meant for short-term use only.      Follow Up    Return in about 4 weeks (around 8/19/2021).  Patient was given instructions and counseling regarding her condition or for health maintenance advice. Please see specific information pulled into the AVS if appropriate.

## 2021-09-01 ENCOUNTER — OFFICE VISIT (OUTPATIENT)
Dept: FAMILY MEDICINE CLINIC | Age: 56
End: 2021-09-01

## 2021-09-01 VITALS
HEART RATE: 94 BPM | DIASTOLIC BLOOD PRESSURE: 95 MMHG | SYSTOLIC BLOOD PRESSURE: 116 MMHG | BODY MASS INDEX: 31.76 KG/M2 | WEIGHT: 185 LBS | OXYGEN SATURATION: 96 % | TEMPERATURE: 98.6 F

## 2021-09-01 DIAGNOSIS — E66.01 MORBID (SEVERE) OBESITY DUE TO EXCESS CALORIES (HCC): ICD-10-CM

## 2021-09-01 DIAGNOSIS — F34.9 PERSISTENT MOOD (AFFECTIVE) DISORDER, UNSPECIFIED (HCC): Primary | ICD-10-CM

## 2021-09-01 PROCEDURE — 99213 OFFICE O/P EST LOW 20 MIN: CPT | Performed by: NURSE PRACTITIONER

## 2021-09-01 RX ORDER — ESCITALOPRAM OXALATE 20 MG/1
20 TABLET ORAL DAILY
Qty: 30 TABLET | Refills: 1 | Status: SHIPPED | OUTPATIENT
Start: 2021-09-01 | End: 2021-11-18 | Stop reason: SDUPTHER

## 2021-09-08 ENCOUNTER — OFFICE VISIT (OUTPATIENT)
Dept: PAIN MEDICINE | Facility: CLINIC | Age: 56
End: 2021-09-08

## 2021-09-08 VITALS
TEMPERATURE: 97.7 F | RESPIRATION RATE: 18 BRPM | WEIGHT: 182.6 LBS | DIASTOLIC BLOOD PRESSURE: 82 MMHG | HEART RATE: 86 BPM | HEIGHT: 64 IN | SYSTOLIC BLOOD PRESSURE: 115 MMHG | BODY MASS INDEX: 31.18 KG/M2 | OXYGEN SATURATION: 100 %

## 2021-09-08 DIAGNOSIS — M79.642 LEFT HAND PAIN: ICD-10-CM

## 2021-09-08 DIAGNOSIS — M47.816 LUMBAR FACET ARTHROPATHY: ICD-10-CM

## 2021-09-08 DIAGNOSIS — M51.36 DDD (DEGENERATIVE DISC DISEASE), LUMBAR: ICD-10-CM

## 2021-09-08 DIAGNOSIS — M25.551 RIGHT HIP PAIN: ICD-10-CM

## 2021-09-08 DIAGNOSIS — G89.29 OTHER CHRONIC PAIN: Primary | ICD-10-CM

## 2021-09-08 DIAGNOSIS — M53.3 SACROILIAC JOINT DYSFUNCTION OF BOTH SIDES: ICD-10-CM

## 2021-09-08 DIAGNOSIS — M50.30 DDD (DEGENERATIVE DISC DISEASE), CERVICAL: ICD-10-CM

## 2021-09-08 PROCEDURE — 99215 OFFICE O/P EST HI 40 MIN: CPT | Performed by: NURSE PRACTITIONER

## 2021-09-08 RX ORDER — CELECOXIB 100 MG/1
100 CAPSULE ORAL 2 TIMES DAILY
Qty: 60 CAPSULE | Refills: 1 | Status: SHIPPED | OUTPATIENT
Start: 2021-09-08 | End: 2021-12-06

## 2021-09-08 NOTE — PROGRESS NOTES
"CHIEF COMPLAINT  Follow-up for back and neck pain.    Subjective   Ema Cisse is a 55 y.o. female  who presents for follow-up.  She has a history of chronic neck and back pain.  Worsening pain in the right lumbosacral area, right hip, right leg. Neck pain is worse. New pain in the left hand, mostly the first three fingers, worse at night.  She reports improvement of the hand symptoms while wearing a carpal tunnel splint, but symptoms worsen when she takes it off.      Cervical Epidural Steroid Injection @ [facility]   on  12/30/21 - helped a little.     She completed a Bilateral S1-S3  Branch Radiofrequency Lesioning on 10/20/2020 - she reports that it is \"some better\".       C/o multiple joint pain, worst in the back, neck and left shoulder. Pain today 5/10 in severity.  Takes Celebrex 200 mg daily which helps some. Helps some with her pain.  Did not tolerate Robaxin, caused nausea/vomiting.  Tried Meloxicam, Naproxen, and Chlorzoxazone without benefit. Says she could not function with Tramadol or Gabapentin.       Saw Dr. Buck (ortho) On 8/27/2020 for her left shoulder pain.  Recommended physical therapy which she has completed.  Offered patient steroid injection to the AC joint which she did not pursue.      She did see rheumatology - osteoarthritis     Patient remained masked during entire encounter. No cough present. I donned a mask and eye protection throughout entire visit. Prior to donning mask and eye protection, hand hygiene was performed, as well as when it was doffed.  I was closer than 6 feet, but not for an extended period of time. No obvious exposure to any bodily fluids.    Back Pain  This is a chronic problem. The current episode started more than 1 year ago. The problem occurs daily. The problem has been waxing and waning since onset. The pain is present in the lumbar spine and sacro-iliac. The pain is at a severity of 5/10. The pain is moderate. Exacerbated by: prolonged walking, standing. " Stiffness is present in the morning. Associated symptoms include numbness, tingling (left arm first thing in the morning) and weakness. Pertinent negatives include no chest pain, dysuria, fever or headaches. Risk factors include obesity. She has tried NSAIDs and analgesics for the symptoms. The treatment provided significant (with RFL) relief.   Neck Pain   This is a chronic problem. The current episode started more than 1 month ago. The problem occurs daily. The problem has been gradually worsening. The pain is present in the left side (left shoulder and arm as well ). The quality of the pain is described as aching and burning. The pain is at a severity of 5/10. The pain is moderate. The symptoms are aggravated by position. Associated symptoms include numbness, tingling (left arm first thing in the morning) and weakness. Pertinent negatives include no chest pain, fever or headaches. She has tried NSAIDs, muscle relaxants, neck support and home exercises for the symptoms. The treatment provided mild relief.      PEG Assessment   What number best describes your pain on average in the past week?7  What number best describes how, during the past week, pain has interfered with your enjoyment of life?5  What number best describes how, during the past week, pain has interfered with your general activity?  5      The following portions of the patient's history were reviewed and updated as appropriate: allergies, current medications, past family history, past medical history, past social history, past surgical history and problem list.    Review of Systems   Constitutional: Negative for fatigue and fever.   HENT: Negative for congestion.    Eyes: Negative for visual disturbance.   Respiratory: Negative for shortness of breath.    Cardiovascular: Negative for chest pain.   Gastrointestinal: Negative for constipation.   Genitourinary: Negative for difficulty urinating and dysuria.   Musculoskeletal: Positive for back pain and  "neck pain.   Neurological: Positive for tingling (left arm first thing in the morning), weakness and numbness. Negative for headaches.   Psychiatric/Behavioral: Positive for sleep disturbance. Negative for suicidal ideas. The patient is nervous/anxious.      I have reviewed and confirmed the accuracy of the ROS as documented by the MA/LPN/RN SHELBIE Najera    Vitals:    09/08/21 1544   BP: 115/82   Pulse: 86   Resp: 18   Temp: 97.7 °F (36.5 °C)   SpO2: 100%   Weight: 82.8 kg (182 lb 9.6 oz)   Height: 162.6 cm (64\")   PainSc:   5   PainLoc: Back     Objective   Physical Exam  Vitals and nursing note reviewed.   Constitutional:       General: She is not in acute distress.     Appearance: Normal appearance. She is not ill-appearing.   Pulmonary:      Effort: Pulmonary effort is normal. No respiratory distress.   Musculoskeletal:      Cervical back: Tenderness and bony tenderness present.      Lumbar back: Tenderness and bony tenderness present. Negative right straight leg raise test.   Skin:     General: Skin is warm and dry.   Neurological:      Mental Status: She is alert and oriented to person, place, and time.      Motor: Motor function is intact. No weakness.      Gait: Gait normal.   Psychiatric:         Mood and Affect: Mood normal.         Behavior: Behavior normal.       Assessment/Plan   Diagnoses and all orders for this visit:    1. Other chronic pain (Primary)    2. DDD (degenerative disc disease), cervical    3. Sacroiliac joint dysfunction of both sides  -     celecoxib (CeleBREX) 100 MG capsule; Take 1 capsule by mouth 2 (Two) Times a Day.  Dispense: 60 capsule; Refill: 1    4. Lumbar facet arthropathy  -     MRI Lumbar Spine Without Contrast; Future  -     celecoxib (CeleBREX) 100 MG capsule; Take 1 capsule by mouth 2 (Two) Times a Day.  Dispense: 60 capsule; Refill: 1    5. DDD (degenerative disc disease), lumbar  -     MRI Lumbar Spine Without Contrast; Future    6. Left hand pain  -     " Ambulatory Referral to Hand Surgery    7. Right hip pain  -     XR hip w or wo pelvis 2-3 view right; Future      --- MRI lumbar spine. Consider possible right LTFESI pending MRI, versus repeating right SI RFA (results were variable, we are 11 months out from procedure so it is possible that she actually got more benefit than originally thought, however is beginning to also report some right sided radicular symptoms which are new).    --- Left hand pain/carpal tunnel - refer to hand surgery (Dr. Morin)  --- Consider cervical MBB  --- Still has Gabapentin at home. She will try taking 300 mg HS for the next week or two, can increase to two hs if tolerated.    --- Follow-up after imaging          PATTI REPORT  As the clinician, I personally reviewed the PATTI from 9/8/2021 while the patient was in the office today.  Dictated utilizing Dragon dictation.     40 minutes total time spent on encounter. This includes EMR review, face to face time, and documentation.

## 2021-09-10 ENCOUNTER — TRANSCRIBE ORDERS (OUTPATIENT)
Dept: ADMINISTRATIVE | Facility: HOSPITAL | Age: 56
End: 2021-09-10

## 2021-09-10 DIAGNOSIS — N18.6 ESRD (END STAGE RENAL DISEASE) (HCC): Primary | ICD-10-CM

## 2021-09-10 DIAGNOSIS — E66.01 MORBID (SEVERE) OBESITY DUE TO EXCESS CALORIES (HCC): ICD-10-CM

## 2021-09-10 RX ORDER — PHENTERMINE HYDROCHLORIDE 37.5 MG/1
37.5 CAPSULE ORAL EVERY MORNING
Qty: 30 CAPSULE | Refills: 0 | Status: SHIPPED | OUTPATIENT
Start: 2021-09-10 | End: 2021-10-28 | Stop reason: SDUPTHER

## 2021-09-27 ENCOUNTER — APPOINTMENT (OUTPATIENT)
Dept: MRI IMAGING | Facility: HOSPITAL | Age: 56
End: 2021-09-27

## 2021-09-30 DIAGNOSIS — K58.2 IRRITABLE BOWEL SYNDROME WITH BOTH CONSTIPATION AND DIARRHEA: ICD-10-CM

## 2021-10-04 RX ORDER — DICYCLOMINE HYDROCHLORIDE 10 MG/1
10 CAPSULE ORAL
Qty: 120 CAPSULE | Refills: 0 | Status: SHIPPED | OUTPATIENT
Start: 2021-10-04 | End: 2021-12-15 | Stop reason: SDUPTHER

## 2021-10-12 ENCOUNTER — HOSPITAL ENCOUNTER (OUTPATIENT)
Dept: GENERAL RADIOLOGY | Facility: HOSPITAL | Age: 56
Discharge: HOME | End: 2021-10-12

## 2021-10-12 ENCOUNTER — HOSPITAL ENCOUNTER (OUTPATIENT)
Dept: MRI IMAGING | Facility: HOSPITAL | Age: 56
Discharge: HOME | End: 2021-10-12

## 2021-10-12 DIAGNOSIS — M51.36 DDD (DEGENERATIVE DISC DISEASE), LUMBAR: ICD-10-CM

## 2021-10-12 DIAGNOSIS — M47.816 LUMBAR FACET ARTHROPATHY: ICD-10-CM

## 2021-10-12 DIAGNOSIS — M25.551 RIGHT HIP PAIN: ICD-10-CM

## 2021-10-12 PROCEDURE — 72148 MRI LUMBAR SPINE W/O DYE: CPT

## 2021-10-12 PROCEDURE — 73502 X-RAY EXAM HIP UNI 2-3 VIEWS: CPT

## 2021-10-12 NOTE — PROGRESS NOTES
Multilevel degenerative changes with narrowing of the nerves as they exit the spine at multiple levels. Also arthritis at multiple levels. We can bring her back in to discuss other interventional options and/or neurosurgical evaluation.

## 2021-10-13 ENCOUNTER — APPOINTMENT (OUTPATIENT)
Dept: MRI IMAGING | Facility: HOSPITAL | Age: 56
End: 2021-10-13

## 2021-10-20 NOTE — TELEPHONE ENCOUNTER
Rx Refill Note  Requested Prescriptions     Pending Prescriptions Disp Refills   • naproxen (NAPROSYN) 500 MG tablet 180 tablet 0     Sig: Take 1 tablet by mouth 2 (Two) Times a Day With Meals.      Last office visit with prescribing clinician: 9/1/2021      Next office visit with prescribing clinician: Visit date not found       {TIP  Please add Last Relevant Lab Date if appropriate:   Lab Results   Component Value Date    BUN 9 03/26/2021    CREATININE 0.95 (H) 03/26/2021    BCR 9 03/26/2021    K 4.1 03/26/2021    CO2 24 03/26/2021    CALCIUM 9.3 03/26/2021    ALBUMIN 4.1 03/26/2021    LABIL2 1.6 03/26/2021    AST 17 03/26/2021    ALT 17 03/26/2021        {TIP  Is Refill Pharmacy correct?YES  Josette Goldberg  10/20/21, 09:59 EDT

## 2021-10-21 RX ORDER — NAPROXEN 500 MG/1
500 TABLET ORAL 2 TIMES DAILY WITH MEALS
Qty: 180 TABLET | Refills: 0 | Status: SHIPPED | OUTPATIENT
Start: 2021-10-21 | End: 2021-12-15 | Stop reason: SDUPTHER

## 2021-10-26 DIAGNOSIS — E66.01 MORBID (SEVERE) OBESITY DUE TO EXCESS CALORIES (HCC): ICD-10-CM

## 2021-10-27 ENCOUNTER — OFFICE VISIT (OUTPATIENT)
Dept: PAIN MEDICINE | Facility: CLINIC | Age: 56
End: 2021-10-27

## 2021-10-27 ENCOUNTER — PREP FOR SURGERY (OUTPATIENT)
Dept: SURGERY | Facility: SURGERY CENTER | Age: 56
End: 2021-10-27

## 2021-10-27 VITALS
HEIGHT: 64 IN | WEIGHT: 182.2 LBS | RESPIRATION RATE: 16 BRPM | HEART RATE: 76 BPM | OXYGEN SATURATION: 99 % | DIASTOLIC BLOOD PRESSURE: 80 MMHG | BODY MASS INDEX: 31.1 KG/M2 | TEMPERATURE: 96.4 F | SYSTOLIC BLOOD PRESSURE: 143 MMHG

## 2021-10-27 DIAGNOSIS — M54.16 LUMBAR RADICULOPATHY: Primary | ICD-10-CM

## 2021-10-27 DIAGNOSIS — M54.16 LUMBAR RADICULOPATHY: ICD-10-CM

## 2021-10-27 DIAGNOSIS — M25.551 RIGHT HIP PAIN: ICD-10-CM

## 2021-10-27 DIAGNOSIS — M48.061 LUMBAR FORAMINAL STENOSIS: ICD-10-CM

## 2021-10-27 DIAGNOSIS — M47.812 ARTHROPATHY OF CERVICAL FACET JOINT: ICD-10-CM

## 2021-10-27 DIAGNOSIS — M51.36 DDD (DEGENERATIVE DISC DISEASE), LUMBAR: ICD-10-CM

## 2021-10-27 DIAGNOSIS — M50.30 DDD (DEGENERATIVE DISC DISEASE), CERVICAL: Primary | ICD-10-CM

## 2021-10-27 DIAGNOSIS — M47.816 LUMBAR FACET ARTHROPATHY: ICD-10-CM

## 2021-10-27 PROBLEM — M51.369 DDD (DEGENERATIVE DISC DISEASE), LUMBAR: Status: ACTIVE | Noted: 2021-10-27

## 2021-10-27 PROCEDURE — 99214 OFFICE O/P EST MOD 30 MIN: CPT | Performed by: NURSE PRACTITIONER

## 2021-10-27 RX ORDER — SODIUM CHLORIDE 0.9 % (FLUSH) 0.9 %
10 SYRINGE (ML) INJECTION AS NEEDED
Status: CANCELLED | OUTPATIENT
Start: 2021-10-27

## 2021-10-27 RX ORDER — SODIUM CHLORIDE 0.9 % (FLUSH) 0.9 %
10 SYRINGE (ML) INJECTION EVERY 12 HOURS SCHEDULED
Status: CANCELLED | OUTPATIENT
Start: 2021-10-27

## 2021-10-27 NOTE — PROGRESS NOTES
CHIEF COMPLAINT  F/U for back and neck pain. Pt states her pain level has gotten worse.     Subjective   Ema Cisse is a 55 y.o. female  who presents for follow-up. She presents with her mother.  The patient gives verbal permission for her mother to be present for the duration of her appointment.  She has a history of back and neck pain. Office visit from 9/8/2021 with SHELBIE Najera reviewed.  Patient seen for chronic neck and back pain and worsening pain in the right lumbosacral area, right hip, right leg.  She has pain in multiple joints, back, neck, and left shoulder.  She utilizes Celebrex 200 mg daily. Plan for lumbar MRI and consider possible right L TFESI versus right SI RFA.  Consider cervical MBB.  Trial gabapentin 300 mg at night.  Follow-up after imaging.    Failed Robaxin (side effects), meloxicam, naproxen, chlorzoxazone, tramadol (side effects), and gabapentin (side effects)    She has been evaluated by rheumatology-osteoarthritis.    Today her pain is 7/10VAS in severity. Patient presents to discuss Lumbar MRI findings. She continues to complain of pain in her right low back that is radiating down her lateral right thigh and into her lateral right calf.  She does report some intermittent tingling in her right foot as well. She has been utilizing Gabapentin 300 mg nightly which has been helpful with no side effects.     She reports left hand numbness, she was evaluated by Dr. Morin and had a steroid injection for carpal tunnel, this was helpful and states her numbness is currently resolved in that hand.  She states now she is experiencing numbness in her right hand.  She sees Dr. Morin in follow up today.     Patient remained masked during entire encounter. No cough present. I donned a mask and eye protection throughout entire visit. Prior to donning mask and eye protection, hand hygiene was performed, as well as when it was doffed.  I was closer than 6 feet, but not for an extended  period of time. No obvious exposure to any bodily fluids.    Back Pain  This is a chronic problem. The current episode started more than 1 year ago. The problem occurs daily. The problem has been waxing and waning since onset. The pain is present in the lumbar spine and sacro-iliac. The pain is at a severity of 7/10. The pain is moderate. Exacerbated by: prolonged walking, standing. Stiffness is present in the morning. Associated symptoms include numbness (right leg), tingling (left arm first thing in the morning) and weakness (right leg). Pertinent negatives include no abdominal pain, chest pain, dysuria, fever or headaches. Risk factors include obesity. She has tried NSAIDs and analgesics for the symptoms. The treatment provided significant (with RFL) relief.   Neck Pain   This is a chronic problem. The current episode started more than 1 month ago. The problem occurs daily. The problem has been gradually worsening. The pain is present in the left side (left shoulder and arm as well ). The quality of the pain is described as aching and burning. The pain is moderate. The symptoms are aggravated by position. Associated symptoms include numbness (right leg), tingling (left arm first thing in the morning) and weakness (right leg). Pertinent negatives include no chest pain, fever or headaches. She has tried NSAIDs, muscle relaxants, neck support and home exercises for the symptoms. The treatment provided mild relief.      PEG Assessment   What number best describes your pain on average in the past week?8  What number best describes how, during the past week, pain has interfered with your enjoyment of life?8  What number best describes how, during the past week, pain has interfered with your general activity?  8    The following portions of the patient's history were reviewed and updated as appropriate: allergies, current medications, past family history, past medical history, past social history, past surgical history  and problem list.    Review of Systems   Constitutional: Negative for activity change, fatigue and fever.   HENT: Negative for congestion.    Eyes: Negative for visual disturbance.   Respiratory: Negative for cough and chest tightness.    Cardiovascular: Negative for chest pain.   Gastrointestinal: Positive for diarrhea. Negative for abdominal pain and constipation.   Genitourinary: Negative for difficulty urinating and dysuria.   Musculoskeletal: Positive for back pain and neck pain.   Neurological: Positive for tingling (left arm first thing in the morning), weakness (right leg) and numbness (right leg). Negative for dizziness, light-headedness and headaches.   Psychiatric/Behavioral: Negative for agitation, sleep disturbance and suicidal ideas. The patient is not nervous/anxious.      --  The aforementioned information the Chief Complaint section and above subjective data including any HPI data, and also the Review of Systems data, has been personally reviewed and affirmed.  --    PROCEDURE:  MRI LUMBAR SPINE WO CONTRAST--10/12/2021     [facility].     INDICATIONS:  LOW BACK PAIN AND RIGHT HIP PAIN RADIATING DOWN RIGHT LEG TO FOOT AT TIMES X 5 YEARS,   WORSENING     TECHNIQUE:    A variety of imaging planes and parameters were utilized for visualization of suspected   pathology.     FINDINGS:          PARASPINAL AREA:     Normal with no visible mass.    BONES:             There are multilevel Modic type 1 degenerative endplate changes at L2-L3, L3-L4, L4-L5, and   L5-S1.  CORD/CAUDA EQUINA:            Tip of the conus terminates at the T12-L1 level.     LUMBAR DISC LEVELS:  L1-L2:   Mild bilateral facet arthropathy without significant spinal canal or neural foraminal   narrowing.  L2-L3:   Loss of disc height, posterior disc endplate complex, and mild bilateral facet arthropathy   is causes mild-to-moderate right neural foraminal  narrowing, severe left neural foraminal narrowing   is, not significant spinal canal stenosis.  L3-L4:   3 mm grade 1 anterolisthesis of L3 on L1, loss of disc height, posterior disc endplate   complex and ligamentum flavum hypertrophy, and severe bilateral facet arthropathy with small right   facet joint effusion causes mild spinal canal narrowing mild right neural foraminal narrowing, and   moderate left neural foraminal narrowing.  L4-L5:   Loss of disc height, posterior disc endplate complex, and moderate right facet arthropathy,   and moderate to severe left facet arthropathy because of moderate to severe right neural foraminal   narrowing, moderate to severe left neural foraminal narrowing, without significant spinal canal   stenosis.  L5-S1:   Disc bulge, asymmetric to the right and moderate to severe bilateral facet arthropathy with   small right facet joint effusion causes severe right neural foraminal narrowing, mild to moderate   left neural foraminal narrowing, without significant spinal canal stenosis.     CONCLUSION:   1. Multilevel degenerative changes causing severe neural foraminal narrowing at multiple levels, as   described above.  2. Minimal grade 1 anterolisthesis of L3 on L4 with mild spinal canal stenosis at this level.    3. Multilevel Modic type 1 degenerative endplate changes from L2-S1.  4. Multilevel facet arthropathy with facet joint effusions.     DAKOTA GILBERT MD         Electronically Signed and Approved By: DAKOTA GILBERT MD on 10/12/2021 at 15:57         PROCEDURE:  XR HIP W OR WO PELVIS 2-3 VIEW RIGHT--10/12/2021     COMPARISON: None     INDICATIONS:  right hip pain, right sacroiliac joint pain     FINDINGS:          No acute fracture or malalignment is identified.  Mild osteoarthritic spurring is noted involving   both hip joints.     Moderate degenerative disc changes are noted at L4-5.     CONCLUSION:   1. Mild bilateral hip osteoarthritic changes  2. Moderate L4-5  "degenerative disc disease.     Jefry Garcia M.D.         Electronically Signed and Approved By: Jefry Garcia M.D. on 10/12/2021 at 14:23      Vitals:    10/27/21 0940   BP: 143/80   Pulse: 76   Resp: 16   Temp: 96.4 °F (35.8 °C)   SpO2: 99%   Weight: 82.6 kg (182 lb 3.2 oz)   Height: 162.6 cm (64\")   PainSc:   7   PainLoc: Comment: neck     Objective   Physical Exam  Vitals and nursing note reviewed.   Constitutional:       Appearance: Normal appearance. She is well-developed.   Eyes:      General: Lids are normal.   Cardiovascular:      Rate and Rhythm: Normal rate.   Pulmonary:      Effort: Pulmonary effort is normal.   Musculoskeletal:      Cervical back: Tenderness present. Decreased range of motion.      Lumbar back: Tenderness and bony tenderness present. Decreased range of motion. Negative right straight leg raise test and negative left straight leg raise test.   Neurological:      Mental Status: She is alert and oriented to person, place, and time.      Gait: Gait normal.   Psychiatric:         Attention and Perception: Attention normal.         Mood and Affect: Mood normal.         Speech: Speech normal.         Behavior: Behavior normal.         Judgment: Judgment normal.       Assessment/Plan   Diagnoses and all orders for this visit:    1. DDD (degenerative disc disease), cervical (Primary)    2. DDD (degenerative disc disease), lumbar    3. Cervical radiculitis    4. Sacroiliac joint dysfunction of both sides    5. Right hip pain      --- MRI report reviewed with patient and her mother in office today.   --- Right L5 TFESI  Reviewed the procedure at length with the patient.  Included in the review was expectations, complications, risk and benefits.The procedure was described in detail and the risks, benefits and alternatives were discussed with the patient (including but not limited to: bleeding, infection, nerve damage, worsening of pain, inability to perform injection, paralysis, seizures, coma, no " pain relief and death) who agreed to proceed.  Discussed the potential for sedation if warranted/wanted.  The procedure will plan to be performed at [facility] with fluoroscopic guidance(unless ultrasound is indicated) and could potentially have steroids and contrast dye used. Questions were answered and in a way the patient could understand.  Patient verbalized understanding and wishes to proceed.  This intervention will be ordered.  Discussed with patient that all procedures are part of a multimodal plan of care and include either formal PT or a home exercise program.  Patient has no evidence of coagulopathy or current infection.    --- Discussed with the patient that sedation is optional for this procedure.  The sedation offered is called conscious sedation which is different from general anesthesia that is utilized in surgical procedures. The dosing of the sedation is determined by the physician and they will be monitored throughout the procedure. With conscious sedation it is possible to remember parts or all of the procedure, this is normal. They will need to have a  with them as driving is prohibited following conscious sedation.     NPO instructions for conscious sedation:  --- Do not eat 6 hours prior to the procedure.   --- Do not drink any dairy or citrus 4 hours prior to the procedure.   --- Do not drink anything, including clear liquids, 2 hours prior to procedure.     If the NPO instructions are not followed then the procedure may be performed without sedation or the procedure will need to be rescheduled.     --- Consider lumbar MBB in the future  --- Consider cervical MBB in the future.   --- Trial increasing Gabapentin to 600 mg nightly, if she experience side effects then decrease back down to 300 mg nightly.   --- Follow-up after procedure.      PATTI REPORT  As part of the patient's treatment plan, I am prescribing controlled substances. The patient has been made aware  of appropriate use of such medications, including potential risk of somnolence, limited ability to drive and/or work safely, and the potential for dependence or overdose. It has also bee made clear that these medications are for use by this patient only, without concomitant use of alcohol or other substances unless prescribed.     As the clinician, I personally reviewed the PATTI from 10/27/2021 while the patient was in the office today.    History and physical exam exhibit continued safe and appropriate use of controlled substances.    Dictated utilizing Dragon dictation.

## 2021-10-28 ENCOUNTER — TRANSCRIBE ORDERS (OUTPATIENT)
Dept: SURGERY | Facility: SURGERY CENTER | Age: 56
End: 2021-10-28

## 2021-10-28 ENCOUNTER — TELEPHONE (OUTPATIENT)
Dept: FAMILY MEDICINE CLINIC | Age: 56
End: 2021-10-28

## 2021-10-28 DIAGNOSIS — E66.01 MORBID (SEVERE) OBESITY DUE TO EXCESS CALORIES: ICD-10-CM

## 2021-10-28 DIAGNOSIS — Z41.9 SURGERY, ELECTIVE: Primary | ICD-10-CM

## 2021-10-28 RX ORDER — PHENTERMINE HYDROCHLORIDE 37.5 MG/1
37.5 CAPSULE ORAL EVERY MORNING
Qty: 30 CAPSULE | Refills: 0 | Status: SHIPPED | OUTPATIENT
Start: 2021-10-28 | End: 2021-12-15

## 2021-10-28 RX ORDER — PHENTERMINE HYDROCHLORIDE 37.5 MG/1
37.5 CAPSULE ORAL EVERY MORNING
Qty: 30 CAPSULE | Refills: 0 | OUTPATIENT
Start: 2021-10-28

## 2021-10-28 NOTE — TELEPHONE ENCOUNTER
Caller: Ema Cisse    Relationship to patient: Self    Best call back number: [phone]     Patient is needing: PT CALLED TO GIVE HER WEIGHT WHICH IS; 177, PLEASE ADVISE THANK YOU.

## 2021-11-01 NOTE — SIGNIFICANT NOTE
Patient educated on the following :    - If you are receiving Sedation for your procedure Nothing to Eat 6 hours and only clear liquids for 2 hours prior to your procedure.    --You will need to have someone drive you home after your PROCEDURE and remain with you for 24 hours after the PROCEDURE  - The date of your procedure, your are welcome to have one visitor at bedside or remain within 10-15 minutes of [facility]  -You will need to arrive at 1030  on 11/3 for your PROCEDURE  -Please contact [facility] [employer] at: [phone] with any questions and/or concerns

## 2021-11-03 ENCOUNTER — HOSPITAL ENCOUNTER (OUTPATIENT)
Facility: SURGERY CENTER | Age: 56
Setting detail: HOSPITAL OUTPATIENT SURGERY
Discharge: HOME | End: 2021-11-03
Attending: ANESTHESIOLOGY | Admitting: ANESTHESIOLOGY

## 2021-11-03 ENCOUNTER — HOSPITAL ENCOUNTER (OUTPATIENT)
Dept: GENERAL RADIOLOGY | Facility: SURGERY CENTER | Age: 56
End: 2021-11-03

## 2021-11-03 VITALS
OXYGEN SATURATION: 94 % | BODY MASS INDEX: 30.39 KG/M2 | RESPIRATION RATE: 12 BRPM | HEIGHT: 64 IN | HEART RATE: 80 BPM | TEMPERATURE: 98 F | DIASTOLIC BLOOD PRESSURE: 90 MMHG | SYSTOLIC BLOOD PRESSURE: 126 MMHG | WEIGHT: 178 LBS

## 2021-11-03 DIAGNOSIS — Z41.9 SURGERY, ELECTIVE: ICD-10-CM

## 2021-11-03 DIAGNOSIS — M54.16 LUMBAR RADICULOPATHY: ICD-10-CM

## 2021-11-03 PROCEDURE — 77002 NEEDLE LOCALIZATION BY XRAY: CPT

## 2021-11-03 PROCEDURE — 25010000002 METHYLPREDNISOLONE PER 80 MG: Performed by: ANESTHESIOLOGY

## 2021-11-03 PROCEDURE — 76000 FLUOROSCOPY <1 HR PHYS/QHP: CPT

## 2021-11-03 PROCEDURE — 64483 NJX AA&/STRD TFRM EPI L/S 1: CPT | Performed by: ANESTHESIOLOGY

## 2021-11-03 PROCEDURE — 25010000002 FENTANYL CITRATE (PF) 50 MCG/ML SOLUTION: Performed by: ANESTHESIOLOGY

## 2021-11-03 PROCEDURE — 0 IOHEXOL 300 MG/ML SOLUTION 10 ML VIAL: Performed by: ANESTHESIOLOGY

## 2021-11-03 PROCEDURE — 3E0R3BZ INTRODUCTION OF ANESTHETIC AGENT INTO SPINAL CANAL, PERCUTANEOUS APPROACH: ICD-10-PCS | Performed by: ANESTHESIOLOGY

## 2021-11-03 PROCEDURE — 25010000002 MIDAZOLAM PER 1 MG: Performed by: ANESTHESIOLOGY

## 2021-11-03 RX ORDER — SODIUM CHLORIDE 0.9 % (FLUSH) 0.9 %
10 SYRINGE (ML) INJECTION EVERY 12 HOURS SCHEDULED
Status: DISCONTINUED | OUTPATIENT
Start: 2021-11-03 | End: 2021-11-03 | Stop reason: HOSPADM

## 2021-11-03 RX ORDER — SODIUM CHLORIDE 0.9 % (FLUSH) 0.9 %
10 SYRINGE (ML) INJECTION AS NEEDED
Status: DISCONTINUED | OUTPATIENT
Start: 2021-11-03 | End: 2021-11-03 | Stop reason: HOSPADM

## 2021-11-03 RX ORDER — MIDAZOLAM HYDROCHLORIDE 1 MG/ML
INJECTION INTRAMUSCULAR; INTRAVENOUS AS NEEDED
Status: DISCONTINUED | OUTPATIENT
Start: 2021-11-03 | End: 2021-11-03 | Stop reason: HOSPADM

## 2021-11-03 RX ORDER — FENTANYL CITRATE 50 UG/ML
INJECTION, SOLUTION INTRAMUSCULAR; INTRAVENOUS AS NEEDED
Status: DISCONTINUED | OUTPATIENT
Start: 2021-11-03 | End: 2021-11-03 | Stop reason: HOSPADM

## 2021-11-03 NOTE — DISCHARGE INSTRUCTIONS
[facility] - Post-procedure Instructions          --  While there are no absolute restrictions, it is recommended that you do not perform strenuous activity today. In the morning, you may resume your level of activity as before your block.    --  If you have a band-aid at your injection site, please remove it later today. Observe the area for any redness, swelling, pus-like drainage, or a temperature over 101°. If any of these symptoms occur, please call your doctor at [phone]. If after office hours, leave a message and the on-call provider will return your call.    --  Ice may be applied to your injection site. It is recommended you avoid direct heat (heating pad; hot tub) for 1-2 days.    --  Call [facility] at [phone] if you experience persistent headache, persistent bleeding from the injection site, or severe pain not relieved by heat or oral medication.    --  Do not make important decisions today.    --  Due to the effects of the block and/or the I.V. Sedation, DO NOT drive or operate hazardous machinery for 12 hours.  Local anesthetics may cause numbness after procedure and precautions must be taken with regards to operating equipment as well as with walking, even if ambulating with assistance of another person or with an assistive device.    --  Do not drink alcohol for 12 hours.    -- You may return to work tomorrow, or as directed by your referring doctor.    --  Occasionally you may notice a slight increase in your pain after the procedure. This should start to improve within the next 24-48 hours. Radiofrequency ablation procedure pain may last 3-4 weeks.    --  It may take as long as 3-4 days before you notice a gradual improvement in your pain and/or other symptoms.    -- You may continue to take your prescribed pain medication as needed.    --  Some normal possible side effects of steroid use could include fluid retention, increased blood sugar, dull headache,  increased sweating, increased appetite, mood swings and flushing.    --  Diabetics are recommended to watch their blood glucose level closely for 24-48 hours after the injection.    --  Must stay in PACU for 20 min upon arrival and prove no leg weakness before being discharged.    --  IN THE EVENT OF A LIFE THREATENING EMERGENCY, (CHEST PAIN, BREATHING DIFFICULTIES, PARALYSIS…) YOU SHOULD GO TO YOUR NEAREST EMERGENCY ROOM.    --  You should be contacted by our office within 2-3 days to schedule follow up or next appointment date.  If not contacted within 7 days, please call the office at [phone]

## 2021-11-03 NOTE — OP NOTE
Lumbar Transforaminal Epidural Steroid Injection (one level Unilateral)  [facility]      PREOPERATIVE DIAGNOSIS:  right Lumbar Radiculopathy    POSTOPERATIVE DIAGNOSIS:  Same as preop diagnosis    PROCEDURE:  CPT 07531 --  Diagnostic Transforaminal Epidural Steroid Injection at the L5 level, on the right     PRE-PROCEDURE DISCUSSION WITH PATIENT:    Risks and complications were discussed with the patient prior to starting the procedure and informed consent was obtained.  We discussed various topics including but not limited to bleeding, infection, injury, nerve injury, paralysis, coma, death, postprocedural painful flare-up, postprocedural site soreness, and a lack of pain relief.  We discussed the diagnostic aspect of transforaminal epidural / selective nerve root blockade.    SURGEON:  Jeronimo Lind MD    REASON FOR PROCEDURE:    Degenerative changes are noted in the area. and Radiating pattern of pain is likely consistent with degenerative changes in the area.    SEDATION:  Versed 4mg & Fentanyl 50 mcg IV  ANESTHETIC:  Marcaine 0.25%  STEROID:  Methylprednisolone (DEPO MEDROL) 80mg/ml    DESCRIPTON OF PROCEDURE:  After obtaining informed consent, an I.V. was started in the preoperative area. The patient taken to the operating room and was placed in the prone position with a pillow under the abdomen.  All pressure points were well padded.  EKG, blood pressure, and pulse oximeter were monitored.  The lumbar area was prepped with Chloraprep and draped in a sterile fashion. Under fluoroscopic guidance in an oblique dimension on the above mentioned side, the transverse process of the aforementioned vertebra at the junction of the body at 6 o'clock position was identified. Skin and subcutaneous tissue was anesthetized with 1% lidocaine. A 22-gauge spinal needle was introduced under fluoroscopic guidance at the above junction into the foramen without parasthesias and into the epidural space. After  confirming the position of the needle with PA, lateral, and oblique fluoroscopic views, aspiration was checked and was clear of blood or CSF.  Next, 1 mL of Omnipaque was injected. After seeing adequate spread on the corresponding nerve root, a total volume 3mL of injectate containing 1ml of the above mentioned local anesthetic, 1 ml saline,  and the above mentioned corticosteroid was injected into the epidural space.    The needle was removed intact.  Vital signs were stable throughout.        ESTIMATED BLOOD LOSS:  <5 mL  SPECIMENS:  none    COMPLICATIONS:   No complications were noted., There was no indication of vascular uptake on live injection of contrast dye., There was no indication of intrathecal uptake on live injection of contrast dye., There was not any evidence of dural puncture.   and The patient did not have any signs of postprocedure numbness nor weakness.    TOLERANCE & DISCHARGE CONDITION:    The patient tolerated the procedure well.  The patient was transported to the recovery area without difficulties.  The patient was discharged to home under the care of family in stable and satisfactory condition.    PLAN OF CARE:  1. The patient was given our standard instruction sheet.  2. The patient will Return to clinic 4-6 wks.  3. The patient will resume all medications as per the medication reconciliation sheet.

## 2021-11-04 ENCOUNTER — APPOINTMENT (OUTPATIENT)
Dept: GENERAL RADIOLOGY | Facility: SURGERY CENTER | Age: 56
End: 2021-11-04

## 2021-11-18 DIAGNOSIS — F34.9 PERSISTENT MOOD (AFFECTIVE) DISORDER, UNSPECIFIED (HCC): ICD-10-CM

## 2021-11-18 RX ORDER — ESCITALOPRAM OXALATE 20 MG/1
20 TABLET ORAL DAILY
Qty: 30 TABLET | Refills: 0 | Status: SHIPPED | OUTPATIENT
Start: 2021-11-18 | End: 2021-12-15 | Stop reason: SDUPTHER

## 2021-11-19 DIAGNOSIS — M53.3 SACROILIAC JOINT DYSFUNCTION OF BOTH SIDES: ICD-10-CM

## 2021-11-19 DIAGNOSIS — M47.816 LUMBAR FACET ARTHROPATHY: ICD-10-CM

## 2021-11-19 RX ORDER — GABAPENTIN 100 MG/1
300 CAPSULE ORAL 3 TIMES DAILY
Qty: 90 CAPSULE | Refills: 0 | Status: CANCELLED | OUTPATIENT
Start: 2021-11-19

## 2021-11-19 RX ORDER — GABAPENTIN 300 MG/1
600 CAPSULE ORAL NIGHTLY
Qty: 60 CAPSULE | Refills: 0 | Status: SHIPPED | OUTPATIENT
Start: 2021-11-19 | End: 2021-12-06 | Stop reason: SDUPTHER

## 2021-11-19 NOTE — TELEPHONE ENCOUNTER
Called and spoke with pt. She sts she took gabapentin a year ago when Donna prescribed it, made her sleepy, so she d/c'd for a while, then f/u with Donna who encouraged her to go back on gabapentin 2 caps q hs and has continued doing so for the past 3 months. She sts taking 2 caps at night does help and she is not drowsy during the day. She sts she has been out of meds x 2 days now and is in need of med. She is also requesting refill on celebrex. Can you also send celebrex to pharmacy? Please advise. Thank you.

## 2021-11-19 NOTE — TELEPHONE ENCOUNTER
Gabapentin refilled, naproxen was prescribed by her PCP in October. Is she taking Naproxen or Celebrex?--She can not take both.

## 2021-11-19 NOTE — TELEPHONE ENCOUNTER
Pt had ov with you on 10/27/21. I received a Rx refill request on gabapentin 300mg from [facility]. I didn't see gabapentin on med list, last prescribed by Donna 11/10/2020. Can you please review chart? Would you like to prescribe? Please advise. Thank you.     Medication Refill Request    Date of phone call: 11/19/21    Medication being requested: gabapentin 300mg sig: Take 1 capsule by mouth 3 (Three) Times a Day. 1 po hs x 5 days, then bid x 5 days, then tid thereafter as tolerated  Qty: 90    Date of last visit: 10/27/21    Date of last refill:     PATTI up to date?:     Next Follow up?: 12/6/21    Any new pertinent information? (i.e, new medication allergies, new use of medications, change in patient's health or condition, non-compliance or inconsistency with prescribing agreement?): please advise. msg above. Pended order for gabapentin. Please review before sending. Thank you.

## 2021-11-20 DIAGNOSIS — M47.816 LUMBAR FACET ARTHROPATHY: ICD-10-CM

## 2021-11-20 DIAGNOSIS — M53.3 SACROILIAC JOINT DYSFUNCTION OF BOTH SIDES: ICD-10-CM

## 2021-11-22 RX ORDER — CELECOXIB 100 MG/1
100 CAPSULE ORAL 2 TIMES DAILY
Qty: 60 CAPSULE | Refills: 1 | OUTPATIENT
Start: 2021-11-22

## 2021-11-22 NOTE — TELEPHONE ENCOUNTER
Called and spoke with pt. She sts she is still currently taking naproxen from PCP. She sts she ran out of celebrex so she has been taking naproxen in the meantime. She sts she is still getting relief from inj and wants to keep taking naproxen prn for relief and will discuss celebrex with you at upcoming f/u. Please advise. Thank you.

## 2021-12-06 ENCOUNTER — OFFICE VISIT (OUTPATIENT)
Dept: PAIN MEDICINE | Facility: CLINIC | Age: 56
End: 2021-12-06

## 2021-12-06 VITALS
WEIGHT: 181.1 LBS | BODY MASS INDEX: 30.92 KG/M2 | SYSTOLIC BLOOD PRESSURE: 127 MMHG | TEMPERATURE: 96.9 F | HEART RATE: 91 BPM | HEIGHT: 64 IN | RESPIRATION RATE: 18 BRPM | DIASTOLIC BLOOD PRESSURE: 87 MMHG

## 2021-12-06 DIAGNOSIS — M47.812 ARTHROPATHY OF CERVICAL FACET JOINT: ICD-10-CM

## 2021-12-06 DIAGNOSIS — M54.2 NECK PAIN: ICD-10-CM

## 2021-12-06 DIAGNOSIS — M47.816 LUMBAR FACET ARTHROPATHY: ICD-10-CM

## 2021-12-06 DIAGNOSIS — M53.3 SACROILIAC JOINT DYSFUNCTION OF BOTH SIDES: Primary | ICD-10-CM

## 2021-12-06 DIAGNOSIS — M50.30 DDD (DEGENERATIVE DISC DISEASE), CERVICAL: ICD-10-CM

## 2021-12-06 DIAGNOSIS — M54.16 LUMBAR RADICULOPATHY: ICD-10-CM

## 2021-12-06 PROCEDURE — 99214 OFFICE O/P EST MOD 30 MIN: CPT | Performed by: NURSE PRACTITIONER

## 2021-12-06 RX ORDER — GABAPENTIN 300 MG/1
600 CAPSULE ORAL NIGHTLY
Qty: 60 CAPSULE | Refills: 5 | Status: SHIPPED | OUTPATIENT
Start: 2021-12-06 | End: 2022-05-17

## 2021-12-06 NOTE — PROGRESS NOTES
CHIEF COMPLAINT  Follow-up for back and neck pain.      Subjective   Ema Cisse is a 56 y.o. female  who presents to the office for follow-up of procedure.  She completed a Transforaminal Epidural Steroid Injection at the L5 level, on the right   on  11/3/2021 performed by Dr. Lind for management of back pain. Patient reports 70% ONGOING relief from the procedure.     Today her pain is 5/10VAS in severity. We have re-trialed the Gabapentin and she is now taking 600 mg nightly. She denies any side effects including somnolence.     Failed Robaxin (side effects), meloxicam, naproxen, chlorzoxazone, tramadol (side effects), and gabapentin (side effects-previously)    She has been evaluated by rheumatology-osteoarthritis.    Today her primary complaint is her left sided neck pain.  This is 6/10VAS in severity.  This will radiate into her left trapezius, it is NOT radiating into her left arm. Minimal relief with BONILLA in the past. She has completed PT for her neck, and while the exercises help, her pain persists.     She has completed injections into both wrists for carpal tunnel with Dr. Morin and her hand numbness has greatly improved.     Patient remained masked during entire encounter. No cough present. I donned a mask and eye protection throughout entire visit. Prior to donning mask and eye protection, hand hygiene was performed, as well as when it was doffed.  I was closer than 6 feet, but not for an extended period of time. No obvious exposure to any bodily fluids.    Back Pain  This is a chronic problem. The current episode started more than 1 year ago. The problem occurs daily. Progression since onset: improved since last office visit. The pain is present in the lumbar spine and sacro-iliac. The pain is at a severity of 0/10. The pain is moderate. Exacerbated by: prolonged walking, standing. Stiffness is present in the morning. Associated symptoms include tingling (left arm first thing in the morning).  Pertinent negatives include no abdominal pain, chest pain, dysuria, fever, headaches, numbness or weakness. Risk factors include obesity. She has tried NSAIDs and analgesics for the symptoms. The treatment provided significant (with RFL) relief.   Neck Pain   This is a chronic problem. The current episode started more than 1 month ago. The problem occurs daily. The problem has been gradually worsening. The pain is present in the left side (radiating into the left trapezius). The quality of the pain is described as aching and burning. The pain is at a severity of 6/10. The pain is moderate. The symptoms are aggravated by position. Associated symptoms include tingling (left arm first thing in the morning). Pertinent negatives include no chest pain, fever, headaches, numbness or weakness. She has tried NSAIDs, muscle relaxants, neck support and home exercises for the symptoms. The treatment provided mild relief.      PEG Assessment   What number best describes your pain on average in the past week?7  What number best describes how, during the past week, pain has interfered with your enjoyment of life?4  What number best describes how, during the past week, pain has interfered with your general activity?  4    The following portions of the patient's history were reviewed and updated as appropriate: allergies, current medications, past family history, past medical history, past social history, past surgical history and problem list.    Review of Systems   Constitutional: Negative for fatigue and fever.   HENT: Negative for congestion.    Eyes: Negative for visual disturbance.   Respiratory: Negative for cough, wheezing and stridor.    Cardiovascular: Negative for chest pain.   Gastrointestinal: Negative for abdominal pain and constipation.   Genitourinary: Negative for difficulty urinating and dysuria.   Musculoskeletal: Positive for back pain and neck pain.   Neurological: Positive for tingling (left arm first thing in  the morning). Negative for weakness, numbness and headaches.   Psychiatric/Behavioral: Positive for sleep disturbance. Negative for suicidal ideas. The patient is nervous/anxious.      --  The aforementioned information the Chief Complaint section and above subjective data including any HPI data, and also the Review of Systems data, has been personally reviewed and affirmed.  --     MRI OF THE CERVICAL SPINE WITH AND WITHOUT CONTRAST 12/07/2020     CLINICAL HISTORY: Patient complains of worsening neck pain not  responding to conservative treatment, and also has left shoulder pain,  reported history of melanoma 2 years ago.     TECHNIQUE: Sagittal T1, gradient echo T2 and fat-suppressed fast spin  echo T2 and postcontrast sagittal T1-weighted images were obtained of  the cervical spine. In addition, axial T1 and gradient echo T2 and fast  spin echo T2 and postcontrast axial T1-weighted images were obtained  from the skull base to T1.     COMPARISON: There are no prior cervical spine imaging studies from  [facility] for comparison.     FINDINGS: The craniocervical junction is normal in appearance. The  cervical cord and visualized upper thoracic cord is normal in signal  intensity.     There are some arthritic changes at the atlantodental interval and mild  arthritic changes at the articulation of the right and left lateral  masses of C1 and C2, but otherwise the C1-C2 level is normal with no  canal narrowing at C1-C2.     At C2-C3, there is moderate left facet overgrowth, left facet spurs  mildly narrow the left neural foramen. The disc space, right facets and  uncovertebral joints are normal with no canal or right foraminal  narrowing.     At C3-C4, there is moderate left facet overgrowth, 1 mm anterolisthesis  of C3 on C4. There is mild left uncovertebral joint hypertrophy and  there is mild left bony foraminal narrowing, but there is no central  canal or right foraminal narrowing at C3-C4.     At  C4-C5, there is moderate disc space narrowing and there are  degenerative endplate changes and there is diffuse posterior disc  osteophyte complex, comes close to the ventral surface of the cord  mildly narrowing the canal. There is mild right and there is  moderate-to-severe left facet overgrowth. There is some degenerative  marrow edema in the left facets. There is bilateral uncovertebral joint  hypertrophy, left greater than right, and there is moderate-to-severe  left bony foraminal narrowing that could affect the exiting left C5  nerve root. There is only mild right foraminal narrowing.     At C5-C6, there is disc space narrowing and degenerative endplate  changes, 1 mm retrolisthesis of C5 on C6, mild diffuse posterior disc  osteophyte complex with mild canal narrowing. There is mild-to-moderate  bilateral facet overgrowth. There is some uncovertebral joint spurring  and there is mild-to-moderate left and there is moderate right bony  foraminal narrowing. This could compromise the exiting C6 nerve roots  bilaterally, right greater than left.     At C6-C7, there is disc space narrowing and degenerative endplate  changes and mild diffuse posterior disc osteophyte complex, but there is  essentially no canal narrowing. There is minimal left facet overgrowth  and there is some bilateral uncovertebral joint hypertrophy and there is  mild right and moderate left bony foraminal narrowing that could affect  the exiting left C7 nerve root.     At C7-T1, there is minimal right, mild-to-moderate left facet  overgrowth. The disc space is normal. There is no canal or foraminal  narrowing.     Other than some degenerative marrow edema in the left facets at C4-C5,  and minimal degenerative marrow edema in the left facets at C2-C3,  marrow signal intensity in the cervical spine is normal with no evidence  of metastatic disease in the cervical spine.     IMPRESSION:  1. There is diffuse cervical spondylosis as described above.  "There is  disc space narrowing and degenerative endplate changes from C3 to C7,  posterior disc osteophyte complexes result in mild canal narrowing at  C4-C5 and C5-C6, posterior spurs result in essentially no canal  narrowing at C3-C4 and C6-C7. There is no disc herniation, no additional  canal narrowing is seen in the cervical spine.  2. There is multilevel bony foraminal narrowing in the cervical spine.  At C2-C3, moderate left facet overgrowth mildly narrows the left neural  foramen. At C3-C4, moderate left facet overgrowth and some left-sided  uncovertebral joint hypertrophy results in up to mild-to-moderate left  foraminal narrowing. At C4-C5, there is moderate-to-severe left facet  overgrowth, bilateral uncovertebral joint hypertrophy and there is mild  right and there is moderate-to-severe left bony foraminal narrowing  could affect the exiting left C5 nerve root. At C5-C6, there is  mild-to-moderate left and there is moderate right bony foraminal  narrowing that could affect the exiting C6 nerve roots bilaterally,  right greater than left. There is mild right and moderate left bony  foraminal narrowing at C6-C7 that could affect the exiting left C7 nerve  root. The remainder of the cervical spine MRI is unremarkable.     This report was finalized on 12/8/2020 8:32 AM by Dr. Jeff Sandoval M.D.     Vitals:    12/06/21 1529   BP: 127/87   Pulse: 91   Resp: 18   Temp: 96.9 °F (36.1 °C)   Weight: 82.1 kg (181 lb 1.6 oz)   Height: 162.6 cm (64\")   PainSc:   5   PainLoc: Neck     Objective   Physical Exam  Vitals and nursing note reviewed.   Constitutional:       Appearance: Normal appearance. She is well-developed.   Eyes:      General: Lids are normal.   Cardiovascular:      Rate and Rhythm: Normal rate.   Pulmonary:      Effort: Pulmonary effort is normal.   Musculoskeletal:      Cervical back: Tenderness and bony tenderness (left) present. Decreased range of motion.      Lumbar back: Decreased range of motion. "   Neurological:      Mental Status: She is alert and oriented to person, place, and time.      Gait: Gait normal.   Psychiatric:         Attention and Perception: Attention normal.         Mood and Affect: Mood normal.         Speech: Speech normal.         Behavior: Behavior normal.         Judgment: Judgment normal.       Assessment/Plan   Diagnoses and all orders for this visit:    1. Sacroiliac joint dysfunction of both sides (Primary)    2. Lumbar facet arthropathy    3. DDD (degenerative disc disease), cervical    4. Arthropathy of cervical facet joint    5. Neck pain    6. Lumbar radiculopathy    Other orders  -     gabapentin (NEURONTIN) 300 MG capsule; Take 2 capsules by mouth Every Night.  Dispense: 60 capsule; Refill: 5      --- Left C3-C5 MBB (verify levels under fluoro)  Reviewed the procedure at length with the patient.  Included in the review was expectations, complications, risk and benefits.The procedure was described in detail and the risks, benefits and alternatives were discussed with the patient (including but not limited to: bleeding, infection, nerve damage, worsening of pain, inability to perform injection, paralysis, seizures, coma, no pain relief and death) who agreed to proceed.  Discussed the potential for sedation if warranted/wanted.  The procedure will plan to be performed at [facility] with fluoroscopic guidance(unless ultrasound is indicated) and could potentially have steroids and contrast dye used. Questions were answered and in a way the patient could understand.  Patient verbalized understanding and wishes to proceed.  This intervention will be ordered.  Discussed with patient that all procedures are part of a multimodal plan of care and include either formal PT or a home exercise program.  Patient has no evidence of coagulopathy or current infection.    --- Continue Gabapentin 600 mg nightly  --- Follow-up after procedure     PATTI FOX  As part of the  patient's treatment plan, I am prescribing controlled substances. The patient has been made aware of appropriate use of such medications, including potential risk of somnolence, limited ability to drive and/or work safely, and the potential for dependence or overdose. It has also bee made clear that these medications are for use by this patient only, without concomitant use of alcohol or other substances unless prescribed.     As the clinician, I personally reviewed the PATTI from 12/6/2021 while the patient was in the office today.    History and physical exam exhibit continued safe and appropriate use of controlled substances.    Dictated utilizing Dragon dictation.      This document is intended for medical expert use only. Reading of this document by patients and/or patient's family without participating medical staff guidance may result in misinterpretation and unintended morbidity.   Any interpretation of such data is the responsibility of the patient and/or family member responsible for the patient in concert with their primary or specialist providers, not to be left for sources of online searches such as [employer]ONESUPPORT, intelloCut or similar queries. Relying on these approaches to knowledge may result in misinterpretation, misguided goals of care and even death should patients or family members try recommendations outside of the realm of professional medical care in a supervised way.

## 2021-12-06 NOTE — H&P (VIEW-ONLY)
CHIEF COMPLAINT  Follow-up for back and neck pain.      Subjective   Ema Cisse is a 56 y.o. female  who presents to the office for follow-up of procedure.  She completed a Transforaminal Epidural Steroid Injection at the L5 level, on the right   on  11/3/2021 performed by Dr. Lind for management of back pain. Patient reports 70% ONGOING relief from the procedure.     Today her pain is 5/10VAS in severity. We have re-trialed the Gabapentin and she is now taking 600 mg nightly. She denies any side effects including somnolence.     Failed Robaxin (side effects), meloxicam, naproxen, chlorzoxazone, tramadol (side effects), and gabapentin (side effects-previously)    She has been evaluated by rheumatology-osteoarthritis.    Today her primary complaint is her left sided neck pain.  This is 6/10VAS in severity.  This will radiate into her left trapezius, it is NOT radiating into her left arm. Minimal relief with BONILLA in the past. She has completed PT for her neck, and while the exercises help, her pain persists.     She has completed injections into both wrists for carpal tunnel with Dr. Morin and her hand numbness has greatly improved.     Patient remained masked during entire encounter. No cough present. I donned a mask and eye protection throughout entire visit. Prior to donning mask and eye protection, hand hygiene was performed, as well as when it was doffed.  I was closer than 6 feet, but not for an extended period of time. No obvious exposure to any bodily fluids.    Back Pain  This is a chronic problem. The current episode started more than 1 year ago. The problem occurs daily. Progression since onset: improved since last office visit. The pain is present in the lumbar spine and sacro-iliac. The pain is at a severity of 0/10. The pain is moderate. Exacerbated by: prolonged walking, standing. Stiffness is present in the morning. Associated symptoms include tingling (left arm first thing in the morning).  Pertinent negatives include no abdominal pain, chest pain, dysuria, fever, headaches, numbness or weakness. Risk factors include obesity. She has tried NSAIDs and analgesics for the symptoms. The treatment provided significant (with RFL) relief.   Neck Pain   This is a chronic problem. The current episode started more than 1 month ago. The problem occurs daily. The problem has been gradually worsening. The pain is present in the left side (radiating into the left trapezius). The quality of the pain is described as aching and burning. The pain is at a severity of 6/10. The pain is moderate. The symptoms are aggravated by position. Associated symptoms include tingling (left arm first thing in the morning). Pertinent negatives include no chest pain, fever, headaches, numbness or weakness. She has tried NSAIDs, muscle relaxants, neck support and home exercises for the symptoms. The treatment provided mild relief.      PEG Assessment   What number best describes your pain on average in the past week?7  What number best describes how, during the past week, pain has interfered with your enjoyment of life?4  What number best describes how, during the past week, pain has interfered with your general activity?  4    The following portions of the patient's history were reviewed and updated as appropriate: allergies, current medications, past family history, past medical history, past social history, past surgical history and problem list.    Review of Systems   Constitutional: Negative for fatigue and fever.   HENT: Negative for congestion.    Eyes: Negative for visual disturbance.   Respiratory: Negative for cough, wheezing and stridor.    Cardiovascular: Negative for chest pain.   Gastrointestinal: Negative for abdominal pain and constipation.   Genitourinary: Negative for difficulty urinating and dysuria.   Musculoskeletal: Positive for back pain and neck pain.   Neurological: Positive for tingling (left arm first thing in  the morning). Negative for weakness, numbness and headaches.   Psychiatric/Behavioral: Positive for sleep disturbance. Negative for suicidal ideas. The patient is nervous/anxious.      --  The aforementioned information the Chief Complaint section and above subjective data including any HPI data, and also the Review of Systems data, has been personally reviewed and affirmed.  --     MRI OF THE CERVICAL SPINE WITH AND WITHOUT CONTRAST 12/07/2020     CLINICAL HISTORY: Patient complains of worsening neck pain not  responding to conservative treatment, and also has left shoulder pain,  reported history of melanoma 2 years ago.     TECHNIQUE: Sagittal T1, gradient echo T2 and fat-suppressed fast spin  echo T2 and postcontrast sagittal T1-weighted images were obtained of  the cervical spine. In addition, axial T1 and gradient echo T2 and fast  spin echo T2 and postcontrast axial T1-weighted images were obtained  from the skull base to T1.     COMPARISON: There are no prior cervical spine imaging studies from  [facility] for comparison.     FINDINGS: The craniocervical junction is normal in appearance. The  cervical cord and visualized upper thoracic cord is normal in signal  intensity.     There are some arthritic changes at the atlantodental interval and mild  arthritic changes at the articulation of the right and left lateral  masses of C1 and C2, but otherwise the C1-C2 level is normal with no  canal narrowing at C1-C2.     At C2-C3, there is moderate left facet overgrowth, left facet spurs  mildly narrow the left neural foramen. The disc space, right facets and  uncovertebral joints are normal with no canal or right foraminal  narrowing.     At C3-C4, there is moderate left facet overgrowth, 1 mm anterolisthesis  of C3 on C4. There is mild left uncovertebral joint hypertrophy and  there is mild left bony foraminal narrowing, but there is no central  canal or right foraminal narrowing at C3-C4.     At  C4-C5, there is moderate disc space narrowing and there are  degenerative endplate changes and there is diffuse posterior disc  osteophyte complex, comes close to the ventral surface of the cord  mildly narrowing the canal. There is mild right and there is  moderate-to-severe left facet overgrowth. There is some degenerative  marrow edema in the left facets. There is bilateral uncovertebral joint  hypertrophy, left greater than right, and there is moderate-to-severe  left bony foraminal narrowing that could affect the exiting left C5  nerve root. There is only mild right foraminal narrowing.     At C5-C6, there is disc space narrowing and degenerative endplate  changes, 1 mm retrolisthesis of C5 on C6, mild diffuse posterior disc  osteophyte complex with mild canal narrowing. There is mild-to-moderate  bilateral facet overgrowth. There is some uncovertebral joint spurring  and there is mild-to-moderate left and there is moderate right bony  foraminal narrowing. This could compromise the exiting C6 nerve roots  bilaterally, right greater than left.     At C6-C7, there is disc space narrowing and degenerative endplate  changes and mild diffuse posterior disc osteophyte complex, but there is  essentially no canal narrowing. There is minimal left facet overgrowth  and there is some bilateral uncovertebral joint hypertrophy and there is  mild right and moderate left bony foraminal narrowing that could affect  the exiting left C7 nerve root.     At C7-T1, there is minimal right, mild-to-moderate left facet  overgrowth. The disc space is normal. There is no canal or foraminal  narrowing.     Other than some degenerative marrow edema in the left facets at C4-C5,  and minimal degenerative marrow edema in the left facets at C2-C3,  marrow signal intensity in the cervical spine is normal with no evidence  of metastatic disease in the cervical spine.     IMPRESSION:  1. There is diffuse cervical spondylosis as described above.  "There is  disc space narrowing and degenerative endplate changes from C3 to C7,  posterior disc osteophyte complexes result in mild canal narrowing at  C4-C5 and C5-C6, posterior spurs result in essentially no canal  narrowing at C3-C4 and C6-C7. There is no disc herniation, no additional  canal narrowing is seen in the cervical spine.  2. There is multilevel bony foraminal narrowing in the cervical spine.  At C2-C3, moderate left facet overgrowth mildly narrows the left neural  foramen. At C3-C4, moderate left facet overgrowth and some left-sided  uncovertebral joint hypertrophy results in up to mild-to-moderate left  foraminal narrowing. At C4-C5, there is moderate-to-severe left facet  overgrowth, bilateral uncovertebral joint hypertrophy and there is mild  right and there is moderate-to-severe left bony foraminal narrowing  could affect the exiting left C5 nerve root. At C5-C6, there is  mild-to-moderate left and there is moderate right bony foraminal  narrowing that could affect the exiting C6 nerve roots bilaterally,  right greater than left. There is mild right and moderate left bony  foraminal narrowing at C6-C7 that could affect the exiting left C7 nerve  root. The remainder of the cervical spine MRI is unremarkable.     This report was finalized on 12/8/2020 8:32 AM by Dr. Jeff Sandoval M.D.     Vitals:    12/06/21 1529   BP: 127/87   Pulse: 91   Resp: 18   Temp: 96.9 °F (36.1 °C)   Weight: 82.1 kg (181 lb 1.6 oz)   Height: 162.6 cm (64\")   PainSc:   5   PainLoc: Neck     Objective   Physical Exam  Vitals and nursing note reviewed.   Constitutional:       Appearance: Normal appearance. She is well-developed.   Eyes:      General: Lids are normal.   Cardiovascular:      Rate and Rhythm: Normal rate.   Pulmonary:      Effort: Pulmonary effort is normal.   Musculoskeletal:      Cervical back: Tenderness and bony tenderness (left) present. Decreased range of motion.      Lumbar back: Decreased range of motion. "   Neurological:      Mental Status: She is alert and oriented to person, place, and time.      Gait: Gait normal.   Psychiatric:         Attention and Perception: Attention normal.         Mood and Affect: Mood normal.         Speech: Speech normal.         Behavior: Behavior normal.         Judgment: Judgment normal.       Assessment/Plan   Diagnoses and all orders for this visit:    1. Sacroiliac joint dysfunction of both sides (Primary)    2. Lumbar facet arthropathy    3. DDD (degenerative disc disease), cervical    4. Arthropathy of cervical facet joint    5. Neck pain    6. Lumbar radiculopathy    Other orders  -     gabapentin (NEURONTIN) 300 MG capsule; Take 2 capsules by mouth Every Night.  Dispense: 60 capsule; Refill: 5      --- Left C3-C5 MBB (verify levels under fluoro)  Reviewed the procedure at length with the patient.  Included in the review was expectations, complications, risk and benefits.The procedure was described in detail and the risks, benefits and alternatives were discussed with the patient (including but not limited to: bleeding, infection, nerve damage, worsening of pain, inability to perform injection, paralysis, seizures, coma, no pain relief and death) who agreed to proceed.  Discussed the potential for sedation if warranted/wanted.  The procedure will plan to be performed at [facility] with fluoroscopic guidance(unless ultrasound is indicated) and could potentially have steroids and contrast dye used. Questions were answered and in a way the patient could understand.  Patient verbalized understanding and wishes to proceed.  This intervention will be ordered.  Discussed with patient that all procedures are part of a multimodal plan of care and include either formal PT or a home exercise program.  Patient has no evidence of coagulopathy or current infection.    --- Continue Gabapentin 600 mg nightly  --- Follow-up after procedure     PATTI FOX  As part of the  patient's treatment plan, I am prescribing controlled substances. The patient has been made aware of appropriate use of such medications, including potential risk of somnolence, limited ability to drive and/or work safely, and the potential for dependence or overdose. It has also bee made clear that these medications are for use by this patient only, without concomitant use of alcohol or other substances unless prescribed.     As the clinician, I personally reviewed the PATTI from 12/6/2021 while the patient was in the office today.    History and physical exam exhibit continued safe and appropriate use of controlled substances.    Dictated utilizing Dragon dictation.      This document is intended for medical expert use only. Reading of this document by patients and/or patient's family without participating medical staff guidance may result in misinterpretation and unintended morbidity.   Any interpretation of such data is the responsibility of the patient and/or family member responsible for the patient in concert with their primary or specialist providers, not to be left for sources of online searches such as [employer], [employer] or similar queries. Relying on these approaches to knowledge may result in misinterpretation, misguided goals of care and even death should patients or family members try recommendations outside of the realm of professional medical care in a supervised way.

## 2021-12-08 ENCOUNTER — TRANSCRIBE ORDERS (OUTPATIENT)
Dept: SURGERY | Facility: SURGERY CENTER | Age: 56
End: 2021-12-08

## 2021-12-08 ENCOUNTER — PREP FOR SURGERY (OUTPATIENT)
Dept: SURGERY | Facility: SURGERY CENTER | Age: 56
End: 2021-12-08

## 2021-12-08 DIAGNOSIS — M47.812 ARTHROPATHY OF CERVICAL FACET JOINT: Primary | ICD-10-CM

## 2021-12-08 DIAGNOSIS — Z41.9 SURGERY, ELECTIVE: Primary | ICD-10-CM

## 2021-12-08 RX ORDER — SODIUM CHLORIDE 0.9 % (FLUSH) 0.9 %
10 SYRINGE (ML) INJECTION AS NEEDED
Status: CANCELLED | OUTPATIENT
Start: 2021-12-08

## 2021-12-08 RX ORDER — SODIUM CHLORIDE 0.9 % (FLUSH) 0.9 %
10 SYRINGE (ML) INJECTION EVERY 12 HOURS SCHEDULED
Status: CANCELLED | OUTPATIENT
Start: 2021-12-08

## 2021-12-14 ENCOUNTER — HOSPITAL ENCOUNTER (OUTPATIENT)
Facility: SURGERY CENTER | Age: 56
Setting detail: HOSPITAL OUTPATIENT SURGERY
Discharge: HOME | End: 2021-12-14
Attending: ANESTHESIOLOGY | Admitting: ANESTHESIOLOGY

## 2021-12-14 ENCOUNTER — HOSPITAL ENCOUNTER (OUTPATIENT)
Dept: GENERAL RADIOLOGY | Facility: SURGERY CENTER | Age: 56
Setting detail: HOSPITAL OUTPATIENT SURGERY
End: 2021-12-14

## 2021-12-14 VITALS
DIASTOLIC BLOOD PRESSURE: 82 MMHG | HEART RATE: 84 BPM | SYSTOLIC BLOOD PRESSURE: 131 MMHG | OXYGEN SATURATION: 95 % | RESPIRATION RATE: 16 BRPM | TEMPERATURE: 98.9 F

## 2021-12-14 DIAGNOSIS — M47.812 ARTHROPATHY OF CERVICAL FACET JOINT: ICD-10-CM

## 2021-12-14 DIAGNOSIS — Z41.9 SURGERY, ELECTIVE: ICD-10-CM

## 2021-12-14 PROCEDURE — 3E0T3BZ INTRODUCTION OF ANESTHETIC AGENT INTO PERIPHERAL NERVES AND PLEXI, PERCUTANEOUS APPROACH: ICD-10-PCS | Performed by: ANESTHESIOLOGY

## 2021-12-14 PROCEDURE — 25010000002 MIDAZOLAM PER 1 MG: Performed by: ANESTHESIOLOGY

## 2021-12-14 PROCEDURE — 76000 FLUOROSCOPY <1 HR PHYS/QHP: CPT

## 2021-12-14 PROCEDURE — 64490 INJ PARAVERT F JNT C/T 1 LEV: CPT | Performed by: ANESTHESIOLOGY

## 2021-12-14 PROCEDURE — 64491 INJ PARAVERT F JNT C/T 2 LEV: CPT | Performed by: ANESTHESIOLOGY

## 2021-12-14 PROCEDURE — 77002 NEEDLE LOCALIZATION BY XRAY: CPT

## 2021-12-14 PROCEDURE — 0 IOHEXOL 300 MG/ML SOLUTION 10 ML VIAL: Performed by: ANESTHESIOLOGY

## 2021-12-14 RX ORDER — SODIUM CHLORIDE 0.9 % (FLUSH) 0.9 %
10 SYRINGE (ML) INJECTION EVERY 12 HOURS SCHEDULED
Status: DISCONTINUED | OUTPATIENT
Start: 2021-12-14 | End: 2021-12-14 | Stop reason: HOSPADM

## 2021-12-14 RX ORDER — SODIUM CHLORIDE 0.9 % (FLUSH) 0.9 %
10 SYRINGE (ML) INJECTION AS NEEDED
Status: DISCONTINUED | OUTPATIENT
Start: 2021-12-14 | End: 2021-12-14 | Stop reason: HOSPADM

## 2021-12-14 RX ORDER — MIDAZOLAM HYDROCHLORIDE 1 MG/ML
INJECTION INTRAMUSCULAR; INTRAVENOUS AS NEEDED
Status: DISCONTINUED | OUTPATIENT
Start: 2021-12-14 | End: 2021-12-14 | Stop reason: HOSPADM

## 2021-12-14 NOTE — DISCHARGE INSTRUCTIONS
[facility] [place] [facility] - Post-procedure Instructions          --  While there are no absolute restrictions, it is recommended that you do not perform strenuous activity today. In the morning, you may resume your level of activity as before your block.    --  If you have a band-aid at your injection site, please remove it later today. Observe the area for any redness, swelling, pus-like drainage, or a temperature over 101°. If any of these symptoms occur, please call your doctor at [phone]. If after office hours, leave a message and the on-call provider will return your call.    --  Ice may be applied to your injection site. It is recommended you avoid direct heat (heating pad; hot tub) for 1-2 days.    --  Call [facility] [place] at [phone] if you experience persistent headache, persistent bleeding from the injection site, or severe pain not relieved by heat or oral medication.    --  Do not make important decisions today.    --  Due to the effects of the block and/or the I.V. Sedation, DO NOT drive or operate hazardous machinery for 12 hours.  Local anesthetics may cause numbness after procedure and precautions must be taken with regards to operating equipment as well as with walking, even if ambulating with assistance of another person or with an assistive device.    --  Do not drink alcohol for 12 hours.    -- You may return to work tomorrow, or as directed by your referring doctor.    --  Occasionally you may notice a slight increase in your pain after the procedure. This should start to improve within the next 24-48 hours. Radiofrequency ablation procedure pain may last 3-4 weeks.    --  It may take as long as 3-4 days before you notice a gradual improvement in your pain and/or other symptoms.    -- You may continue to take your prescribed pain medication as needed.    --  Some normal possible side effects of steroid use could include fluid retention, increased blood sugar, dull headache,  increased sweating, increased appetite, mood swings and flushing.    --  Diabetics are recommended to watch their blood glucose level closely for 24-48 hours after the injection.    --  Must stay in PACU for 20 min upon arrival and prove no leg weakness before being discharged.    --  IN THE EVENT OF A LIFE THREATENING EMERGENCY, (CHEST PAIN, BREATHING DIFFICULTIES, PARALYSIS…) YOU SHOULD GO TO YOUR NEAREST EMERGENCY ROOM.    --  You should be contacted by our office within 2-3 days to schedule follow up or next appointment date.  If not contacted within 7 days, please call the office at [phone]    --         PAIN DIARY               Ema Cisse  1965    Procedure:   Left C35 cmbb  Date of Procedure:  12/14/21          PAIN SCORE (0-10)   Activity Level         Pre-procedure              1 hour after procedure:            2 hours after procedure:            3 hours after procedure:            4 hours after procedure:            5 hours after procedure:            6 hours after procedure:            7 hours after procedure:            8 hours after procedure:              PLEASE BRING THIS WITH YOU TO YOUR NEXT APPOINTMENT & TURN IT IN AT THE CHECK-IN WINDOW TO SCAN INTO YOUR CHART.

## 2021-12-14 NOTE — OP NOTE
LEFT C3-5 Cervical Medial Branch Blockade  [facility]      PREOPERATIVE DIAGNOSIS:  Cervical spondylosis without myelopathy   POSTOPERATIVE DIAGNOSIS:  Same as preoperative diagnosis    PROCEDURE:    Cervical Facet Nerve (medial branch) Blocks, with Fluoroscopy:  LEVELS C3, C4, C5,  to block facet joints C3-4 & C4-5 on the LEFT  • 96641 - Cervical Facet block, 1st level  • 55045 - Cervical Facet block, 2nd level    PRE-PROCEDURE DISCUSSION WITH PATIENT:    Risks and complications were discussed with the patient prior to starting the procedure and informed consent was obtained.    SURGEON:  Jeronimo Lind MD    REASON FOR PROCEDURE:    The patient complains of pain that seems to have a significant axial component    SEDATION:  Versed 6mg IV  ANESTHETIC:   Marcaine 0.5%  STEROID:  NONE  TOTAL VOLUME OF SOLUTION:  3 mL    DESCRIPTON OF PROCEDURE:  After obtaining informed consent, the patient was placed in the prone position. IV access was obtained.  EKG, blood pressure, and pulse oximeter were monitored and all sedation was administered by an RN under my direct guidance.  The cervical area was prepped with Chloraprep and draped with sterile barrier. Under fluoroscopic guidance the waists of the C3 & C4 & C5 vertebra on the affected side were identified. Skin and subcutaneous tissue was then anesthetized with 1% lidocaine 1mL at each point. Then spinal needles were introduced under fluoroscopic guidance at the waist of these vertebra on this side. After confirming the position of the needle under fluoroscopic PA and lateral views, and confirming negative aspiration of blood and CSF, 0.25 mL of Omnipaque was injected.  Proper spread and lack of vascular uptake was demonstrated.  A solution was prepared as above, and 1 mL of that solution was injected very slowly each level.      Needles were removed intact from all levels.  Vitals were stable throughout.       ESTIMATED BLOOD LOSS:   minimal  SPECIMENS:  None    COMPLICATIONS:    No complications were noted., There was no indication of vascular uptake on live injection of contrast dye., There was no indication of intrathecal uptake on live injection of contrast dye., There was not any evidence of dural puncture.   and The patient did not have any signs of postprocedure numbness nor weakness.    TOLERANCE & DISCHARGE CONDITION:    The patient tolerated the procedure well.  The patient was transported to the recovery area without difficulties.  The patient was discharged to home under the care of family in stable and satisfactory condition.      PLAN OF CARE:  1. The patient was given our standard instruction sheet.  2. We discussed that Cervical Medial Branch Blockade is a diagnostic procedure in consideration for radiofrequency ablation if two diagnostic procedures proved to be positive for significant benefit.  If sustained relief of six to eight weeks is obtained, then an alternative plan could be therapeutic cervical medial branch blocks on an as-needed basis.  3. The patient is asked to keep a pain log hourly for 8 hours postoperatively today.  4. The patient will Return to clinic 1 wk.  5. The patient will resume all medications as per the medication reconciliation sheet.

## 2021-12-15 ENCOUNTER — OFFICE VISIT (OUTPATIENT)
Dept: FAMILY MEDICINE CLINIC | Age: 56
End: 2021-12-15

## 2021-12-15 VITALS
HEIGHT: 64 IN | SYSTOLIC BLOOD PRESSURE: 137 MMHG | WEIGHT: 185 LBS | TEMPERATURE: 98.3 F | BODY MASS INDEX: 31.58 KG/M2 | DIASTOLIC BLOOD PRESSURE: 87 MMHG | HEART RATE: 101 BPM

## 2021-12-15 DIAGNOSIS — M54.50 LOW BACK PAIN WITHOUT SCIATICA, UNSPECIFIED BACK PAIN LATERALITY, UNSPECIFIED CHRONICITY: Primary | ICD-10-CM

## 2021-12-15 DIAGNOSIS — F34.9 PERSISTENT MOOD (AFFECTIVE) DISORDER, UNSPECIFIED (HCC): ICD-10-CM

## 2021-12-15 DIAGNOSIS — K58.2 IRRITABLE BOWEL SYNDROME WITH BOTH CONSTIPATION AND DIARRHEA: ICD-10-CM

## 2021-12-15 DIAGNOSIS — G43.109 MIGRAINE WITH AURA, NOT INTRACTABLE, WITHOUT STATUS MIGRAINOSUS: ICD-10-CM

## 2021-12-15 DIAGNOSIS — F51.01 PRIMARY INSOMNIA: ICD-10-CM

## 2021-12-15 LAB
BILIRUB BLD-MCNC: NEGATIVE MG/DL
CLARITY, POC: CLEAR
COLOR UR: YELLOW
EXPIRATION DATE: NORMAL
GLUCOSE UR STRIP-MCNC: NEGATIVE MG/DL
KETONES UR QL: NEGATIVE
LEUKOCYTE EST, POC: NEGATIVE
Lab: 7004
NITRITE UR-MCNC: NEGATIVE MG/ML
PH UR: 6.5 [PH] (ref 5–8)
PROT UR STRIP-MCNC: NEGATIVE MG/DL
RBC # UR STRIP: NEGATIVE /UL
SP GR UR: 1.01 (ref 1–1.03)
UROBILINOGEN UR QL: NORMAL

## 2021-12-15 PROCEDURE — 81003 URINALYSIS AUTO W/O SCOPE: CPT | Performed by: NURSE PRACTITIONER

## 2021-12-15 PROCEDURE — 99214 OFFICE O/P EST MOD 30 MIN: CPT | Performed by: NURSE PRACTITIONER

## 2021-12-15 RX ORDER — ESCITALOPRAM OXALATE 20 MG/1
20 TABLET ORAL DAILY
Qty: 90 TABLET | Refills: 1 | Status: SHIPPED | OUTPATIENT
Start: 2021-12-15 | End: 2022-05-17 | Stop reason: SDUPTHER

## 2021-12-15 RX ORDER — SUMATRIPTAN 50 MG/1
50 TABLET, FILM COATED ORAL ONCE AS NEEDED
Qty: 9 TABLET | Refills: 1 | Status: SHIPPED | OUTPATIENT
Start: 2021-12-15 | End: 2022-05-17 | Stop reason: SDUPTHER

## 2021-12-15 RX ORDER — TRAZODONE HYDROCHLORIDE 50 MG/1
50 TABLET ORAL NIGHTLY
Qty: 90 TABLET | Refills: 1 | Status: SHIPPED | OUTPATIENT
Start: 2021-12-15 | End: 2022-02-18 | Stop reason: SDUPTHER

## 2021-12-15 RX ORDER — DICYCLOMINE HYDROCHLORIDE 10 MG/1
10 CAPSULE ORAL
Qty: 120 CAPSULE | Refills: 1 | Status: SHIPPED | OUTPATIENT
Start: 2021-12-15 | End: 2022-01-24

## 2021-12-15 RX ORDER — NAPROXEN 500 MG/1
500 TABLET ORAL 2 TIMES DAILY WITH MEALS
Qty: 180 TABLET | Refills: 1 | Status: SHIPPED | OUTPATIENT
Start: 2021-12-15 | End: 2022-03-29

## 2021-12-15 NOTE — PROGRESS NOTES
Chief Complaint  Ema Cisse presents to [facility] for Back Pain, Urinary Urgency, and Depression (follow up)    Subjective          History of Present Illness    Amy is following up on anxiety today. Current medication includes Lexapro 20 mg daily. She is also taking trazodone 50 mg nightly to help with sleep. She has been on several medication in the past for anxiety but none seemed to help. She has tried buspirone which was ineffective. Trintellix caused rash. There were others but she does not know the name of all of them.   Feels that she is doing fairly well on current regimen.  Wishes to continue.  History of IBS.  She is on dicyclomine which has helped symptoms.  Additionally, she complains of bilateral low back pain.  Would like to have urine checked.  She does have chronic back pain but feels that this might be a little bit different.  She is followed by pain management.  History of migraines.  On sumatriptan as needed.    Review of Systems      Allergies   Allergen Reactions   • Sulfa Antibiotics Rash   • Trintellix [Vortioxetine] Itching      History reviewed. No pertinent past medical history.  Current Outpatient Medications   Medication Sig Dispense Refill   • albuterol sulfate  (90 Base) MCG/ACT inhaler Inhale 2 puffs Every 4 (Four) Hours As Needed.     • Cetirizine HCl 10 MG capsule Take 10 mg by mouth Daily.     • dicyclomine (Bentyl) 10 MG capsule Take 1 capsule by mouth 4 (Four) Times a Day Before Meals & at Bedtime As Needed (diarrhea). 120 capsule 1   • escitalopram (Lexapro) 20 MG tablet Take 1 tablet by mouth Daily. 90 tablet 1   • gabapentin (NEURONTIN) 300 MG capsule Take 2 capsules by mouth Every Night. 60 capsule 5   • montelukast (SINGULAIR) 10 MG tablet      • multivitamin with minerals tablet tablet Take 1 tablet by mouth Daily.     • naproxen (NAPROSYN) 500 MG tablet Take 1 tablet by mouth 2 (Two) Times a Day With Meals. 180 tablet 1   •  pantoprazole (PROTONIX) 40 MG EC tablet 40 mg Daily.     • SUMAtriptan (IMITREX) 50 MG tablet Take 1 tablet by mouth 1 (One) Time As Needed for Migraine. 9 tablet 1   • traZODone (DESYREL) 50 MG tablet Take 1 tablet by mouth Every Night. 90 tablet 1     No current facility-administered medications for this visit.     Past Surgical History:   Procedure Laterality Date   • BUNIONECTOMY  2008   • CHOLECYSTECTOMY  1984   • COLONOSCOPY  2017   • COLONOSCOPY  2017   • EPIDURAL Right 11/3/2021    Procedure: Right L5 LUMBAR/SACRAL TRANSFORAMINAL EPIDURAL;  Surgeon: Jeronimo Lind MD;  [place] [facility] [place];  Service: Pain Management;  Laterality: Right;   • KNEE SURGERY  07/2005    ACL Removal   • MEDIAL BRANCH BLOCK Left 12/14/2021    Procedure: Left C3-C5 MEDIAL BRANCH BLOCK;  Surgeon: Jeronimo Lind MD;  [place] [facility] [place];  Service: Pain Management;  Laterality: Left;   • SINUS SURGERY  2010      Social History     Tobacco Use   • Smoking status: Never Smoker   • Smokeless tobacco: Never Used   Vaping Use   • Vaping Use: Never used   Substance Use Topics   • Alcohol use: Yes     Comment: socially   • Drug use: No     Family History   Problem Relation Age of Onset   • Hypertension Other    • Heart disease Other    • Hypertension Mother    • Heart disease Father    • Lung cancer Father    • No Known Problems Sister    • No Known Problems Daughter    • No Known Problems Maternal Grandmother    • No Known Problems Paternal Grandmother    • No Known Problems Maternal Aunt    • No Known Problems Paternal Aunt    • No Known Problems Maternal Grandfather    • No Known Problems Paternal Grandfather    • BRCA 1/2 Neg Hx    • Breast cancer Neg Hx    • Colon cancer Neg Hx    • Endometrial cancer Neg Hx    • Ovarian cancer Neg Hx      Health Maintenance Due   Topic Date Due   • ZOSTER VACCINE (1 of 2) Never done   • HEPATITIS C SCREENING  Never done      Immunization History   Administered Date(s) Administered   •  "COVID-19 (MODERNA) 1st, 2nd, 3rd Dose Only 02/05/2021, 03/05/2021, 11/19/2021   • COVID-19 (PFIZER) 02/05/2021, 02/26/2021   • Flu Vaccine Split Quad 09/25/2019, 09/23/2021   • Flucelvax Quad Vial =>4yrs 09/28/2020   • Hepatitis A 05/02/2018, 11/20/2018   • Tdap 05/29/2019        Objective     Vitals:    12/15/21 1540   BP: 137/87   Pulse: 101   Temp: 98.3 °F (36.8 °C)   TempSrc: Oral   Weight: 83.9 kg (185 lb)   Height: 162.6 cm (64.02\")     Body mass index is 31.74 kg/m².     Physical Exam  Vitals reviewed.   Constitutional:       General: She is not in acute distress.     Appearance: Normal appearance. She is well-developed.   HENT:      Head: Normocephalic and atraumatic.   Cardiovascular:      Rate and Rhythm: Normal rate and regular rhythm.   Pulmonary:      Effort: Pulmonary effort is normal.      Breath sounds: Normal breath sounds.   Neurological:      Mental Status: She is alert and oriented to person, place, and time.   Psychiatric:         Mood and Affect: Mood and affect normal.           Result Review :{Labs  Result Review  Imaging  Med Tab  Media :23}     The following data was reviewed by: SHELBIE Mccormick on 12/15/2021:          POCT urinalysis dipstick, automated (12/15/2021 15:49)  BNP (03/26/2021 07:43)  Physical [facility] Panel (03/26/2021 07:43)  RA Profile II (03/26/2021 07:43)  Urinalysis With Microscopic - (03/26/2021 07:43)  Antinuclear Antibody With Reflex Cascade (03/26/2021 07:43)                  Assessment and Plan      Diagnoses and all orders for this visit:    1. Low back pain without sciatica, unspecified back pain laterality, unspecified chronicity (Primary)  Comments:  Urine normal.  Will continue follow-up with pain management.  Orders:  -     POCT urinalysis dipstick, automated  -     naproxen (NAPROSYN) 500 MG tablet; Take 1 tablet by mouth 2 (Two) Times a Day With Meals.  Dispense: 180 tablet; Refill: 1    2. Persistent mood (affective) disorder, " unspecified (HCC)  Comments:  Medical condition is stable.  Continue same therapy.  Will recheck at next regular appointment.  Orders:  -     escitalopram (Lexapro) 20 MG tablet; Take 1 tablet by mouth Daily.  Dispense: 90 tablet; Refill: 1    3. Primary insomnia  Comments:  Medical condition is stable.  Continue same therapy.  Will recheck at next regular appointment.  Orders:  -     traZODone (DESYREL) 50 MG tablet; Take 1 tablet by mouth Every Night.  Dispense: 90 tablet; Refill: 1    4. Irritable bowel syndrome with both constipation and diarrhea  Comments:  Medical condition is stable.  Continue same therapy.  Will recheck at next regular appointment.  Orders:  -     dicyclomine (Bentyl) 10 MG capsule; Take 1 capsule by mouth 4 (Four) Times a Day Before Meals & at Bedtime As Needed (diarrhea).  Dispense: 120 capsule; Refill: 1    5. Migraine with aura, not intractable, without status migrainosus  Comments:  Medical condition is stable.  Continue same therapy.  Will recheck at next regular appointment.  Orders:  -     SUMAtriptan (IMITREX) 50 MG tablet; Take 1 tablet by mouth 1 (One) Time As Needed for Migraine.  Dispense: 9 tablet; Refill: 1    Plan for labs at next visit.          Follow Up     Return in about 4 months (around 4/15/2022) for Annual physical.

## 2021-12-16 ENCOUNTER — TELEPHONE (OUTPATIENT)
Dept: PAIN MEDICINE | Facility: CLINIC | Age: 56
End: 2021-12-16

## 2021-12-16 ENCOUNTER — APPOINTMENT (OUTPATIENT)
Dept: GENERAL RADIOLOGY | Facility: SURGERY CENTER | Age: 56
End: 2021-12-16

## 2021-12-16 NOTE — TELEPHONE ENCOUNTER
Caller: FLORENCE MARIE    Relationship to patient: SELF    Best call back number: [phone]    Patient is needing: PATIENT HAD A TRIAL BASIS PROCEDURE ON 12/14/2021. PATIENT JUST WANTED TO KNOW WHAT TO DO AFTERWARD WITH SCHEDULING OR ANYTHING MEDICAL THAT NEEDS TO BE DISCUSSED.

## 2022-01-10 ENCOUNTER — TELEPHONE (OUTPATIENT)
Dept: FAMILY MEDICINE CLINIC | Age: 57
End: 2022-01-10

## 2022-01-10 NOTE — TELEPHONE ENCOUNTER
Caller: Ema Cisse    Relationship to patient: Self    Best call back number: [phone]    Patient is needing: PATIENT CALLED STATING SHE IS NEEDING A REFERRAL TO BE SENT TO SEE A GASTROENTEROLOGIST. SHE STATED SHE HAS IBS AND IS NEEDING TO BE REFERRED TO DR DAKOTA CULLEN. THE PATIENT IS NOT SURE IF SHE NEEDS AN APPT TO GET A REFERRAL TO BE SENT OVER. BUT IF NOT THE PATIENT WOULD LIKE A CALL BACK TO LET HER KNOW THIS HAS BEEN SENT OVER. THE PATIENT WOULD ALSO LIKE A CALL BACK TO LET HER KNOW IF SHE DOES NEED AN APPT TO GET THIS REFERRAL. PLEASE ADVISE THANK YOU.         DAKOTA CULLEN  [address] [facility]   [address]  [place] [id number]  PHONE #:[phone]

## 2022-01-12 ENCOUNTER — OFFICE VISIT (OUTPATIENT)
Dept: FAMILY MEDICINE CLINIC | Age: 57
End: 2022-01-12

## 2022-01-12 ENCOUNTER — LAB (OUTPATIENT)
Dept: LAB | Facility: HOSPITAL | Age: 57
End: 2022-01-12

## 2022-01-12 VITALS
TEMPERATURE: 98 F | WEIGHT: 182 LBS | HEART RATE: 77 BPM | BODY MASS INDEX: 31.22 KG/M2 | SYSTOLIC BLOOD PRESSURE: 133 MMHG | DIASTOLIC BLOOD PRESSURE: 81 MMHG

## 2022-01-12 DIAGNOSIS — R19.7 DIARRHEA, UNSPECIFIED TYPE: ICD-10-CM

## 2022-01-12 DIAGNOSIS — Z11.59 SCREENING FOR VIRAL DISEASE: ICD-10-CM

## 2022-01-12 DIAGNOSIS — K58.2 IRRITABLE BOWEL SYNDROME WITH BOTH CONSTIPATION AND DIARRHEA: ICD-10-CM

## 2022-01-12 DIAGNOSIS — R19.7 DIARRHEA, UNSPECIFIED TYPE: Primary | ICD-10-CM

## 2022-01-12 LAB
ALBUMIN SERPL-MCNC: 4.2 G/DL (ref 3.5–5.2)
ALBUMIN/GLOB SERPL: 1.4 G/DL
ALP SERPL-CCNC: 100 U/L (ref 39–117)
ALT SERPL W P-5'-P-CCNC: 16 U/L (ref 1–33)
ANION GAP SERPL CALCULATED.3IONS-SCNC: 11.6 MMOL/L (ref 5–15)
AST SERPL-CCNC: 18 U/L (ref 1–32)
BASOPHILS # BLD AUTO: 0.07 10*3/MM3 (ref 0–0.2)
BASOPHILS NFR BLD AUTO: 0.9 % (ref 0–1.5)
BILIRUB SERPL-MCNC: 0.2 MG/DL (ref 0–1.2)
BUN SERPL-MCNC: 9 MG/DL (ref 6–20)
BUN/CREAT SERPL: 10.7 (ref 7–25)
CALCIUM SPEC-SCNC: 9.2 MG/DL (ref 8.6–10.5)
CHLORIDE SERPL-SCNC: 106 MMOL/L (ref 98–107)
CO2 SERPL-SCNC: 21.4 MMOL/L (ref 22–29)
CREAT SERPL-MCNC: 0.84 MG/DL (ref 0.57–1)
DEPRECATED RDW RBC AUTO: 44 FL (ref 37–54)
EOSINOPHIL # BLD AUTO: 0.19 10*3/MM3 (ref 0–0.4)
EOSINOPHIL NFR BLD AUTO: 2.5 % (ref 0.3–6.2)
ERYTHROCYTE [DISTWIDTH] IN BLOOD BY AUTOMATED COUNT: 13.3 % (ref 12.3–15.4)
GFR SERPL CREATININE-BSD FRML MDRD: 70 ML/MIN/1.73
GLOBULIN UR ELPH-MCNC: 2.9 GM/DL
GLUCOSE SERPL-MCNC: 93 MG/DL (ref 65–99)
HCT VFR BLD AUTO: 40.1 % (ref 34–46.6)
HCV AB SER DONR QL: NORMAL
HGB BLD-MCNC: 13.3 G/DL (ref 12–15.9)
IMM GRANULOCYTES # BLD AUTO: 0.01 10*3/MM3 (ref 0–0.05)
IMM GRANULOCYTES NFR BLD AUTO: 0.1 % (ref 0–0.5)
LYMPHOCYTES # BLD AUTO: 2.1 10*3/MM3 (ref 0.7–3.1)
LYMPHOCYTES NFR BLD AUTO: 27.1 % (ref 19.6–45.3)
MCH RBC QN AUTO: 29.8 PG (ref 26.6–33)
MCHC RBC AUTO-ENTMCNC: 33.2 G/DL (ref 31.5–35.7)
MCV RBC AUTO: 89.7 FL (ref 79–97)
MONOCYTES # BLD AUTO: 0.51 10*3/MM3 (ref 0.1–0.9)
MONOCYTES NFR BLD AUTO: 6.6 % (ref 5–12)
NEUTROPHILS NFR BLD AUTO: 4.87 10*3/MM3 (ref 1.7–7)
NEUTROPHILS NFR BLD AUTO: 62.8 % (ref 42.7–76)
PLATELET # BLD AUTO: 343 10*3/MM3 (ref 140–450)
PMV BLD AUTO: 9.9 FL (ref 6–12)
POTASSIUM SERPL-SCNC: 3.5 MMOL/L (ref 3.5–5.2)
PROT SERPL-MCNC: 7.1 G/DL (ref 6–8.5)
RBC # BLD AUTO: 4.47 10*6/MM3 (ref 3.77–5.28)
SODIUM SERPL-SCNC: 139 MMOL/L (ref 136–145)
WBC NRBC COR # BLD: 7.75 10*3/MM3 (ref 3.4–10.8)

## 2022-01-12 PROCEDURE — 87493 C DIFF AMPLIFIED PROBE: CPT

## 2022-01-12 PROCEDURE — 85025 COMPLETE CBC W/AUTO DIFF WBC: CPT

## 2022-01-12 PROCEDURE — 80053 COMPREHEN METABOLIC PANEL: CPT

## 2022-01-12 PROCEDURE — 99214 OFFICE O/P EST MOD 30 MIN: CPT | Performed by: NURSE PRACTITIONER

## 2022-01-12 PROCEDURE — 82784 ASSAY IGA/IGD/IGG/IGM EACH: CPT

## 2022-01-12 PROCEDURE — 86258 DGP ANTIBODY EACH IG CLASS: CPT

## 2022-01-12 PROCEDURE — 87506 IADNA-DNA/RNA PROBE TQ 6-11: CPT

## 2022-01-12 PROCEDURE — 86803 HEPATITIS C AB TEST: CPT

## 2022-01-12 PROCEDURE — 36415 COLL VENOUS BLD VENIPUNCTURE: CPT

## 2022-01-12 RX ORDER — LOPERAMIDE HYDROCHLORIDE 2 MG/1
2 CAPSULE ORAL 4 TIMES DAILY PRN
Qty: 60 CAPSULE | Refills: 0 | Status: SHIPPED | OUTPATIENT
Start: 2022-01-12 | End: 2022-10-10

## 2022-01-12 RX ORDER — DICYCLOMINE HCL 20 MG
20 TABLET ORAL
Qty: 120 TABLET | Refills: 0 | Status: SHIPPED | OUTPATIENT
Start: 2022-01-12 | End: 2022-05-17 | Stop reason: SDUPTHER

## 2022-01-12 NOTE — PROGRESS NOTES
Chief Complaint  Ema Cisse presents to [facility] for GI Problem (would like referral)    Subjective          History of Present Illness    Amy is here today with c/o GI issues. She has history of IBS. She notes worsening diarrhea that started around [place] time. Typically has both constipation and diarrhea. She has been taking Diclycomine and Imodium for symptoms. She notes that diarrhea is watery. Denies mucous or blood. Sometimes she cannot make it to the bathroom in time. She is requesting referral to gastroenterology. Would like to see Dr Fitzpatrick. She has had 2 colonoscopies in the past. It has been some time since she had those done.     Review of Systems       Allergies   Allergen Reactions   • Sulfa Antibiotics Rash   • Trintellix [Vortioxetine] Itching      History reviewed. No pertinent past medical history.  Current Outpatient Medications   Medication Sig Dispense Refill   • albuterol sulfate  (90 Base) MCG/ACT inhaler Inhale 2 puffs Every 4 (Four) Hours As Needed.     • Cetirizine HCl 10 MG capsule Take 10 mg by mouth Daily.     • dicyclomine (Bentyl) 10 MG capsule Take 1 capsule by mouth 4 (Four) Times a Day Before Meals & at Bedtime As Needed (diarrhea). 120 capsule 1   • escitalopram (Lexapro) 20 MG tablet Take 1 tablet by mouth Daily. 90 tablet 1   • gabapentin (NEURONTIN) 300 MG capsule Take 2 capsules by mouth Every Night. 60 capsule 5   • montelukast (SINGULAIR) 10 MG tablet      • multivitamin with minerals tablet tablet Take 1 tablet by mouth Daily.     • naproxen (NAPROSYN) 500 MG tablet Take 1 tablet by mouth 2 (Two) Times a Day With Meals. 180 tablet 1   • pantoprazole (PROTONIX) 40 MG EC tablet 40 mg Daily.     • SUMAtriptan (IMITREX) 50 MG tablet Take 1 tablet by mouth 1 (One) Time As Needed for Migraine. 9 tablet 1   • traZODone (DESYREL) 50 MG tablet Take 1 tablet by mouth Every Night. 90 tablet 1   • dicyclomine (BENTYL) 20 MG tablet  Take 1 tablet by mouth 4 (Four) Times a Day Before Meals & at Bedtime. 120 tablet 0   • loperamide (IMODIUM) 2 MG capsule Take 1 capsule by mouth 4 (Four) Times a Day As Needed for Diarrhea. 60 capsule 0     No current facility-administered medications for this visit.     Past Surgical History:   Procedure Laterality Date   • BUNIONECTOMY  2008   • CHOLECYSTECTOMY  1984   • COLONOSCOPY  2017   • COLONOSCOPY  2017   • EPIDURAL Right 11/3/2021    Procedure: Right L5 LUMBAR/SACRAL TRANSFORAMINAL EPIDURAL;  Surgeon: Jeronimo Lind MD;  [place] [facility] [place]  Service: Pain Management;  Laterality: Right;   • KNEE SURGERY  07/2005    ACL Removal   • MEDIAL BRANCH BLOCK Left 12/14/2021    Procedure: Left C3-C5 MEDIAL BRANCH BLOCK;  Surgeon: Jeronimo Lind MD;  [place] [facility] [place]  Service: Pain Management;  Laterality: Left;   • SINUS SURGERY  2010      Social History     Tobacco Use   • Smoking status: Never Smoker   • Smokeless tobacco: Never Used   Vaping Use   • Vaping Use: Never used   Substance Use Topics   • Alcohol use: Yes     Comment: socially   • Drug use: No     Family History   Problem Relation Age of Onset   • Hypertension Other    • Heart disease Other    • Hypertension Mother    • Heart disease Father    • Lung cancer Father    • No Known Problems Sister    • No Known Problems Daughter    • No Known Problems Maternal Grandmother    • No Known Problems Paternal Grandmother    • No Known Problems Maternal Aunt    • No Known Problems Paternal Aunt    • No Known Problems Maternal Grandfather    • No Known Problems Paternal Grandfather    • BRCA 1/2 Neg Hx    • Breast cancer Neg Hx    • Colon cancer Neg Hx    • Endometrial cancer Neg Hx    • Ovarian cancer Neg Hx      Health Maintenance Due   Topic Date Due   • ZOSTER VACCINE (1 of 2) Never done   • HEPATITIS C SCREENING  Never done      Immunization History   Administered Date(s) Administered   • COVID-19 (MODERNA) 1st, 2nd, 3rd Dose Only  02/05/2021, 03/05/2021, 11/19/2021   • COVID-19 (PFIZER) 02/05/2021, 02/26/2021   • Flu Vaccine Split Quad 09/25/2019, 09/23/2021   • Flucelvax Quad Vial =>4yrs 09/28/2020   • Hepatitis A 05/02/2018, 11/20/2018, 11/20/2018   • Tdap 05/29/2019        Objective     Vitals:    01/12/22 1052   BP: 133/81   BP Location: Right arm   Patient Position: Sitting   Pulse: 77   Temp: 98 °F (36.7 °C)   TempSrc: Oral   Weight: 82.6 kg (182 lb)     Body mass index is 31.22 kg/m².     Physical Exam  Vitals reviewed.   Constitutional:       General: She is not in acute distress.     Appearance: Normal appearance. She is well-developed.   HENT:      Head: Normocephalic and atraumatic.   Cardiovascular:      Rate and Rhythm: Normal rate and regular rhythm.   Pulmonary:      Effort: Pulmonary effort is normal.      Breath sounds: Normal breath sounds.   Abdominal:      General: Bowel sounds are normal.      Palpations: Abdomen is soft.      Tenderness: There is no abdominal tenderness.   Neurological:      Mental Status: She is alert and oriented to person, place, and time.   Psychiatric:         Mood and Affect: Mood and affect normal.           Result Review :     The following data was reviewed by: SHELBIE Mccormick on 01/12/2022:          Urine Culture - , (03/26/2021 07:43)  Urine Culture - , (03/26/2021 07:43)  Antinuclear Antibody With Reflex Cascade (03/26/2021 07:43)  Urinalysis With Microscopic - (03/26/2021 07:43)  RA Profile II (03/26/2021 07:43)  Physical [facility] Panel (03/26/2021 07:43)  BNP (03/26/2021 07:43)  Urinalysis With Microscopic - (03/24/2021 15:10)                  Assessment and Plan      Diagnoses and all orders for this visit:    1. Diarrhea, unspecified type (Primary)  Comments:  Increase fiber to help bulk up stools. Make sure drinking plenty of water. Increasing dose of dicyclomine. Testing to rule out acute pathology.   Orders:  -     dicyclomine (BENTYL) 20 MG tablet; Take 1 tablet by  mouth 4 (Four) Times a Day Before Meals & at Bedtime.  Dispense: 120 tablet; Refill: 0  -     Ambulatory Referral to Gastroenterology  -     loperamide (IMODIUM) 2 MG capsule; Take 1 capsule by mouth 4 (Four) Times a Day As Needed for Diarrhea.  Dispense: 60 capsule; Refill: 0  -     CBC w AUTO Differential; Future  -     Comprehensive metabolic panel; Future  -     Celiac Disease Antibody Screen; Future  -     Clostridium Difficile Toxin, PCR - Stool, Per Rectum; Future  -     Enteric Bacterial Panel - Stool, Per Rectum; Future  -     Enteric Parasite Panel - Stool, Per Rectum; Future  -     Ova & Parasite Examination - Stool, Per Rectum; Future    2. Irritable bowel syndrome with both constipation and diarrhea  -     dicyclomine (BENTYL) 20 MG tablet; Take 1 tablet by mouth 4 (Four) Times a Day Before Meals & at Bedtime.  Dispense: 120 tablet; Refill: 0  -     Ambulatory Referral to Gastroenterology  -     loperamide (IMODIUM) 2 MG capsule; Take 1 capsule by mouth 4 (Four) Times a Day As Needed for Diarrhea.  Dispense: 60 capsule; Refill: 0    3. Screening for viral disease  -     Hepatitis C antibody; Future              Follow Up     Return for Next scheduled follow up.

## 2022-01-13 ENCOUNTER — TELEPHONE (OUTPATIENT)
Dept: FAMILY MEDICINE CLINIC | Age: 57
End: 2022-01-13

## 2022-01-13 DIAGNOSIS — A49.8 CLOSTRIDIUM DIFFICILE INFECTION: Primary | ICD-10-CM

## 2022-01-13 LAB
027 TOXIN: ABNORMAL
C COLI+JEJ+UPSA DNA STL QL NAA+NON-PROBE: NOT DETECTED
C DIFF TOX GENS STL QL NAA+PROBE: POSITIVE
CRYPTOSP DNA STL QL NAA+NON-PROBE: NOT DETECTED
E HISTOLYT DNA STL QL NAA+NON-PROBE: NOT DETECTED
EC STX1+STX2 GENES STL QL NAA+NON-PROBE: NOT DETECTED
G LAMBLIA DNA STL QL NAA+NON-PROBE: NOT DETECTED
S ENT+BONG DNA STL QL NAA+NON-PROBE: NOT DETECTED
SHIGELLA SP+EIEC IPAH ST NAA+NON-PROBE: NOT DETECTED

## 2022-01-13 RX ORDER — VANCOMYCIN HYDROCHLORIDE 125 MG/1
125 CAPSULE ORAL 4 TIMES DAILY
Qty: 40 CAPSULE | Refills: 0 | Status: SHIPPED | OUTPATIENT
Start: 2022-01-13 | End: 2022-01-24

## 2022-01-13 NOTE — TELEPHONE ENCOUNTER
Caller: Ema Cisse    Relationship to patient: Self    Best call back number:  [phone]     Patient is needing: THE PATIENT IS REQUESTING TO SPEAK WITH CLINICAL REGARDING BEING DIAGNOSED WITH C-DIF.  PATIENT STATED SHE WOULD LIKE TO FURTHER DISCUSS.

## 2022-01-14 LAB
GLIADIN PEPTIDE IGA SER-ACNC: 5 UNITS (ref 0–19)
IGA SERPL-MCNC: 205 MG/DL (ref 87–352)
TTG IGA SER-ACNC: <2 U/ML (ref 0–3)

## 2022-01-24 ENCOUNTER — OFFICE VISIT (OUTPATIENT)
Dept: GASTROENTEROLOGY | Facility: CLINIC | Age: 57
End: 2022-01-24

## 2022-01-24 VITALS
WEIGHT: 187.8 LBS | SYSTOLIC BLOOD PRESSURE: 142 MMHG | DIASTOLIC BLOOD PRESSURE: 85 MMHG | BODY MASS INDEX: 31.29 KG/M2 | HEART RATE: 81 BPM | HEIGHT: 65 IN

## 2022-01-24 DIAGNOSIS — A04.72 C. DIFFICILE DIARRHEA: Primary | ICD-10-CM

## 2022-01-24 PROCEDURE — 99214 OFFICE O/P EST MOD 30 MIN: CPT | Performed by: NURSE PRACTITIONER

## 2022-01-24 RX ORDER — SACCHAROMYCES BOULARDII 250 MG
250 CAPSULE ORAL 2 TIMES DAILY
Qty: 60 CAPSULE | Refills: 2 | Status: SHIPPED | OUTPATIENT
Start: 2022-01-24 | End: 2022-02-23

## 2022-01-24 NOTE — PROGRESS NOTES
Patient Name: Ema Cisse   Visit Date: 01/24/2022   Patient ID: [id number]  Provider: SHELBIE Gupta    Sex: female  Location:  Location Address:  Location Phone: [phone] [facility] [address] [address] [address] [phone]    YOB: 1965      Primary Care Provider Chary Hartman APRN      Referring Provider: SHELBIE German        Chief Complaint  Diarrhea and Constipation    History of Present Illness  Ema Cisse is a 56 y.o. who presents to [facility] on referral from SHELBIE German for a gastroenterology evaluation of Diarrhea and Constipation.    Ms. Cisse presents today for evaluation of cdiff.  Reports she was having a bowel movement multiple times a day, always loose stool. BM 5+ times a day, worse after meals. Stool studies consistent with positive c diff and she was treated with vancomycin. She has approx 3 days left of antibiotic. Still having a bowel movement around 5x a day, always loose.  Does not feel that she is getting any better.  Abdominal cramping present.  Mild relief with dicyclomine.  Fecal urgency after meals.  Denies any hematochezia or melena.    Labs Result Review Imaging    History reviewed. No pertinent past medical history.    Past Surgical History:   Procedure Laterality Date   • BUNIONECTOMY  2008   • CHOLECYSTECTOMY  1984   • COLONOSCOPY  2017   • COLONOSCOPY  2017   • CYSTECTOMY     • EPIDURAL Right 11/3/2021    Procedure: Right L5 LUMBAR/SACRAL TRANSFORAMINAL EPIDURAL;  Surgeon: Jeronimo Lind MD;  [place] [facility] [place];  Service: Pain Management;  Laterality: Right;   • KNEE SURGERY  07/2005    ACL Removal   • MEDIAL BRANCH BLOCK Left 12/14/2021    Procedure: Left C3-C5 MEDIAL BRANCH BLOCK;  Surgeon: Jeronimo Lind MD;  [place] [facility] [place];  Service: Pain Management;  Laterality: Left;   • SINUS SURGERY  2010   • UPPER GASTROINTESTINAL ENDOSCOPY  2002         Current Outpatient  Medications:   •  albuterol sulfate  (90 Base) MCG/ACT inhaler, Inhale 2 puffs Every 4 (Four) Hours As Needed., Disp: , Rfl:   •  Cetirizine HCl 10 MG capsule, Take 10 mg by mouth Daily., Disp: , Rfl:   •  dicyclomine (BENTYL) 20 MG tablet, Take 1 tablet by mouth 4 (Four) Times a Day Before Meals & at Bedtime., Disp: 120 tablet, Rfl: 0  •  escitalopram (Lexapro) 20 MG tablet, Take 1 tablet by mouth Daily., Disp: 90 tablet, Rfl: 1  •  gabapentin (NEURONTIN) 300 MG capsule, Take 2 capsules by mouth Every Night., Disp: 60 capsule, Rfl: 5  •  loperamide (IMODIUM) 2 MG capsule, Take 1 capsule by mouth 4 (Four) Times a Day As Needed for Diarrhea., Disp: 60 capsule, Rfl: 0  •  montelukast (SINGULAIR) 10 MG tablet, , Disp: , Rfl:   •  multivitamin with minerals tablet tablet, Take 1 tablet by mouth Daily., Disp: , Rfl:   •  naproxen (NAPROSYN) 500 MG tablet, Take 1 tablet by mouth 2 (Two) Times a Day With Meals., Disp: 180 tablet, Rfl: 1  •  pantoprazole (PROTONIX) 40 MG EC tablet, 40 mg Daily., Disp: , Rfl:   •  SUMAtriptan (IMITREX) 50 MG tablet, Take 1 tablet by mouth 1 (One) Time As Needed for Migraine., Disp: 9 tablet, Rfl: 1  •  traZODone (DESYREL) 50 MG tablet, Take 1 tablet by mouth Every Night., Disp: 90 tablet, Rfl: 1  •  vancomycin (VANCOCIN) 125 MG capsule, Take 1 capsule by mouth 4 (Four) Times a Day for 10 days., Disp: 40 capsule, Rfl: 0  •  fidaxomicin (Dificid) 200 MG tablet, Take 1 tablet by mouth 2 (Two) Times a Day for 10 days., Disp: 20 tablet, Rfl: 0  •  saccharomyces boulardii (Florastor) 250 MG capsule, Take 1 capsule by mouth 2 (Two) Times a Day for 30 days., Disp: 60 capsule, Rfl: 2     Allergies   Allergen Reactions   • Sulfa Antibiotics Rash   • Trintellix [Vortioxetine] Itching       Family History   Problem Relation Age of Onset   • Hypertension Other    • Heart disease Other    • Hypertension Mother    • Heart disease Father    • Lung cancer Father    • No Known Problems Sister    • No  "Known Problems Daughter    • No Known Problems Maternal Grandmother    • No Known Problems Paternal Grandmother    • No Known Problems Maternal Aunt    • No Known Problems Paternal Aunt    • No Known Problems Maternal Grandfather    • No Known Problems Paternal Grandfather    • BRCA 1/2 Neg Hx    • Breast cancer Neg Hx    • Colon cancer Neg Hx    • Endometrial cancer Neg Hx    • Ovarian cancer Neg Hx         Social History     Social History Narrative   • Not on file         Objective     Review of Systems   Gastrointestinal: Positive for diarrhea.        Vital Signs:   /85 (BP Location: Right arm, Patient Position: Sitting, Cuff Size: Large Adult)   Pulse 81   Ht 165.1 cm (65\")   Wt 85.2 kg (187 lb 12.8 oz)   BMI 31.25 kg/m²       Physical Exam  Constitutional:       General: She is not in acute distress.     Appearance: Normal appearance. She is well-developed and normal weight.   HENT:      Head: Normocephalic and atraumatic.   Eyes:      Conjunctiva/sclera: Conjunctivae normal.      Pupils: Pupils are equal, round, and reactive to light.      Visual Fields: Right eye visual fields normal and left eye visual fields normal.   Cardiovascular:      Rate and Rhythm: Normal rate and regular rhythm.      Heart sounds: Normal heart sounds.   Pulmonary:      Effort: Pulmonary effort is normal. No retractions.      Breath sounds: Normal breath sounds and air entry.   Abdominal:      General: Bowel sounds are normal. There is no distension.      Palpations: Abdomen is soft.      Tenderness: There is no abdominal tenderness.      Comments: No appreciable hepatosplenomegaly or ascites   Musculoskeletal:         General: Normal range of motion.      Cervical back: Normal range of motion and neck supple.   Skin:     General: Skin is warm and dry.   Neurological:      Mental Status: She is alert and oriented to person, place, and time.   Psychiatric:         Mood and Affect: Mood and affect normal.         Behavior: " Behavior normal.         Result Review :   The following data was reviewed by: SHELBIE Gupta on 01/24/2022:  CMP    CMP 3/26/21 1/12/22   Glucose 99 93   BUN 9 9   Creatinine 0.95 (A) 0.84   eGFR Non African Am  70   Sodium 142 139   Potassium 4.1 3.5   Chloride 106 106   Calcium 9.3 9.2   Albumin 4.1 4.20   Total Bilirubin 0.22 0.2   Alkaline Phosphatase 93 100   AST (SGOT) 17 18   ALT (SGPT) 17 16   (A) Abnormal value            CBC w/diff    CBC w/Diff 3/24/21 3/26/21 3/26/21 1/12/22     0743 0743    WBC   5.35 7.75   RBC NONE SEEN NONE SEEN 4.67 4.47   Hemoglobin   13.90 13.3   Hematocrit   42.0 40.1   MCV   89.9 89.7   MCH   29.8 29.8   MCHC   33.1 33.2   RDW   12.9 13.3   Platelets   337.00 343   Neutrophil Rel %    62.8   Immature Granulocyte Rel %    0.1   Lymphocyte Rel %    27.1   Monocyte Rel %    6.6   Eosinophil Rel %    2.5   Basophil Rel %    0.9           No results found for: IRON, TIBC, FERRITIN, LABIRON           Assessment and Plan    Diagnoses and all orders for this visit:    1. C. difficile diarrhea (Primary)    Other orders  -     fidaxomicin (Dificid) 200 MG tablet; Take 1 tablet by mouth 2 (Two) Times a Day for 10 days.  Dispense: 20 tablet; Refill: 0  -     saccharomyces boulardii (Florastor) 250 MG capsule; Take 1 capsule by mouth 2 (Two) Times a Day for 30 days.  Dispense: 60 capsule; Refill: 2      * Surgery not found *     Consider colonoscopy after resolution of C. difficile for colon cancer screening given history of colon polyps.    Follow Up   Return in about 6 weeks (around 3/7/2022).  Patient was given instructions and counseling regarding her condition or for health maintenance advice. Please see specific information pulled into the AVS if appropriate.

## 2022-01-24 NOTE — PATIENT INSTRUCTIONS
Clostridioides Difficile Infection  Clostridioides difficile infection, or C. diff, is an infection that is caused by C. diff germs (bacteria).  This infection may happen after you take antibiotics that kill other germs and let C. diff germs grow. C. diff can be spread from person to person (is contagious).  What are the causes?  · Taking certain antibiotics.  · Coming in contact with people, food, or things that have C. diff.  What increases the risk?  · Taking certain antibiotics for a long time.  · Staying in a hospital or long-term care facility for a long time.  · Being age 65 or older.  · Having had C. diff before or been exposed to C. diff.  · Having a weak disease-fighting system (immune system).  · Taking medicines that treat stomach acid.  · Having serious health problems, including:  ? Colon cancer.  ? Inflammatory bowel disease (IBD).  · Having had a procedure or surgery on your digestive system.  What are the signs or symptoms?  · Watery poop (diarrhea).  · Fever.  · Not feeling hungry.  · Feeling like you may vomit.  · Swelling, pain, cramps, or a tender belly.  How is this treated?  Treatment may include:  · Stopping the antibiotics that caused the C. diff infection.  · Taking antibiotics that kill C. diff.  · Placing poop from a healthy person into your colon (fecal transplant).  · Doing surgery to take out the infected part of the colon.  Follow these instructions at home:  Medicines  · Take over-the-counter and prescription medicines only as told by your doctor.  · Take antibiotic medicine as told by your doctor. Do not stop taking it even if you start to feel better.  · Do not take medicines to treat watery poop unless your doctor tells you to.  Eating and drinking    · Follow instructions from your doctor about what to eat and drink. This may include eating bland foods in small amounts, such as:  ? Bananas.  ? Applesauce.  ? Rice.  ? Lean meats.  ? Toast.  ? Crackers.  · To prevent loss of fluid  in your body (dehydration):  ? Take in enough fluids to keep your pee pale yellow. This includes water, ice chips, clear fruit juice with water added to it, or low-calorie sports drinks.  ? Take an ORS (oral rehydration solution). This drink is sold in pharmacies and retail stores.  · Avoid milk, caffeine, and alcohol.    General instructions  · Wash your hands often with soap and water. Do this for at least 20 seconds.  · Take a bath or shower every day.  · Return to your normal activities when your doctor says that it is safe.  · Keep all follow-up visits.  How is this prevented?  Personal hygiene    · Wash your hands often with soap and water. Do this for at least 20 seconds.  · Wash your hands before you cook and after you use the bathroom.  · Other people should wash their hands too, especially:  ? People who live with you.  ? People who visit you in a hospital or clinic.    Contact precautions  · If you get watery poop while you are in the hospital or a long-term care facility, tell your doctor right away.  · When you visit someone in the hospital or a long-term care facility, wear a gown, gloves, or other protection.  · If possible:  ? Stay away from people who have diarrhea.  ? Use a separate bathroom if you are sick and live with other people.  Clean environment  · Keep your home clean.  ? Clean your home every day for at least a week after you leave the hospital.  · Clean surfaces that you touch every day. Use a product that has a 10% chlorine bleach solution. Be sure to:  ? Read the label on your product to make sure that the product will kill the germs on your surfaces.  ? Clean toilets and flush handles, bathtubs, sinks, doorknobs and handles, countertops, and work surfaces.  · If you are in the hospital, make sure the surfaces in your room are cleaned each day. Tell someone right away if body fluids have splashed or spilled.  Clothes and linens  · Wash clothes and linens using laundry soap that has  chlorine bleach. Be sure to:  ? Use powder soap instead of liquid.  ? Clean your washing machine once a month. To do this, turn on the hot setting with only soap in it.  Contact a doctor if:  · Your symptoms do not get better or they get worse.  · Your symptoms go away and then come back.  · You have a fever.  · You have new symptoms.  Get help right away if:  · Your belly is more tender or you have more pain.  · Your poop is mostly bloody.  · Your poop looks black.  · You vomit after you eat or drink.  · You have signs of not having enough fluids in your body. These include:  ? Dark yellow pee, very little pee, or no pee.  ? Cracked lips or dry mouth.  ? No tears when you cry.  ? Sunken eyes.  ? Feeling sleepy.  ? Feeling weak or dizzy.  Summary  · C. diff infection is an infection that may happen after you take antibiotic medicines.  · Symptoms include watery poop, fever, not feeling hungry, or feeling like you may vomit.  · Treatment includes stopping the antibiotics that made you sick and taking antibiotics that kill the C. diff germs. Poop from a healthy person may also be placed into your colon.  · To prevent C. diff infectionfrom spreading, wash hands often with soap and water. Do this for at least 20 seconds. Keep your home clean.  This information is not intended to replace advice given to you by your health care provider. Make sure you discuss any questions you have with your health care provider.  Document Revised: 04/08/2021 Document Reviewed: 04/08/2021  [employer] Patient Education © 2021 [employer]

## 2022-01-28 ENCOUNTER — PROCEDURE VISIT (OUTPATIENT)
Dept: OBSTETRICS AND GYNECOLOGY | Age: 57
End: 2022-01-28

## 2022-01-28 ENCOUNTER — APPOINTMENT (OUTPATIENT)
Dept: WOMENS IMAGING | Facility: HOSPITAL | Age: 57
End: 2022-01-28

## 2022-01-28 ENCOUNTER — OFFICE VISIT (OUTPATIENT)
Dept: OBSTETRICS AND GYNECOLOGY | Age: 57
End: 2022-01-28

## 2022-01-28 ENCOUNTER — HOSPITAL ENCOUNTER (OUTPATIENT)
Dept: CARDIOLOGY | Facility: HOSPITAL | Age: 57
Discharge: HOME | End: 2022-01-28
Admitting: OBSTETRICS & GYNECOLOGY

## 2022-01-28 VITALS
DIASTOLIC BLOOD PRESSURE: 80 MMHG | WEIGHT: 182.8 LBS | BODY MASS INDEX: 30.46 KG/M2 | HEIGHT: 65 IN | SYSTOLIC BLOOD PRESSURE: 132 MMHG

## 2022-01-28 DIAGNOSIS — I83.813 VARICOSE VEINS OF BOTH LOWER EXTREMITIES WITH PAIN: ICD-10-CM

## 2022-01-28 DIAGNOSIS — M79.604 PAIN OF RIGHT LOWER EXTREMITY: ICD-10-CM

## 2022-01-28 DIAGNOSIS — Z12.31 VISIT FOR SCREENING MAMMOGRAM: Primary | ICD-10-CM

## 2022-01-28 DIAGNOSIS — Z01.419 ENCOUNTER FOR GYNECOLOGICAL EXAMINATION WITHOUT ABNORMAL FINDING: Primary | ICD-10-CM

## 2022-01-28 LAB

## 2022-01-28 PROCEDURE — 77067 SCR MAMMO BI INCL CAD: CPT | Performed by: RADIOLOGY

## 2022-01-28 PROCEDURE — 77063 BREAST TOMOSYNTHESIS BI: CPT | Performed by: OBSTETRICS & GYNECOLOGY

## 2022-01-28 PROCEDURE — 93970 EXTREMITY STUDY: CPT

## 2022-01-28 PROCEDURE — 99396 PREV VISIT EST AGE 40-64: CPT | Performed by: OBSTETRICS & GYNECOLOGY

## 2022-01-28 PROCEDURE — 77063 BREAST TOMOSYNTHESIS BI: CPT | Performed by: RADIOLOGY

## 2022-01-28 PROCEDURE — 77067 SCR MAMMO BI INCL CAD: CPT | Performed by: OBSTETRICS & GYNECOLOGY

## 2022-01-28 NOTE — PROGRESS NOTES
Routine Annual Visit    2022    Patient: Ema Cisse          MR#:[id number]      Chief Complaint   Patient presents with   • Gynecologic Exam     Mammo today @10:40, Last Pap 20 (-), Last Mammo 2020, Last C/Scope , Last Dexa , C/O Spot in her leg       History of Present Illness    56 y.o. female  who presents for annual exam.   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 Patient is postmenopausal.  Obstetric History:  OB History        1    Para   1    Term   1            AB        Living   1       SAB        IAB        Ectopic        Molar        Multiple        Live Births   1               Menstrual History:     No LMP recorded (lmp unknown). Patient is postmenopausal.       Sexual History:       ________________________________________  Patient Active Problem List   Diagnosis   • Somatic dysfunction of sacroiliac joint   • Lumbar disc disorder   • Chronic pain   • Lumbar facet arthropathy   • Metatarsalgia of left foot   • Other osteonecrosis, left foot (HCC)   • Vitamin D deficiency   • GERD without esophagitis   • Irritable bowel syndrome with both constipation and diarrhea   • Impingement syndrome of left shoulder   • DDD (degenerative disc disease), cervical   • Migraine with aura, not intractable, without status migrainosus   • Persistent mood (affective) disorder, unspecified (HCC)   • Primary insomnia   • Cervicalgia   • Primary generalized (osteo)arthritis   • DDD (degenerative disc disease), lumbar   • Lumbar radiculopathy   • Lumbar foraminal stenosis   • Arthropathy of cervical facet joint       No past medical history on file.    Past Surgical History:   Procedure Laterality Date   • BUNIONECTOMY   "2008   • CHOLECYSTECTOMY  1984   • COLONOSCOPY  2017   • COLONOSCOPY  2017   • CYSTECTOMY     • EPIDURAL Right 11/3/2021    Procedure: Right L5 LUMBAR/SACRAL TRANSFORAMINAL EPIDURAL;  Surgeon: Jeronimo Lind MD;  Location: [facility];  Service: Pain Management;  Laterality: Right;   • KNEE SURGERY  07/2005    ACL Removal   • MEDIAL BRANCH BLOCK Left 12/14/2021    Procedure: Left C3-C5 MEDIAL BRANCH BLOCK;  Surgeon: Jeronimo Lind MD;  Location: [facility];  Service: Pain Management;  Laterality: Left;   • SINUS SURGERY  2010   • UPPER GASTROINTESTINAL ENDOSCOPY  2002       Social History     Tobacco Use   Smoking Status Never Smoker   Smokeless Tobacco Never Used       has a current medication list which includes the following prescription(s): albuterol sulfate hfa, cetirizine hcl, dicyclomine, escitalopram, fidaxomicin, gabapentin, loperamide, montelukast, multivitamin with minerals, naproxen, pantoprazole, saccharomyces boulardii, sumatriptan, and trazodone.  ________________________________________    Current contraception: post menopausal status  History of abnormal Pap smear: no  Family history of Breast cancer: no  Family history of uterine or ovarian cancer: no  Family History of colon cancer/colon polyps: no  History of abnormal mammogram: no      The following portions of the patient's history were reviewed and updated as appropriate: allergies, current medications, past family history, past medical history, past social history, past surgical history and problem list.    Review of Systems    Pertinent items are noted in HPI.     Objective   Physical Exam  Constitutional:              /80   Ht 165.1 cm (65\")   Wt 82.9 kg (182 lb 12.8 oz)   LMP  (LMP Unknown)   Breastfeeding No   BMI 30.42 kg/m²    BP Readings from Last 3 Encounters:   01/28/22 132/80   01/24/22 142/85   01/12/22 133/81      Wt Readings from Last 3 Encounters:   01/28/22 82.9 kg (182 lb 12.8 oz)   01/24/22 85.2 kg " "(187 lb 12.8 oz)   01/12/22 82.6 kg (182 lb)      BMI: Estimated body mass index is 30.42 kg/m² as calculated from the following:    Height as of this encounter: 165.1 cm (65\").    Weight as of this encounter: 82.9 kg (182 lb 12.8 oz).      General:   alert, appears stated age and cooperative   Abdomen: soft, non-tender, without masses or organomegaly   Breast: inspection negative, no nipple discharge or bleeding, no masses or nodularity palpable   Vulva: normal   Vagina: normal mucosa   Cervix: no cervical motion tenderness and no lesions   Uterus: normal size, mobile and non-tender   Adnexa: no mass, fullness, tenderness       Assessment:    1. Normal annual exam   Assessment     ICD-10-CM ICD-9-CM   1. Encounter for gynecological examination without abnormal finding  Z01.419 V72.31   2. Pain of right lower extremity  M79.604 729.5   3. Varicose veins of both lower extremities with pain  I83.813 454.8     Plan:    Plan     [x]  Mammogram request made  []  PAP done  []  Labs:   []  GC/Chl/TV  []  DEXA scan   []  Referral for colonoscopy:       Diagnoses and all orders for this visit:    1. Encounter for gynecological examination without abnormal finding (Primary)    2. Pain of right lower extremity  -     Cancel: Duplex Venous Lower Extremity - Bilateral CAR; Future  -     Duplex Venous Lower Extremity - Bilateral CAR    3. Varicose veins of both lower extremities with pain  -     Cancel: Duplex Venous Lower Extremity - Bilateral CAR; Future  -     Duplex Venous Lower Extremity - Bilateral CAR      Doppler ordered  If negative pt to follow up with primary care      Counseling:  --Nutrition: Stressed importance of moderation and caloric balance, stressed fresh fruit and vegetables  --Exercise: Stressed the importance of regular exercise. 3-5 times weekly   - Discussed screening mammogram recommendations.   --Discussed benefits of screening colonoscopy- age 45 unless FH  --Discussed pap smear screening " recommendations

## 2022-01-31 ENCOUNTER — OFFICE VISIT (OUTPATIENT)
Dept: PAIN MEDICINE | Facility: CLINIC | Age: 57
End: 2022-01-31

## 2022-01-31 ENCOUNTER — PREP FOR SURGERY (OUTPATIENT)
Dept: SURGERY | Facility: SURGERY CENTER | Age: 57
End: 2022-01-31

## 2022-01-31 VITALS
SYSTOLIC BLOOD PRESSURE: 126 MMHG | WEIGHT: 186.6 LBS | HEART RATE: 84 BPM | RESPIRATION RATE: 16 BRPM | HEIGHT: 65 IN | DIASTOLIC BLOOD PRESSURE: 84 MMHG | TEMPERATURE: 96.9 F | OXYGEN SATURATION: 98 % | BODY MASS INDEX: 31.09 KG/M2

## 2022-01-31 DIAGNOSIS — M47.812 ARTHROPATHY OF CERVICAL FACET JOINT: Primary | ICD-10-CM

## 2022-01-31 DIAGNOSIS — M51.36 DDD (DEGENERATIVE DISC DISEASE), LUMBAR: ICD-10-CM

## 2022-01-31 DIAGNOSIS — M54.16 LUMBAR RADICULOPATHY: ICD-10-CM

## 2022-01-31 DIAGNOSIS — M50.30 DDD (DEGENERATIVE DISC DISEASE), CERVICAL: ICD-10-CM

## 2022-01-31 PROCEDURE — 99214 OFFICE O/P EST MOD 30 MIN: CPT | Performed by: NURSE PRACTITIONER

## 2022-01-31 RX ORDER — SODIUM CHLORIDE 0.9 % (FLUSH) 0.9 %
10 SYRINGE (ML) INJECTION EVERY 12 HOURS SCHEDULED
Status: CANCELLED | OUTPATIENT
Start: 2022-01-31

## 2022-01-31 RX ORDER — SODIUM CHLORIDE 0.9 % (FLUSH) 0.9 %
10 SYRINGE (ML) INJECTION AS NEEDED
Status: CANCELLED | OUTPATIENT
Start: 2022-01-31

## 2022-01-31 NOTE — PROGRESS NOTES
"CHIEF COMPLAINT  PROCEDURE FOLLOW-UP(BACK AND NECK) Left C3-C5 MEDIAL BRANCH BLOCK    Subjective   Ema Cisse is a 56 y.o. female  who presents to the office for follow-up of procedure.  She completed a Left C3-C5 MBB   on  12/14/2021 performed by Dr. Lind for management of neck pain. Patient reports 95% relief the day of the procedure with 70% relief the day after and then her pain returned to baseline.      Today her pain is 5/10VAS in severity. She states that her pain in her right low back with radiates to her right lateral hip and down her lateral leg has started to increase.  This previously responded well to right L5 TFESI. Discussed that we could repeat this, currently her left neck pain is her primary concern and she would like to focus on that for now. She continues with Gabapentin 600 mg nightly.     Failed Robaxin (side effects), meloxicam, naproxen, chlorzoxazone, tramadol (side effects), and gabapentin (side effects-previously)     She has been evaluated by rheumatology-osteoarthritis.    Follow by Dr. Morin for Carpal Tunnel syndrome in both wrists.     Procedure list:  11/3/2021-right L5 TFESI-70% ongoing relief  12/30/2020-BONILLA at C5-6-a few days of relief, then minimal relief thereafter  10/20/2020-bilateral S1-S3 RFA-\"some better\"  8/6/2020-bilateral SI joint injections-100% relief for a couple weeks    Patient remained masked during entire encounter. No cough present. I donned a mask and eye protection throughout entire visit. Prior to donning mask and eye protection, hand hygiene was performed, as well as when it was doffed.  I was closer than 6 feet, but not for an extended period of time. No obvious exposure to any bodily fluids.    Back Pain  This is a chronic problem. The current episode started more than 1 year ago. The problem occurs daily. The problem has been gradually worsening since onset. The pain is present in the lumbar spine and sacro-iliac. The pain is moderate. " Exacerbated by: prolonged walking, standing. Stiffness is present in the morning. Associated symptoms include tingling (left arm first thing in the morning) and weakness (right side). Pertinent negatives include no abdominal pain, chest pain, dysuria, fever, headaches or numbness. Risk factors include obesity. She has tried NSAIDs and analgesics for the symptoms. The treatment provided significant (with RFL) relief.   Neck Pain   This is a chronic problem. The current episode started more than 1 month ago. The problem occurs daily. The problem has been gradually worsening. The pain is present in the left side (radiating into the left trapezius). The quality of the pain is described as aching and burning. The pain is at a severity of 5/10. The pain is moderate. The symptoms are aggravated by position. Associated symptoms include tingling (left arm first thing in the morning) and weakness (right side). Pertinent negatives include no chest pain, fever, headaches or numbness. She has tried NSAIDs, muscle relaxants, neck support and home exercises for the symptoms. The treatment provided mild relief.      PEG Assessment   What number best describes your pain on average in the past week?8  What number best describes how, during the past week, pain has interfered with your enjoyment of life?8  What number best describes how, during the past week, pain has interfered with your general activity?  8    The following portions of the patient's history were reviewed and updated as appropriate: allergies, current medications, past family history, past medical history, past social history, past surgical history and problem list.    Review of Systems   Constitutional: Negative for activity change, fatigue and fever.   HENT: Negative for congestion.    Eyes: Negative for visual disturbance.   Respiratory: Negative for cough and chest tightness.    Cardiovascular: Negative for chest pain.   Gastrointestinal: Positive for constipation  "and diarrhea. Negative for abdominal pain.   Genitourinary: Negative for difficulty urinating and dysuria.   Musculoskeletal: Positive for back pain and neck pain.   Neurological: Positive for tingling (left arm first thing in the morning) and weakness (right side). Negative for dizziness, light-headedness, numbness and headaches.   Psychiatric/Behavioral: Negative for agitation, sleep disturbance and suicidal ideas. The patient is not nervous/anxious.      --  The aforementioned information the Chief Complaint section and above subjective data including any HPI data, and also the Review of Systems data, has been personally reviewed and affirmed.  --     Vitals:    01/31/22 1541   BP: 126/84   Pulse: 84   Resp: 16   Temp: 96.9 °F (36.1 °C)   SpO2: 98%   Weight: 84.6 kg (186 lb 9.6 oz)   Height: 165.1 cm (65\")   PainSc:   5   PainLoc: Back  Comment: NECK     Objective   Physical Exam  Vitals and nursing note reviewed.   Constitutional:       Appearance: Normal appearance. She is well-developed.   Eyes:      General: Lids are normal.   Cardiovascular:      Rate and Rhythm: Normal rate.   Pulmonary:      Effort: Pulmonary effort is normal.   Musculoskeletal:      Cervical back: Tenderness present. Decreased range of motion.      Lumbar back: Tenderness present. Decreased range of motion. Positive right straight leg raise test. Negative left straight leg raise test.   Neurological:      Mental Status: She is alert and oriented to person, place, and time.   Psychiatric:         Attention and Perception: Attention normal.         Mood and Affect: Mood normal.         Speech: Speech normal.         Behavior: Behavior normal.         Judgment: Judgment normal.       Assessment/Plan   Diagnoses and all orders for this visit:    1. Arthropathy of cervical facet joint (Primary)    2. DDD (degenerative disc disease), cervical    3. Lumbar radiculopathy    4. DDD (degenerative disc disease), lumbar      ---Repeat Left C3-C5 " MBB  Reviewed the procedure at length with the patient.  Included in the review was expectations, complications, risk and benefits.The procedure was described in detail and the risks, benefits and alternatives were discussed with the patient (including but not limited to: bleeding, infection, nerve damage, worsening of pain, inability to perform injection, paralysis, seizures, coma, no pain relief and death) who agreed to proceed.  Discussed the potential for sedation if warranted/wanted.  The procedure will plan to be performed at [facility] with fluoroscopic guidance(unless ultrasound is indicated) and could potentially have steroids and contrast dye used. Questions were answered and in a way the patient could understand.  Patient verbalized understanding and wishes to proceed.  This intervention will be ordered.  Discussed with patient that all procedures are part of a multimodal plan of care and include either formal PT or a home exercise program.  Patient has no evidence of coagulopathy or current infection.    Discussed with the patient that sedation is optional for this procedure.  The sedation offered is called conscious sedation which is different from general anesthesia that is utilized in surgical procedures. The dosing of the sedation is determined by the physician and they will be monitored throughout the procedure. With conscious sedation it is possible to remember parts or all of the procedure, this is normal. They will need to have a  with them as driving is prohibited following conscious sedation.     NPO instructions for conscious sedation:  --- Do not eat 6 hours prior to the procedure.   --- Do not drink any dairy or citrus 4 hours prior to the procedure.   --- Do not drink anything, including clear liquids, 2 hours prior to procedure.     If the NPO instructions are not followed then the procedure may be performed without sedation or the procedure will need to be  rescheduled.     --- Plan to repeat Right L5 TFESI in the future   --- Follow-up after procedure     PATTI REPORT  As part of the patient's treatment plan, I am prescribing controlled substances. The patient has been made aware of appropriate use of such medications, including potential risk of somnolence, limited ability to drive and/or work safely, and the potential for dependence or overdose. It has also bee made clear that these medications are for use by this patient only, without concomitant use of alcohol or other substances unless prescribed.     As the clinician, I personally reviewed the PATTI from 1/31/2022 while the patient was in the office today.    History and physical exam exhibit continued safe and appropriate use of controlled substances.    Dictated utilizing Dragon dictation.      This document is intended for medical expert use only. Reading of this document by patients and/or patient's family without participating medical staff guidance may result in misinterpretation and unintended morbidity.   Any interpretation of such data is the responsibility of the patient and/or family member responsible for the patient in concert with their primary or specialist providers, not to be left for sources of online searches such as [employer] or similar queries. Relying on these approaches to knowledge may result in misinterpretation, misguided goals of care and even death should patients or family members try recommendations outside of the realm of professional medical care in a supervised way.

## 2022-01-31 NOTE — PATIENT INSTRUCTIONS
"--- Do not eat 6 hours prior to the procedure.   --- Do not drink any dairy or citrus 4 hours prior to the procedure.   --- Do not drink anything, including clear liquids, 2 hours prior to procedure.     -------  Education about Medial Branch Blockade and RF Therapy:    This medial branch blockade (MBB) suggested is intended for diagnostic purposes, with the intent of offering the patient Radiofrequency thermal rhizotomy (RF) if the MBB is diagnostically effective.  The diagnostic blockade is necessary to determine the likelihood that RF therapy could be efficacious in providing long term relief to the patient.    Medial branches are sensory nerve branches that connect to a facet joint and transmit sensations & pain signals from that joint.  Facet is a term for the type of joints found in the spine.  Medial branches are the nerves that go to a facet, and therefore are also sometimes called \"facet joint nerves\" (FJNs).      In a medial branch blockade procedure, xray fluoroscopy is used to verify the locations of the outside of the joint lines which are being targeted.  Under xray guidance, needles are placed to these areas.  Contrast dye is injected to confirm proper placement, with dye flowing over the joint area, and to ensure that the dye does not flow into unintended areas such as a vein.  When this is confirmed, local anesthetic is injected to block the medial branch at that joint level.      If MBBs are diagnostically successful in blocking pain, then the patient is most likely a great candidate for Radiofrequency of those facet joint nerves.  In the RF procedure, needles are placed to the joint lines in the same fashion, and after testing, the needle tips are heated to thermally treat the nerves, blocking the nerves by in essence damaging the nerves with the heat treatment.       Medically, a successful RF procedure should provide a patient with 50% pain relief or more for at least 6 months.  Clinical " experience suggests that successful patients receive relief more in the range of 12 months on average.  We also discussed that a fortunate minority of patients receive therapeutic success from the MBB, and may not require RF ablation.  If a patient receives more than 8 weeks of relief from MBB, then occasional repeat MBB for therapeutic purposes is a very reasonable alternative therapy.  This course of therapy is consistent with our LCDs according to our CMS  in the area, and therefore other insurance providers should follow accordingly.  We will monitor our patients to screen for these therapeutic responders and will offer RF therapy only when necessary.        We discussed that MBB & RF are not without risks.  Guidelines regarding anticoagulant use & neuraxial procedures will be respected.  Patients that are ill or otherwise may be at risk for sepsis will not have their spines accessed by neuraxial injections of any type.  This patient will not be offered these therapies if there is an increased risk.   We discussed that there is a risk of postprocedural pain and also a risk of worsening of clinical picture with these procedures as with any neuraxial procedure.    -------

## 2022-02-01 ENCOUNTER — TRANSCRIBE ORDERS (OUTPATIENT)
Dept: SURGERY | Facility: SURGERY CENTER | Age: 57
End: 2022-02-01

## 2022-02-01 DIAGNOSIS — Z41.9 SURGERY, ELECTIVE: Primary | ICD-10-CM

## 2022-02-02 ENCOUNTER — PATIENT ROUNDING (BHMG ONLY) (OUTPATIENT)
Dept: GASTROENTEROLOGY | Facility: CLINIC | Age: 57
End: 2022-02-02

## 2022-02-02 NOTE — PROGRESS NOTES
February 2, 2022    Hello, may I speak with Ema Cisse?    My name is Charlee WALKER      I am  with [facility]  [address] [place] [address] [phone].    Before we get started may I verify your date of birth? 1965    I am calling to officially welcome you to our practice and ask about your recent visit. Is this a good time to talk? No Voicemail Left     Tell me about your visit with us. What things went well?         We're always looking for ways to make our patients' experiences even better. Do you have recommendations on ways we may improve?     Overall were you satisfied with your first visit to our practice?        I appreciate you taking the time to speak with me today. Is there anything else I can do for you?     Thank you, and have a great day.

## 2022-02-08 ENCOUNTER — HOSPITAL ENCOUNTER (OUTPATIENT)
Facility: SURGERY CENTER | Age: 57
Setting detail: HOSPITAL OUTPATIENT SURGERY
Discharge: HOME | End: 2022-02-08
Attending: ANESTHESIOLOGY | Admitting: ANESTHESIOLOGY

## 2022-02-08 ENCOUNTER — HOSPITAL ENCOUNTER (OUTPATIENT)
Dept: GENERAL RADIOLOGY | Facility: SURGERY CENTER | Age: 57
Setting detail: HOSPITAL OUTPATIENT SURGERY
End: 2022-02-08

## 2022-02-08 VITALS
WEIGHT: 182 LBS | BODY MASS INDEX: 29.25 KG/M2 | HEIGHT: 66 IN | TEMPERATURE: 97.4 F | SYSTOLIC BLOOD PRESSURE: 155 MMHG | DIASTOLIC BLOOD PRESSURE: 101 MMHG | OXYGEN SATURATION: 97 % | HEART RATE: 79 BPM | RESPIRATION RATE: 18 BRPM

## 2022-02-08 DIAGNOSIS — M47.812 ARTHROPATHY OF CERVICAL FACET JOINT: ICD-10-CM

## 2022-02-08 DIAGNOSIS — Z41.9 SURGERY, ELECTIVE: ICD-10-CM

## 2022-02-08 PROCEDURE — 76000 FLUOROSCOPY <1 HR PHYS/QHP: CPT

## 2022-02-08 PROCEDURE — S0260 H&P FOR SURGERY: HCPCS | Performed by: ANESTHESIOLOGY

## 2022-02-08 PROCEDURE — 64491 INJ PARAVERT F JNT C/T 2 LEV: CPT | Performed by: ANESTHESIOLOGY

## 2022-02-08 PROCEDURE — 0 IOHEXOL 300 MG/ML SOLUTION 10 ML VIAL: Performed by: ANESTHESIOLOGY

## 2022-02-08 PROCEDURE — 64490 INJ PARAVERT F JNT C/T 1 LEV: CPT | Performed by: ANESTHESIOLOGY

## 2022-02-08 PROCEDURE — 77002 NEEDLE LOCALIZATION BY XRAY: CPT

## 2022-02-08 PROCEDURE — 25010000002 MIDAZOLAM PER 1 MG: Performed by: ANESTHESIOLOGY

## 2022-02-08 PROCEDURE — 3E0T3BZ INTRODUCTION OF ANESTHETIC AGENT INTO PERIPHERAL NERVES AND PLEXI, PERCUTANEOUS APPROACH: ICD-10-PCS | Performed by: ANESTHESIOLOGY

## 2022-02-08 RX ORDER — SODIUM CHLORIDE, SODIUM LACTATE, POTASSIUM CHLORIDE, CALCIUM CHLORIDE 600; 310; 30; 20 MG/100ML; MG/100ML; MG/100ML; MG/100ML
20 INJECTION, SOLUTION INTRAVENOUS CONTINUOUS
Status: DISCONTINUED | OUTPATIENT
Start: 2022-02-08 | End: 2022-02-08 | Stop reason: HOSPADM

## 2022-02-08 RX ORDER — SODIUM CHLORIDE 0.9 % (FLUSH) 0.9 %
10 SYRINGE (ML) INJECTION AS NEEDED
Status: DISCONTINUED | OUTPATIENT
Start: 2022-02-08 | End: 2022-02-08 | Stop reason: HOSPADM

## 2022-02-08 RX ORDER — SODIUM CHLORIDE 0.9 % (FLUSH) 0.9 %
10 SYRINGE (ML) INJECTION EVERY 12 HOURS SCHEDULED
Status: DISCONTINUED | OUTPATIENT
Start: 2022-02-08 | End: 2022-02-08 | Stop reason: HOSPADM

## 2022-02-08 RX ORDER — MIDAZOLAM HYDROCHLORIDE 1 MG/ML
INJECTION INTRAMUSCULAR; INTRAVENOUS AS NEEDED
Status: DISCONTINUED | OUTPATIENT
Start: 2022-02-08 | End: 2022-02-08 | Stop reason: HOSPADM

## 2022-02-08 RX ADMIN — SODIUM CHLORIDE, POTASSIUM CHLORIDE, SODIUM LACTATE AND CALCIUM CHLORIDE 20 ML/HR: 600; 310; 30; 20 INJECTION, SOLUTION INTRAVENOUS at 14:06

## 2022-02-08 NOTE — DISCHARGE INSTRUCTIONS
[facility] - Post-procedure Instructions          --  While there are no absolute restrictions, it is recommended that you do not perform strenuous activity today. In the morning, you may resume your level of activity as before your block.    --  If you have a band-aid at your injection site, please remove it later today. Observe the area for any redness, swelling, pus-like drainage, or a temperature over 101°. If any of these symptoms occur, please call your doctor at [phone]. If after office hours, leave a message and the on-call provider will return your call.    --  Ice may be applied to your injection site. It is recommended you avoid direct heat (heating pad; hot tub) for 1-2 days.    --  Call [facility] at [phone] if you experience persistent headache, persistent bleeding from the injection site, or severe pain not relieved by heat or oral medication.    --  Do not make important decisions today.    --  Due to the effects of the block and/or the I.V. Sedation, DO NOT drive or operate hazardous machinery for 12 hours.  Local anesthetics may cause numbness after procedure and precautions must be taken with regards to operating equipment as well as with walking, even if ambulating with assistance of another person or with an assistive device.    --  Do not drink alcohol for 12 hours.    -- You may return to work tomorrow, or as directed by your referring doctor.    --  Occasionally you may notice a slight increase in your pain after the procedure. This should start to improve within the next 24-48 hours. Radiofrequency ablation procedure pain may last 3-4 weeks.    --  It may take as long as 3-4 days before you notice a gradual improvement in your pain and/or other symptoms.    -- You may continue to take your prescribed pain medication as needed.    --  Some normal possible side effects of steroid use could include fluid retention, increased blood sugar, dull headache,  increased sweating, increased appetite, mood swings and flushing.    --  Diabetics are recommended to watch their blood glucose level closely for 24-48 hours after the injection.    --  Must stay in PACU for 20 min upon arrival and prove no leg weakness before being discharged.    --  IN THE EVENT OF A LIFE THREATENING EMERGENCY, (CHEST PAIN, BREATHING DIFFICULTIES, PARALYSIS…) YOU SHOULD GO TO YOUR NEAREST EMERGENCY ROOM.    --  You should be contacted by our office within 2-3 days to schedule follow up or next appointment date.  If not contacted within 7 days, please call the office at [phone]

## 2022-02-08 NOTE — H&P
[facility]   HISTORY AND PHYSICAL    Patient Name: Ema Cisse  : 1965  MRN: [id number]  Primary Care Physician:  Chary Hartman APRN  Date of admission: 2022    Subjective   Subjective     Chief Complaint: Neck pain more on the left    History of Present Illness  Some actual menstrual neck pain.  She previously had a left C3-5 cervical medial branch block on  it was diagnostically positive at 95% pain relief for a diagnostic interval.  Considering RF ablations a diagnostic injection being required a second time.  Review of Systems   No Fever, No Chills, No ear pain, No sinus pressure or drainage, No eye pain or drainage, No cough, No SOB, No chest tightness nor chest pain, no palpitations.      Personal History     History reviewed. No pertinent past medical history.    Past Surgical History:   Procedure Laterality Date   • BUNIONECTOMY     • CHOLECYSTECTOMY     • COLONOSCOPY     • COLONOSCOPY     • CYSTECTOMY     • EPIDURAL Right 11/3/2021    Procedure: Right L5 LUMBAR/SACRAL TRANSFORAMINAL EPIDURAL;  Surgeon: Jeronimo Lind MD;  Location: [facility] [place] MAIN OR;  Service: Pain Management;  Laterality: Right;   • KNEE SURGERY  2005    ACL Removal   • MEDIAL BRANCH BLOCK Left 2021    Procedure: Left C3-C5 MEDIAL BRANCH BLOCK;  Surgeon: Jeronimo Lind MD;  Location: [facility] [place] MAIN OR;  Service: Pain Management;  Laterality: Left;   • SINUS SURGERY     • UPPER GASTROINTESTINAL ENDOSCOPY         Family History: family history includes Heart disease in her father and another family member; Hypertension in her mother and another family member; Lung cancer in her father; No Known Problems in her daughter, maternal aunt, maternal grandfather, maternal grandmother, paternal aunt, paternal grandfather, paternal grandmother, and sister. Otherwise pertinent FHx was reviewed and not pertinent to current issue.    Social History:  reports that she has never smoked. She  has never used smokeless tobacco. She reports current alcohol use. She reports that she does not use drugs.    Home Medications:  Cetirizine HCl, SUMAtriptan, albuterol sulfate HFA, dicyclomine, escitalopram, gabapentin, loperamide, montelukast, multivitamin with minerals, naproxen, pantoprazole, saccharomyces boulardii, and traZODone    Allergies:  Allergies   Allergen Reactions   • Sulfa Antibiotics Rash   • Trintellix [Vortioxetine] Itching       Objective    Objective     Vitals:   Temp:  [97.5 °F (36.4 °C)] 97.5 °F (36.4 °C)  Heart Rate:  [80] 80  Resp:  [17] 17  BP: (132)/(83) 132/83    Physical Exam  VSS, NNR, NCAT, NMNA, NRD, AAOx3.    Result Review    Result Review:  I have personally reviewed the results from the time of this admission to 2/8/2022 14:47 EST and agree with these findings:  []  Laboratory  []  Microbiology  []  Radiology  []  EKG/Telemetry   []  Cardiology/Vascular   []  Pathology  []  Old records  []  Other:  Most notable findings include: As above    Assessment/Plan   Assessment / Plan     Brief Patient Summary:  Ema Cisse is a 56 y.o. female who has pain on the left side of the neck    Active Hospital Problems:  Active Hospital Problems    Diagnosis    • **Arthropathy of cervical facet joint      Plan:   Cervical medial branch block left    DVT prophylaxis:  No DVT prophylaxis order currently exists.    CODE STATUS:       Admission Status:  I believe this patient meets outpatient status.    Jeronimo Lind MD

## 2022-02-08 NOTE — OP NOTE
LEFT C3-5 Cervical Medial Branch Blockade  [facility]      PREOPERATIVE DIAGNOSIS:  Cervical spondylosis without myelopathy   POSTOPERATIVE DIAGNOSIS:  Same as preoperative diagnosis    PROCEDURE:    Cervical Facet Nerve (medial branch) Blocks, with Fluoroscopy:  LEVELS C3, C4, C5,  to block facet joints C3-4 & C4-5 on the LEFT  • 36284 - Cervical Facet block, 1st level  • 27971 - Cervical Facet block, 2nd level    PRE-PROCEDURE DISCUSSION WITH PATIENT:    Risks and complications were discussed with the patient prior to starting the procedure and informed consent was obtained.    SURGEON:  Jeronimo Lind MD    REASON FOR PROCEDURE:    Previous diagnostic positivity of a Cervical Medial Branch Blockade at the same levels    SEDATION:  Versed 6mg IV  ANESTHETIC:   Marcaine 0.5%  STEROID:  NONE  TOTAL VOLUME OF SOLUTION:  3 mL    DESCRIPTON OF PROCEDURE:  After obtaining informed consent, the patient was placed in the prone position. IV access was obtained.  EKG, blood pressure, and pulse oximeter were monitored and all sedation was administered by an RN under my direct guidance.  The cervical area was prepped with Chloraprep and draped with sterile barrier. Under fluoroscopic guidance the waists of the C3 & C4 & C5 vertebra on the affected side were identified. Skin and subcutaneous tissue was then anesthetized with 1% lidocaine 1mL at each point. Then spinal needles were introduced under fluoroscopic guidance at the waist of these vertebra on this side. After confirming the position of the needle under fluoroscopic PA and lateral views, and confirming negative aspiration of blood and CSF, 0.25 mL of Omnipaque was injected.  Proper spread and lack of vascular uptake was demonstrated.  A solution was prepared as above, and 1 mL of that solution was injected very slowly each level.      Needles were removed intact from all levels.  Vitals were stable throughout.       ESTIMATED BLOOD LOSS:   minimal  SPECIMENS:  None    COMPLICATIONS:    No complications were noted., There was no indication of vascular uptake on live injection of contrast dye., There was no indication of intrathecal uptake on live injection of contrast dye., There was not any evidence of dural puncture.   and The patient did not have any signs of postprocedure numbness nor weakness.    TOLERANCE & DISCHARGE CONDITION:    The patient tolerated the procedure well.  The patient was transported to the recovery area without difficulties.  The patient was discharged to home under the care of family in stable and satisfactory condition.  The patient did notice improvement in pain on extension and rotation of the cervical spine.    PLAN OF CARE:  1. The patient was given our standard instruction sheet.  2. We discussed that Cervical Medial Branch Blockade is a diagnostic procedure in consideration for radiofrequency ablation if two diagnostic procedures proved to be positive for significant benefit.  If sustained relief of six to eight weeks is obtained, then an alternative plan could be therapeutic cervical medial branch blocks on an as-needed basis.  3. The patient is asked to keep a pain log hourly for 8 hours postoperatively today.  4. The patient will Return to clinic 1 wk.  5. The patient will resume all medications as per the medication reconciliation sheet.

## 2022-02-15 ENCOUNTER — PREP FOR SURGERY (OUTPATIENT)
Dept: SURGERY | Facility: SURGERY CENTER | Age: 57
End: 2022-02-15

## 2022-02-15 ENCOUNTER — OFFICE VISIT (OUTPATIENT)
Dept: PAIN MEDICINE | Facility: CLINIC | Age: 57
End: 2022-02-15

## 2022-02-15 VITALS
BODY MASS INDEX: 30.12 KG/M2 | RESPIRATION RATE: 16 BRPM | WEIGHT: 187.4 LBS | SYSTOLIC BLOOD PRESSURE: 148 MMHG | TEMPERATURE: 96.9 F | OXYGEN SATURATION: 98 % | HEART RATE: 80 BPM | DIASTOLIC BLOOD PRESSURE: 95 MMHG | HEIGHT: 66 IN

## 2022-02-15 DIAGNOSIS — M54.16 LUMBAR RADICULOPATHY: Primary | ICD-10-CM

## 2022-02-15 DIAGNOSIS — M54.16 LUMBAR RADICULOPATHY: ICD-10-CM

## 2022-02-15 DIAGNOSIS — M47.812 ARTHROPATHY OF CERVICAL FACET JOINT: Primary | ICD-10-CM

## 2022-02-15 DIAGNOSIS — M25.551 RIGHT HIP PAIN: ICD-10-CM

## 2022-02-15 DIAGNOSIS — M50.30 DDD (DEGENERATIVE DISC DISEASE), CERVICAL: ICD-10-CM

## 2022-02-15 DIAGNOSIS — M47.812 ARTHROPATHY OF CERVICAL FACET JOINT: ICD-10-CM

## 2022-02-15 DIAGNOSIS — M51.36 DDD (DEGENERATIVE DISC DISEASE), LUMBAR: ICD-10-CM

## 2022-02-15 PROCEDURE — 99214 OFFICE O/P EST MOD 30 MIN: CPT | Performed by: NURSE PRACTITIONER

## 2022-02-15 RX ORDER — SODIUM CHLORIDE 0.9 % (FLUSH) 0.9 %
10 SYRINGE (ML) INJECTION AS NEEDED
Status: CANCELLED | OUTPATIENT
Start: 2022-02-15

## 2022-02-15 RX ORDER — SODIUM CHLORIDE 0.9 % (FLUSH) 0.9 %
10 SYRINGE (ML) INJECTION EVERY 12 HOURS SCHEDULED
Status: CANCELLED | OUTPATIENT
Start: 2022-02-15

## 2022-02-15 NOTE — PROGRESS NOTES
"CHIEF COMPLAINT  PROCEDURE FOLLOW-UP Left C3-C5 MEDIAL BRANCH BLOCK.  Pt states MBB was 100 % successful.      Subjective   Ema Cisse is a 56 y.o. female  who presents to the office for follow-up of procedure.  She completed a Left C3-C5 MBB   on  2/8/2022 performed by Dr. Lind for management of neck pain. Patient reports 100% relief from the procedure x 1 day.     Today her pain is 5/10VAS in severity. Her pain remains in the left side of her neck.  She also reports worsening right lateral leg pain radiating into her right lateral calf.  She does have some intermittent groin pain.  She previously responded well to a Right L5 TFESI with 70% relief from this procedure.     She continues with Gabapentin 600 mg nightly.     Failed Robaxin (side effects), meloxicam, naproxen, chlorzoxazone, tramadol (side effects), and gabapentin (side effects-previously)    She has been evaluated by rheumatology-osteoarthritis.     Followed by Dr. Morin for Carpal Tunnel syndrome in both wrists.     Procedure list:  12/14/2021-Left C3-C5 MBB-95% relief x1 day, 70% relief the second day then pain returned to baseline  11/3/2021-right L5 TFESI-70% ongoing relief  12/30/2020-BONILLA at C5-6-a few days of relief, then minimal relief thereafter  10/20/2020-bilateral S1-S3 RFA-\"some better\"  8/6/2020-bilateral SI joint injections-100% relief for a couple weeks    Patient remained masked during entire encounter. No cough present. I donned a mask and eye protection throughout entire visit. Prior to donning mask and eye protection, hand hygiene was performed, as well as when it was doffed.  I was closer than 6 feet, but not for an extended period of time. No obvious exposure to any bodily fluids.    Back Pain  This is a chronic problem. The current episode started more than 1 year ago. The problem occurs daily. The problem has been gradually worsening since onset. The pain is present in the lumbar spine and sacro-iliac. The pain radiates " to the right thigh (right calf). The pain is at a severity of 5/10. The pain is moderate. Exacerbated by: prolonged walking, standing. Stiffness is present in the morning. Associated symptoms include tingling (left arm first thing in the morning). Pertinent negatives include no abdominal pain, chest pain, dysuria, fever, headaches, numbness or weakness. Risk factors include obesity. She has tried NSAIDs and analgesics for the symptoms. The treatment provided significant (with RFL) relief.   Neck Pain   This is a chronic problem. The current episode started more than 1 month ago. The problem occurs daily. The problem has been gradually worsening. The pain is present in the left side (radiating into the left trapezius). The quality of the pain is described as aching and burning. The pain is at a severity of 5/10. The pain is moderate. The symptoms are aggravated by position. Associated symptoms include tingling (left arm first thing in the morning). Pertinent negatives include no chest pain, fever, headaches, numbness or weakness. She has tried NSAIDs, muscle relaxants, neck support and home exercises for the symptoms. The treatment provided significant (diagnostic relief with Cervical MBB) relief.      PEG Assessment   What number best describes your pain on average in the past week?9  What number best describes how, during the past week, pain has interfered with your enjoyment of life?9  What number best describes how, during the past week, pain has interfered with your general activity?  9    The following portions of the patient's history were reviewed and updated as appropriate: allergies, current medications, past family history, past medical history, past social history, past surgical history and problem list.    Review of Systems   Constitutional: Negative for activity change, fatigue and fever.   HENT: Negative for congestion.    Eyes: Negative for visual disturbance.   Respiratory: Negative for cough and  "chest tightness.    Cardiovascular: Negative for chest pain.   Gastrointestinal: Negative for abdominal pain, constipation and diarrhea.   Genitourinary: Negative for difficulty urinating and dysuria.   Musculoskeletal: Positive for back pain and neck pain.   Neurological: Positive for tingling (left arm first thing in the morning). Negative for dizziness, weakness, light-headedness, numbness and headaches.   Psychiatric/Behavioral: Negative for agitation, sleep disturbance and suicidal ideas. The patient is not nervous/anxious.      --  The aforementioned information the Chief Complaint section and above subjective data including any HPI data, and also the Review of Systems data, has been personally reviewed and affirmed.  --     Vitals:    02/15/22 1521   BP: 148/95   Pulse: 80   Resp: 16   Temp: 96.9 °F (36.1 °C)   SpO2: 98%   Weight: 85 kg (187 lb 6.4 oz)   Height: 167.6 cm (66\")   PainSc:   5   PainLoc: Back  Comment: NECK     Objective   Physical Exam  Vitals and nursing note reviewed.   Constitutional:       Appearance: Normal appearance. She is well-developed.   Eyes:      General: Lids are normal.   Cardiovascular:      Rate and Rhythm: Normal rate.   Pulmonary:      Effort: Pulmonary effort is normal.   Musculoskeletal:      Cervical back: Tenderness and bony tenderness present. Decreased range of motion.      Lumbar back: Tenderness present. Decreased range of motion. Positive right straight leg raise test. Negative left straight leg raise test.      Right hip: Tenderness (over GT Bursa) present.      Comments:   +Cervical facet loading  +Pain with internal and external rotation of the right hip   Neurological:      Mental Status: She is alert and oriented to person, place, and time.   Psychiatric:         Attention and Perception: Attention normal.         Mood and Affect: Mood normal.         Speech: Speech normal.         Behavior: Behavior normal.         Judgment: Judgment normal. "       Assessment/Plan   Diagnoses and all orders for this visit:    1. Arthropathy of cervical facet joint (Primary)    2. DDD (degenerative disc disease), cervical    3. Lumbar radiculopathy    4. DDD (degenerative disc disease), lumbar    5. Right hip pain      --- Left C3-C5 RFA then Right L5 TFESI 2 weeks later  Reviewed the procedure at length with the patient.  Included in the review was expectations, complications, risk and benefits.The procedure was described in detail and the risks, benefits and alternatives were discussed with the patient (including but not limited to: bleeding, infection, nerve damage, worsening of pain, inability to perform injection, paralysis, seizures, coma, no pain relief and death) who agreed to proceed.  Discussed the potential for sedation if warranted/wanted.  The procedure will plan to be performed at [facility] with fluoroscopic guidance(unless ultrasound is indicated) and could potentially have steroids and contrast dye used. Questions were answered and in a way the patient could understand.  Patient verbalized understanding and wishes to proceed.  This intervention will be ordered. Patient has no evidence of coagulopathy or current infection.    --- Consider Right hip injection in the future.   --- Follow-up after procedure.      PATTI REPORT  As part of the patient's treatment plan, I am prescribing controlled substances. The patient has been made aware of appropriate use of such medications, including potential risk of somnolence, limited ability to drive and/or work safely, and the potential for dependence or overdose. It has also bee made clear that these medications are for use by this patient only, without concomitant use of alcohol or other substances unless prescribed.     As the clinician, I personally reviewed the PATTI from 2/15/2022 while the patient was in the office today.    History and physical exam exhibit continued safe and  appropriate use of controlled substances.    Dictated utilizing Dragon dictation.      This document is intended for medical expert use only. Reading of this document by patients and/or patient's family without participating medical staff guidance may result in misinterpretation and unintended morbidity.   Any interpretation of such data is the responsibility of the patient and/or family member responsible for the patient in concert with their primary or specialist providers, not to be left for sources of online searches such as [employer], [employer] or similar queries. Relying on these approaches to knowledge may result in misinterpretation, misguided goals of care and even death should patients or family members try recommendations outside of the realm of professional medical care in a supervised way.

## 2022-02-18 ENCOUNTER — TRANSCRIBE ORDERS (OUTPATIENT)
Dept: SURGERY | Facility: SURGERY CENTER | Age: 57
End: 2022-02-18

## 2022-02-18 DIAGNOSIS — Z41.9 SURGERY, ELECTIVE: Primary | ICD-10-CM

## 2022-02-18 DIAGNOSIS — F51.01 PRIMARY INSOMNIA: ICD-10-CM

## 2022-02-18 RX ORDER — TRAZODONE HYDROCHLORIDE 50 MG/1
100 TABLET ORAL NIGHTLY
Qty: 180 TABLET | Refills: 1 | Status: SHIPPED | OUTPATIENT
Start: 2022-02-18 | End: 2022-05-17 | Stop reason: SDUPTHER

## 2022-02-18 NOTE — TELEPHONE ENCOUNTER
Caller: Betito Ema R    Relationship: Self    Best call back number: [phone]    Requested Prescriptions:   Requested Prescriptions     Pending Prescriptions Disp Refills   • traZODone (DESYREL) 50 MG tablet 90 tablet 1     Sig: Take 1 tablet by mouth Every Night.        Pharmacy where request should be sent: [facility] [place] [facility] [address] [phone] [facility] [phone] FX     Additional details provided by patient: PATIENT STATED THESE DIRECTIONS SHOULD SAY TAKE 2 TABLETS NIGHTLY.     Does the patient have less than a 3 day supply:  [x] Yes  [] No    Katie Marquez Rep   02/18/22 12:16 EST

## 2022-03-01 NOTE — SIGNIFICANT NOTE
Patient educated on the following :    - If you are receiving Sedation for your procedure Nothing to Eat 6 hours and only clear liquids for 2 hours prior to your procedure.    -Your required COVID Test is Scheduled on Between the Hours of 5162-2576  -You will only be notified if your COVID test Result is POSITIVE  -The importance of reducing your number of contacts by self quarantining after you COVID test until the date of your PROCEDURE  -You will need to have someone drive you home after your PROCEDURE and remain with you for 24 hours after the PROCEDURE  - The date of your procedure, your are welcome to have one visitor at bedside or remain within 10-15 minutes of [facility]  -You will need to arrive at 1400 on 3/3/22 PROCEDURE  -Please contact [facility] PREOP at: [phone] with any questions and/or concerns

## 2022-03-03 ENCOUNTER — HOSPITAL ENCOUNTER (OUTPATIENT)
Dept: GENERAL RADIOLOGY | Facility: SURGERY CENTER | Age: 57
Setting detail: HOSPITAL OUTPATIENT SURGERY
End: 2022-03-03

## 2022-03-03 ENCOUNTER — HOSPITAL ENCOUNTER (OUTPATIENT)
Facility: SURGERY CENTER | Age: 57
Setting detail: HOSPITAL OUTPATIENT SURGERY
Discharge: HOME | End: 2022-03-03
Attending: ANESTHESIOLOGY | Admitting: ANESTHESIOLOGY

## 2022-03-03 VITALS
BODY MASS INDEX: 29.25 KG/M2 | SYSTOLIC BLOOD PRESSURE: 109 MMHG | WEIGHT: 182 LBS | TEMPERATURE: 98.4 F | HEART RATE: 86 BPM | RESPIRATION RATE: 16 BRPM | DIASTOLIC BLOOD PRESSURE: 86 MMHG | HEIGHT: 66 IN | OXYGEN SATURATION: 96 %

## 2022-03-03 DIAGNOSIS — Z41.9 SURGERY, ELECTIVE: ICD-10-CM

## 2022-03-03 DIAGNOSIS — M47.812 ARTHROPATHY OF CERVICAL FACET JOINT: ICD-10-CM

## 2022-03-03 PROCEDURE — 76000 FLUOROSCOPY <1 HR PHYS/QHP: CPT

## 2022-03-03 PROCEDURE — 64633 DESTROY CERV/THOR FACET JNT: CPT | Performed by: ANESTHESIOLOGY

## 2022-03-03 PROCEDURE — 25010000002 FENTANYL CITRATE (PF) 50 MCG/ML SOLUTION: Performed by: ANESTHESIOLOGY

## 2022-03-03 PROCEDURE — 64634 DESTROY C/TH FACET JNT ADDL: CPT | Performed by: ANESTHESIOLOGY

## 2022-03-03 PROCEDURE — 99152 MOD SED SAME PHYS/QHP 5/>YRS: CPT | Performed by: ANESTHESIOLOGY

## 2022-03-03 PROCEDURE — 25010000002 NALOXONE PER 1 MG: Performed by: ANESTHESIOLOGY

## 2022-03-03 PROCEDURE — 25010000002 MIDAZOLAM PER 1 MG: Performed by: ANESTHESIOLOGY

## 2022-03-03 RX ORDER — SODIUM CHLORIDE, SODIUM LACTATE, POTASSIUM CHLORIDE, CALCIUM CHLORIDE 600; 310; 30; 20 MG/100ML; MG/100ML; MG/100ML; MG/100ML
20 INJECTION, SOLUTION INTRAVENOUS CONTINUOUS
Status: DISCONTINUED | OUTPATIENT
Start: 2022-03-03 | End: 2022-03-03 | Stop reason: HOSPADM

## 2022-03-03 RX ORDER — MIDAZOLAM HYDROCHLORIDE 1 MG/ML
INJECTION INTRAMUSCULAR; INTRAVENOUS AS NEEDED
Status: DISCONTINUED | OUTPATIENT
Start: 2022-03-03 | End: 2022-03-03 | Stop reason: HOSPADM

## 2022-03-03 RX ORDER — SODIUM CHLORIDE 0.9 % (FLUSH) 0.9 %
10 SYRINGE (ML) INJECTION EVERY 12 HOURS SCHEDULED
Status: DISCONTINUED | OUTPATIENT
Start: 2022-03-03 | End: 2022-03-03 | Stop reason: HOSPADM

## 2022-03-03 RX ORDER — FENTANYL CITRATE 50 UG/ML
INJECTION, SOLUTION INTRAMUSCULAR; INTRAVENOUS AS NEEDED
Status: DISCONTINUED | OUTPATIENT
Start: 2022-03-03 | End: 2022-03-03 | Stop reason: HOSPADM

## 2022-03-03 RX ORDER — NALOXONE HYDROCHLORIDE 0.4 MG/ML
INJECTION, SOLUTION INTRAMUSCULAR; INTRAVENOUS; SUBCUTANEOUS AS NEEDED
Status: DISCONTINUED | OUTPATIENT
Start: 2022-03-03 | End: 2022-03-03 | Stop reason: HOSPADM

## 2022-03-03 RX ORDER — SODIUM CHLORIDE 0.9 % (FLUSH) 0.9 %
10 SYRINGE (ML) INJECTION AS NEEDED
Status: DISCONTINUED | OUTPATIENT
Start: 2022-03-03 | End: 2022-03-03 | Stop reason: HOSPADM

## 2022-03-03 RX ORDER — EPHEDRINE SULFATE 50 MG/ML
INJECTION INTRAVENOUS AS NEEDED
Status: DISCONTINUED | OUTPATIENT
Start: 2022-03-03 | End: 2022-03-03 | Stop reason: HOSPADM

## 2022-03-03 RX ADMIN — SODIUM CHLORIDE, SODIUM LACTATE, POTASSIUM CHLORIDE, AND CALCIUM CHLORIDE 20 ML/HR: .6; .31; .03; .02 INJECTION, SOLUTION INTRAVENOUS at 15:18

## 2022-03-03 NOTE — INTERVAL H&P NOTE
H&P reviewed. The patient was examined and there are no changes to the H&P.previous cmbb dx+ therefore RF is indicated.

## 2022-03-03 NOTE — OP NOTE
Cervical Facet Nerve (Medial Branch) Radiofrequency Lesioning  [facility]      PREOPERATIVE DIAGNOSIS:  Cervical spondylosis without myelopathy   POSTOPERATIVE DIAGNOSIS:  Same as preoperative diagnosis    PROCEDURE:    Cervical Facet Nerve (medial branch) RF, with Fluoroscopy:      LEVELS: left  C3, C4 and C5    PRE-PROCEDURE DISCUSSION WITH PATIENT:    Risks and complications were discussed with the patient prior to starting the procedure and informed consent was obtained.  We discussed various topics, including but not limited to bleeding, infection, injury, nerve injury, paralysis, coma, death, postprocedural soreness or painful flare, worsening of clinical picture, lack of pain relief.  We discussed the previous positive diagnostic nature of medial branch blockades.      SURGEON:  Jeronimo Lind MD    REASON FOR PROCEDURE:    The patient presents with chronic axial neck pain. The patient underwent 2 left cervical medial branch blocks with diagnostically positive relief. Given the patient’s significant pain relief, it is diagnostic that we have likely found the source of the patient’s pain; therefore, radiofrequency ablation of those nerves has been indicated for therapeutic pain relief.    SEDATION:  Versed 6mg & Fentanyl 50 mcg IV  TIME OF PROCEDURE:  After administration of the IV sedative, the intraoperative procedure time was 10 minutes.      ANESTHETIC:   Lidocaine 2%  STEROID:   NONE  TOTAL VOLUME OF SOLUTION:  3 ml      DESCRIPTON OF PROCEDURE:  After obtaining informed consent, the patient was placed in the prone position. IV access was obtained.  EKG, blood pressure, and pulse oximeter were monitored and any & all sedation was administered by an RN under my direct guidance.  The cervical area was prepped with Chloraprep and draped with sterile barrier.     Under fluoroscopic guidance the waists of the above mentioned vertebra were identified and marked at the lateral margin of the  vertebrae on the AP fluoroscopic view.  Skin and subcutaneous tissue were then anesthetized with 1% lidocaine 1mL at each point.  Then RF cannula needles were sequentially introduced under fluoroscopic guidance to the waists of these vertebrae contacting periosteum on the lateral edge of the vertebra on the AP fluroscopic view. After confirming the position of the needle under fluoroscopic PA and lateral views, confirming the needle position in the center of the trapezoid at each level, and confirming negative aspiration of blood and CSF, testing was initiated.  There was a lack of radicular stimulation on each needle at 3v and 2Hz.      Then, in each needle, 1mL of the aforementioned local anesthetic was instilled.  After 2 minutes had elapsed, Radiofrequency thermal lesioning was initiated for 2 minutes at 80 degrees Celsius.      Needles were removed intact from all levels.  Vitals were stable throughout.       ESTIMATED BLOOD LOSS:  minimal  SPECIMENS:  None    COMPLICATIONS:    There was no indication of vascular uptake on live injection of contrast dye., There was no indication of intrathecal uptake on live injection of contrast dye., There was not any evidence of dural puncture.   and The patient did not have any signs of postprocedure numbness nor weakness.    She had some lowering of blood pressure as we were in process of preparing to RF, and it was unclear as to the cause, with ddx including local anesthetic versus sedative, therefore she was given narcan 100 mcg and also ephedrine 10mg.  She was conversive, oxygenating, pain-free.  See nursing notes.    TOLERANCE & DISCHARGE CONDITION:    The patient tolerated the procedure well.  The patient was transported to the recovery area without difficulties.  The patient was discharged to home under the care of family in stable and satisfactory condition.  The patient did notice improvement in pain on extension and rotation of the cervical spine.      PLAN OF  CARE:  1. The patient was given our standard instruction sheet.  2. We discussed that Cervical Medial Branch Blockade is a diagnostic procedure in consideration for radiofrequency ablation if two diagnostic procedures proved to be positive for significant benefit.  If sustained relief of six to eight weeks is obtained, then an alternative plan could be therapeutic cervical medial branch blocks on an as-needed basis.  3. The patient is asked to keep a pain log hourly for 8 hours postoperatively today.  4. The patient will Plan for procedure lumbar epidural, previously scheduled, in 2 weeks.  5. The patient will resume all medications as per the medication reconciliation sheet.

## 2022-03-03 NOTE — DISCHARGE INSTRUCTIONS
[facility] [place] [facility] - Post-procedure Instructions          --  While there are no absolute restrictions, it is recommended that you do not perform strenuous activity today. In the morning, you may resume your level of activity as before your block.    --  If you have a band-aid at your injection site, please remove it later today. Observe the area for any redness, swelling, pus-like drainage, or a temperature over 101°. If any of these symptoms occur, please call your doctor at [phone]. If after office hours, leave a message and the on-call provider will return your call.    --  Ice may be applied to your injection site. It is recommended you avoid direct heat (heating pad; hot tub) for 1-2 days.    --  Call [facility] at [phone] if you experience persistent headache, persistent bleeding from the injection site, or severe pain not relieved by heat or oral medication.    --  Do not make important decisions today.    --  Due to the effects of the block and/or the I.V. Sedation, DO NOT drive or operate hazardous machinery for 12 hours.  Local anesthetics may cause numbness after procedure and precautions must be taken with regards to operating equipment as well as with walking, even if ambulating with assistance of another person or with an assistive device.    --  Do not drink alcohol for 12 hours.    -- You may return to work tomorrow, or as directed by your referring doctor.    --  Occasionally you may notice a slight increase in your pain after the procedure. This should start to improve within the next 24-48 hours. Radiofrequency ablation procedure pain may last 3-4 weeks.    --  It may take as long as 3-4 days before you notice a gradual improvement in your pain and/or other symptoms.    -- You may continue to take your prescribed pain medication as needed.    --  Some normal possible side effects of steroid use could include fluid retention, increased blood sugar, dull headache,  increased sweating, increased appetite, mood swings and flushing.    --  Diabetics are recommended to watch their blood glucose level closely for 24-48 hours after the injection.    --  Must stay in PACU for 20 min upon arrival and prove no leg weakness before being discharged.    --  IN THE EVENT OF A LIFE THREATENING EMERGENCY, (CHEST PAIN, BREATHING DIFFICULTIES, PARALYSIS…) YOU SHOULD GO TO YOUR NEAREST EMERGENCY ROOM.    --  You should be contacted by our office within 2-3 days to schedule follow up or next appointment date.  If not contacted within 7 days, please call the office at [phone]

## 2022-03-14 ENCOUNTER — TELEPHONE (OUTPATIENT)
Dept: PAIN MEDICINE | Facility: CLINIC | Age: 57
End: 2022-03-14

## 2022-03-14 NOTE — TELEPHONE ENCOUNTER
Caller: FLORENCE RODGERS   Relationship to Patient: SELF     Phone Number: [phone]  Reason for Call: PATIENT WANTING TO ASK SOME QUESTIONS ABOUT UPCOMING PROCEDURE

## 2022-03-14 NOTE — TELEPHONE ENCOUNTER
Narcan is a reversal agent for opiates.  She had a very small amount of fentanyl.  He is want to balance out the other.  There is no reason to not come in for her upcoming procedure.  She had a little more sedation to tolerate the radiofrequency ablation of the neck then would be necessary to do well lumbar epidural injection.. We can take a look as far as the sides on that lumbar epidural later when she comes in, no issue there.

## 2022-03-14 NOTE — TELEPHONE ENCOUNTER
Patient called with some concerns in regards to her upcoming procedure. She wants to know of she should proceed since she was given narcan due to her BP dropping during her last procedure. She wants to know why do you think her BP dropped and is this something she should get checked out?     Also, she states that she has a right sided epidural coming up and states that now her left side is very painful and wanted to know if she does have the procedure can both sided be performed?     Please advise.

## 2022-03-15 NOTE — TELEPHONE ENCOUNTER
PATIENT CALLED CHECKING ON MESSAGE. SAYS NURSE WAS SUPPOSED TO CALL HER BACK AGAIN.  INFORMED WAITING FOR REPLY FROM DR. NICHOLS.

## 2022-03-17 ENCOUNTER — HOSPITAL ENCOUNTER (OUTPATIENT)
Facility: SURGERY CENTER | Age: 57
Setting detail: HOSPITAL OUTPATIENT SURGERY
Discharge: HOME | End: 2022-03-17
Attending: ANESTHESIOLOGY | Admitting: ANESTHESIOLOGY

## 2022-03-17 ENCOUNTER — HOSPITAL ENCOUNTER (OUTPATIENT)
Dept: GENERAL RADIOLOGY | Facility: SURGERY CENTER | Age: 57
Setting detail: HOSPITAL OUTPATIENT SURGERY
End: 2022-03-17

## 2022-03-17 VITALS
OXYGEN SATURATION: 99 % | TEMPERATURE: 97.1 F | RESPIRATION RATE: 16 BRPM | SYSTOLIC BLOOD PRESSURE: 106 MMHG | WEIGHT: 182 LBS | HEIGHT: 66 IN | BODY MASS INDEX: 29.25 KG/M2 | HEART RATE: 82 BPM | DIASTOLIC BLOOD PRESSURE: 68 MMHG

## 2022-03-17 DIAGNOSIS — M54.16 LUMBAR RADICULOPATHY: ICD-10-CM

## 2022-03-17 DIAGNOSIS — Z41.9 SURGERY, ELECTIVE: ICD-10-CM

## 2022-03-17 PROCEDURE — 25010000002 METHYLPREDNISOLONE PER 80 MG: Performed by: ANESTHESIOLOGY

## 2022-03-17 PROCEDURE — 25010000002 FENTANYL CITRATE (PF) 50 MCG/ML SOLUTION: Performed by: ANESTHESIOLOGY

## 2022-03-17 PROCEDURE — 25010000002 MIDAZOLAM PER 1 MG: Performed by: ANESTHESIOLOGY

## 2022-03-17 PROCEDURE — 0 IOHEXOL 300 MG/ML SOLUTION 10 ML VIAL: Performed by: ANESTHESIOLOGY

## 2022-03-17 PROCEDURE — 64483 NJX AA&/STRD TFRM EPI L/S 1: CPT | Performed by: ANESTHESIOLOGY

## 2022-03-17 PROCEDURE — 76000 FLUOROSCOPY <1 HR PHYS/QHP: CPT

## 2022-03-17 RX ORDER — SODIUM CHLORIDE 0.9 % (FLUSH) 0.9 %
10 SYRINGE (ML) INJECTION AS NEEDED
Status: DISCONTINUED | OUTPATIENT
Start: 2022-03-17 | End: 2022-03-17 | Stop reason: HOSPADM

## 2022-03-17 RX ORDER — SODIUM CHLORIDE 0.9 % (FLUSH) 0.9 %
10 SYRINGE (ML) INJECTION EVERY 12 HOURS SCHEDULED
Status: DISCONTINUED | OUTPATIENT
Start: 2022-03-17 | End: 2022-03-17 | Stop reason: HOSPADM

## 2022-03-17 RX ORDER — MIDAZOLAM HYDROCHLORIDE 1 MG/ML
INJECTION INTRAMUSCULAR; INTRAVENOUS AS NEEDED
Status: DISCONTINUED | OUTPATIENT
Start: 2022-03-17 | End: 2022-03-17 | Stop reason: HOSPADM

## 2022-03-17 RX ORDER — FENTANYL CITRATE 50 UG/ML
INJECTION, SOLUTION INTRAMUSCULAR; INTRAVENOUS AS NEEDED
Status: DISCONTINUED | OUTPATIENT
Start: 2022-03-17 | End: 2022-03-17 | Stop reason: HOSPADM

## 2022-03-17 NOTE — OP NOTE
Bilateral L5 Lumbar Transforaminal Epidural Steroid Injection  [facility]      PREOPERATIVE DIAGNOSIS:  Lumbar Degenerative Disc Disease and bilateral Lumbar Radiculopathy    POSTOPERATIVE DIAGNOSIS:  Same as preop diagnosis    PROCEDURE:  CPT 47838(-50) --  Diagnostic Transforaminal Epidural Steroid Injection at the L5 level, bilaterally    PRE-PROCEDURE DISCUSSION WITH PATIENT:    Risks and complications were discussed with the patient prior to starting the procedure and informed consent was obtained.  We discussed various topics including but not limited to bleeding, infection, injury, nerve injury, paralysis, coma, death, postprocedural painful flare-up, postprocedural site soreness, and a lack of pain relief.  We discussed the diagnostic aspect of transforaminal epidural / selective nerve root blockade.    SURGEON:  Jeronimo Lind MD    REASON FOR PROCEDURE:    Degenerative changes are noted in the area., Radiating pattern of pain is likely consistent with degenerative changes in the area., Acute pain flareup is noted & problematic, and the injection is used to attempt to break the flareup.   and Interlaminar approach is relatively contraindicated.      SEDATION:  Versed 4mg & Fentanyl 50 mcg IV  ANESTHETIC:  Marcaine 0.25%  STEROID:  Methylprednisolone (DEPO MEDROL) 80mg/ml    DESCRIPTON OF PROCEDURE:  After obtaining informed consent, an I.V. was started in the preoperative area. The patient taken to the operating room and was placed in the prone position with a pillow under the abdomen.  All pressure points were well padded.  EKG, blood pressure, and pulse oximeter were monitored.  The lumbosacral area was prepped with Chloraprep and draped in a sterile fashion. Under fluoroscopic guidance in an oblique dimension, the transverse process of the aforementioned vertebra at the junction of the body at 6 o'clock position on one side was identified. Skin and subcutaneous tissue was  anesthetized with 1% lidocaine. A 22-gauge spinal needle was introduced under fluoroscopic guidance at the above junction into the foramen without parasthesias and into the epidural space. After confirming the position of the needle with PA, lateral, and oblique fluoroscopic views, aspiration was checked and was clear of blood or CSF.  Next, 1 mL of Omnipaque was injected. After seeing adequate spread on the corresponding nerve root, a total volume 2mL of injectate containing 0.5ml of the above mentioned local anesthetic, 1 ml saline,  and half of the above mentioned corticosteroid was injected into the epidural space.    The needle was removed intact.      Next, the same vertebral level and corresponding foramen on the contralateral side was visualized with fluoroscopy in an oblique view, and a similar approach was used to place the spinal needle in that foramen.  After confirming the position of the needle with PA, lateral, and oblique fluoroscopic views, aspiration was checked and was clear of blood or CSF.  Next, 1 mL of Omnipaque was injected. After seeing adequate spread on the corresponding nerve root, a total volume 2mL of injectate containing 0.5ml of the above mentioned local anesthetic, 1 ml saline, and half of the above mentioned corticosteroid was injected into the epidural space. The needle was removed intact.  Vital signs were stable throughout.      ESTIMATED BLOOD LOSS:  <5 mL  SPECIMENS:  none    COMPLICATIONS:   No complications were noted., There was no indication of vascular uptake on live injection of contrast dye., There was no indication of intrathecal uptake on live injection of contrast dye., There was not any evidence of dural puncture.   and The patient did not have any signs of postprocedure numbness nor weakness.    TOLERANCE & DISCHARGE CONDITION:    The patient tolerated the procedure well.  The patient was transported to the recovery area without difficulties.  The patient was  discharged to home under the care of family in stable and satisfactory condition.    PLAN OF CARE:  1. The patient was given our standard instruction sheet.  2. The patient will Return to clinic 3-4 wks.  3. The patient will resume all medications as per the medication reconciliation sheet.

## 2022-03-17 NOTE — DISCHARGE INSTRUCTIONS
[facility] - Post-procedure Instructions          --  While there are no absolute restrictions, it is recommended that you do not perform strenuous activity today. In the morning, you may resume your level of activity as before your block.    --  If you have a band-aid at your injection site, please remove it later today. Observe the area for any redness, swelling, pus-like drainage, or a temperature over 101°. If any of these symptoms occur, please call your doctor at [phone]. If after office hours, leave a message and the on-call provider will return your call.    --  Ice may be applied to your injection site. It is recommended you avoid direct heat (heating pad; hot tub) for 1-2 days.    --  Call [facility] at [phone] if you experience persistent headache, persistent bleeding from the injection site, or severe pain not relieved by heat or oral medication.    --  Do not make important decisions today.    --  Due to the effects of the block and/or the I.V. Sedation, DO NOT drive or operate hazardous machinery for 12 hours.  Local anesthetics may cause numbness after procedure and precautions must be taken with regards to operating equipment as well as with walking, even if ambulating with assistance of another person or with an assistive device.    --  Do not drink alcohol for 12 hours.    -- You may return to work tomorrow, or as directed by your referring doctor.    --  Occasionally you may notice a slight increase in your pain after the procedure. This should start to improve within the next 24-48 hours. Radiofrequency ablation procedure pain may last 3-4 weeks.    --  It may take as long as 3-4 days before you notice a gradual improvement in your pain and/or other symptoms.    -- You may continue to take your prescribed pain medication as needed.    --  Some normal possible side effects of steroid use could include fluid retention, increased blood sugar, dull headache,  increased sweating, increased appetite, mood swings and flushing.    --  Diabetics are recommended to watch their blood glucose level closely for 24-48 hours after the injection.    --  Must stay in PACU for 20 min upon arrival and prove no leg weakness before being discharged.    --  IN THE EVENT OF A LIFE THREATENING EMERGENCY, (CHEST PAIN, BREATHING DIFFICULTIES, PARALYSIS…) YOU SHOULD GO TO YOUR NEAREST EMERGENCY ROOM.    --  You should be contacted by our office within 2-3 days to schedule follow up or next appointment date.  If not contacted within 7 days, please call the office at [phone]

## 2022-03-17 NOTE — INTERVAL H&P NOTE
H&P reviewed. The patient was examined and there are no changes to the H&P.  As we had discussed previously, she has bilateral lbp and rad, so plan to perform bilat inj today

## 2022-03-22 ENCOUNTER — HOSPITAL ENCOUNTER (OUTPATIENT)
Dept: GENERAL RADIOLOGY | Facility: HOSPITAL | Age: 57
Discharge: HOME | End: 2022-03-22
Admitting: NURSE PRACTITIONER

## 2022-03-22 ENCOUNTER — OFFICE VISIT (OUTPATIENT)
Dept: FAMILY MEDICINE CLINIC | Age: 57
End: 2022-03-22

## 2022-03-22 VITALS
HEART RATE: 91 BPM | HEIGHT: 66 IN | SYSTOLIC BLOOD PRESSURE: 131 MMHG | DIASTOLIC BLOOD PRESSURE: 87 MMHG | WEIGHT: 189.6 LBS | BODY MASS INDEX: 30.47 KG/M2

## 2022-03-22 DIAGNOSIS — M25.552 LEFT HIP PAIN: ICD-10-CM

## 2022-03-22 DIAGNOSIS — M25.552 LEFT HIP PAIN: Primary | ICD-10-CM

## 2022-03-22 PROCEDURE — 73502 X-RAY EXAM HIP UNI 2-3 VIEWS: CPT

## 2022-03-22 PROCEDURE — 99213 OFFICE O/P EST LOW 20 MIN: CPT | Performed by: NURSE PRACTITIONER

## 2022-03-22 RX ORDER — BACLOFEN 10 MG/1
10 TABLET ORAL 3 TIMES DAILY PRN
Qty: 30 TABLET | Refills: 0 | Status: SHIPPED | OUTPATIENT
Start: 2022-03-22 | End: 2022-05-17

## 2022-03-22 RX ORDER — METHYLPREDNISOLONE 4 MG/1
TABLET ORAL
Qty: 21 EACH | Refills: 0 | Status: SHIPPED | OUTPATIENT
Start: 2022-03-22 | End: 2022-03-31

## 2022-03-22 NOTE — PATIENT INSTRUCTIONS
Methylprednisolone tablets  What is this medicine?  METHYLPREDNISOLONE (meth ill pred NISS oh lone) is a corticosteroid. It is commonly used to treat inflammation of the skin, joints, lungs, and other organs. Common conditions treated include asthma, allergies, and arthritis. It is also used for other conditions, such as blood disorders and diseases of the adrenal glands.  This medicine may be used for other purposes; ask your health care provider or pharmacist if you have questions.  COMMON BRAND NAME(S): Medrol, Medrol Dosepak  What should I tell my health care provider before I take this medicine?  They need to know if you have any of these conditions:  Cushing's syndrome  eye disease, vision problems  diabetes  glaucoma  heart disease  high blood pressure  infection (especially a virus infection such as chickenpox, cold sores, or herpes)  liver disease  mental illness  myasthenia gravis  osteoporosis  recently received or scheduled to receive a vaccine  seizures  stomach or intestine problems  thyroid disease  an unusual or allergic reaction to lactose, methylprednisolone, other medicines, foods, dyes, or preservatives  pregnant or trying to get pregnant  breast-feeding  How should I use this medicine?  Take this medicine by mouth with a glass of water. Follow the directions on the prescription label. Take this medicine with food. If you are taking this medicine once a day, take it in the morning. Do not take it more often than directed. Do not suddenly stop taking your medicine because you may develop a severe reaction. Your doctor will tell you how much medicine to take. If your doctor wants you to stop the medicine, the dose may be slowly lowered over time to avoid any side effects.  Talk to your pediatrician regarding the use of this medicine in children. Special care may be needed.  Overdosage: If you think you have taken too much of this medicine contact a poison control center or emergency room at  once.  NOTE: This medicine is only for you. Do not share this medicine with others.  What if I miss a dose?  If you miss a dose, take it as soon as you can. If it is almost time for your next dose, talk to your doctor or health care professional. You may need to miss a dose or take an extra dose. Do not take double or extra doses without advice.  What may interact with this medicine?  Do not take this medicine with any of the following medications:  alefacept  echinacea  live virus vaccines  metyrapone  mifepristone  This medicine may also interact with the following medications:  amphotericin B  aspirin and aspirin-like medicines  certain antibiotics like erythromycin, clarithromycin, troleandomycin  certain medicines for diabetes  certain medicines for fungal infections like ketoconazole  certain medicines for seizures like carbamazepine, phenobarbital, phenytoin  certain medicines that treat or prevent blood clots like warfarin  cholestyramine  cyclosporine  digoxin  diuretics  female hormones, like estrogens and birth control pills  isoniazid  NSAIDs, medicines for pain inflammation, like ibuprofen or naproxen  other medicines for myasthenia gravis  rifampin  vaccines  This list may not describe all possible interactions. Give your health care provider a list of all the medicines, herbs, non-prescription drugs, or dietary supplements you use. Also tell them if you smoke, drink alcohol, or use illegal drugs. Some items may interact with your medicine.  What should I watch for while using this medicine?  Tell your doctor or healthcare professional if your symptoms do not start to get better or if they get worse. Do not stop taking except on your doctor's advice. You may develop a severe reaction. Your doctor will tell you how much medicine to take.  This medicine may increase your risk of getting an infection. Tell your doctor or health care professional if you are around anyone with measles or chickenpox, or if  you develop sores or blisters that do not heal properly.  This medicine may increase blood sugar levels. Ask your healthcare provider if changes in diet or medicines are needed if you have diabetes.  Tell your doctor or health care professional right away if you have any change in your eyesight.  Using this medicine for a long time may increase your risk of low bone mass. Talk to your doctor about bone health.  What side effects may I notice from receiving this medicine?  Side effects that you should report to your doctor or health care professional as soon as possible:  allergic reactions like skin rash, itching or hives, swelling of the face, lips, or tongue  bloody or tarry stools  hallucination, loss of contact with reality  muscle cramps  muscle pain  palpitations  signs and symptoms of high blood sugar such as being more thirsty or hungry or having to urinate more than normal. You may also feel very tired or have blurry vision.  signs and symptoms of infection like fever or chills; cough; sore throat; pain or trouble passing urine  Side effects that usually do not require medical attention (report to your doctor or health care professional if they continue or are bothersome):  changes in emotions or mood  constipation  diarrhea  excessive hair growth on the face or body  headache  nausea, vomiting  trouble sleeping  weight gain  This list may not describe all possible side effects. Call your doctor for medical advice about side effects. You may report side effects to FDA at 1-032-FDA-0745.  Where should I keep my medicine?  Keep out of the reach of children.  Store at room temperature between 20 and 25 degrees C (68 and 77 degrees F). Throw away any unused medicine after the expiration date.  NOTE: This sheet is a summary. It may not cover all possible information. If you have questions about this medicine, talk to your doctor, pharmacist, or health care provider.  © 2021 [employer] (2019-09-19  09:19:36)

## 2022-03-22 NOTE — PROGRESS NOTES
"Chief Complaint  Hip Pain (Pt c/o (L) hip pain./)    Subjective          Ema Cisse presents to [facility]  Hip Pain   The incident occurred more than 1 week ago. There was no injury mechanism. The pain is present in the left hip. Quality: sharp. The pain is at a severity of 8/10. The pain is moderate. The pain has been intermittent since onset. Pertinent negatives include no inability to bear weight, loss of motion, loss of sensation, numbness or tingling. Nothing aggravates the symptoms. She has tried ice (Epidural injections, naproxen) for the symptoms. The treatment provided mild relief.       Objective   Vital Signs:   /87 (BP Location: Left arm, Patient Position: Sitting)   Pulse 91   Ht 167.6 cm (66\")   Wt 86 kg (189 lb 9.6 oz)   BMI 30.60 kg/m²     Physical Exam  Constitutional:       General: She is not in acute distress.     Appearance: Normal appearance. She is normal weight.   HENT:      Head: Normocephalic.   Eyes:      Pupils: Pupils are equal, round, and reactive to light.      Visual Fields: Right eye visual fields normal and left eye visual fields normal.   Neck:      Trachea: Trachea normal.   Cardiovascular:      Rate and Rhythm: Normal rate and regular rhythm.      Heart sounds: Normal heart sounds.   Pulmonary:      Effort: Pulmonary effort is normal.      Breath sounds: Normal breath sounds and air entry.   Musculoskeletal:         General: No swelling or tenderness. Normal range of motion.      Right lower leg: No edema.      Left lower leg: No edema.   Skin:     General: Skin is warm and dry.   Neurological:      Mental Status: She is alert and oriented to person, place, and time.   Psychiatric:         Mood and Affect: Mood and affect normal.         Behavior: Behavior normal.         Thought Content: Thought content normal.        Result Review :   The following data was reviewed by: SHELBIE Rushing on 03/22/2022:              "     Assessment and Plan    Diagnoses and all orders for this visit:    1. Left hip pain (Primary)  -     XR Hip With or Without Pelvis 2 - 3 View Left; Future  -     methylPREDNISolone (MEDROL) 4 MG dose pack; Take as directed on package instructions.  Dispense: 21 each; Refill: 0  -     baclofen (LIORESAL) 10 MG tablet; Take 1 tablet by mouth 3 (Three) Times a Day As Needed for Muscle Spasms.  Dispense: 30 tablet; Refill: 0    Will obtain x-ray of her left hip.  We will also give her a trial of Medrol and baclofen to see if that will help with her left hip pain.  If her symptoms continue or worsen, may consider MRI.      Follow Up   Return if symptoms worsen or fail to improve.  Patient was given instructions and counseling regarding her condition or for health maintenance advice. Please see specific information pulled into the AVS if appropriate.

## 2022-03-23 NOTE — PROGRESS NOTES
I can refer her to physical therapy. I believe I ordered a steroid pack yesterday. I can put an order for an MRI, but I don't know if her insurance would approve

## 2022-03-28 ENCOUNTER — TELEPHONE (OUTPATIENT)
Dept: FAMILY MEDICINE CLINIC | Age: 57
End: 2022-03-28

## 2022-03-28 NOTE — TELEPHONE ENCOUNTER
Caller: Ema Cisse    Relationship: Self    Best call back number:[phone]     What is the best time to reach you: ANY     Who are you requesting to speak with CLINICAL     What was the call regarding: PATIENT SEEN LINDA PARRA A WEEK AGO REGARDING PAIN ON SIDE AROUND HIP AND PATIENT STATED SHE STILL IN PAIN AFTER TAKING ALL STEROID PACK AND WOULD LIKE TO KNOW WHAT ELSE PATIENT CAN DO REGARDING PAIN.     PATIENT STATED MAYBE ANOTHER STEROID PACK OR WHATEVER PROVIDER PREFERS.    Do you require a callback: YES

## 2022-03-29 DIAGNOSIS — M25.552 LEFT HIP PAIN: Primary | ICD-10-CM

## 2022-03-29 RX ORDER — MELOXICAM 7.5 MG/1
7.5 TABLET ORAL DAILY
Qty: 14 TABLET | Refills: 0 | Status: SHIPPED | OUTPATIENT
Start: 2022-03-29 | End: 2022-04-12

## 2022-03-29 RX ORDER — PANTOPRAZOLE SODIUM 40 MG/1
TABLET, DELAYED RELEASE ORAL
Qty: 90 TABLET | Refills: 0 | Status: SHIPPED | OUTPATIENT
Start: 2022-03-29 | End: 2022-05-17 | Stop reason: SDUPTHER

## 2022-03-29 NOTE — TELEPHONE ENCOUNTER
Pt inf, would like to proceed with an MRI, she has tried PT in the past and it did not help for her. Please adv.

## 2022-03-29 NOTE — TELEPHONE ENCOUNTER
I have sent in meloxicam, so she cannot take NSAIDs such as her naproxen, ibuprofen, Advil.  I could put an order for an MRI, but the insurance may want her to have physical therapy first.  Please let me know.

## 2022-03-30 ENCOUNTER — TELEPHONE (OUTPATIENT)
Dept: FAMILY MEDICINE CLINIC | Age: 57
End: 2022-03-30

## 2022-03-30 RX ORDER — PANTOPRAZOLE SODIUM 40 MG/1
40 TABLET, DELAYED RELEASE ORAL DAILY
Qty: 90 TABLET | Refills: 0 | Status: CANCELLED | OUTPATIENT
Start: 2022-03-30

## 2022-03-30 NOTE — TELEPHONE ENCOUNTER
Caller: Betito Ema R    Relationship: Self    Best call back number: [phone]    Requested Prescriptions:   Requested Prescriptions     Pending Prescriptions Disp Refills   • pantoprazole (PROTONIX) 40 MG EC tablet 90 tablet 0     Sig: Take 1 tablet by mouth Daily.        Pharmacy where request should be sent: [facility] - [facility] [address] [facility] [phone] [facility] [phone] FX     Additional details provided by patient: PRIOR AUTHORIZATION IS NEEDED BEFORE PATIENT CAN  FROM THE PHARMACY   SHE ONLY HAS A COUPLE OF DAYS LEFT     Does the patient have less than a 3 day supply:  [x] Yes  [] No    Katie Salinas Rep   03/30/22 16:30 EDT

## 2022-03-31 ENCOUNTER — OFFICE VISIT (OUTPATIENT)
Dept: GASTROENTEROLOGY | Facility: CLINIC | Age: 57
End: 2022-03-31

## 2022-03-31 VITALS
DIASTOLIC BLOOD PRESSURE: 73 MMHG | WEIGHT: 191.6 LBS | HEART RATE: 84 BPM | SYSTOLIC BLOOD PRESSURE: 135 MMHG | BODY MASS INDEX: 31.92 KG/M2 | HEIGHT: 65 IN

## 2022-03-31 DIAGNOSIS — K58.2 IRRITABLE BOWEL SYNDROME WITH BOTH CONSTIPATION AND DIARRHEA: Primary | ICD-10-CM

## 2022-03-31 DIAGNOSIS — Z86.19 HISTORY OF CLOSTRIDIUM DIFFICILE INFECTION: ICD-10-CM

## 2022-03-31 DIAGNOSIS — R10.32 LEFT LOWER QUADRANT PAIN: ICD-10-CM

## 2022-03-31 PROCEDURE — 99214 OFFICE O/P EST MOD 30 MIN: CPT | Performed by: NURSE PRACTITIONER

## 2022-03-31 RX ORDER — POLYETHYLENE GLYCOL 3350, SODIUM SULFATE, SODIUM CHLORIDE, POTASSIUM CHLORIDE, ASCORBIC ACID, SODIUM ASCORBATE 140-9-5.2G
1 KIT ORAL TAKE AS DIRECTED
Qty: 1 EACH | Refills: 0 | Status: SHIPPED | OUTPATIENT
Start: 2022-03-31 | End: 2022-05-17

## 2022-03-31 RX ORDER — PLECANATIDE 3 MG/1
3 TABLET ORAL DAILY
Qty: 30 TABLET | Refills: 4 | Status: SHIPPED | OUTPATIENT
Start: 2022-03-31 | End: 2022-05-17 | Stop reason: SDUPTHER

## 2022-03-31 RX ORDER — L.ACIDOPH/B.ANIMALIS/B.LONGUM 15B CELL
1 CAPSULE ORAL DAILY
Qty: 30 CAPSULE | Refills: 4 | Status: SHIPPED | OUTPATIENT
Start: 2022-03-31 | End: 2022-04-30

## 2022-03-31 RX ORDER — SODIUM, POTASSIUM,MAG SULFATES 17.5-3.13G
1 SOLUTION, RECONSTITUTED, ORAL ORAL EVERY 12 HOURS
Qty: 354 ML | Refills: 0 | Status: SHIPPED | OUTPATIENT
Start: 2022-03-31 | End: 2022-05-17

## 2022-03-31 NOTE — PROGRESS NOTES
Chief Complaint  Constipation, Diarrhea, Follow-up (C. Diff ), and Abdominal Pain    History of Present Illness  Ema Cisse is a 56 y.o. who presents to [facility] for follow up of Constipation, Diarrhea, Follow-up (C. Diff ), and Abdominal Pain.    Ms. Cisse presents today for f/u cdiff. Reports that she feels the cdiff resolved after completing Dificid but she is now alternating diarrhea and constipation.  More so constipation. Taking miralax on the days she feels constipated with no change in BMs.. Dicylcomine does help decrease the diarrhea.  Left lower quadrant pain present, worse when bending over and with palpation.  Recently had an x-ray done on her left hip which did so degenerative joint disease however she feels it may be r/t her abdomen.  Describes the pain to be a stabbing sensation.  PCP did prescribe a steroid which did help decrease the pain.  Would like to schedule colonoscopy today due to her last one being 5+ years ago.    Previously tried Linzess for constipation however it caused abdominal discomfort she was unable to tolerate it.  Would like to try something new.  Denies any hematochezia or melena.  Reports Florastor was not covered by her insurance and was very expensive.      Labs Result Review Imaging    History reviewed. No pertinent past medical history.    Past Surgical History:   Procedure Laterality Date   • BUNIONECTOMY  2008   • CHOLECYSTECTOMY  1984   • COLONOSCOPY  2017   • COLONOSCOPY  2017   • CYSTECTOMY     • EPIDURAL Right 11/3/2021    Procedure: Right L5 LUMBAR/SACRAL TRANSFORAMINAL EPIDURAL;  Surgeon: Jeronimo Lind MD;  Location: [facility] MAIN OR;  Service: Pain Management;  Laterality: Right;   • EPIDURAL Bilateral 3/17/2022    Procedure: Right L5 LUMBAR/SACRAL TRANSFORAMINAL EPIDURAL;  Surgeon: Jeronimo Lind MD;  Location: [facility] MAIN OR;  Service: Pain Management;  Laterality: Bilateral;   • KNEE SURGERY  07/2005    ACL Removal    • MEDIAL BRANCH BLOCK Left 12/14/2021    Procedure: Left C3-C5 MEDIAL BRANCH BLOCK;  Surgeon: Jeronimo Lind MD;  Location: [facility];  Service: Pain Management;  Laterality: Left;   • MEDIAL BRANCH BLOCK Left 2/8/2022    Procedure: Left C3-C5 MEDIAL BRANCH BLOCK;  Surgeon: Jeronimo Lind MD;  Location: [facility];  Service: Pain Management;  Laterality: Left;   • RADIOFREQUENCY ABLATION Left 3/3/2022    Procedure: RADIOFREQUENCY ABLATION CERVICAL C3-C5;  Surgeon: Jeronimo Lind MD;  Location: [facility];  Service: Pain Management;  Laterality: Left;   • SINUS SURGERY  2010   • UPPER GASTROINTESTINAL ENDOSCOPY  2002       Current Outpatient Medications on File Prior to Visit   Medication Sig Dispense Refill   • albuterol sulfate  (90 Base) MCG/ACT inhaler Inhale 2 puffs Every 4 (Four) Hours As Needed.     • baclofen (LIORESAL) 10 MG tablet Take 1 tablet by mouth 3 (Three) Times a Day As Needed for Muscle Spasms. 30 tablet 0   • Cetirizine HCl 10 MG capsule Take 10 mg by mouth Daily.     • dicyclomine (BENTYL) 20 MG tablet Take 1 tablet by mouth 4 (Four) Times a Day Before Meals & at Bedtime. 120 tablet 0   • escitalopram (Lexapro) 20 MG tablet Take 1 tablet by mouth Daily. 90 tablet 1   • gabapentin (NEURONTIN) 300 MG capsule Take 2 capsules by mouth Every Night. 60 capsule 5   • loperamide (IMODIUM) 2 MG capsule Take 1 capsule by mouth 4 (Four) Times a Day As Needed for Diarrhea. 60 capsule 0   • meloxicam (Mobic) 7.5 MG tablet Take 1 tablet by mouth Daily for 14 days. 14 tablet 0   • montelukast (SINGULAIR) 10 MG tablet      • multivitamin with minerals tablet tablet Take 1 tablet by mouth Daily.     • pantoprazole (PROTONIX) 40 MG EC tablet TAKE 1 TABLET BY MOUTH DAILY 90 tablet 0   • SUMAtriptan (IMITREX) 50 MG tablet Take 1 tablet by mouth 1 (One) Time As Needed for Migraine. 9 tablet 1   • traZODone (DESYREL) 50 MG tablet Take 2 tablets by mouth Every Night. 180 tablet 1   •  "[DISCONTINUED] methylPREDNISolone (MEDROL) 4 MG dose pack Take as directed on package instructions. 21 each 0     No current facility-administered medications on file prior to visit.       Social History     Social History Narrative   • Not on file         Objective     Review of Systems   Gastrointestinal: Positive for abdominal pain, constipation and diarrhea.        Vital Signs:   /73 (BP Location: Left arm, Patient Position: Sitting, Cuff Size: Large Adult)   Pulse 84   Ht 165.1 cm (65\")   Wt 86.9 kg (191 lb 9.6 oz)   BMI 31.88 kg/m²       Physical Exam  Constitutional:       General: She is not in acute distress.     Appearance: Normal appearance. She is well-developed and normal weight.   HENT:      Head: Normocephalic and atraumatic.   Eyes:      Conjunctiva/sclera: Conjunctivae normal.      Pupils: Pupils are equal, round, and reactive to light.      Visual Fields: Right eye visual fields normal and left eye visual fields normal.   Cardiovascular:      Rate and Rhythm: Normal rate and regular rhythm.      Heart sounds: Normal heart sounds.   Pulmonary:      Effort: Pulmonary effort is normal. No retractions.      Breath sounds: Normal breath sounds and air entry.   Abdominal:      General: Bowel sounds are normal. There is no distension.      Palpations: Abdomen is soft.      Tenderness: There is abdominal tenderness in the left lower quadrant.      Comments: No appreciable hepatosplenomegaly or ascites   Musculoskeletal:         General: Normal range of motion.      Cervical back: Normal range of motion and neck supple.   Skin:     General: Skin is warm and dry.   Neurological:      Mental Status: She is alert and oriented to person, place, and time.   Psychiatric:         Mood and Affect: Mood and affect normal.         Behavior: Behavior normal.         Result Review :   The following data was reviewed by: SHELBIE Gupta on 03/31/2022:  CBC w/diff    CBC w/Diff 1/12/22   WBC 7.75   RBC " 4.47   Hemoglobin 13.3   Hematocrit 40.1   MCV 89.7   MCH 29.8   MCHC 33.2   RDW 13.3   Platelets 343   Neutrophil Rel % 62.8   Immature Granulocyte Rel % 0.1   Lymphocyte Rel % 27.1   Monocyte Rel % 6.6   Eosinophil Rel % 2.5   Basophil Rel % 0.9           CMP    CMP 1/12/22   Glucose 93   BUN 9   Creatinine 0.84   eGFR Non African Am 70   Sodium 139   Potassium 3.5   Chloride 106   Calcium 9.2   Albumin 4.20   Total Bilirubin 0.2   Alkaline Phosphatase 100   AST (SGOT) 18   ALT (SGPT) 16           Sed Rate   Date Value Ref Range Status   03/26/2021 7 0 - 30 mm/h Final     C-Reactive Protein   Date Value Ref Range Status   03/26/2021 <3.00 0.00 - 5.00 mg/L Final     Comment:     In very rare cases, gammopathy, in particular type IgM  (Waldenstrom's macroglobulinemia), may cause unreliable results.       ds DNA Antibody   Date Value Ref Range Status   03/26/2021 NEGATIVE [IU]/mL Final     IgA   Date Value Ref Range Status   01/12/2022 205 87 - 352 mg/dL Final     Hepatitis C Ab   Date Value Ref Range Status   01/12/2022 Non-Reactive Non-Reactive Final             Assessment and Plan    Diagnoses and all orders for this visit:    1. Irritable bowel syndrome with both constipation and diarrhea (Primary)  -     Case Request; Standing  -     Case Request    2. History of Clostridium difficile infection  -     Case Request; Standing  -     Case Request    3. Left lower quadrant pain  -     Cancel: CT Abdomen Pelvis With Contrast; Future  -     Case Request; Standing  -     Case Request  -     CT Abdomen Pelvis With Contrast; Future    Other orders  -     Probiotic Product (Florajen Digestion) capsule; Take 1 capsule by mouth Daily for 30 days.  Dispense: 30 capsule; Refill: 4  -     Plecanatide (Trulance) 3 MG tablet; Take 1 tablet by mouth Daily for 90 days.  Dispense: 30 tablet; Refill: 4  -     Follow Anesthesia Guidelines / Protocol; Future  -     Obtain Informed Consent; Future  -     PEG-KCl-NaCl-NaSulf-Na Asc-C  (Plenvu) 140 g reconstituted solution solution; Take 140 g by mouth Take As Directed.  Dispense: 1 each; Refill: 0      COLONOSCOPY (N/A)       Follow Up   Return in about 3 months (around 6/30/2022).  Patient was given instructions and counseling regarding her condition or for health maintenance advice. Please see specific information pulled into the AVS if appropriate.

## 2022-04-01 RX ORDER — PLECANATIDE 3 MG/1
3 TABLET ORAL DAILY
Qty: 6 TABLET | Refills: 0 | COMMUNITY
Start: 2022-04-01 | End: 2022-06-06

## 2022-04-05 ENCOUNTER — TELEPHONE (OUTPATIENT)
Dept: FAMILY MEDICINE CLINIC | Age: 57
End: 2022-04-05

## 2022-04-05 NOTE — TELEPHONE ENCOUNTER
Caller: FLORENCE    Relationship: PATIENT    Best call back number: [phone]    What is the best time to reach you: ANYTIME    Who are you requesting to speak with (clinical staff, provider,  specific staff member): CLINICAL    What was the call regarding: PATIENT STATES SHE IS STILL NEEDING THE PRIOR AUTHORIZATION SENT FOR THIS MEDICATION. PATIENT STATES THAT SHE IS COMPLETELY OUT OF THIS MEDICATION.    Do you require a callback: YES    MEDICATION:  pantoprazole (PROTONIX) 40 MG EC tablet    PHARMACY:  [facility] [address] [phone] [facility] [phone]

## 2022-04-06 NOTE — TELEPHONE ENCOUNTER
PA sent to plan covermymeds    Drug  Pantoprazole Sodium 40MG dr tablets    Key: [id number]    Original Claim Info  76 QTY LIMIT EXCD PA RQD CALL - [phone] - MAX QTY OF 90.000  DAYS

## 2022-04-11 ENCOUNTER — HOSPITAL ENCOUNTER (OUTPATIENT)
Dept: CT IMAGING | Facility: HOSPITAL | Age: 57
Discharge: HOME | End: 2022-04-11
Admitting: NURSE PRACTITIONER

## 2022-04-11 DIAGNOSIS — R10.32 LEFT LOWER QUADRANT PAIN: ICD-10-CM

## 2022-04-11 PROCEDURE — 74177 CT ABD & PELVIS W/CONTRAST: CPT

## 2022-04-11 PROCEDURE — 0 IOPAMIDOL PER 1 ML: Performed by: NURSE PRACTITIONER

## 2022-04-11 RX ADMIN — IOPAMIDOL 100 ML: 755 INJECTION, SOLUTION INTRAVENOUS at 14:07

## 2022-04-13 ENCOUNTER — TELEPHONE (OUTPATIENT)
Dept: GASTROENTEROLOGY | Facility: CLINIC | Age: 57
End: 2022-04-13

## 2022-04-13 NOTE — TELEPHONE ENCOUNTER
Patient called to get her results for her CT scan. I explained that the provider has not reviewed that results but when she does I will call. Patient took it upon herself to go ahead and consults with urology due to mention of kidney stones on results. Patient stated that urology told her that the pain is not coming from the kidney stones. Patient also stated that her insurance needed more information for the Trulance. I explained that we received approval for the PA on April 1st. Patient requested I forward the letter to [employer]. Letter was sent.

## 2022-04-25 ENCOUNTER — APPOINTMENT (OUTPATIENT)
Dept: MRI IMAGING | Facility: HOSPITAL | Age: 57
End: 2022-04-25

## 2022-04-26 ENCOUNTER — OFFICE VISIT (OUTPATIENT)
Dept: PAIN MEDICINE | Facility: CLINIC | Age: 57
End: 2022-04-26

## 2022-04-26 VITALS
WEIGHT: 192.8 LBS | DIASTOLIC BLOOD PRESSURE: 92 MMHG | OXYGEN SATURATION: 99 % | HEIGHT: 65 IN | HEART RATE: 85 BPM | RESPIRATION RATE: 12 BRPM | BODY MASS INDEX: 32.12 KG/M2 | TEMPERATURE: 97.1 F | SYSTOLIC BLOOD PRESSURE: 147 MMHG

## 2022-04-26 DIAGNOSIS — M47.812 ARTHROPATHY OF CERVICAL FACET JOINT: Primary | ICD-10-CM

## 2022-04-26 DIAGNOSIS — M50.30 DDD (DEGENERATIVE DISC DISEASE), CERVICAL: ICD-10-CM

## 2022-04-26 DIAGNOSIS — M54.16 LUMBAR RADICULOPATHY: ICD-10-CM

## 2022-04-26 DIAGNOSIS — M51.36 DDD (DEGENERATIVE DISC DISEASE), LUMBAR: ICD-10-CM

## 2022-04-26 PROCEDURE — 99212 OFFICE O/P EST SF 10 MIN: CPT | Performed by: NURSE PRACTITIONER

## 2022-04-26 NOTE — PROGRESS NOTES
"CHIEF COMPLAINT  PROCEDURE FOLLOW UP Bilateral L5 LUMBAR/SACRAL TRANSFORAMINAL EPIDURAL.  Patient reports 80% success from epidural and states it has continued to remain in effect.     Subjective   Ema Cisse is a 56 y.o. female  who presents to the office for follow-up of procedure.  She completed a Left C3-C5 RFA on 3/3/2022 performed by Dr. Lind. Patient reports 75-80% ONGOING relief from this procedure. She also completed a Bilateral L5 TFESI   on  3/17/2022 performed by Dr. Lind for management of back pain. Patient reports 80% ONGOING relief from the procedure.     Today her pain is 1/10VAS in severity. She has stopped the Gabapentin. States she was unsure if this was helpful, saw no worsening in her pain when she stopped this.     Failed Robaxin (side effects), meloxicam, naproxen, chlorzoxazone, tramadol (side effects), and gabapentin (side effects-previously)     She has been evaluated by rheumatology-osteoarthritis. Followed by Dr. Morin for Carpal Tunnel syndrome in both wrists.     Procedure list:  2/8/2022-left C3-C5 MBB-100% relief x1 day  12/14/2021-Left C3-C5 MBB-95% relief x1 day, 70% relief the second day then pain returned to baseline  11/3/2021-right L5 TFESI-70% ongoing relief  12/30/2020-BONILLA at C5-6-a few days of relief, then minimal relief thereafter  10/20/2020-bilateral S1-S3 RFA-\"some better\"  8/6/2020-bilateral SI joint injections-100% relief for a couple weeks    Patient remained masked during entire encounter. No cough present. I donned a mask and eye protection throughout entire visit. Prior to donning mask and eye protection, hand hygiene was performed, as well as when it was doffed.  I was closer than 6 feet, but not for an extended period of time. No obvious exposure to any bodily fluids.    Back Pain  This is a chronic problem. The current episode started more than 1 year ago. The problem occurs daily. Progression since onset: improved since last office visit. The pain " is present in the lumbar spine and sacro-iliac. The pain radiates to the right thigh (right calf). The pain is at a severity of 1/10. The pain is moderate. Exacerbated by: prolonged walking, standing. Stiffness is present in the morning. Associated symptoms include abdominal pain (left lower quadrant) and tingling (left arm first thing in the morning). Pertinent negatives include no chest pain, dysuria, fever, headaches, numbness or weakness. Risk factors include obesity. She has tried NSAIDs and analgesics for the symptoms. The treatment provided significant (with RFL) relief.   Neck Pain   This is a chronic problem. The current episode started more than 1 month ago. The problem occurs daily. The pain is present in the left side (radiating into the left trapezius). The quality of the pain is described as aching and burning. The pain is at a severity of 1/10. The pain is moderate. The symptoms are aggravated by position. Associated symptoms include tingling (left arm first thing in the morning). Pertinent negatives include no chest pain, fever, headaches, numbness or weakness. She has tried NSAIDs, muscle relaxants, neck support and home exercises for the symptoms. The treatment provided significant (diagnostic relief with Cervical MBB) relief.      PEG Assessment   What number best describes your pain on average in the past week?5  What number best describes how, during the past week, pain has interfered with your enjoyment of life?5  What number best describes how, during the past week, pain has interfered with your general activity?  5    The following portions of the patient's history were reviewed and updated as appropriate: allergies, current medications, past family history, past medical history, past social history, past surgical history and problem list.    Review of Systems   Constitutional: Negative for activity change, fatigue and fever.   HENT: Negative for congestion.    Eyes: Negative for visual  "disturbance.   Respiratory: Negative for cough and chest tightness.    Cardiovascular: Negative for chest pain.   Gastrointestinal: Positive for abdominal pain (left lower quadrant), constipation (IBS) and diarrhea (IBS).   Genitourinary: Negative for difficulty urinating and dysuria.   Musculoskeletal: Positive for back pain and neck pain.   Neurological: Positive for tingling (left arm first thing in the morning). Negative for dizziness, weakness, light-headedness, numbness and headaches.   Psychiatric/Behavioral: Negative for agitation, sleep disturbance and suicidal ideas. The patient is not nervous/anxious.      --  The aforementioned information the Chief Complaint section and above subjective data including any HPI data, and also the Review of Systems data, has been personally reviewed and affirmed.  --     Vitals:    04/26/22 1540   BP: 147/92   BP Location: Right arm   Patient Position: Sitting   Pulse: 85   Resp: 12   Temp: 97.1 °F (36.2 °C)   SpO2: 99%   Weight: 87.5 kg (192 lb 12.8 oz)   Height: 165.1 cm (65\")   PainSc:   1   PainLoc: Back  Comment: NECK     Objective   Physical Exam  Vitals and nursing note reviewed.   Constitutional:       Appearance: Normal appearance. She is well-developed.   Eyes:      General: Lids are normal.   Cardiovascular:      Rate and Rhythm: Normal rate.   Pulmonary:      Effort: Pulmonary effort is normal.   Neurological:      Mental Status: She is alert and oriented to person, place, and time.   Psychiatric:         Speech: Speech normal.         Behavior: Behavior normal.         Judgment: Judgment normal.       Assessment/Plan   Diagnoses and all orders for this visit:    1. Arthropathy of cervical facet joint (Primary)    2. DDD (degenerative disc disease), cervical    3. Lumbar radiculopathy    4. DDD (degenerative disc disease), lumbar      --- I do recommend she follow through with the Left hip MRI which was ordered by her primary care office.   --- If hip MRI " normal could consider left L2 TFESI if pain returns.   --- Cevical RFA can be repeated as soon as every 6 months PRN  --- Follow-up if pain fails to improve or worsens.      PATTI REPORT    As the clinician, I personally reviewed the PATTI from 4/26/2022 while the patient was in the office today.    Dictated utilizing Dragon dictation.      This document is intended for medical expert use only. Reading of this document by patients and/or patient's family without participating medical staff guidance may result in misinterpretation and unintended morbidity.   Any interpretation of such data is the responsibility of the patient and/or family member responsible for the patient in concert with their primary or specialist providers, not to be left for sources of online searches such as [employer], [employer] or similar queries. Relying on these approaches to knowledge may result in misinterpretation, misguided goals of care and even death should patients or family members try recommendations outside of the realm of professional medical care in a supervised way.

## 2022-04-29 RX ORDER — MONTELUKAST SODIUM 10 MG/1
10 TABLET ORAL DAILY
Qty: 90 TABLET | Refills: 0 | Status: SHIPPED | OUTPATIENT
Start: 2022-04-29 | End: 2022-05-17 | Stop reason: SDUPTHER

## 2022-05-17 ENCOUNTER — OFFICE VISIT (OUTPATIENT)
Dept: FAMILY MEDICINE CLINIC | Age: 57
End: 2022-05-17

## 2022-05-17 VITALS
TEMPERATURE: 98.3 F | HEIGHT: 65 IN | SYSTOLIC BLOOD PRESSURE: 127 MMHG | WEIGHT: 189.2 LBS | BODY MASS INDEX: 31.52 KG/M2 | HEART RATE: 70 BPM | DIASTOLIC BLOOD PRESSURE: 92 MMHG

## 2022-05-17 DIAGNOSIS — Z23 NEED FOR SHINGLES VACCINE: Primary | ICD-10-CM

## 2022-05-17 DIAGNOSIS — F34.9 PERSISTENT MOOD (AFFECTIVE) DISORDER, UNSPECIFIED: ICD-10-CM

## 2022-05-17 DIAGNOSIS — J30.9 ALLERGIC RHINITIS, UNSPECIFIED SEASONALITY, UNSPECIFIED TRIGGER: ICD-10-CM

## 2022-05-17 DIAGNOSIS — M15.0 PRIMARY GENERALIZED (OSTEO)ARTHRITIS: ICD-10-CM

## 2022-05-17 DIAGNOSIS — G43.109 MIGRAINE WITH AURA, NOT INTRACTABLE, WITHOUT STATUS MIGRAINOSUS: ICD-10-CM

## 2022-05-17 DIAGNOSIS — K58.2 IRRITABLE BOWEL SYNDROME WITH BOTH CONSTIPATION AND DIARRHEA: ICD-10-CM

## 2022-05-17 DIAGNOSIS — K21.9 GASTROESOPHAGEAL REFLUX DISEASE, UNSPECIFIED WHETHER ESOPHAGITIS PRESENT: ICD-10-CM

## 2022-05-17 DIAGNOSIS — F51.01 PRIMARY INSOMNIA: ICD-10-CM

## 2022-05-17 PROCEDURE — 90750 HZV VACC RECOMBINANT IM: CPT | Performed by: NURSE PRACTITIONER

## 2022-05-17 PROCEDURE — 99214 OFFICE O/P EST MOD 30 MIN: CPT | Performed by: NURSE PRACTITIONER

## 2022-05-17 PROCEDURE — 90471 IMMUNIZATION ADMIN: CPT | Performed by: NURSE PRACTITIONER

## 2022-05-17 RX ORDER — MONTELUKAST SODIUM 10 MG/1
10 TABLET ORAL DAILY
Qty: 30 TABLET | Refills: 0 | Status: SHIPPED | OUTPATIENT
Start: 2022-05-17 | End: 2022-06-06 | Stop reason: SDUPTHER

## 2022-05-17 RX ORDER — ESCITALOPRAM OXALATE 20 MG/1
20 TABLET ORAL DAILY
Qty: 30 TABLET | Refills: 0 | Status: SHIPPED | OUTPATIENT
Start: 2022-05-17 | End: 2022-06-06 | Stop reason: SDUPTHER

## 2022-05-17 RX ORDER — NAPROXEN 250 MG/1
500 TABLET ORAL 2 TIMES DAILY PRN
Qty: 60 TABLET | Refills: 0 | Status: SHIPPED | OUTPATIENT
Start: 2022-05-17 | End: 2022-06-06

## 2022-05-17 RX ORDER — NAPROXEN 250 MG/1
500 TABLET ORAL 2 TIMES DAILY PRN
COMMUNITY
End: 2022-05-17 | Stop reason: SDUPTHER

## 2022-05-17 RX ORDER — SUMATRIPTAN 50 MG/1
50 TABLET, FILM COATED ORAL ONCE AS NEEDED
Qty: 9 TABLET | Refills: 0 | Status: SHIPPED | OUTPATIENT
Start: 2022-05-17 | End: 2022-10-04

## 2022-05-17 RX ORDER — TRAZODONE HYDROCHLORIDE 50 MG/1
100 TABLET ORAL NIGHTLY
Qty: 60 TABLET | Refills: 0 | Status: SHIPPED | OUTPATIENT
Start: 2022-05-17 | End: 2022-06-06 | Stop reason: SDUPTHER

## 2022-05-17 RX ORDER — DICYCLOMINE HCL 20 MG
20 TABLET ORAL
Qty: 120 TABLET | Refills: 0 | Status: SHIPPED | OUTPATIENT
Start: 2022-05-17 | End: 2022-06-06 | Stop reason: SDUPTHER

## 2022-05-17 RX ORDER — PANTOPRAZOLE SODIUM 40 MG/1
40 TABLET, DELAYED RELEASE ORAL DAILY
Qty: 30 TABLET | Refills: 0 | Status: SHIPPED | OUTPATIENT
Start: 2022-05-17 | End: 2022-06-06 | Stop reason: SDUPTHER

## 2022-05-17 NOTE — PROGRESS NOTES
"Ema Cisse presents to [facility] with complaint of  Med Refill    SUBJECTIVE  History of Present Illness    The patient is here today for medication refills. She says she is tolerating all needed medications well with no side effects. She wants to get shingles vaccine while she is here today. She has no other issues or concerns she wishes to address today.       OBJECTIVE  Vital Signs:   /92 (BP Location: Right arm, Patient Position: Sitting)   Pulse 70   Temp 98.3 °F (36.8 °C) (Oral)   Ht 165.1 cm (65\")   Wt 85.8 kg (189 lb 3.2 oz)   BMI 31.48 kg/m²       Physical Exam  Vitals reviewed.   Constitutional:       General: She is not in acute distress.     Appearance: Normal appearance. She is not ill-appearing.   HENT:      Head: Normocephalic and atraumatic.      Nose: Nose normal.      Mouth/Throat:      Mouth: Mucous membranes are moist.      Pharynx: Oropharynx is clear.   Cardiovascular:      Rate and Rhythm: Normal rate and regular rhythm.      Pulses: Normal pulses.      Heart sounds: Normal heart sounds.   Pulmonary:      Effort: Pulmonary effort is normal.      Breath sounds: Normal breath sounds.   Musculoskeletal:      Cervical back: Neck supple.   Skin:     General: Skin is warm and dry.      Capillary Refill: Capillary refill takes less than 2 seconds.   Neurological:      General: No focal deficit present.      Mental Status: She is alert and oriented to person, place, and time. Mental status is at baseline.   Psychiatric:         Mood and Affect: Mood normal.         Behavior: Behavior normal.         Judgment: Judgment normal.            ASSESSMENT AND PLAN:  Diagnoses and all orders for this visit:    1. Need for shingles vaccine (Primary)  -     Shingrix Vaccine    2. Irritable bowel syndrome with both constipation and diarrhea  -     dicyclomine (BENTYL) 20 MG tablet; Take 1 tablet by mouth 4 (Four) Times a Day Before Meals & at Bedtime.  Dispense: " 120 tablet; Refill: 0    3. Persistent mood (affective) disorder, unspecified (HCC)  Comments:  Medical condition is stable.  Continue same therapy.  Will recheck at next regular appointment.  Orders:  -     escitalopram (Lexapro) 20 MG tablet; Take 1 tablet by mouth Daily.  Dispense: 30 tablet; Refill: 0    4. Migraine with aura, not intractable, without status migrainosus  Comments:  Medical condition is stable.  Continue same therapy.  Will recheck at next regular appointment.  Orders:  -     SUMAtriptan (IMITREX) 50 MG tablet; Take 1 tablet by mouth 1 (One) Time As Needed for Migraine.  Dispense: 9 tablet; Refill: 0    5. Primary insomnia  Comments:  Medical condition is stable.  Continue same therapy.  Will recheck at next regular appointment.  Orders:  -     traZODone (DESYREL) 50 MG tablet; Take 2 tablets by mouth Every Night.  Dispense: 60 tablet; Refill: 0    6. Allergic rhinitis, unspecified seasonality, unspecified trigger  -     montelukast (SINGULAIR) 10 MG tablet; Take 1 tablet by mouth Daily.  Dispense: 30 tablet; Refill: 0    7. Gastroesophageal reflux disease, unspecified whether esophagitis present  -     pantoprazole (PROTONIX) 40 MG EC tablet; Take 1 tablet by mouth Daily.  Dispense: 30 tablet; Refill: 0    8. Primary generalized (osteo)arthritis  Assessment & Plan:  -well managed with Naproxen     Orders:  -     naproxen (NAPROSYN) 250 MG tablet; Take 2 tablets by mouth 2 (Two) Times a Day As Needed for Mild Pain  for up to 30 days.  Dispense: 60 tablet; Refill: 0    The patient will need to see her regular PCP in 1 month for longer supply of medications.     Follow Up   Return in about 1 month (around 6/17/2022). Patient to notify office with any acute concerns or issues.  Patient verbalizes understanding, agrees with plan of care and has no further questions upon discharge.     Patient was given instructions and counseling regarding her condition or for health maintenance advice. Please see  specific information pulled into the AVS if appropriate.

## 2022-06-06 ENCOUNTER — OFFICE VISIT (OUTPATIENT)
Dept: FAMILY MEDICINE CLINIC | Age: 57
End: 2022-06-06

## 2022-06-06 VITALS
SYSTOLIC BLOOD PRESSURE: 132 MMHG | HEIGHT: 65 IN | TEMPERATURE: 98.2 F | DIASTOLIC BLOOD PRESSURE: 88 MMHG | HEART RATE: 90 BPM | BODY MASS INDEX: 32.15 KG/M2 | WEIGHT: 193 LBS

## 2022-06-06 DIAGNOSIS — K58.2 IRRITABLE BOWEL SYNDROME WITH BOTH CONSTIPATION AND DIARRHEA: ICD-10-CM

## 2022-06-06 DIAGNOSIS — F34.9 PERSISTENT MOOD (AFFECTIVE) DISORDER, UNSPECIFIED: ICD-10-CM

## 2022-06-06 DIAGNOSIS — F51.01 PRIMARY INSOMNIA: ICD-10-CM

## 2022-06-06 DIAGNOSIS — M15.0 PRIMARY GENERALIZED (OSTEO)ARTHRITIS: Primary | ICD-10-CM

## 2022-06-06 DIAGNOSIS — J30.9 ALLERGIC RHINITIS, UNSPECIFIED SEASONALITY, UNSPECIFIED TRIGGER: ICD-10-CM

## 2022-06-06 DIAGNOSIS — K21.9 GASTROESOPHAGEAL REFLUX DISEASE, UNSPECIFIED WHETHER ESOPHAGITIS PRESENT: ICD-10-CM

## 2022-06-06 PROCEDURE — 99214 OFFICE O/P EST MOD 30 MIN: CPT | Performed by: NURSE PRACTITIONER

## 2022-06-06 RX ORDER — NAPROXEN 500 MG/1
500 TABLET ORAL 2 TIMES DAILY PRN
Qty: 180 TABLET | Refills: 1 | Status: SHIPPED | OUTPATIENT
Start: 2022-06-06 | End: 2022-12-28

## 2022-06-06 RX ORDER — PANTOPRAZOLE SODIUM 40 MG/1
40 TABLET, DELAYED RELEASE ORAL DAILY
Qty: 90 TABLET | Refills: 1 | Status: SHIPPED | OUTPATIENT
Start: 2022-06-06 | End: 2023-01-11 | Stop reason: SDUPTHER

## 2022-06-06 RX ORDER — ESCITALOPRAM OXALATE 20 MG/1
20 TABLET ORAL DAILY
Qty: 90 TABLET | Refills: 1 | Status: SHIPPED | OUTPATIENT
Start: 2022-06-06 | End: 2023-01-11

## 2022-06-06 RX ORDER — TRAZODONE HYDROCHLORIDE 50 MG/1
100 TABLET ORAL NIGHTLY
Qty: 180 TABLET | Refills: 1 | Status: SHIPPED | OUTPATIENT
Start: 2022-06-06 | End: 2023-01-11 | Stop reason: SDUPTHER

## 2022-06-06 RX ORDER — MONTELUKAST SODIUM 10 MG/1
10 TABLET ORAL DAILY
Qty: 90 TABLET | Refills: 1 | Status: SHIPPED | OUTPATIENT
Start: 2022-06-06 | End: 2023-01-11 | Stop reason: SDUPTHER

## 2022-06-06 RX ORDER — DICYCLOMINE HCL 20 MG
20 TABLET ORAL
Qty: 360 TABLET | Refills: 1 | Status: SHIPPED | OUTPATIENT
Start: 2022-06-06 | End: 2023-01-11 | Stop reason: SDUPTHER

## 2022-06-06 NOTE — PROGRESS NOTES
Chief Complaint  Ema Cisse presents to [facility] for Insomnia (Med refills)    Subjective          History of Present Illness    Amy is following up on anxiety today. Current medication includes Lexapro 20 mg daily. She is also taking trazodone 50 mg nightly to help with sleep. She has been on several medication in the past for anxiety but none seemed to help. She has tried buspirone which was ineffective. Trintellix caused rash. There were others but she does not know the name of all of them.   Feels that she is doing fairly well on current regimen.  Wishes to continue.  She is on dicyclomine for IBS which has helped symptoms. She was treated for Cdiff earlier this year. She is scheduled for screening colonoscopy.  She sees pain management for chronic low back pain.  She is on sumatriptan as needed for migraines. No refill needed at this time.    Review of Systems      Allergies   Allergen Reactions   • Sulfa Antibiotics Rash   • Trintellix [Vortioxetine] Itching      History reviewed. No pertinent past medical history.  Current Outpatient Medications   Medication Sig Dispense Refill   • albuterol sulfate  (90 Base) MCG/ACT inhaler Inhale 2 puffs Every 4 (Four) Hours As Needed.     • Cetirizine HCl 10 MG capsule Take 10 mg by mouth Daily.     • dicyclomine (BENTYL) 20 MG tablet Take 1 tablet by mouth 4 (Four) Times a Day Before Meals & at Bedtime. 360 tablet 1   • escitalopram (Lexapro) 20 MG tablet Take 1 tablet by mouth Daily. 90 tablet 1   • loperamide (IMODIUM) 2 MG capsule Take 1 capsule by mouth 4 (Four) Times a Day As Needed for Diarrhea. 60 capsule 0   • montelukast (SINGULAIR) 10 MG tablet Take 1 tablet by mouth Daily. 90 tablet 1   • multivitamin with minerals tablet tablet Take 1 tablet by mouth Daily.     • naproxen (NAPROSYN) 500 MG tablet Take 1 tablet by mouth 2 (Two) Times a Day As Needed for Mild Pain . 180 tablet 1   • pantoprazole (PROTONIX) 40 MG  EC tablet Take 1 tablet by mouth Daily. 90 tablet 1   • SUMAtriptan (IMITREX) 50 MG tablet Take 1 tablet by mouth 1 (One) Time As Needed for Migraine. 9 tablet 0   • traZODone (DESYREL) 50 MG tablet Take 2 tablets by mouth Every Night. 180 tablet 1     No current facility-administered medications for this visit.     Past Surgical History:   Procedure Laterality Date   • BUNIONECTOMY  2008   • CHOLECYSTECTOMY  1984   • COLONOSCOPY  2017   • COLONOSCOPY  2017   • CYSTECTOMY     • EPIDURAL Right 11/3/2021    Procedure: Right L5 LUMBAR/SACRAL TRANSFORAMINAL EPIDURAL;  Surgeon: Jeronimo Lind MD;  [facility];  Service: Pain Management;  Laterality: Right;   • EPIDURAL Bilateral 3/17/2022    Procedure: Right L5 LUMBAR/SACRAL TRANSFORAMINAL EPIDURAL;  Surgeon: Jeronimo Lind MD;  Location[facility];  Service: Pain Management;  Laterality: Bilateral;   • KNEE SURGERY  07/2005    ACL Removal   • MEDIAL BRANCH BLOCK Left 12/14/2021    Procedure: Left C3-C5 MEDIAL BRANCH BLOCK;  Surgeon: Jeronimo Lind MD;  Location[facility];  Service: Pain Management;  Laterality: Left;   • MEDIAL BRANCH BLOCK Left 2/8/2022    Procedure: Left C3-C5 MEDIAL BRANCH BLOCK;  Surgeon: Jeronimo Lind MD;  [facility];  Service: Pain Management;  Laterality: Left;   • RADIOFREQUENCY ABLATION Left 3/3/2022    Procedure: RADIOFREQUENCY ABLATION CERVICAL C3-C5;  Surgeon: Jeronimo Lind MD;  [facility];  Service: Pain Management;  Laterality: Left;   • SINUS SURGERY  2010   • UPPER GASTROINTESTINAL ENDOSCOPY  2002      Social History     Tobacco Use   • Smoking status: Never Smoker   • Smokeless tobacco: Never Used   Vaping Use   • Vaping Use: Never used   Substance Use Topics   • Alcohol use: Yes     Comment: socially   • Drug use: No     Family History   Problem Relation Age of Onset   • Hypertension Other    • Heart disease Other    • Hypertension Mother    • Heart disease Father   "  • Lung cancer Father    • No Known Problems Sister    • No Known Problems Daughter    • No Known Problems Maternal Grandmother    • No Known Problems Paternal Grandmother    • No Known Problems Maternal Aunt    • No Known Problems Paternal Aunt    • No Known Problems Maternal Grandfather    • No Known Problems Paternal Grandfather    • BRCA 1/2 Neg Hx    • Breast cancer Neg Hx    • Colon cancer Neg Hx    • Endometrial cancer Neg Hx    • Ovarian cancer Neg Hx      Health Maintenance Due   Topic Date Due   • ANNUAL PHYSICAL  03/25/2022   • LIPID PANEL  03/26/2022   • COLORECTAL CANCER SCREENING  05/01/2022      Immunization History   Administered Date(s) Administered   • COVID-19 (MODERNA) 1st, 2nd, 3rd Dose Only 02/05/2021, 03/05/2021, 11/19/2021   • Flu Vaccine Split Quad 09/25/2019, 09/23/2021   • Flucelvax Quad Vial =>4yrs 09/28/2020   • Hepatitis A 05/02/2018, 11/20/2018   • Shingrix 05/17/2022   • Tdap 05/29/2019        Objective     Vitals:    06/06/22 1332   BP: 132/88   BP Location: Left arm   Patient Position: Sitting   Pulse: 90   Temp: 98.2 °F (36.8 °C)   TempSrc: Oral   Weight: 87.5 kg (193 lb)   Height: 165.1 cm (65\")     Body mass index is 32.12 kg/m².     Physical Exam  Vitals reviewed.   Constitutional:       General: She is not in acute distress.     Appearance: Normal appearance. She is well-developed.   HENT:      Head: Normocephalic and atraumatic.   Cardiovascular:      Rate and Rhythm: Normal rate and regular rhythm.   Pulmonary:      Effort: Pulmonary effort is normal.      Breath sounds: Normal breath sounds.   Neurological:      Mental Status: She is alert and oriented to person, place, and time.   Psychiatric:         Mood and Affect: Mood and affect normal.           Result Review :     The following data was reviewed by: SHELBIE Mccormick on 06/06/2022:          POCT urinalysis dipstick, automated (12/15/2021 15:49)  CBC w AUTO Differential (01/12/2022 11:54)  Comprehensive metabolic " panel (01/12/2022 11:54)  Celiac Disease Antibody Screen (01/12/2022 11:54)  Hepatitis C antibody (01/12/2022 11:54)  Clostridium Difficile Toxin, PCR - Stool, Per Rectum (01/12/2022 16:45)  Enteric Bacterial Panel - Stool, Per Rectum (01/12/2022 16:45)  Enteric Parasite Panel - Stool, Per Rectum (01/12/2022 16:45)                  Assessment and Plan      Diagnoses and all orders for this visit:    1. Primary generalized (osteo)arthritis (Primary)  Assessment & Plan:  Medical condition is stable.  Continue same therapy.  Will recheck at next regular appointment      Orders:  -     naproxen (NAPROSYN) 500 MG tablet; Take 1 tablet by mouth 2 (Two) Times a Day As Needed for Mild Pain .  Dispense: 180 tablet; Refill: 1    2. Primary insomnia  Assessment & Plan:  Medical condition is stable.  Continue same therapy.  Will recheck at next regular appointment      Orders:  -     traZODone (DESYREL) 50 MG tablet; Take 2 tablets by mouth Every Night.  Dispense: 180 tablet; Refill: 1    3. Allergic rhinitis, unspecified seasonality, unspecified trigger  Assessment & Plan:  Medical condition is stable.  Continue same therapy.  Will recheck at next regular appointment      Orders:  -     montelukast (SINGULAIR) 10 MG tablet; Take 1 tablet by mouth Daily.  Dispense: 90 tablet; Refill: 1    4. Persistent mood (affective) disorder, unspecified (HCC)  Assessment & Plan:  Medical condition is stable.  Continue same therapy.  Will recheck at next regular appointment      Orders:  -     escitalopram (Lexapro) 20 MG tablet; Take 1 tablet by mouth Daily.  Dispense: 90 tablet; Refill: 1    5. Irritable bowel syndrome with both constipation and diarrhea  Assessment & Plan:  Medical condition is stable.  Continue same therapy.  Will recheck at next regular appointment      Orders:  -     dicyclomine (BENTYL) 20 MG tablet; Take 1 tablet by mouth 4 (Four) Times a Day Before Meals & at Bedtime.  Dispense: 360 tablet; Refill: 1    6.  Gastroesophageal reflux disease, unspecified whether esophagitis present  Assessment & Plan:  Medical condition is stable.  Continue same therapy.  Will recheck at next regular appointment      Orders:  -     pantoprazole (PROTONIX) 40 MG EC tablet; Take 1 tablet by mouth Daily.  Dispense: 90 tablet; Refill: 1            Follow Up     Return in about 6 months (around 12/6/2022) for Annual physical.

## 2022-07-13 ENCOUNTER — TELEPHONE (OUTPATIENT)
Dept: GASTROENTEROLOGY | Facility: CLINIC | Age: 57
End: 2022-07-13

## 2022-07-20 ENCOUNTER — OFFICE VISIT (OUTPATIENT)
Dept: FAMILY MEDICINE CLINIC | Age: 57
End: 2022-07-20

## 2022-07-20 VITALS
WEIGHT: 195 LBS | SYSTOLIC BLOOD PRESSURE: 122 MMHG | HEIGHT: 65 IN | TEMPERATURE: 98.3 F | HEART RATE: 88 BPM | OXYGEN SATURATION: 98 % | BODY MASS INDEX: 32.49 KG/M2 | DIASTOLIC BLOOD PRESSURE: 86 MMHG

## 2022-07-20 DIAGNOSIS — J06.9 VIRAL UPPER RESPIRATORY TRACT INFECTION: Primary | ICD-10-CM

## 2022-07-20 LAB
EXPIRATION DATE: NORMAL
EXPIRATION DATE: NORMAL
FLUAV AG UPPER RESP QL IA.RAPID: NOT DETECTED
FLUBV AG UPPER RESP QL IA.RAPID: NOT DETECTED
INTERNAL CONTROL: NORMAL
INTERNAL CONTROL: NORMAL
Lab: NORMAL
Lab: NORMAL
S PYO AG THROAT QL: NEGATIVE
SARS-COV-2 AG UPPER RESP QL IA.RAPID: NOT DETECTED

## 2022-07-20 PROCEDURE — 99213 OFFICE O/P EST LOW 20 MIN: CPT | Performed by: NURSE PRACTITIONER

## 2022-07-20 PROCEDURE — 87880 STREP A ASSAY W/OPTIC: CPT | Performed by: NURSE PRACTITIONER

## 2022-07-20 PROCEDURE — 87428 SARSCOV & INF VIR A&B AG IA: CPT | Performed by: NURSE PRACTITIONER

## 2022-07-20 PROCEDURE — 87081 CULTURE SCREEN ONLY: CPT | Performed by: NURSE PRACTITIONER

## 2022-07-20 NOTE — PROGRESS NOTES
Chief Complaint  Ema Cisse presents to [facility] for Nasal Congestion (Congestion, both side ear ache, sore throat X 2 days )    Subjective          History of Present Illness    Amy is here today with c/o symptoms that started one week ago. Started with right ear pain which has progressed to bilateral. Notes headache, sinus pressure, scratchy throat, congestion. Has taken Sudafed PE. Taking Zyrtec and singulair for her allergies. She has some Flonase at home but has not used.     Review of Systems    Allergies   Allergen Reactions   • Sulfa Antibiotics Rash   • Trintellix [Vortioxetine] Itching      History reviewed. No pertinent past medical history.  Current Outpatient Medications   Medication Sig Dispense Refill   • albuterol sulfate  (90 Base) MCG/ACT inhaler Inhale 2 puffs Every 4 (Four) Hours As Needed.     • Cetirizine HCl 10 MG capsule Take 10 mg by mouth Daily.     • dicyclomine (BENTYL) 20 MG tablet Take 1 tablet by mouth 4 (Four) Times a Day Before Meals & at Bedtime. 360 tablet 1   • escitalopram (Lexapro) 20 MG tablet Take 1 tablet by mouth Daily. 90 tablet 1   • loperamide (IMODIUM) 2 MG capsule Take 1 capsule by mouth 4 (Four) Times a Day As Needed for Diarrhea. 60 capsule 0   • montelukast (SINGULAIR) 10 MG tablet Take 1 tablet by mouth Daily. 90 tablet 1   • multivitamin with minerals tablet tablet Take 1 tablet by mouth Daily.     • naproxen (NAPROSYN) 500 MG tablet Take 1 tablet by mouth 2 (Two) Times a Day As Needed for Mild Pain . 180 tablet 1   • pantoprazole (PROTONIX) 40 MG EC tablet Take 1 tablet by mouth Daily. 90 tablet 1   • SUMAtriptan (IMITREX) 50 MG tablet Take 1 tablet by mouth 1 (One) Time As Needed for Migraine. 9 tablet 0   • traZODone (DESYREL) 50 MG tablet Take 2 tablets by mouth Every Night. 180 tablet 1     No current facility-administered medications for this visit.     Past Surgical History:   Procedure Laterality Date   •  BUNIONECTOMY  2008   • CHOLECYSTECTOMY  1984   • COLONOSCOPY  2017   • COLONOSCOPY  2017   • CYSTECTOMY     • EPIDURAL Right 11/3/2021    Procedure: Right L5 LUMBAR/SACRAL TRANSFORAMINAL EPIDURAL;  Surgeon: Jeronimo Lind MD;  [facility];  Service: Pain Management;  Laterality: Right;   • EPIDURAL Bilateral 3/17/2022    Procedure: Right L5 LUMBAR/SACRAL TRANSFORAMINAL EPIDURAL;  Surgeon: Jeronimo Lind MD;  [facility];  Service: Pain Management;  Laterality: Bilateral;   • KNEE SURGERY  07/2005    ACL Removal   • MEDIAL BRANCH BLOCK Left 12/14/2021    Procedure: Left C3-C5 MEDIAL BRANCH BLOCK;  Surgeon: Jeronimo Lind MD;  [facility];  Service: Pain Management;  Laterality: Left;   • MEDIAL BRANCH BLOCK Left 2/8/2022    Procedure: Left C3-C5 MEDIAL BRANCH BLOCK;  Surgeon: Jeronimo Lind MD;  [facility];  Service: Pain Management;  Laterality: Left;   • RADIOFREQUENCY ABLATION Left 3/3/2022    Procedure: RADIOFREQUENCY ABLATION CERVICAL C3-C5;  Surgeon: Jeronimo Lind MD;  Location[facility];  Service: Pain Management;  Laterality: Left;   • SINUS SURGERY  2010   • UPPER GASTROINTESTINAL ENDOSCOPY  2002      Social History     Tobacco Use   • Smoking status: Never Smoker   • Smokeless tobacco: Never Used   Vaping Use   • Vaping Use: Never used   Substance Use Topics   • Alcohol use: Yes     Comment: socially   • Drug use: No     Family History   Problem Relation Age of Onset   • Hypertension Other    • Heart disease Other    • Hypertension Mother    • Heart disease Father    • Lung cancer Father    • No Known Problems Sister    • No Known Problems Daughter    • No Known Problems Maternal Grandmother    • No Known Problems Paternal Grandmother    • No Known Problems Maternal Aunt    • No Known Problems Paternal Aunt    • No Known Problems Maternal Grandfather    • No Known Problems Paternal Grandfather    • BRCA 1/2 Neg Hx    • Breast cancer  "Neg Hx    • Colon cancer Neg Hx    • Endometrial cancer Neg Hx    • Ovarian cancer Neg Hx      Health Maintenance Due   Topic Date Due   • COVID-19 Vaccine (4 - Booster for Moderna series) 03/19/2022   • ANNUAL PHYSICAL  03/25/2022   • LIPID PANEL  03/26/2022   • COLORECTAL CANCER SCREENING  05/01/2022   • ZOSTER VACCINE (2 of 2) 07/12/2022      Immunization History   Administered Date(s) Administered   • COVID-19 (MODERNA) 1st, 2nd, 3rd Dose Only 02/05/2021, 03/05/2021, 11/19/2021   • Flu Vaccine Split Quad 09/25/2019, 09/23/2021   • Flucelvax Quad Vial =>4yrs 09/28/2020   • Hepatitis A 05/02/2018, 11/20/2018   • Shingrix 05/17/2022   • Tdap 05/29/2019        Objective     Vitals:    07/20/22 1350   BP: 122/86   BP Location: Left arm   Patient Position: Sitting   Pulse: 88   Temp: 98.3 °F (36.8 °C)   TempSrc: Oral   SpO2: 98%   Weight: 88.5 kg (195 lb)   Height: 165.1 cm (65\")     Body mass index is 32.45 kg/m².     Physical Exam  Vitals reviewed.   Constitutional:       General: She is not in acute distress.     Appearance: Normal appearance. She is well-developed.   HENT:      Head: Normocephalic and atraumatic.      Right Ear: A middle ear effusion is present.      Left Ear: A middle ear effusion is present.      Nose: Congestion present.      Mouth/Throat:      Comments: PND  Eyes:      Extraocular Movements: Extraocular movements intact.      Pupils: Pupils are equal, round, and reactive to light.   Cardiovascular:      Rate and Rhythm: Normal rate and regular rhythm.   Pulmonary:      Effort: Pulmonary effort is normal.      Breath sounds: Normal breath sounds.   Neurological:      Mental Status: She is alert and oriented to person, place, and time.   Psychiatric:         Mood and Affect: Mood and affect normal.           Result Review :     The following data was reviewed by: SHELBIE Mccormick on 07/20/2022:          POCT rapid strep A (07/20/2022 14:18)  POCT SARS-CoV-2 Antigen PATRIA + Flu (07/20/2022 " 14:16)                  Assessment and Plan      Diagnoses and all orders for this visit:    1. Viral upper respiratory tract infection (Primary)  Comments:  Rapid strep, covid, flu neg. Symptomatic tx. Start Flonase. May continue Sudafed PE. Chloraseptic spray, warm salt water gargles. Declines rx cough med.   Orders:  -     POCT SARS-CoV-2 Antigen PATRIA + Flu  -     POCT rapid strep A  -     Beta Strep Culture, Throat - , Throat; Future  -     Beta Strep Culture, Throat - Swab, Throat              Follow Up     Return for As needed for persistent or worsening symptoms, Next scheduled follow up.

## 2022-07-22 LAB — BACTERIA SPEC AEROBE CULT: NORMAL

## 2022-07-27 ENCOUNTER — HOSPITAL ENCOUNTER (OUTPATIENT)
Facility: HOSPITAL | Age: 57
Setting detail: HOSPITAL OUTPATIENT SURGERY
Discharge: HOME | End: 2022-07-27
Attending: INTERNAL MEDICINE | Admitting: INTERNAL MEDICINE

## 2022-07-27 ENCOUNTER — ANESTHESIA EVENT (OUTPATIENT)
Dept: GASTROENTEROLOGY | Facility: HOSPITAL | Age: 57
End: 2022-07-27

## 2022-07-27 ENCOUNTER — ANESTHESIA (OUTPATIENT)
Dept: GASTROENTEROLOGY | Facility: HOSPITAL | Age: 57
End: 2022-07-27

## 2022-07-27 VITALS
DIASTOLIC BLOOD PRESSURE: 92 MMHG | TEMPERATURE: 97.6 F | WEIGHT: 190.92 LBS | HEART RATE: 76 BPM | SYSTOLIC BLOOD PRESSURE: 119 MMHG | OXYGEN SATURATION: 95 % | RESPIRATION RATE: 17 BRPM | BODY MASS INDEX: 31.77 KG/M2

## 2022-07-27 DIAGNOSIS — R10.32 LEFT LOWER QUADRANT PAIN: ICD-10-CM

## 2022-07-27 DIAGNOSIS — K58.2 IRRITABLE BOWEL SYNDROME WITH BOTH CONSTIPATION AND DIARRHEA: ICD-10-CM

## 2022-07-27 DIAGNOSIS — Z86.19 HISTORY OF CLOSTRIDIUM DIFFICILE INFECTION: ICD-10-CM

## 2022-07-27 PROCEDURE — 45380 COLONOSCOPY AND BIOPSY: CPT | Performed by: INTERNAL MEDICINE

## 2022-07-27 PROCEDURE — 88305 TISSUE EXAM BY PATHOLOGIST: CPT | Performed by: INTERNAL MEDICINE

## 2022-07-27 PROCEDURE — 25010000002 PROPOFOL 10 MG/ML EMULSION: Performed by: NURSE ANESTHETIST, CERTIFIED REGISTERED

## 2022-07-27 RX ORDER — LIDOCAINE HYDROCHLORIDE 20 MG/ML
INJECTION, SOLUTION EPIDURAL; INFILTRATION; INTRACAUDAL; PERINEURAL AS NEEDED
Status: DISCONTINUED | OUTPATIENT
Start: 2022-07-27 | End: 2022-07-27 | Stop reason: SURG

## 2022-07-27 RX ORDER — SODIUM CHLORIDE, SODIUM LACTATE, POTASSIUM CHLORIDE, CALCIUM CHLORIDE 600; 310; 30; 20 MG/100ML; MG/100ML; MG/100ML; MG/100ML
30 INJECTION, SOLUTION INTRAVENOUS CONTINUOUS
Status: DISCONTINUED | OUTPATIENT
Start: 2022-07-27 | End: 2022-07-27 | Stop reason: HOSPADM

## 2022-07-27 RX ORDER — PROPOFOL 10 MG/ML
VIAL (ML) INTRAVENOUS AS NEEDED
Status: DISCONTINUED | OUTPATIENT
Start: 2022-07-27 | End: 2022-07-27 | Stop reason: SURG

## 2022-07-27 RX ADMIN — PROPOFOL 160 MG: 10 INJECTION, EMULSION INTRAVENOUS at 14:54

## 2022-07-27 RX ADMIN — LIDOCAINE HYDROCHLORIDE 100 MG: 20 INJECTION, SOLUTION EPIDURAL; INFILTRATION; INTRACAUDAL; PERINEURAL at 14:54

## 2022-07-27 RX ADMIN — PROPOFOL 150 MCG/KG/MIN: 10 INJECTION, EMULSION INTRAVENOUS at 14:54

## 2022-07-27 RX ADMIN — SODIUM CHLORIDE, POTASSIUM CHLORIDE, SODIUM LACTATE AND CALCIUM CHLORIDE 30 ML/HR: 600; 310; 30; 20 INJECTION, SOLUTION INTRAVENOUS at 13:07

## 2022-07-27 NOTE — ANESTHESIA POSTPROCEDURE EVALUATION
Patient: Ema Cisse    Procedure Summary     Date: 07/27/22 Room / Location: [facility] ENDOSCOPY 3 / [facility]    Anesthesia Start: 1452 Anesthesia Stop: 1510    Procedure: COLONOSCOPY WITH BIOPSIES (N/A ) Diagnosis:       Irritable bowel syndrome with both constipation and diarrhea      History of Clostridium difficile infection      Left lower quadrant pain      (Irritable bowel syndrome with both constipation and diarrhea [K58.2])      (History of Clostridium difficile infection [Z86.19])      (Left lower quadrant pain [R10.32])    Surgeons: Halima Fitzpatrick MD Provider: Dilcia Peterson DO    Anesthesia Type: general ASA Status: 2          Anesthesia Type: general    Vitals  Vitals Value Taken Time   /92 07/27/22 1530   Temp 36.4 °C (97.6 °F) 07/27/22 1530   Pulse 74 07/27/22 1531   Resp 17 07/27/22 1530   SpO2 91 % 07/27/22 1531   Vitals shown include unvalidated device data.        Post Anesthesia Care and Evaluation    Patient location during evaluation: bedside  Patient participation: complete - patient participated  Level of consciousness: awake  Pain management: adequate    Airway patency: patent  Anesthetic complications: No anesthetic complications  PONV Status: none  Cardiovascular status: acceptable and stable  Respiratory status: acceptable  Hydration status: acceptable    Comments: An Anesthesiologist personally participated in the most demanding procedures (including induction and emergence if applicable) in the anesthesia plan, monitored the course of anesthesia administration at frequent intervals and remained physically present and available for immediate diagnosis and treatment of emergencies.             poor glycemic control with T2DM

## 2022-07-27 NOTE — ANESTHESIA PREPROCEDURE EVALUATION
Anesthesia Evaluation     Patient summary reviewed and Nursing notes reviewed   history of anesthetic complications: PONV  NPO Solid Status: > 8 hours  NPO Liquid Status: > 2 hours           Airway   Mallampati: III  TM distance: >3 FB  Possible difficult intubation  Dental - normal exam     Pulmonary - negative pulmonary ROS and normal exam   Cardiovascular - negative cardio ROS and normal exam        Neuro/Psych  (+) headaches, numbness, psychiatric history Anxiety,    GI/Hepatic/Renal/Endo    (+) obesity,  GERD,      Musculoskeletal     (+) neck pain,   Abdominal  - normal exam   Substance History - negative use     OB/GYN negative ob/gyn ROS         Other   arthritis,    history of cancer (melanoma)    ROS/Med Hx Other:                     Anesthesia Plan    ASA 2     general     (Total IV Anesthesia    Patient understands anesthesia not responsible for dental damage.  )  intravenous induction     Anesthetic plan, risks, benefits, and alternatives have been provided, discussed and informed consent has been obtained with: patient.    Plan discussed with CRNA.        CODE STATUS:

## 2022-07-28 ENCOUNTER — TELEPHONE (OUTPATIENT)
Dept: FAMILY MEDICINE CLINIC | Age: 57
End: 2022-07-28

## 2022-07-28 DIAGNOSIS — J32.9 SINUSITIS, UNSPECIFIED CHRONICITY, UNSPECIFIED LOCATION: Primary | ICD-10-CM

## 2022-07-28 RX ORDER — AZITHROMYCIN 250 MG/1
TABLET, FILM COATED ORAL
Qty: 6 TABLET | Refills: 0 | Status: SHIPPED | OUTPATIENT
Start: 2022-07-28 | End: 2022-10-10

## 2022-07-28 NOTE — TELEPHONE ENCOUNTER
Caller: Ema Cisse    Relationship: Self    Best call back number: [phone]    What medication are you requesting: ANY MEDICATION THAT WILL HELP    What are your current symptoms: HEADACHE, DRAINAGE CONGESTION, VERY PAINFUL EARS     How long have you been experiencing symptoms: 2 WEEKS     Have you had these symptoms before:    [x] Yes  [] No    Have you been treated for these symptoms before:   [x] Yes  [] No    If a prescription is needed, what is your preferred pharmacy and phone number:      [facility] - [place], [place] - [address] [place] [address] [phone] [facility] [phone]   [phone]

## 2022-07-29 ENCOUNTER — TELEPHONE (OUTPATIENT)
Dept: GASTROENTEROLOGY | Facility: CLINIC | Age: 57
End: 2022-07-29

## 2022-07-29 LAB
CYTO UR: NORMAL
LAB AP CASE REPORT: NORMAL
LAB AP CLINICAL INFORMATION: NORMAL
PATH REPORT.FINAL DX SPEC: NORMAL
PATH REPORT.GROSS SPEC: NORMAL

## 2022-08-25 ENCOUNTER — TELEPHONE (OUTPATIENT)
Dept: FAMILY MEDICINE CLINIC | Age: 57
End: 2022-08-25

## 2022-08-25 NOTE — TELEPHONE ENCOUNTER
Caller: Ema Cisse    Relationship to patient: Self    Best call back number: [phone]    Patient is needing: PATIENT CALLED BACK IN TO ADD ANOTHER SYMPTOM SHE IS EXPERIENCING. SHE IS HAVING REALLY BAD ITCHING IN THE ROOF OF HER MOUTH. SHE THINKS SHE MAY BE HAVING AN ALLERGIC REACTION TO SOMETHING.

## 2022-08-25 NOTE — TELEPHONE ENCOUNTER
Caller: Ema Cisse    Relationship: Self    Best call back number: [phone]    What medication are you requesting: STEROID    What are your current symptoms: SNEEZING, CONGESTION, DRAINAGE,  NOSE STOPPED UP WHEN SHE LAYS DOWN    If a prescription is needed, what is your preferred pharmacy and phone number: [facility] [place] - [address] [facility] [phone] [facility] [phone]      Additional notes: PATIENT IS STILL HAVING SYMPTOMS. SHE WAS PRESCRIBED AN ANTIBIOTIC A FEW WEEKS AGO AND IT DID NOT HELP MUCH. SHE WOULD LIKE TO REQUEST A STEROID.

## 2022-10-04 DIAGNOSIS — G43.109 MIGRAINE WITH AURA, NOT INTRACTABLE, WITHOUT STATUS MIGRAINOSUS: ICD-10-CM

## 2022-10-04 RX ORDER — SUMATRIPTAN 50 MG/1
TABLET, FILM COATED ORAL
Qty: 9 TABLET | Refills: 0 | Status: SHIPPED | OUTPATIENT
Start: 2022-10-04

## 2022-10-10 ENCOUNTER — OFFICE VISIT (OUTPATIENT)
Dept: FAMILY MEDICINE CLINIC | Age: 57
End: 2022-10-10

## 2022-10-10 ENCOUNTER — HOSPITAL ENCOUNTER (OUTPATIENT)
Dept: GENERAL RADIOLOGY | Facility: HOSPITAL | Age: 57
Discharge: HOME | End: 2022-10-10

## 2022-10-10 VITALS
OXYGEN SATURATION: 97 % | TEMPERATURE: 98.6 F | DIASTOLIC BLOOD PRESSURE: 73 MMHG | SYSTOLIC BLOOD PRESSURE: 149 MMHG | WEIGHT: 191 LBS | BODY MASS INDEX: 31.82 KG/M2 | HEART RATE: 80 BPM | HEIGHT: 65 IN

## 2022-10-10 DIAGNOSIS — R05.9 COUGH, UNSPECIFIED TYPE: ICD-10-CM

## 2022-10-10 DIAGNOSIS — R09.82 POST-NASAL DRIP: ICD-10-CM

## 2022-10-10 DIAGNOSIS — R09.81 NASAL CONGESTION: ICD-10-CM

## 2022-10-10 DIAGNOSIS — J20.9 ACUTE BRONCHITIS, UNSPECIFIED ORGANISM: Primary | ICD-10-CM

## 2022-10-10 LAB
EXPIRATION DATE: NORMAL
FLUAV AG NPH QL: NEGATIVE
FLUBV AG NPH QL: NEGATIVE
INTERNAL CONTROL: NORMAL
Lab: NORMAL

## 2022-10-10 PROCEDURE — 99213 OFFICE O/P EST LOW 20 MIN: CPT

## 2022-10-10 PROCEDURE — 87804 INFLUENZA ASSAY W/OPTIC: CPT

## 2022-10-10 PROCEDURE — 71046 X-RAY EXAM CHEST 2 VIEWS: CPT

## 2022-10-10 RX ORDER — BROMPHENIRAMINE MALEATE, PSEUDOEPHEDRINE HYDROCHLORIDE, AND DEXTROMETHORPHAN HYDROBROMIDE 2; 30; 10 MG/5ML; MG/5ML; MG/5ML
5 SYRUP ORAL 4 TIMES DAILY PRN
Qty: 118 ML | Refills: 0 | Status: SHIPPED | OUTPATIENT
Start: 2022-10-10 | End: 2022-12-22

## 2022-10-10 RX ORDER — FLUTICASONE PROPIONATE 50 MCG
2 SPRAY, SUSPENSION (ML) NASAL DAILY
Qty: 18.2 ML | Refills: 0 | Status: SHIPPED | OUTPATIENT
Start: 2022-10-10

## 2022-10-10 RX ORDER — METHYLPREDNISOLONE 4 MG/1
TABLET ORAL
Qty: 21 TABLET | Refills: 0 | Status: SHIPPED | OUTPATIENT
Start: 2022-10-10 | End: 2022-12-22

## 2022-10-10 NOTE — PROGRESS NOTES
Subjective     CHIEF COMPLAINT    Chief Complaint   Patient presents with   • Cough     Drainage, chest congestion x 4 days. Tested positive for Covid 09/24.         History of Present Illness  Patient is a 56-year-old female who presents today with complaints of cough, congestion x 4 days.  She was positive for COVID on 9-24 and reports her symptoms did improve however her cough has somewhat lingered.  She endorses intermittent sore throat, intermittent and mild SOB.  She denies chest pain or leg swelling.  She does also endorse intermittent headaches but has history of migraines.  She has been using Sudafed, Zyrtec, Robitussin at home.  Also has a saline nasal spray and albuterol inhaler that she uses as needed. Her main complaint is her cough.      Review of Systems   Constitutional: Negative for chills and fever.   HENT: Positive for congestion and sore throat (intermittent, mostly at night).    Respiratory: Positive for cough and shortness of breath (very mild).    Cardiovascular: Negative for chest pain and leg swelling.   Neurological: Positive for headaches (intermittent ).       Current Outpatient Medications on File Prior to Visit   Medication Sig Dispense Refill   • albuterol sulfate  (90 Base) MCG/ACT inhaler Inhale 2 puffs Every 4 (Four) Hours As Needed.     • Cetirizine HCl 10 MG capsule Take 10 mg by mouth Daily.     • dicyclomine (BENTYL) 20 MG tablet Take 1 tablet by mouth 4 (Four) Times a Day Before Meals & at Bedtime. 360 tablet 1   • escitalopram (Lexapro) 20 MG tablet Take 1 tablet by mouth Daily. 90 tablet 1   • montelukast (SINGULAIR) 10 MG tablet Take 1 tablet by mouth Daily. 90 tablet 1   • multivitamin with minerals tablet tablet Take 1 tablet by mouth Daily.     • naproxen (NAPROSYN) 500 MG tablet Take 1 tablet by mouth 2 (Two) Times a Day As Needed for Mild Pain . 180 tablet 1   • pantoprazole (PROTONIX) 40 MG EC tablet Take 1 tablet by mouth Daily. 90 tablet 1   • SUMAtriptan  "(IMITREX) 50 MG tablet TAKE 1 TABLET BY MOUTH ONE TIME AS NEEDED FOR MIGRAINE 9 tablet 0   • traZODone (DESYREL) 50 MG tablet Take 2 tablets by mouth Every Night. 180 tablet 1   • [DISCONTINUED] azithromycin (Zithromax Z-Cheko) 250 MG tablet Take 2 tablets by mouth on day 1, then 1 tablet daily on days 2-5 6 tablet 0   • [DISCONTINUED] loperamide (IMODIUM) 2 MG capsule Take 1 capsule by mouth 4 (Four) Times a Day As Needed for Diarrhea. 60 capsule 0     No current facility-administered medications on file prior to visit.       /73 (BP Location: Left arm, Patient Position: Sitting)   Pulse 80   Temp 98.6 °F (37 °C) (Oral)   Ht 165.1 cm (65\")   Wt 86.6 kg (191 lb)   LMP  (LMP Unknown)   SpO2 97% Comment: room air  BMI 31.78 kg/m²       Objective     Physical Exam  Vitals and nursing note reviewed.   Constitutional:       General: She is not in acute distress.     Appearance: Normal appearance. She is not ill-appearing.   HENT:      Head: Normocephalic and atraumatic.      Right Ear: Tympanic membrane, ear canal and external ear normal.      Left Ear: Tympanic membrane, ear canal and external ear normal.      Nose: Nose normal.      Mouth/Throat:      Mouth: Mucous membranes are moist.      Pharynx: No oropharyngeal exudate or posterior oropharyngeal erythema.   Eyes:      Extraocular Movements: Extraocular movements intact.      Pupils: Pupils are equal, round, and reactive to light.   Cardiovascular:      Rate and Rhythm: Normal rate and regular rhythm.      Pulses: Normal pulses.      Heart sounds: Normal heart sounds.   Pulmonary:      Effort: Pulmonary effort is normal. No accessory muscle usage or respiratory distress.      Breath sounds: Normal breath sounds. No wheezing or rhonchi.   Skin:     General: Skin is warm and dry.   Neurological:      General: No focal deficit present.      Mental Status: She is alert and oriented to person, place, and time.   Psychiatric:         Mood and Affect: Mood and " affect normal.         Speech: Speech normal.         Behavior: Behavior normal.            Lab Results (last 24 hours)     Procedure Component Value Units Date/Time    POCT Influenza A/B [[id number]] Collected: 10/10/22 1333    Specimen: Swab Updated: 10/10/22 1333     Rapid Influenza A Ag Negative     Rapid Influenza B Ag Negative     Internal Control Passed     Lot Number [id number]     Expiration Date 6/22/23             XR Chest PA & Lateral    Result Date: 10/10/2022  PROCEDURE: XR CHEST PA AND LATERAL  COMPARISON: [facility] , CHEST PA/AP & LAT 2V, 3/30/2021, 16:18.  INDICATIONS: COUGH, SHORTNESS OF BREATH SINCE COVID+ 9/24/22  FINDINGS:  LUNGS: Normal.  No significant pulmonary parenchymal abnormalities.  VASCULATURE: Normal.  Unremarkable pulmonary vasculature.  CARDIAC: Normal.  No cardiac silhouette abnormality or cardiomegaly.  MEDIASTINUM: Normal.  No visible mass or adenopathy.  PLEURA: Normal.  No effusion or pleural thickening.  BONES: Normal.  No fracture or visible bony lesion.        No acute disease.  No significant change has occurred.    JAVIER DE LEÓN MD       Electronically Signed and Approved By: JAVIER DE LEÓN MD on 10/10/2022 at 14:12                  Diagnoses and all orders for this visit:    1. Acute bronchitis, unspecified organism (Primary)  -     brompheniramine-pseudoephedrine-DM 30-2-10 MG/5ML syrup; Take 5 mL by mouth 4 (Four) Times a Day As Needed for Cough or Allergies.  Dispense: 118 mL; Refill: 0  -     methylPREDNISolone (MEDROL) 4 MG dose pack; Take as directed on package instructions.  Dispense: 21 tablet; Refill: 0    2. Cough, unspecified type  -     POCT Influenza A/B  -     XR Chest PA & Lateral; Future  -     brompheniramine-pseudoephedrine-DM 30-2-10 MG/5ML syrup; Take 5 mL by mouth 4 (Four) Times a Day As Needed for Cough or Allergies.  Dispense: 118 mL; Refill: 0    3. Nasal congestion  Comments:  Continue allergy medication as prescribed  Orders:  -      fluticasone (Flonase) 50 MCG/ACT nasal spray; 2 sprays into the nostril(s) as directed by provider Daily.  Dispense: 18.2 mL; Refill: 0    4. Post-nasal drip  -     fluticasone (Flonase) 50 MCG/ACT nasal spray; 2 sprays into the nostril(s) as directed by provider Daily.  Dispense: 18.2 mL; Refill: 0      Rapid flu negative.  Chest x-ray negative for acute process.  Recommend avoiding antibiotics due to history of c diff. Patient agreeable to plan of care. Recommend Mucinex during the day, bromfed at night. Return to clinic if symptoms worsen or do not improve.       FOR FULL DISCHARGE INSTRUCTIONS/COMMENTS/HANDOUTS please see the AVS

## 2022-10-12 ENCOUNTER — TELEPHONE (OUTPATIENT)
Dept: FAMILY MEDICINE CLINIC | Age: 57
End: 2022-10-12

## 2022-10-12 NOTE — TELEPHONE ENCOUNTER
If she feels that she is not getting better and thinks she needs antibiotics, I would recommend she return to clinic for further evaluation. She could also go to an urgent care if needed. It would be important for someone to listen to her lungs. She can see another provider, does not need to see me since I am out of office tomorrow.

## 2022-10-12 NOTE — TELEPHONE ENCOUNTER
Pt calling stating her cough is no better and is asking for a stronger cough medication.  Also states she thinks she needs an antibiotic because she is not feeling better at all.  Please advise.

## 2022-10-12 NOTE — TELEPHONE ENCOUNTER
Spoke with pt and she states she is out of town and will go to urgent care if she is not getting better.

## 2022-12-22 ENCOUNTER — OFFICE VISIT (OUTPATIENT)
Dept: FAMILY MEDICINE CLINIC | Age: 57
End: 2022-12-22

## 2022-12-22 VITALS
TEMPERATURE: 98 F | SYSTOLIC BLOOD PRESSURE: 149 MMHG | WEIGHT: 193.4 LBS | DIASTOLIC BLOOD PRESSURE: 99 MMHG | HEIGHT: 65 IN | BODY MASS INDEX: 32.22 KG/M2 | HEART RATE: 75 BPM

## 2022-12-22 DIAGNOSIS — R03.0 ELEVATED BLOOD PRESSURE READING: ICD-10-CM

## 2022-12-22 DIAGNOSIS — H65.91 FLUID LEVEL BEHIND TYMPANIC MEMBRANE OF RIGHT EAR: ICD-10-CM

## 2022-12-22 DIAGNOSIS — J06.9 VIRAL UPPER RESPIRATORY TRACT INFECTION: Primary | ICD-10-CM

## 2022-12-22 PROCEDURE — 87880 STREP A ASSAY W/OPTIC: CPT | Performed by: NURSE PRACTITIONER

## 2022-12-22 PROCEDURE — 87428 SARSCOV & INF VIR A&B AG IA: CPT | Performed by: NURSE PRACTITIONER

## 2022-12-22 PROCEDURE — 99213 OFFICE O/P EST LOW 20 MIN: CPT | Performed by: NURSE PRACTITIONER

## 2022-12-22 PROCEDURE — 87081 CULTURE SCREEN ONLY: CPT | Performed by: NURSE PRACTITIONER

## 2022-12-22 NOTE — PROGRESS NOTES
"Ema Cisse presents to [facility] with complaint of  Sore Throat (H/a, nausea onset yesterday )    SUBJECTIVE  Sore Throat   This is a new problem. The current episode started yesterday. The problem has been unchanged. The pain is worse on the right side. There has been no fever. The pain is moderate. Associated symptoms include ear pain, headaches and a plugged ear sensation. Pertinent negatives include no abdominal pain, congestion, coughing, ear discharge, hoarse voice, neck pain, shortness of breath, trouble swallowing or vomiting. Associated symptoms comments: +PND.        OBJECTIVE  Vital Signs:   /99 (BP Location: Right arm, Patient Position: Sitting)   Pulse 75   Temp 98 °F (36.7 °C) (Oral)   Ht 165.1 cm (65\")   Wt 87.7 kg (193 lb 6.4 oz)   BMI 32.18 kg/m²       Physical Exam  Vitals reviewed.   Constitutional:       General: She is not in acute distress.     Appearance: Normal appearance. She is not ill-appearing.   HENT:      Head: Normocephalic and atraumatic.      Right Ear: Tympanic membrane and ear canal normal.      Left Ear: Ear canal normal. A middle ear effusion (clear fluid present ) is present.      Nose: Nose normal.      Mouth/Throat:      Mouth: Mucous membranes are moist.      Pharynx: Posterior oropharyngeal erythema present. No oropharyngeal exudate.   Cardiovascular:      Rate and Rhythm: Normal rate and regular rhythm.      Pulses: Normal pulses.      Heart sounds: Normal heart sounds.   Pulmonary:      Effort: Pulmonary effort is normal.      Breath sounds: Normal breath sounds.   Musculoskeletal:      Cervical back: Neck supple.   Skin:     General: Skin is warm and dry.   Neurological:      General: No focal deficit present.      Mental Status: She is alert and oriented to person, place, and time. Mental status is at baseline.   Psychiatric:         Mood and Affect: Mood normal.         Behavior: Behavior normal.         Judgment: " Judgment normal.          Results Review:  The following data was reviewed by SHELBIE Red [unfilled] 12:06 EST.    Office Visit on 12/22/2022   Component Date Value Ref Range Status   • SARS Antigen 12/22/2022 Not Detected  Not Detected, Presumptive Negative Final   • Influenza A Antigen PATRIA 12/22/2022 Not Detected  Not Detected Final   • Influenza B Antigen PATRIA 12/22/2022 Not Detected  Not Detected Final   • Internal Control 12/22/2022 Passed  Passed Final   • Lot Number 12/22/2022 [id number]   Final   • Expiration Date 12/22/2022 02/11/23   Final   • Rapid Strep A Screen 12/22/2022 Negative  Negative, VALID, INVALID, Not Performed Final   • Internal Control 12/22/2022 Passed  Passed Final   • Lot Number 12/22/2022 [id number]   Final   • Expiration Date 12/22/2022 03/31/24   Final         ASSESSMENT AND PLAN:  Diagnoses and all orders for this visit:    1. Viral upper respiratory tract infection (Primary)  -     POCT SARS-CoV-2 Antigen PATRIA + Flu  -     POCT rapid strep A  -     Beta Strep Culture, Throat - , Throat; Future  -     Beta Strep Culture, Throat - Swab, Throat    2. Fluid level behind tympanic membrane of right ear    3. Elevated blood pressure reading      Neg for flu, strept, and covid today in office. The patient was encouraged to increase rest and fluids. May take Tylenol for pain or fever. Consider switching antihistamine brand since she has been on zyrtec for years. Also encouraged flonase. May also try dayquil. Pt says she has this at home. Pt reports her BP is always elevated at office. Encouraged to monitor at home and follow up with PCP if const >135/85. If symptoms persist 7 days or longer, come back in to be seen.       Follow Up   Return if symptoms worsen or fail to improve. Patient to notify office with any acute concerns or issues.  Patient verbalizes understanding, agrees with plan of care and has no further questions upon discharge.     Patient was given instructions and counseling  regarding her condition or for health maintenance advice. Please see specific information pulled into the AVS if appropriate.

## 2022-12-24 LAB — BACTERIA SPEC AEROBE CULT: NORMAL

## 2022-12-26 DIAGNOSIS — M15.0 PRIMARY GENERALIZED (OSTEO)ARTHRITIS: ICD-10-CM

## 2022-12-28 RX ORDER — NAPROXEN 500 MG/1
TABLET ORAL
Qty: 180 TABLET | Refills: 0 | Status: SHIPPED | OUTPATIENT
Start: 2022-12-28 | End: 2023-01-11 | Stop reason: SDUPTHER

## 2022-12-29 ENCOUNTER — TELEPHONE (OUTPATIENT)
Dept: GASTROENTEROLOGY | Facility: CLINIC | Age: 57
End: 2022-12-29
Payer: COMMERCIAL

## 2022-12-29 NOTE — TELEPHONE ENCOUNTER
Patient is one of Wilfredo Sheets  [place] patient and she would like to transfer her care to you. She wants to stay in the [place] area.

## 2022-12-30 ENCOUNTER — LAB (OUTPATIENT)
Dept: LAB | Facility: HOSPITAL | Age: 57
End: 2022-12-30
Payer: COMMERCIAL

## 2022-12-30 ENCOUNTER — OFFICE VISIT (OUTPATIENT)
Dept: FAMILY MEDICINE CLINIC | Age: 57
End: 2022-12-30

## 2022-12-30 VITALS
SYSTOLIC BLOOD PRESSURE: 131 MMHG | DIASTOLIC BLOOD PRESSURE: 89 MMHG | HEART RATE: 76 BPM | WEIGHT: 195 LBS | BODY MASS INDEX: 32.49 KG/M2 | HEIGHT: 65 IN

## 2022-12-30 DIAGNOSIS — R10.32 LLQ PAIN: ICD-10-CM

## 2022-12-30 DIAGNOSIS — R30.0 DYSURIA: ICD-10-CM

## 2022-12-30 DIAGNOSIS — R10.32 LLQ PAIN: Primary | ICD-10-CM

## 2022-12-30 LAB
ALBUMIN SERPL-MCNC: 4.3 G/DL (ref 3.5–5.2)
ALBUMIN/GLOB SERPL: 1.8 G/DL
ALP SERPL-CCNC: 104 U/L (ref 39–117)
ALT SERPL W P-5'-P-CCNC: 11 U/L (ref 1–33)
ANION GAP SERPL CALCULATED.3IONS-SCNC: 11.4 MMOL/L (ref 5–15)
AST SERPL-CCNC: 11 U/L (ref 1–32)
BACTERIA UR QL AUTO: ABNORMAL /HPF
BASOPHILS # BLD AUTO: 0.06 10*3/MM3 (ref 0–0.2)
BASOPHILS NFR BLD AUTO: 0.9 % (ref 0–1.5)
BILIRUB SERPL-MCNC: 0.2 MG/DL (ref 0–1.2)
BILIRUB UR QL STRIP: NEGATIVE
BUN SERPL-MCNC: 11 MG/DL (ref 6–20)
BUN/CREAT SERPL: 11.7 (ref 7–25)
CALCIUM SPEC-SCNC: 9.5 MG/DL (ref 8.6–10.5)
CHLORIDE SERPL-SCNC: 108 MMOL/L (ref 98–107)
CLARITY UR: CLEAR
CO2 SERPL-SCNC: 21.6 MMOL/L (ref 22–29)
COLOR UR: YELLOW
CREAT SERPL-MCNC: 0.94 MG/DL (ref 0.57–1)
DEPRECATED RDW RBC AUTO: 43.6 FL (ref 37–54)
EGFRCR SERPLBLD CKD-EPI 2021: 70.5 ML/MIN/1.73
EOSINOPHIL # BLD AUTO: 0.3 10*3/MM3 (ref 0–0.4)
EOSINOPHIL NFR BLD AUTO: 4.3 % (ref 0.3–6.2)
ERYTHROCYTE [DISTWIDTH] IN BLOOD BY AUTOMATED COUNT: 13.3 % (ref 12.3–15.4)
GLOBULIN UR ELPH-MCNC: 2.4 GM/DL
GLUCOSE SERPL-MCNC: 97 MG/DL (ref 65–99)
GLUCOSE UR STRIP-MCNC: NEGATIVE MG/DL
HCT VFR BLD AUTO: 40.7 % (ref 34–46.6)
HGB BLD-MCNC: 13.5 G/DL (ref 12–15.9)
HGB UR QL STRIP.AUTO: NEGATIVE
IMM GRANULOCYTES # BLD AUTO: 0.01 10*3/MM3 (ref 0–0.05)
IMM GRANULOCYTES NFR BLD AUTO: 0.1 % (ref 0–0.5)
KETONES UR QL STRIP: NEGATIVE
LEUKOCYTE ESTERASE UR QL STRIP.AUTO: ABNORMAL
LYMPHOCYTES # BLD AUTO: 2.36 10*3/MM3 (ref 0.7–3.1)
LYMPHOCYTES NFR BLD AUTO: 34 % (ref 19.6–45.3)
MCH RBC QN AUTO: 29.3 PG (ref 26.6–33)
MCHC RBC AUTO-ENTMCNC: 33.2 G/DL (ref 31.5–35.7)
MCV RBC AUTO: 88.5 FL (ref 79–97)
MONOCYTES # BLD AUTO: 0.61 10*3/MM3 (ref 0.1–0.9)
MONOCYTES NFR BLD AUTO: 8.8 % (ref 5–12)
NEUTROPHILS NFR BLD AUTO: 3.61 10*3/MM3 (ref 1.7–7)
NEUTROPHILS NFR BLD AUTO: 51.9 % (ref 42.7–76)
NITRITE UR QL STRIP: NEGATIVE
PH UR STRIP.AUTO: 6 [PH] (ref 5–8)
PLATELET # BLD AUTO: 281 10*3/MM3 (ref 140–450)
PMV BLD AUTO: 9.8 FL (ref 6–12)
POTASSIUM SERPL-SCNC: 4.2 MMOL/L (ref 3.5–5.2)
PROT SERPL-MCNC: 6.7 G/DL (ref 6–8.5)
PROT UR QL STRIP: NEGATIVE
RBC # BLD AUTO: 4.6 10*6/MM3 (ref 3.77–5.28)
RBC # UR STRIP: ABNORMAL /HPF
REF LAB TEST METHOD: ABNORMAL
SODIUM SERPL-SCNC: 141 MMOL/L (ref 136–145)
SP GR UR STRIP: 1.01 (ref 1–1.03)
SQUAMOUS #/AREA URNS HPF: ABNORMAL /HPF
UROBILINOGEN UR QL STRIP: ABNORMAL
WBC # UR STRIP: ABNORMAL /HPF
WBC NRBC COR # BLD: 6.95 10*3/MM3 (ref 3.4–10.8)

## 2022-12-30 PROCEDURE — 36415 COLL VENOUS BLD VENIPUNCTURE: CPT

## 2022-12-30 PROCEDURE — 99214 OFFICE O/P EST MOD 30 MIN: CPT | Performed by: FAMILY MEDICINE

## 2022-12-30 PROCEDURE — 85025 COMPLETE CBC W/AUTO DIFF WBC: CPT

## 2022-12-30 PROCEDURE — 81001 URINALYSIS AUTO W/SCOPE: CPT | Performed by: FAMILY MEDICINE

## 2022-12-30 PROCEDURE — 87086 URINE CULTURE/COLONY COUNT: CPT | Performed by: FAMILY MEDICINE

## 2022-12-30 PROCEDURE — 80053 COMPREHEN METABOLIC PANEL: CPT

## 2022-12-30 NOTE — PROGRESS NOTES
"Chief Complaint  Difficulty Urinating (Pain when urinating, frequency, pain in abdomin and back area x \"several weeks\")    Subjective          Ema Cisse presents to [facility] today for acute complaint of dysuria for the past several weeks.  She has IBS, mixed, so initially she thought she was experiencintg constipation.  She also has chronic back pain.  Initially, the pain started on the L flank.  Has since radiated around to the low back and around the R flank.  She reports positive dysuria, negative hematuria, positive frequency, positive urgency.  She reports negative fevers, positive chills, positive nausea, negative vomiting, positive suprapubic abdominal pain, positive back pain, positive flank pain.  She has had UTIs in the past.  This does feel similar but worse to what she has experienced previously.  She has never had a kidney stone previously.  She had a CT A/P previously over the summer that did show a nephrolith.    She does have IBS and had colonoscopy last done back in June with several diverticula noted by Dr. Fitzpatrick in the sigmoid colon.      Objective   Vital Signs:   Vitals:    12/30/22 0916   BP: 131/89   BP Location: Right arm   Patient Position: Sitting   Pulse: 76   Weight: 88.5 kg (195 lb)   Height: 165.1 cm (65\")      Physical Exam  Vitals reviewed.   Constitutional:       General: She is not in acute distress.     Appearance: Normal appearance.   HENT:      Mouth/Throat:      Mouth: Mucous membranes are moist.   Eyes:      Extraocular Movements: Extraocular movements intact.      Pupils: Pupils are equal, round, and reactive to light.   Cardiovascular:      Rate and Rhythm: Normal rate and regular rhythm.      Heart sounds: No murmur heard.  Pulmonary:      Effort: Pulmonary effort is normal.      Breath sounds: Normal breath sounds. No wheezing, rhonchi or rales.   Abdominal:      Palpations: Abdomen is soft.      Tenderness: There is abdominal " tenderness (LLQ > generalized). There is no right CVA tenderness, left CVA tenderness, guarding or rebound.   Musculoskeletal:         General: Normal range of motion.   Neurological:      Mental Status: She is alert.             Assessment and Plan    Diagnoses and all orders for this visit:    1. LLQ pain (Primary)  Assessment & Plan:  She does have some left lower quadrant tenderness on exam today, possibly more than the rest of the abdomen although the tenderness is pretty diffuse.  Given the blandness of the UA coupled with her physical exam, I am much more suspicious that this may be diverticulitis versus other colitis with adjacent inflammation causing the LE noted on UA.  She did have a colonoscopy done back in June that demonstrated diverticular disease.  Sending her down for labs today.  I would like to get a CT A/P with contrast as well although she is on a somewhat tight schedule as she is leaving in 3 hours for a trip to [place] to take care of her grandkids.  We will try getting her in for the CT either here or at [facility] before she has to go.  However, given that she has been experiencing the symptoms for the past several weeks without noticeable worsening, it is probably fine for her to hold off on the CT until she returns if that is absolutely necessary.  I did give her very strict ED precautions to go if her symptoms worsen, including worsening abdominal pain, blood in the stool, fevers or vomiting.  Also checking labs today to get a baseline there.  We will contact her with results when these are available.    Orders:  -     Comprehensive Metabolic Panel; Future  -     CBC & Differential; Future  -     CT Abdomen Pelvis With Contrast; Future    2. Dysuria  Assessment & Plan:  UA today was unremarkable with the exception of 1+ LE.  Following culture.  Plan as above.    Orders:  -     Urinalysis With Microscopic - Urine, Clean Catch; Future  -     Urine Culture - Urine, Urine, Clean Catch; Future  -      Urinalysis With Microscopic - Urine, Clean Catch  -     Urine Culture - Urine, Urine, Clean Catch         Follow Up   Return if symptoms worsen or fail to improve.  Patient was given instructions and counseling regarding her condition or for health maintenance advice. Please see specific information pulled into the AVS if appropriate.         12/30/2022

## 2022-12-30 NOTE — ASSESSMENT & PLAN NOTE
She does have some left lower quadrant tenderness on exam today, possibly more than the rest of the abdomen although the tenderness is pretty diffuse.  Given the blandness of the UA coupled with her physical exam, I am much more suspicious that this may be diverticulitis versus other colitis with adjacent inflammation causing the LE noted on UA.  She did have a colonoscopy done back in June that demonstrated diverticular disease.  Sending her down for labs today.  I would like to get a CT A/P with contrast as well although she is on a somewhat tight schedule as she is leaving in 3 hours for a trip to [place] to take care of her grandkids.  We will try getting her in for the CT either here or at [facility] before she has to go.  However, given that she has been experiencing the symptoms for the past several weeks without noticeable worsening, it is probably fine for her to hold off on the CT until she returns if that is absolutely necessary.  I did give her very strict ED precautions to go if her symptoms worsen, including worsening abdominal pain, blood in the stool, fevers or vomiting.  Also checking labs today to get a baseline there.  We will contact her with results when these are available.

## 2022-12-31 LAB — BACTERIA SPEC AEROBE CULT: NO GROWTH

## 2023-01-03 ENCOUNTER — TELEPHONE (OUTPATIENT)
Dept: FAMILY MEDICINE CLINIC | Age: 58
End: 2023-01-03

## 2023-01-03 DIAGNOSIS — R10.32 LLQ PAIN: ICD-10-CM

## 2023-01-03 NOTE — TELEPHONE ENCOUNTER
Caller: Ema Cisse    Relationship: Self    Best call back number: [phone]    Caller requesting test results: PATIENT    What test was performed: CAT SCAN    When was the test performed: 12.30.22    Where was the test performed: [facility]    Additional notes: PATIENT WOULD LIKE A CALL REGARDING TEST RESULTS FROM 12.30.22.

## 2023-01-11 ENCOUNTER — TELEPHONE (OUTPATIENT)
Dept: FAMILY MEDICINE CLINIC | Age: 58
End: 2023-01-11

## 2023-01-11 ENCOUNTER — OFFICE VISIT (OUTPATIENT)
Dept: FAMILY MEDICINE CLINIC | Age: 58
End: 2023-01-11
Payer: COMMERCIAL

## 2023-01-11 VITALS
BODY MASS INDEX: 32.65 KG/M2 | HEART RATE: 88 BPM | TEMPERATURE: 98.2 F | SYSTOLIC BLOOD PRESSURE: 131 MMHG | DIASTOLIC BLOOD PRESSURE: 88 MMHG | HEIGHT: 65 IN | WEIGHT: 196 LBS

## 2023-01-11 DIAGNOSIS — Z23 ENCOUNTER FOR IMMUNIZATION: ICD-10-CM

## 2023-01-11 DIAGNOSIS — K58.2 IRRITABLE BOWEL SYNDROME WITH BOTH CONSTIPATION AND DIARRHEA: ICD-10-CM

## 2023-01-11 DIAGNOSIS — M15.0 PRIMARY GENERALIZED (OSTEO)ARTHRITIS: ICD-10-CM

## 2023-01-11 DIAGNOSIS — F34.9 PERSISTENT MOOD (AFFECTIVE) DISORDER, UNSPECIFIED: ICD-10-CM

## 2023-01-11 DIAGNOSIS — J30.9 ALLERGIC RHINITIS, UNSPECIFIED SEASONALITY, UNSPECIFIED TRIGGER: ICD-10-CM

## 2023-01-11 DIAGNOSIS — Z00.00 ANNUAL PHYSICAL EXAM: Primary | ICD-10-CM

## 2023-01-11 DIAGNOSIS — K64.9 HEMORRHOIDS, UNSPECIFIED HEMORRHOID TYPE: ICD-10-CM

## 2023-01-11 DIAGNOSIS — F51.01 PRIMARY INSOMNIA: ICD-10-CM

## 2023-01-11 DIAGNOSIS — K64.9 HEMORRHOIDS, UNSPECIFIED HEMORRHOID TYPE: Primary | ICD-10-CM

## 2023-01-11 DIAGNOSIS — K21.9 GASTROESOPHAGEAL REFLUX DISEASE, UNSPECIFIED WHETHER ESOPHAGITIS PRESENT: ICD-10-CM

## 2023-01-11 PROCEDURE — 90750 HZV VACC RECOMBINANT IM: CPT | Performed by: NURSE PRACTITIONER

## 2023-01-11 PROCEDURE — 99396 PREV VISIT EST AGE 40-64: CPT | Performed by: NURSE PRACTITIONER

## 2023-01-11 PROCEDURE — 99214 OFFICE O/P EST MOD 30 MIN: CPT | Performed by: NURSE PRACTITIONER

## 2023-01-11 PROCEDURE — 90471 IMMUNIZATION ADMIN: CPT | Performed by: NURSE PRACTITIONER

## 2023-01-11 RX ORDER — PANTOPRAZOLE SODIUM 40 MG/1
40 TABLET, DELAYED RELEASE ORAL DAILY
Qty: 90 TABLET | Refills: 1 | Status: SHIPPED | OUTPATIENT
Start: 2023-01-11

## 2023-01-11 RX ORDER — ESCITALOPRAM OXALATE 20 MG/1
30 TABLET ORAL DAILY
Qty: 145 TABLET | Refills: 0 | Status: SHIPPED | OUTPATIENT
Start: 2023-01-11

## 2023-01-11 RX ORDER — DICYCLOMINE HCL 20 MG
20 TABLET ORAL
Qty: 360 TABLET | Refills: 1 | Status: SHIPPED | OUTPATIENT
Start: 2023-01-11

## 2023-01-11 RX ORDER — HYDROCORTISONE ACETATE 25 MG/1
25 SUPPOSITORY RECTAL 2 TIMES DAILY PRN
Qty: 24 EACH | Refills: 0 | Status: SHIPPED | OUTPATIENT
Start: 2023-01-11 | End: 2023-02-09

## 2023-01-11 RX ORDER — PRAMOXINE HYDROCHLORIDE HYDROCORTISONE ACETATE 100; 100 MG/10G; MG/10G
1 AEROSOL, FOAM TOPICAL 2 TIMES DAILY
Qty: 1 EACH | Refills: 0 | Status: SHIPPED | OUTPATIENT
Start: 2023-01-11 | End: 2023-01-18

## 2023-01-11 RX ORDER — NAPROXEN 500 MG/1
500 TABLET ORAL 2 TIMES DAILY PRN
Qty: 180 TABLET | Refills: 1 | Status: SHIPPED | OUTPATIENT
Start: 2023-01-11

## 2023-01-11 RX ORDER — TRAZODONE HYDROCHLORIDE 50 MG/1
100 TABLET ORAL NIGHTLY
Qty: 180 TABLET | Refills: 1 | Status: SHIPPED | OUTPATIENT
Start: 2023-01-11

## 2023-01-11 RX ORDER — MONTELUKAST SODIUM 10 MG/1
10 TABLET ORAL DAILY
Qty: 90 TABLET | Refills: 1 | Status: SHIPPED | OUTPATIENT
Start: 2023-01-11

## 2023-01-11 NOTE — TELEPHONE ENCOUNTER
Caller: Ema Cisse    Relationship: Self    Best call back number: [phone]    What medication are you requesting: ALTERNATIVE TO   hydrocortisone (ANUSOL-HC) 25 MG suppository       What are your current symptoms: HEMAROIDS     If a prescription is needed, what is your preferred pharmacy and phone number: [facility] - [place] - [address] [facility] [phone] [facility] [phone] FX     Additional notes: MEDICATION IS NOT COVERED BY INSURANCE AND PATIENT CANNOT AFFORD IT. SHE IS REQUESTING AN ALTERNATIVE AND STATED IT COULD BE IN CREAM FORM

## 2023-01-11 NOTE — PROGRESS NOTES
Chief Complaint  Ema Cisse presents to [facility] for Annual Exam    Subjective          History of Present Illness    Amy is here today for annual preventive exam.  Has received covid vaccine X 3.  UTD Tdap, influenza vaccines.   Wishes to update zoster vaccine today. Will hold off on any other vaccines today.   Had colonoscopy on 7/27/22 with Dr Fitzpatrick. Advised repeat 10 years.   Pap smear UTD with GYN. 1/20/20 normal.  Normal screening mammogram on 1/28/22. Followed by GYN.   Never smoker.    Amy is also following up on anxiety today. Current medication includes Lexapro 20 mg daily. She is also taking trazodone 50 mg nightly to help with sleep. She has been on several medication in the past for anxiety. Buspirone was ineffective. Trintellix caused rash. There were others but she does not know the name of all of them. Notes worsening anxiety recently.   She sees pain management for chronic low back pain.  She is on sumatriptan as needed for migraines. No refill needed at this time.  She had episode of abdominal pain last month. Blood counts were normal. CT scan of abdominal and pelvis did not show any signs of diverticulitis or acute issue. She had been experiencing alternating constipation and diarrhea. She has not been taking dicyclomine so was advised to restart. Has just started taking again. Unfortunately, she cannot get in with GI until June. Reports abdominal pain has improved.     Review of Systems   Constitutional: Negative for chills and fever.   HENT: Negative for ear pain and sore throat.    Eyes: Negative for blurred vision and redness.   Respiratory: Negative for shortness of breath and wheezing.    Cardiovascular: Negative for chest pain and palpitations.   Gastrointestinal: Positive for abdominal pain, constipation and diarrhea.   Genitourinary: Negative for frequency and urgency.   Skin: Negative for rash.   Neurological: Negative for dizziness and  syncope.   Psychiatric/Behavioral: Negative for suicidal ideas. The patient is nervous/anxious.          Allergies   Allergen Reactions   • Sulfa Antibiotics Rash   • Trintellix [Vortioxetine] Itching      History reviewed. No pertinent past medical history.  Current Outpatient Medications   Medication Sig Dispense Refill   • albuterol sulfate  (90 Base) MCG/ACT inhaler Inhale 2 puffs Every 4 (Four) Hours As Needed.     • Cetirizine HCl 10 MG capsule Take 10 mg by mouth Daily.     • dicyclomine (BENTYL) 20 MG tablet Take 1 tablet by mouth 4 (Four) Times a Day Before Meals & at Bedtime As Needed (abdominal cramping). 360 tablet 1   • escitalopram (Lexapro) 20 MG tablet Take 1.5 tablets by mouth Daily. 145 tablet 0   • fluticasone (Flonase) 50 MCG/ACT nasal spray 2 sprays into the nostril(s) as directed by provider Daily. 18.2 mL 0   • montelukast (SINGULAIR) 10 MG tablet Take 1 tablet by mouth Daily. 90 tablet 1   • multivitamin with minerals tablet tablet Take 1 tablet by mouth Daily.     • naproxen (NAPROSYN) 500 MG tablet Take 1 tablet by mouth 2 (Two) Times a Day As Needed for Mild Pain. 180 tablet 1   • pantoprazole (PROTONIX) 40 MG EC tablet Take 1 tablet by mouth Daily. 90 tablet 1   • SUMAtriptan (IMITREX) 50 MG tablet TAKE 1 TABLET BY MOUTH ONE TIME AS NEEDED FOR MIGRAINE 9 tablet 0   • traZODone (DESYREL) 50 MG tablet Take 2 tablets by mouth Every Night. 180 tablet 1   • hydrocortisone (ANUSOL-HC) 25 MG suppository Insert 1 suppository into the rectum 2 (Two) Times a Day As Needed for Hemorrhoids. 24 each 0     No current facility-administered medications for this visit.     Past Surgical History:   Procedure Laterality Date   • BUNIONECTOMY  2008   • CHOLECYSTECTOMY  1984   • COLONOSCOPY  2017   • COLONOSCOPY  2017   • COLONOSCOPY N/A 7/27/2022    Procedure: COLONOSCOPY WITH BIOPSIES;  Surgeon: Halima Fitzpatrick MD;  Location: [facility] ENDOSCOPY;  Service: Gastroenterology;  Laterality: N/A;   DIVERTICULOSIS, HEMORRHOIDS   • CYSTECTOMY     • EPIDURAL Right 11/3/2021    Procedure: Right L5 LUMBAR/SACRAL TRANSFORAMINAL EPIDURAL;  Surgeon: Jeronimo Lind MD;  [facility];  Service: Pain Management;  Laterality: Right;   • EPIDURAL Bilateral 3/17/2022    Procedure: Right L5 LUMBAR/SACRAL TRANSFORAMINAL EPIDURAL;  Surgeon: Jeronimo Lind MD;  Location: [facility];  Service: Pain Management;  Laterality: Bilateral;   • KNEE SURGERY  07/2005    ACL Removal   • MEDIAL BRANCH BLOCK Left 12/14/2021    Procedure: Left C3-C5 MEDIAL BRANCH BLOCK;  Surgeon: Jeronimo Lind MD;  Location: [facility];  Service: Pain Management;  Laterality: Left;   • MEDIAL BRANCH BLOCK Left 2/8/2022    Procedure: Left C3-C5 MEDIAL BRANCH BLOCK;  Surgeon: Jeronimo Lind MD;  [facility];  Service: Pain Management;  Laterality: Left;   • RADIOFREQUENCY ABLATION Left 3/3/2022    Procedure: RADIOFREQUENCY ABLATION CERVICAL C3-C5;  Surgeon: Jeronimo Lind MD;  Location[facility];  Service: Pain Management;  Laterality: Left;   • SINUS SURGERY  2010   • UPPER GASTROINTESTINAL ENDOSCOPY  2002      Social History     Tobacco Use   • Smoking status: Never   • Smokeless tobacco: Never   Vaping Use   • Vaping Use: Never used   Substance Use Topics   • Alcohol use: Yes     Comment: socially   • Drug use: No     Family History   Problem Relation Age of Onset   • Hypertension Mother    • Heart disease Father    • Lung cancer Father    • No Known Problems Sister    • No Known Problems Daughter    • No Known Problems Maternal Aunt    • No Known Problems Paternal Aunt    • No Known Problems Maternal Grandmother    • No Known Problems Maternal Grandfather    • No Known Problems Paternal Grandmother    • No Known Problems Paternal Grandfather    • Hypertension Other    • Heart disease Other    • BRCA 1/2 Neg Hx    • Breast cancer Neg Hx    • Colon cancer Neg Hx    • Endometrial cancer Neg Hx    •  "Ovarian cancer Neg Hx    • Malig Hyperthermia Neg Hx      Health Maintenance Due   Topic Date Due   • ANNUAL PHYSICAL  03/24/2022   • LIPID PANEL  03/26/2022      Immunization History   Administered Date(s) Administered   • COVID-19 (MODERNA) 1st, 2nd, 3rd Dose Only 02/05/2021, 03/05/2021, 11/19/2021   • Flu Vaccine Split Quad 09/25/2019, 09/23/2021   • Flucelvax Quad Vial =>4yrs 09/28/2020   • Hepatitis A 05/02/2018, 11/20/2018   • Influenza, Unspecified 10/01/2022   • Shingrix 05/17/2022, 01/11/2023   • Tdap 05/29/2019        Objective     Vitals:    01/11/23 1327   BP: 131/88   BP Location: Right arm   Patient Position: Sitting   Cuff Size: Large Adult   Pulse: 88   Temp: 98.2 °F (36.8 °C)   TempSrc: Oral   Weight: 88.9 kg (196 lb)   Height: 165.1 cm (65\")     Body mass index is 32.62 kg/m².     Physical Exam  Vitals reviewed.   Constitutional:       General: She is not in acute distress.     Appearance: Normal appearance. She is well-developed.   HENT:      Head: Normocephalic and atraumatic.      Right Ear: Hearing, tympanic membrane and ear canal normal.      Left Ear: Hearing, tympanic membrane and ear canal normal.      Mouth/Throat:      Mouth: Mucous membranes are moist.   Eyes:      Extraocular Movements: Extraocular movements intact.      Pupils: Pupils are equal, round, and reactive to light.   Cardiovascular:      Rate and Rhythm: Normal rate and regular rhythm.      Pulses: Normal pulses.      Heart sounds: Normal heart sounds.   Pulmonary:      Effort: Pulmonary effort is normal.      Breath sounds: Normal breath sounds.   Abdominal:      General: Bowel sounds are normal.      Palpations: Abdomen is soft.      Tenderness: There is no abdominal tenderness.   Musculoskeletal:      Cervical back: Normal range of motion and neck supple.   Skin:     General: Skin is warm and dry.   Neurological:      Mental Status: She is alert and oriented to person, place, and time.   Psychiatric:         Mood and " Affect: Mood normal.           Result Review :     The following data was reviewed by: SHELBIE Mccormick on 01/11/2023:          CBC & Differential (12/30/2022 10:08)  Comprehensive Metabolic Panel (12/30/2022 10:08)                  Assessment and Plan      Diagnoses and all orders for this visit:    1. Annual physical exam (Primary)  Comments:  Appropriate screenings and vaccinations were reviewed with the pt and offered as indicated.  Pt counseled on healthy lifestyle including healthy diet, exercise.  Orders:  -     Lipid panel; Future    2. Encounter for immunization  -     Shingrix Vaccine    3. Hemorrhoids, unspecified hemorrhoid type  -     hydrocortisone (ANUSOL-HC) 25 MG suppository; Insert 1 suppository into the rectum 2 (Two) Times a Day As Needed for Hemorrhoids.  Dispense: 24 each; Refill: 0    4. Irritable bowel syndrome with both constipation and diarrhea  Comments:  Will continue dicyclomine and give it more time. LowFODMAP diet discussed and handout provided. Consider hycosamine.   Orders:  -     dicyclomine (BENTYL) 20 MG tablet; Take 1 tablet by mouth 4 (Four) Times a Day Before Meals & at Bedtime As Needed (abdominal cramping).  Dispense: 360 tablet; Refill: 1    5. Primary generalized (osteo)arthritis  -     naproxen (NAPROSYN) 500 MG tablet; Take 1 tablet by mouth 2 (Two) Times a Day As Needed for Mild Pain.  Dispense: 180 tablet; Refill: 1    6. Gastroesophageal reflux disease, unspecified whether esophagitis present  Comments:  Continue PPI. Short term may add famotidine.  Orders:  -     pantoprazole (PROTONIX) 40 MG EC tablet; Take 1 tablet by mouth Daily.  Dispense: 90 tablet; Refill: 1    7. Allergic rhinitis, unspecified seasonality, unspecified trigger  Comments:    Medical condition is stable.  Continue same therapy.  Will recheck at next regular appointment  Orders:  -     montelukast (SINGULAIR) 10 MG tablet; Take 1 tablet by mouth Daily.  Dispense: 90 tablet; Refill: 1    8.  Persistent mood (affective) disorder, unspecified (HCC)  Comments:  Will increase Lexapro dose to 30 mg daily.   Orders:  -     escitalopram (Lexapro) 20 MG tablet; Take 1.5 tablets by mouth Daily.  Dispense: 145 tablet; Refill: 0    9. Primary insomnia  Comments:  Stable on current medications.   Orders:  -     traZODone (DESYREL) 50 MG tablet; Take 2 tablets by mouth Every Night.  Dispense: 180 tablet; Refill: 1                Follow Up     Return in about 3 months (around 4/11/2023) for Recheck.

## 2023-01-11 NOTE — TELEPHONE ENCOUNTER
Pt states she thought you were going to send in rx for something for her hemorrhoids that was discussed at visit today, but pharm doesn't have anything. Please advise.

## 2023-01-12 DIAGNOSIS — K64.9 HEMORRHOIDS, UNSPECIFIED HEMORRHOID TYPE: Primary | ICD-10-CM

## 2023-01-12 RX ORDER — DIAPER,BRIEF,INFANT-TODD,DISP
1 EACH MISCELLANEOUS 2 TIMES DAILY PRN
Qty: 30 G | Refills: 1 | Status: SHIPPED | OUTPATIENT
Start: 2023-01-12

## 2023-01-30 ENCOUNTER — TELEPHONE (OUTPATIENT)
Dept: FAMILY MEDICINE CLINIC | Age: 58
End: 2023-01-30
Payer: COMMERCIAL

## 2023-01-30 NOTE — LETTER
February 6, 2023     Ema Cisse   [address] [address]    Dear Ms Cisse,    Just a friendly reminder you have active lab orders pending from your last office visit.  These orders are in your chart, simply come to the lab at your convenience between 7am-6pm Mon-Fri to have these completed. No appointment is needed. These are fasting labs meaning nothing to eat or drink after midnight the night before your test; however you may drink all the water or black coffee you would like the morning of your lab work.        Thank you,     [facility]

## 2023-02-01 ENCOUNTER — OFFICE VISIT (OUTPATIENT)
Dept: GASTROENTEROLOGY | Facility: CLINIC | Age: 58
End: 2023-02-01
Payer: COMMERCIAL

## 2023-02-01 VITALS
DIASTOLIC BLOOD PRESSURE: 92 MMHG | HEART RATE: 94 BPM | HEIGHT: 65 IN | WEIGHT: 194 LBS | SYSTOLIC BLOOD PRESSURE: 139 MMHG | BODY MASS INDEX: 32.32 KG/M2

## 2023-02-01 DIAGNOSIS — K58.2 IRRITABLE BOWEL SYNDROME WITH BOTH CONSTIPATION AND DIARRHEA: Primary | ICD-10-CM

## 2023-02-01 DIAGNOSIS — K21.9 GASTROESOPHAGEAL REFLUX DISEASE, UNSPECIFIED WHETHER ESOPHAGITIS PRESENT: ICD-10-CM

## 2023-02-01 DIAGNOSIS — R14.0 ABDOMINAL BLOATING: ICD-10-CM

## 2023-02-01 PROCEDURE — 99214 OFFICE O/P EST MOD 30 MIN: CPT | Performed by: NURSE PRACTITIONER

## 2023-02-01 NOTE — PROGRESS NOTES
Chief Complaint     Irritable Bowel Syndrome    History of Present Illness     Ema Cisse is a 57 y.o. female who presents to [facility] for follow-up of IBS-mixed, history of cdiff and llq pain.      She reports alternating between constipation and diarrhea.  Reports lower abdominal pain that's present frequently.  Will often feel like she needs to have a bowel movement but can't.  Previously tried probiotics but didn't notice improvement with this.  No improvement with gas-x.      Reports an increase in gas/flatulance.      Reports having pain in left side.  This has been present for several months.  Not improved with anything.  It is not present daily, but when present it makes it difficult for her to stand up straight.  She tried dicyclomine QID for abdominal pain without improvement.  It caused her to be nauseous and she stopped it.  CT ordered per PCP  -      Admits heartburn despite protonix 40 mg daily.  Previously this was working well, but over the last month has noticed an increase.  She drinks 2-3 mountain dew daily.       History      Past Medical History:   Diagnosis Date   • GERD (gastroesophageal reflux disease)    • Irritable bowel syndrome      Past Surgical History:   Procedure Laterality Date   • BUNIONECTOMY  2008   • CHOLECYSTECTOMY  1984   • COLONOSCOPY  2017   • COLONOSCOPY  2017   • COLONOSCOPY N/A 7/27/2022    Procedure: COLONOSCOPY WITH BIOPSIES;  Surgeon: Halima Fitzpatrick MD;  Location: [facility] ENDOSCOPY;  Service: Gastroenterology;  Laterality: N/A;  DIVERTICULOSIS, HEMORRHOIDS   • CYSTECTOMY     • EPIDURAL Right 11/3/2021    Procedure: Right L5 LUMBAR/SACRAL TRANSFORAMINAL EPIDURAL;  Surgeon: Jeronimo Lind MD;  Location: [facility] OR;  Service: Pain Management;  Laterality: Right;   • EPIDURAL Bilateral 3/17/2022    Procedure: Right L5 LUMBAR/SACRAL TRANSFORAMINAL EPIDURAL;  Surgeon: Jeronimo Lind MD;  Location: [facility] MA[facility] OR;   Service: Pain Management;  Laterality: Bilateral;   • KNEE SURGERY  07/2005    ACL Removal   • MEDIAL BRANCH BLOCK Left 12/14/2021    Procedure: Left C3-C5 MEDIAL BRANCH BLOCK;  Surgeon: Jeronimo Lind MD;  [facility];  Service: Pain Management;  Laterality: Left;   • MEDIAL BRANCH BLOCK Left 2/8/2022    Procedure: Left C3-C5 MEDIAL BRANCH BLOCK;  Surgeon: Jeronimo Lind MD;  [facility];  Service: Pain Management;  Laterality: Left;   • RADIOFREQUENCY ABLATION Left 3/3/2022    Procedure: RADIOFREQUENCY ABLATION CERVICAL C3-C5;  Surgeon: Jeronimo Lind MD;  [facility];  Service: Pain Management;  Laterality: Left;   • SINUS SURGERY  2010   • UPPER GASTROINTESTINAL ENDOSCOPY  2002     Family History   Problem Relation Age of Onset   • Hypertension Mother    • Heart disease Father    • Lung cancer Father    • No Known Problems Sister    • No Known Problems Daughter    • No Known Problems Maternal Aunt    • No Known Problems Paternal Aunt    • No Known Problems Maternal Grandmother    • No Known Problems Maternal Grandfather    • No Known Problems Paternal Grandmother    • No Known Problems Paternal Grandfather    • Hypertension Other    • Heart disease Other    • BRCA 1/2 Neg Hx    • Breast cancer Neg Hx    • Colon cancer Neg Hx    • Endometrial cancer Neg Hx    • Ovarian cancer Neg Hx    • Malig Hyperthermia Neg Hx         Current Medications       Current Outpatient Medications:   •  albuterol sulfate  (90 Base) MCG/ACT inhaler, Inhale 2 puffs Every 4 (Four) Hours As Needed., Disp: , Rfl:   •  Cetirizine HCl 10 MG capsule, Take 10 mg by mouth Daily., Disp: , Rfl:   •  escitalopram (Lexapro) 20 MG tablet, Take 1.5 tablets by mouth Daily., Disp: 145 tablet, Rfl: 0  •  fluticasone (Flonase) 50 MCG/ACT nasal spray, 2 sprays into the nostril(s) as directed by provider Daily., Disp: 18.2 mL, Rfl: 0  •  hydrocortisone (ANUSOL-HC) 25 MG suppository, Insert 1  "suppository into the rectum 2 (Two) Times a Day As Needed for Hemorrhoids., Disp: 24 each, Rfl: 0  •  hydrocortisone 1 % cream, Apply 1 application topically to the appropriate area as directed 2 (Two) Times a Day As Needed for Irritation., Disp: 30 g, Rfl: 1  •  montelukast (SINGULAIR) 10 MG tablet, Take 1 tablet by mouth Daily., Disp: 90 tablet, Rfl: 1  •  multivitamin with minerals tablet tablet, Take 1 tablet by mouth Daily., Disp: , Rfl:   •  naproxen (NAPROSYN) 500 MG tablet, Take 1 tablet by mouth 2 (Two) Times a Day As Needed for Mild Pain., Disp: 180 tablet, Rfl: 1  •  pantoprazole (PROTONIX) 40 MG EC tablet, Take 1 tablet by mouth Daily., Disp: 90 tablet, Rfl: 1  •  SUMAtriptan (IMITREX) 50 MG tablet, TAKE 1 TABLET BY MOUTH ONE TIME AS NEEDED FOR MIGRAINE, Disp: 9 tablet, Rfl: 0  •  traZODone (DESYREL) 50 MG tablet, Take 2 tablets by mouth Every Night., Disp: 180 tablet, Rfl: 1  •  dicyclomine (BENTYL) 20 MG tablet, Take 1 tablet by mouth 4 (Four) Times a Day Before Meals & at Bedtime As Needed (abdominal cramping)., Disp: 360 tablet, Rfl: 1     Allergies     Allergies   Allergen Reactions   • Sulfa Antibiotics Rash   • Trintellix [Vortioxetine] Itching       Social History       Social History     Social History Narrative   • Not on file         Objective       /92 (BP Location: Left arm, Patient Position: Sitting, Cuff Size: Adult)   Pulse 94   Ht 165.1 cm (65\")   Wt 88 kg (194 lb)   BMI 32.28 kg/m²       Physical Exam    Results       Result Review :    The following data was reviewed by: SHELBIE Driscoll on 02/01/2023:    CBC w/diff    CBC w/Diff 12/30/22   WBC 6.95   RBC 4.60   Hemoglobin 13.5   Hematocrit 40.7   MCV 88.5   MCH 29.3   MCHC 33.2   RDW 13.3   Platelets 281   Neutrophil Rel % 51.9   Immature Granulocyte Rel % 0.1   Lymphocyte Rel % 34.0   Monocyte Rel % 8.8   Eosinophil Rel % 4.3   Basophil Rel % 0.9           CMP    CMP 12/30/22   Glucose 97   BUN 11   Creatinine 0.94 "   eGFR 70.5   Sodium 141   Potassium 4.2   Chloride 108 (A)   Calcium 9.5   Total Protein 6.7   Albumin 4.3   Globulin 2.4   Total Bilirubin 0.2   Alkaline Phosphatase 104   AST (SGOT) 11   ALT (SGPT) 11   Albumin/Globulin Ratio 1.8   BUN/Creatinine Ratio 11.7   Anion Gap 11.4   (A) Abnormal value       Comments are available for some flowsheets but are not being displayed.           1/12/2022 enteric parasite panel-negative, enteric bacterial panel-negative, C. difficile-positive.  Celiac serology-negative.    7/27/2022 colonoscopy-normal terminal ileum.  Few diverticula in the sigmoid colon.  No evidence of bleeding.  Entire colon was normal.  Internal and external hemorrhoids present.  Random biopsy negative for microscopic colitis.             Assessment and Plan              Diagnoses and all orders for this visit:    1. Irritable bowel syndrome with both constipation and diarrhea (Primary)    2. Gastroesophageal reflux disease, unspecified whether esophagitis present    3. Abdominal bloating  -     Sucrose Breath Test; Future      IBgard samples given to patient.        Follow Up     Follow Up   Return in about 6 months (around 8/1/2023) for IBS, GERD.  Patient was given instructions and counseling regarding her condition or for health maintenance advice. Please see specific information pulled into the AVS if appropriate.

## 2023-02-01 NOTE — PATIENT INSTRUCTIONS
Food Choices for Gastroesophageal Reflux Disease, Adult  When you have gastroesophageal reflux disease (GERD), the foods you eat and your eating habits are very important. Choosing the right foods can help ease your discomfort. Think about working with a food expert (dietitian) to help you make good choices.  What are tips for following this plan?  Reading food labels  Look for foods that are low in saturated fat. Foods that may help with your symptoms include:  Foods that have less than 5% of daily value (DV) of fat.  Foods that have 0 grams of trans fat.  Cooking  Do not mares your food.  Cook your food by baking, steaming, grilling, or broiling. These are all methods that do not need a lot of fat for cooking.  To add flavor, try to use herbs that are low in spice and acidity.  Meal planning    Choose healthy foods that are low in fat, such as:  Fruits and vegetables.  Whole grains.  Low-fat dairy products.  Lean meats, fish, and poultry.  Eat small meals often instead of eating 3 large meals each day. Eat your meals slowly in a place where you are relaxed. Avoid bending over or lying down until 2-3 hours after eating.  Limit high-fat foods such as fatty meats or fried foods.  Limit your intake of fatty foods, such as oils, butter, and shortening.  Avoid the following as told by your doctor:  Foods that cause symptoms. These may be different for different people. Keep a food diary to keep track of foods that cause symptoms.  Alcohol.  Drinking a lot of liquid with meals.  Eating meals during the 2-3 hours before bed.  Lifestyle  Stay at a healthy weight. Ask your doctor what weight is healthy for you. If you need to lose weight, work with your doctor to do so safely.  Exercise for at least 30 minutes on 5 or more days each week, or as told by your doctor.  Wear loose-fitting clothes.  Do not smoke or use any products that contain nicotine or tobacco. If you need help quitting, ask your doctor.  Sleep with the head  of your bed higher than your feet. Use a wedge under the mattress or blocks under the bed frame to raise the head of the bed.  Chew sugar-free gum after meals.  What foods should eat?  Eat a healthy, well-balanced diet of fruits, vegetables, whole grains, low-fat dairy products, lean meats, fish, and poultry. Each person is different.  Foods that may cause symptoms in one person may not cause any symptoms in another person. Work with your doctor to find foods that are safe for you.  The items listed above may not be a complete list of what you can eat and drink. Contact a food expert for more options.  What foods should I avoid?  Limiting some of these foods may help in managing the symptoms of GERD. Everyone is different. Talk with a food expert or your doctor to help you find the exact foods to avoid, if any.  Fruits  Any fruits prepared with added fat. Any fruits that cause symptoms. For some people, this may include citrus fruits, such as oranges, grapefruit, pineapple, and anny.  Vegetables  Deep-fried vegetables. French fries. Any vegetables prepared with added fat. Any vegetables that cause symptoms. For some people, this may include tomatoes and tomato products, chili peppers, onions and garlic, and horseradish.  Grains  Pastries or quick breads with added fat.  Meats and other proteins  High-fat meats, such as fatty beef or pork, hot dogs, ribs, ham, sausage, salami, and bond. Fried meat or protein, including fried fish and fried chicken. Nuts and nut butters, in large amounts.  Dairy  Whole milk and chocolate milk. Sour cream. Cream. Ice cream. Cream cheese. Milkshakes.  Fats and oils  Butter. Margarine. Shortening. Ghee.  Beverages  Coffee and tea, with or without caffeine. Carbonated beverages. Sodas. Energy drinks. Fruit juice made with acidic fruits, such as orange or grapefruit. Tomato juice. Alcoholic drinks.  Sweets and desserts  Chocolate and cocoa. Donuts.  Seasonings and condiments  Pepper.  Peppermint and spearmint. Added salt. Any condiments, herbs, or seasonings that cause symptoms. For some people, this may include woods, hot sauce, or vinegar-based salad dressings.  The items listed above may not be a complete list of what you should not eat and drink. Contact a food expert for more options.  Questions to ask your doctor  Diet and lifestyle changes are often the first steps that are taken to manage symptoms of GERD. If diet and lifestyle changes do not help, talk with your doctor about taking medicines.  Where to find more information  [employer]: aboutgerd.org  Summary  When you have GERD, food and lifestyle choices are very important in easing your symptoms.  Eat small meals often instead of 3 large meals a day. Eat your meals slowly and in a place where you are relaxed.  Avoid bending over or lying down until 2-3 hours after eating.  Limit high-fat foods such as fatty meats or fried foods.  This information is not intended to replace advice given to you by your health care provider. Make sure you discuss any questions you have with your health care provider.  Document Revised: 06/28/2021 Document Reviewed: 06/28/2021  [employer] Patient Education © 2022 [employer]  Low-FODMAP Eating Plan  FODMAP stands for fermentable oligosaccharides, disaccharides, monosaccharides, and polyols. These are sugars that are hard for some people to digest. A low-FODMAP eating plan may help some people who have irritable bowel syndrome (IBS) and certain other bowel (intestinal) diseases to manage their symptoms.  This meal plan can be complicated to follow. Work with a diet and nutrition specialist (dietitian) to make a low-FODMAP eating plan that is right for you. A dietitian can help make sure that you get enough nutrition from this diet.  What are tips for following this plan?  Reading food labels  Check labels for hidden FODMAPs such as:  High-fructose  syrup.  Honey.  Agave.  Natural fruit flavors.  Onion or garlic powder.  Choose low-FODMAP foods that contain 3-4 grams of fiber per serving.  Check food labels for serving sizes. Eat only one serving at a time to make sure FODMAP levels stay low.  Shopping  Shop with a list of foods that are recommended on this diet and make a meal plan.  Meal planning  Follow a low-FODMAP eating plan for up to 6 weeks, or as told by your health care provider or dietitian.  To follow the eating plan:  Eliminate high-FODMAP foods from your diet completely. Choose only low-FODMAP foods to eat. You will do this for 2-6 weeks.  Gradually reintroduce high-FODMAP foods into your diet one at a time. Most people should wait a few days before introducing the next new high-FODMAP food into their meal plan. Your dietitian can recommend how quickly you may reintroduce foods.  Keep a daily record of what and how much you eat and drink. Make note of any symptoms that you have after eating.  Review your daily record with a dietitian regularly to identify which foods you can eat and which foods you should avoid.  General tips  Drink enough fluid each day to keep your urine pale yellow.  Avoid processed foods. These often have added sugar and may be high in FODMAPs.  Avoid most dairy products, whole grains, and sweeteners.  Work with a dietitian to make sure you get enough fiber in your diet.  Avoid high FODMAP foods at meals to manage symptoms.  Recommended foods  Fruits  Bananas, oranges, tangerines, anny, limes, blueberries, raspberries, strawberries, grapes, cantaloupe, honeydew melon, kiwi, papaya, passion fruit, and pineapple. Limited amounts of dried cranberries, banana chips, and shredded coconut.  Vegetables  Eggplant, zucchini, cucumber, peppers, green beans, bean sprouts, lettuce, arugula, kale, Swiss chard, spinach, zahida greens, bok khadra, summer squash, potato, and tomato. Limited amounts of corn, carrot, and sweet potato. Green  "parts of scallions.  Grains  Gluten-free grains, such as rice, oats, buckwheat, quinoa, corn, polenta, and millet. Gluten-free pasta, bread, or cereal. Rice noodles. Corn tortillas.  Meats and other proteins  Unseasoned beef, pork, poultry, or fish. Eggs. [place]. Tofu (firm) and tempeh. Limited amounts of nuts and seeds, such as almonds, walnuts, brazil nuts, pecans, peanuts, nut butters, pumpkin seeds, jamari seeds, and sunflower seeds.  Dairy  Lactose-free milk, yogurt, and kefir. Lactose-free cottage cheese and ice cream. Non-dairy milks, such as almond, coconut, hemp, and rice milk. Non-dairy yogurt. Limited amounts of goat cheese, brie, mozzarella, parmesan, swiss, and other hard cheeses.  Fats and oils  Butter-free spreads. Vegetable oils, such as olive, canola, and sunflower oil.  Seasoning and other foods  Artificial sweeteners with names that do not end in \"ol,\" such as aspartame, saccharine, and stevia. Maple syrup, white table sugar, raw sugar, brown sugar, and molasses. Mayonnaise, soy sauce, and tamari. Fresh basil, coriander, parsley, rosemary, and thyme.  Beverages  Water and mineral water. Sugar-sweetened soft drinks. Small amounts of orange juice or cranberry juice. Black and green tea. Most dry russ. Coffee.  The items listed above may not be a complete list of foods and beverages you can eat. Contact a dietitian for more information.  Foods to avoid  Fruits  Fresh, dried, and juiced forms of apple, pear, watermelon, peach, plum, cherries, apricots, blackberries, boysenberries, figs, nectarines, and keeley. Avocado.  Vegetables  Chicory root, artichoke, asparagus, cabbage, snow peas, [place] sprouts, broccoli, sugar snap peas, mushrooms, celery, and cauliflower. Onions, garlic, leeks, and the white part of scallions.  Grains  Wheat, including kamut, durum, and semolina. Barley and bulgur. Couscous. Wheat-based cereals. Wheat noodles, bread, crackers, and pastries.  Meats and other proteins  Fried or " fatty meat. Sausage. Cashews and pistachios. Soybeans, baked beans, black beans, chickpeas, kidney beans, samir beans, navy beans, lentils, black-eyed peas, and split peas.  Dairy  Milk, yogurt, ice cream, and soft cheese. Cream and sour cream. Milk-based sauces. Custard. Buttermilk. Soy milk.  Seasoning and other foods  Any sugar-free gum or candy. Foods that contain artificial sweeteners such as sorbitol, mannitol, isomalt, or xylitol. Foods that contain honey, high-fructose corn syrup, or agave. Bouillon, vegetable stock, beef stock, and chicken stock. Garlic and onion powder. Condiments made with onion, such as hummus, chutney, pickles, relish, salad dressing, and salsa. Tomato paste.  Beverages  Chicory-based drinks. Coffee substitutes. Chamomile tea. Fennel tea. Sweet or fortified russ such as port or bird. Diet soft drinks made with isomalt, mannitol, maltitol, sorbitol, or xylitol. Apple, pear, and keeley juice. Juices with high-fructose corn syrup.  The items listed above may not be a complete list of foods and beverages you should avoid. Contact a dietitian for more information.  Summary  FODMAP stands for fermentable oligosaccharides, disaccharides, monosaccharides, and polyols. These are sugars that are hard for some people to digest.  A low-FODMAP eating plan is a short-term diet that helps to ease symptoms of certain bowel diseases.  The eating plan usually lasts up to 6 weeks. After that, high-FODMAP foods are reintroduced gradually and one at a time. This can help you find out which foods may be causing symptoms.  A low-FODMAP eating plan can be complicated. It is best to work with a dietitian who has experience with this type of plan.  This information is not intended to replace advice given to you by your health care provider. Make sure you discuss any questions you have with your health care provider.  Document Revised: 05/06/2021 Document Reviewed: 05/06/2021  [employer] Patient Education © 2022  [employer]

## 2023-02-09 ENCOUNTER — OFFICE VISIT (OUTPATIENT)
Dept: FAMILY MEDICINE CLINIC | Age: 58
End: 2023-02-09
Payer: COMMERCIAL

## 2023-02-09 VITALS
HEART RATE: 76 BPM | DIASTOLIC BLOOD PRESSURE: 84 MMHG | SYSTOLIC BLOOD PRESSURE: 139 MMHG | OXYGEN SATURATION: 97 % | WEIGHT: 199 LBS | HEIGHT: 65 IN | BODY MASS INDEX: 33.15 KG/M2 | TEMPERATURE: 98.2 F

## 2023-02-09 DIAGNOSIS — J01.01 ACUTE RECURRENT MAXILLARY SINUSITIS: Primary | ICD-10-CM

## 2023-02-09 PROCEDURE — 99213 OFFICE O/P EST LOW 20 MIN: CPT | Performed by: NURSE PRACTITIONER

## 2023-02-09 RX ORDER — FLUCONAZOLE 150 MG/1
150 TABLET ORAL ONCE
Qty: 1 TABLET | Refills: 0 | Status: SHIPPED | OUTPATIENT
Start: 2023-02-09 | End: 2023-02-09

## 2023-02-09 RX ORDER — AMOXICILLIN AND CLAVULANATE POTASSIUM 875; 125 MG/1; MG/1
1 TABLET, FILM COATED ORAL 2 TIMES DAILY
Qty: 20 TABLET | Refills: 0 | Status: SHIPPED | OUTPATIENT
Start: 2023-02-09 | End: 2023-03-09

## 2023-02-09 NOTE — PROGRESS NOTES
Chief Complaint  Ema Cisse presents to [facility] for Sinusitis (For over a week, covid test at home negative)    Subjective          History of Present Illness  Here for over 1 week head and head congestion, no fever, dry cough, getting some thick green mucous with neti pot , Covid test at home neg    Review of Systems   Constitutional: Negative for fatigue, fever, unexpected weight gain and unexpected weight loss.   HENT: Positive for congestion and sinus pressure.    Eyes: Negative.    Respiratory: Negative for cough, shortness of breath and wheezing.    Cardiovascular: Negative for chest pain, palpitations and leg swelling.   Gastrointestinal: Negative for abdominal pain.   Endocrine: Negative.    Genitourinary: Negative for breast lump, breast pain, dysuria, frequency and urgency.   Musculoskeletal: Negative for gait problem.   Skin: Negative.    Neurological: Negative for dizziness, tremors, seizures, weakness and memory problem.   Psychiatric/Behavioral: Negative for behavioral problems and suicidal ideas.         Allergies   Allergen Reactions   • Sulfa Antibiotics Rash   • Trintellix [Vortioxetine] Itching      Past Medical History:   Diagnosis Date   • Allergic    • Arthritis 2015   • Cholelithiasis 1985   • GERD (gastroesophageal reflux disease)    • Irritable bowel syndrome    • Low back pain 2009     Current Outpatient Medications   Medication Sig Dispense Refill   • albuterol sulfate  (90 Base) MCG/ACT inhaler Inhale 2 puffs Every 4 (Four) Hours As Needed.     • Cetirizine HCl 10 MG capsule Take 10 mg by mouth Daily.     • dicyclomine (BENTYL) 20 MG tablet Take 1 tablet by mouth 4 (Four) Times a Day Before Meals & at Bedtime As Needed (abdominal cramping). 360 tablet 1   • escitalopram (Lexapro) 20 MG tablet Take 1.5 tablets by mouth Daily. 145 tablet 0   • fluticasone (Flonase) 50 MCG/ACT nasal spray 2 sprays into the nostril(s) as directed by provider  Daily. 18.2 mL 0   • hydrocortisone 1 % cream Apply 1 application topically to the appropriate area as directed 2 (Two) Times a Day As Needed for Irritation. 30 g 1   • montelukast (SINGULAIR) 10 MG tablet Take 1 tablet by mouth Daily. 90 tablet 1   • multivitamin with minerals tablet tablet Take 1 tablet by mouth Daily.     • naproxen (NAPROSYN) 500 MG tablet Take 1 tablet by mouth 2 (Two) Times a Day As Needed for Mild Pain. 180 tablet 1   • pantoprazole (PROTONIX) 40 MG EC tablet Take 1 tablet by mouth Daily. 90 tablet 1   • SUMAtriptan (IMITREX) 50 MG tablet TAKE 1 TABLET BY MOUTH ONE TIME AS NEEDED FOR MIGRAINE 9 tablet 0   • traZODone (DESYREL) 50 MG tablet Take 2 tablets by mouth Every Night. 180 tablet 1   • amoxicillin-clavulanate (Augmentin) 875-125 MG per tablet Take 1 tablet by mouth 2 (Two) Times a Day. 20 tablet 0   • fluconazole (Diflucan) 150 MG tablet Take 1 tablet by mouth 1 (One) Time for 1 dose. 1 tablet 0     No current facility-administered medications for this visit.     Past Surgical History:   Procedure Laterality Date   • BUNIONECTOMY  2008   • CHOLECYSTECTOMY  1984   • COLONOSCOPY  2017   • COLONOSCOPY  2017   • COLONOSCOPY N/A 7/27/2022    Procedure: COLONOSCOPY WITH BIOPSIES;  Surgeon: Halima Fitzpatrick MD;  Location: [facility] ENDOSCOPY;  Service: Gastroenterology;  Laterality: N/A;  DIVERTICULOSIS, HEMORRHOIDS   • CYSTECTOMY     • EPIDURAL Right 11/3/2021    Procedure: Right L5 LUMBAR/SACRAL TRANSFORAMINAL EPIDURAL;  Surgeon: Jeronimo Lind MD;  [place] [facility] [place];  Service: Pain Management;  Laterality: Right;   • EPIDURAL Bilateral 3/17/2022    Procedure: Right L5 LUMBAR/SACRAL TRANSFORAMINAL EPIDURAL;  Surgeon: Jeronimo Lind MD;  [place] [facility] [place] [facility] [place];  Service: Pain Management;  Laterality: Bilateral;   • KNEE SURGERY  07/2005    ACL Removal   • MEDIAL BRANCH BLOCK Left 12/14/2021    Procedure: Left C3-C5 MEDIAL BRANCH BLOCK;  Surgeon: Jeronimo Lind MD;   Location[facility];  Service: Pain Management;  Laterality: Left;   • MEDIAL BRANCH BLOCK Left 2/8/2022    Procedure: Left C3-C5 MEDIAL BRANCH BLOCK;  Surgeon: Jeronimo Lind MD;  Location: [facility];  Service: Pain Management;  Laterality: Left;   • RADIOFREQUENCY ABLATION Left 3/3/2022    Procedure: RADIOFREQUENCY ABLATION CERVICAL C3-C5;  Surgeon: Jeronimo Lind MD;  Location: [facility];  Service: Pain Management;  Laterality: Left;   • SINUS SURGERY  2010   • UPPER GASTROINTESTINAL ENDOSCOPY  2002      Social History     Tobacco Use   • Smoking status: Never   • Smokeless tobacco: Never   Vaping Use   • Vaping Use: Never used   Substance Use Topics   • Alcohol use: Yes     Comment: socially   • Drug use: No     Family History   Problem Relation Age of Onset   • Hypertension Mother    • Heart disease Father    • Lung cancer Father    • No Known Problems Sister    • No Known Problems Daughter    • No Known Problems Maternal Aunt    • No Known Problems Paternal Aunt    • No Known Problems Maternal Grandmother    • No Known Problems Maternal Grandfather    • No Known Problems Paternal Grandmother    • No Known Problems Paternal Grandfather    • Hypertension Other    • Heart disease Other    • BRCA 1/2 Neg Hx    • Breast cancer Neg Hx    • Colon cancer Neg Hx    • Endometrial cancer Neg Hx    • Ovarian cancer Neg Hx    • Malig Hyperthermia Neg Hx      Health Maintenance Due   Topic Date Due   • COVID-19 Vaccine (4 - Booster for Moderna series) 01/14/2022   • LIPID PANEL  03/26/2022   • PAP SMEAR  01/20/2023   • MAMMOGRAM  01/28/2023      Immunization History   Administered Date(s) Administered   • COVID-19 (MODERNA) 1st, 2nd, 3rd Dose Only 02/05/2021, 03/05/2021, 11/19/2021   • Flu Vaccine Split Quad 09/25/2019, 09/23/2021   • Flucelvax Quad Vial =>4yrs 09/28/2020   • Hepatitis A 05/02/2018, 11/20/2018   • Influenza, Unspecified 10/01/2022   • Shingrix 05/17/2022, 01/11/2023   • Tdap 05/29/2019  "       Objective     Vitals:    02/09/23 1548   BP: 139/84   BP Location: Left arm   Patient Position: Sitting   Pulse: 76   Temp: 98.2 °F (36.8 °C)   TempSrc: Oral   SpO2: 97%   Weight: 90.3 kg (199 lb)   Height: 165.1 cm (65\")     Body mass index is 33.12 kg/m².     Physical Exam  Vitals and nursing note reviewed.   Constitutional:       Appearance: Normal appearance.   HENT:      Right Ear: Tympanic membrane normal.      Left Ear: Tympanic membrane normal.      Nose: Congestion present.      Right Sinus: Maxillary sinus tenderness present.      Left Sinus: Maxillary sinus tenderness present.      Mouth/Throat:      Pharynx: No oropharyngeal exudate or posterior oropharyngeal erythema.   Eyes:      Pupils: Pupils are equal, round, and reactive to light.   Neck:      Vascular: No carotid bruit.   Cardiovascular:      Rate and Rhythm: Normal rate and regular rhythm.      Heart sounds: Normal heart sounds.   Pulmonary:      Effort: Pulmonary effort is normal.      Breath sounds: Normal breath sounds.   Musculoskeletal:         General: Normal range of motion.   Lymphadenopathy:      Cervical: Cervical adenopathy present.   Skin:     General: Skin is warm and dry.   Neurological:      General: No focal deficit present.      Mental Status: She is alert.   Psychiatric:         Mood and Affect: Mood normal.         Behavior: Behavior normal.           Result Review :    Lab on 12/30/2022   Component Date Value Ref Range Status   • Glucose 12/30/2022 97  65 - 99 mg/dL Final   • BUN 12/30/2022 11  6 - 20 mg/dL Final   • Creatinine 12/30/2022 0.94  0.57 - 1.00 mg/dL Final   • Sodium 12/30/2022 141  136 - 145 mmol/L Final   • Potassium 12/30/2022 4.2  3.5 - 5.2 mmol/L Final   • Chloride 12/30/2022 108 (H)  98 - 107 mmol/L Final   • CO2 12/30/2022 21.6 (L)  22.0 - 29.0 mmol/L Final   • Calcium 12/30/2022 9.5  8.6 - 10.5 mg/dL Final   • Total Protein 12/30/2022 6.7  6.0 - 8.5 g/dL Final   • Albumin 12/30/2022 4.3  3.5 - 5.2 " g/dL Final   • ALT (SGPT) 12/30/2022 11  1 - 33 U/L Final   • AST (SGOT) 12/30/2022 11  1 - 32 U/L Final   • Alkaline Phosphatase 12/30/2022 104  39 - 117 U/L Final   • Total Bilirubin 12/30/2022 0.2  0.0 - 1.2 mg/dL Final   • Globulin 12/30/2022 2.4  gm/dL Final   • A/G Ratio 12/30/2022 1.8  g/dL Final   • BUN/Creatinine Ratio 12/30/2022 11.7  7.0 - 25.0 Final   • Anion Gap 12/30/2022 11.4  5.0 - 15.0 mmol/L Final   • eGFR 12/30/2022 70.5  >60.0 mL/min/1.73 Final   • WBC 12/30/2022 6.95  3.40 - 10.80 10*3/mm3 Final   • RBC 12/30/2022 4.60  3.77 - 5.28 10*6/mm3 Final   • Hemoglobin 12/30/2022 13.5  12.0 - 15.9 g/dL Final   • Hematocrit 12/30/2022 40.7  34.0 - 46.6 % Final   • MCV 12/30/2022 88.5  79.0 - 97.0 fL Final   • MCH 12/30/2022 29.3  26.6 - 33.0 pg Final   • MCHC 12/30/2022 33.2  31.5 - 35.7 g/dL Final   • RDW 12/30/2022 13.3  12.3 - 15.4 % Final   • RDW-SD 12/30/2022 43.6  37.0 - 54.0 fl Final   • MPV 12/30/2022 9.8  6.0 - 12.0 fL Final   • Platelets 12/30/2022 281  140 - 450 10*3/mm3 Final   • Neutrophil % 12/30/2022 51.9  42.7 - 76.0 % Final   • Lymphocyte % 12/30/2022 34.0  19.6 - 45.3 % Final   • Monocyte % 12/30/2022 8.8  5.0 - 12.0 % Final   • Eosinophil % 12/30/2022 4.3  0.3 - 6.2 % Final   • Basophil % 12/30/2022 0.9  0.0 - 1.5 % Final   • Immature Grans % 12/30/2022 0.1  0.0 - 0.5 % Final   • Neutrophils, Absolute 12/30/2022 3.61  1.70 - 7.00 10*3/mm3 Final   • Lymphocytes, Absolute 12/30/2022 2.36  0.70 - 3.10 10*3/mm3 Final   • Monocytes, Absolute 12/30/2022 0.61  0.10 - 0.90 10*3/mm3 Final   • Eosinophils, Absolute 12/30/2022 0.30  0.00 - 0.40 10*3/mm3 Final   • Basophils, Absolute 12/30/2022 0.06  0.00 - 0.20 10*3/mm3 Final   • Immature Grans, Absolute 12/30/2022 0.01  0.00 - 0.05 10*3/mm3 Final   Office Visit on 12/30/2022   Component Date Value Ref Range Status   • Urine Culture 12/30/2022 No growth   Final   • Color, UA 12/30/2022 Yellow  Yellow, Straw Final   • Appearance, UA 12/30/2022  Clear  Clear Final   • pH, UA 12/30/2022 6.0  5.0 - 8.0 Final   • Specific Gravity, UA 12/30/2022 1.015  1.005 - 1.030 Final   • Glucose, UA 12/30/2022 Negative  Negative Final   • Ketones, UA 12/30/2022 Negative  Negative Final   • Bilirubin, UA 12/30/2022 Negative  Negative Final   • Blood, UA 12/30/2022 Negative  Negative Final   • Protein, UA 12/30/2022 Negative  Negative Final   • Leuk Esterase, UA 12/30/2022 Small (1+) (A)  Negative Final   • Nitrite, UA 12/30/2022 Negative  Negative Final   • Urobilinogen, UA 12/30/2022 0.2 E.U./dL  0.2 - 1.0 E.U./dL Final   • RBC, UA 12/30/2022 None Seen  None Seen /HPF Final   • WBC, UA 12/30/2022 3-5 (A)  None Seen /HPF Final   • Bacteria, UA 12/30/2022 None Seen  None Seen /HPF Final   • Squamous Epithelial Cells, UA 12/30/2022 0-2  None Seen, 0-2 /HPF Final   • Methodology 12/30/2022 Manual Light Microscopy   Final   Office Visit on 12/22/2022   Component Date Value Ref Range Status   • SARS Antigen 12/22/2022 Not Detected  Not Detected, Presumptive Negative Final   • Influenza A Antigen PATRIA 12/22/2022 Not Detected  Not Detected Final   • Influenza B Antigen PATRIA 12/22/2022 Not Detected  Not Detected Final   • Internal Control 12/22/2022 Passed  Passed Final   • Lot Number 12/22/2022 [id number]   Final   • Expiration Date 12/22/2022 02/11/23   Final   • Rapid Strep A Screen 12/22/2022 Negative  Negative, VALID, INVALID, Not Performed Final   • Internal Control 12/22/2022 Passed  Passed Final   • Lot Number 12/22/2022 [id number]   Final   • Expiration Date 12/22/2022 03/31/24   Final   • Throat Culture, Beta Strep 12/22/2022 No Beta Hemolytic Streptococcus Isolated   Final   Office Visit on 10/10/2022   Component Date Value Ref Range Status   • Rapid Influenza A Ag 10/10/2022 Negative  Negative Final   • Rapid Influenza B Ag 10/10/2022 Negative  Negative Final   • Internal Control 10/10/2022 Passed  Passed Final   • Lot Number 10/10/2022 [id number]   Final   • Expiration Date  10/10/2022 6/22/23   Final                        Assessment and Plan      Diagnoses and all orders for this visit:    1. Acute recurrent maxillary sinusitis (Primary)  -     amoxicillin-clavulanate (Augmentin) 875-125 MG per tablet; Take 1 tablet by mouth 2 (Two) Times a Day.  Dispense: 20 tablet; Refill: 0    Other orders  -     fluconazole (Diflucan) 150 MG tablet; Take 1 tablet by mouth 1 (One) Time for 1 dose.  Dispense: 1 tablet; Refill: 0            Follow Up     Return if symptoms worsen or fail to improve.

## 2023-02-10 ENCOUNTER — OFFICE VISIT (OUTPATIENT)
Dept: OBSTETRICS AND GYNECOLOGY | Age: 58
End: 2023-02-10
Payer: COMMERCIAL

## 2023-02-10 ENCOUNTER — PROCEDURE VISIT (OUTPATIENT)
Dept: OBSTETRICS AND GYNECOLOGY | Age: 58
End: 2023-02-10
Payer: COMMERCIAL

## 2023-02-10 VITALS
HEIGHT: 65 IN | WEIGHT: 197 LBS | SYSTOLIC BLOOD PRESSURE: 130 MMHG | DIASTOLIC BLOOD PRESSURE: 84 MMHG | BODY MASS INDEX: 32.82 KG/M2

## 2023-02-10 DIAGNOSIS — I83.813 VARICOSE VEINS OF BOTH LOWER EXTREMITIES WITH PAIN: ICD-10-CM

## 2023-02-10 DIAGNOSIS — Z12.4 SCREENING FOR CERVICAL CANCER: ICD-10-CM

## 2023-02-10 DIAGNOSIS — Z11.51 SCREENING FOR HPV (HUMAN PAPILLOMAVIRUS): ICD-10-CM

## 2023-02-10 DIAGNOSIS — N62 LARGE BREASTS: ICD-10-CM

## 2023-02-10 DIAGNOSIS — M54.2 NECK PAIN: ICD-10-CM

## 2023-02-10 DIAGNOSIS — Z12.31 VISIT FOR SCREENING MAMMOGRAM: Primary | ICD-10-CM

## 2023-02-10 DIAGNOSIS — Z01.419 ENCOUNTER FOR GYNECOLOGICAL EXAMINATION WITHOUT ABNORMAL FINDING: Primary | ICD-10-CM

## 2023-02-10 PROCEDURE — 77063 BREAST TOMOSYNTHESIS BI: CPT | Performed by: OBSTETRICS & GYNECOLOGY

## 2023-02-10 PROCEDURE — 99396 PREV VISIT EST AGE 40-64: CPT | Performed by: OBSTETRICS & GYNECOLOGY

## 2023-02-10 PROCEDURE — 77067 SCR MAMMO BI INCL CAD: CPT | Performed by: OBSTETRICS & GYNECOLOGY

## 2023-02-10 NOTE — PROGRESS NOTES
Routine Annual Visit    2/10/2023    Patient: Ema Cisse          MR#:[id number]      Chief Complaint   Patient presents with   • Gynecologic Exam     annual exam last pap 2020 neg/neg, m/g done today, c/scope        History of Present Illness    57 y.o. female  who presents for annual exam.     Neck pain and back pain from breasts  43 DD- pt considering a breast reduction    Trouble losing weight, discussed strategies    Varicosities of both legs bothersome, will refer to vascular    Due for pap    mammo today    UTD CSC    No vaginal bleeding        No LMP recorded (lmp unknown). Patient is postmenopausal.  Obstetric History:  OB History        1    Para   1    Term   1            AB        Living   1       SAB        IAB        Ectopic        Molar        Multiple        Live Births   1               Menstrual History:     No LMP recorded (lmp unknown). Patient is postmenopausal.       Sexual History:       ________________________________________  Patient Active Problem List   Diagnosis   • Somatic dysfunction of sacroiliac joint   • Lumbar disc disorder   • Chronic pain   • Lumbar facet arthropathy   • Metatarsalgia of left foot   • Other osteonecrosis, left foot (HCC)   • Vitamin D deficiency   • Gastroesophageal reflux disease   • Irritable bowel syndrome with both constipation and diarrhea   • Impingement syndrome of left shoulder   • DDD (degenerative disc disease), cervical   • Migraine with aura, not intractable, without status migrainosus   • Persistent mood (affective) disorder, unspecified (HCC)   • Primary insomnia   • Cervicalgia   • Primary generalized (osteo)arthritis   • DDD (degenerative disc disease), lumbar   • Lumbar radiculopathy   • Lumbar foraminal stenosis   • Arthropathy of cervical facet joint   • History of Clostridium difficile infection   • LLQ pain   • Allergic rhinitis   • Dysuria       Past Medical History:   Diagnosis Date   • Allergic    •  Arthritis 2015   • Cholelithiasis 1985   • GERD (gastroesophageal reflux disease)    • Irritable bowel syndrome    • Low back pain 2009       Past Surgical History:   Procedure Laterality Date   • BUNIONECTOMY  2008   • CHOLECYSTECTOMY  1984   • COLONOSCOPY  2017   • COLONOSCOPY  2017   • COLONOSCOPY N/A 7/27/2022    Procedure: COLONOSCOPY WITH BIOPSIES;  Surgeon: Halima Fitzpatrick MD;  Location: [facility] ENDOSCOPY;  Service: Gastroenterology;  Laterality: N/A;  DIVERTICULOSIS, HEMORRHOIDS   • CYSTECTOMY     • EPIDURAL Right 11/3/2021    Procedure: Right L5 LUMBAR/SACRAL TRANSFORAMINAL EPIDURAL;  Surgeon: Jeronimo Lind MD;  Location: [facility] OR;  Service: Pain Management;  Laterality: Right;   • EPIDURAL Bilateral 3/17/2022    Procedure: Right L5 LUMBAR/SACRAL TRANSFORAMINAL EPIDURAL;  Surgeon: Jeronimo Lind MD;  Location[facility];  Service: Pain Management;  Laterality: Bilateral;   • KNEE SURGERY  07/2005    ACL Removal   • MEDIAL BRANCH BLOCK Left 12/14/2021    Procedure: Left C3-C5 MEDIAL BRANCH BLOCK;  Surgeon: Jeronimo Lind MD;  [facility];  Service: Pain Management;  Laterality: Left;   • MEDIAL BRANCH BLOCK Left 2/8/2022    Procedure: Left C3-C5 MEDIAL BRANCH BLOCK;  Surgeon: Jeronimo Lind MD;  Location[facility];  Service: Pain Management;  Laterality: Left;   • RADIOFREQUENCY ABLATION Left 3/3/2022    Procedure: RADIOFREQUENCY ABLATION CERVICAL C3-C5;  Surgeon: Jeronimo Lind MD;  Location[facility];  Service: Pain Management;  Laterality: Left;   • SINUS SURGERY  2010   • UPPER GASTROINTESTINAL ENDOSCOPY  2002       Social History     Tobacco Use   Smoking Status Never   • Passive exposure: Never   Smokeless Tobacco Never       has a current medication list which includes the following prescription(s): albuterol sulfate hfa, cetirizine hcl, dicyclomine, escitalopram, fluticasone, hydrocortisone, montelukast, multivitamin with minerals, naproxen,  "pantoprazole, sumatriptan, trazodone, and amoxicillin-clavulanate.  ________________________________________    Current contraception: post menopausal status  History of abnormal Pap smear: no  Family history of Breast cancer: no  Family history of uterine or ovarian cancer: no  Family History of colon cancer/colon polyps: no  History of abnormal mammogram: no      The following portions of the patient's history were reviewed and updated as appropriate: allergies, current medications, past family history, past medical history, past social history, past surgical history and problem list.    Review of Systems    Pertinent items are noted in HPI.     Objective   Physical Exam    /84   Ht 165.1 cm (65\")   Wt 89.4 kg (197 lb)   LMP  (LMP Unknown)   BMI 32.78 kg/m²    BP Readings from Last 3 Encounters:   02/10/23 130/84   02/09/23 139/84   02/01/23 139/92      Wt Readings from Last 3 Encounters:   02/10/23 89.4 kg (197 lb)   02/09/23 90.3 kg (199 lb)   02/01/23 88 kg (194 lb)      BMI: Estimated body mass index is 32.78 kg/m² as calculated from the following:    Height as of this encounter: 165.1 cm (65\").    Weight as of this encounter: 89.4 kg (197 lb).      General:   alert, appears stated age and cooperative   Abdomen: soft, non-tender, without masses or organomegaly   Breast: inspection negative, no nipple discharge or bleeding, no masses or nodularity palpable   Vulva: normal   Vagina: normal mucosa   Cervix: no cervical motion tenderness and no lesions   Uterus: normal size, mobile and non-tender   Adnexa: no mass, fullness, tenderness     Assessment:    1. Normal annual exam   Assessment     ICD-10-CM ICD-9-CM   1. Encounter for gynecological examination without abnormal finding  Z01.419 V72.31   2. Screening for cervical cancer  Z12.4 V76.2   3. Screening for HPV (human papillomavirus)  Z11.51 V73.81   4. Neck pain  M54.2 723.1   5. Large breasts  N62 611.1   6. Varicose veins of both lower extremities " with pain  I83.813 454.8     Plan:    Plan     [x]  Mammogram request made  [x]  PAP done  []  Labs:   []  GC/Chl/TV  []  DEXA scan   []  Referral for colonoscopy:       Diagnoses and all orders for this visit:    1. Encounter for gynecological examination without abnormal finding (Primary)    2. Screening for cervical cancer  -     IGP, Apt HPV,rfx 16 / 18,45    3. Screening for HPV (human papillomavirus)  -     IGP, Apt HPV,rfx 16 / 18,45    4. Neck pain    5. Large breasts    6. Varicose veins of both lower extremities with pain  -     Ambulatory Referral to Vascular Surgery            Counseling:  --Nutrition: Stressed importance of moderation and caloric balance, stressed fresh fruit and vegetables  --Exercise: Stressed the importance of regular exercise. 3-5 times weekly   - Discussed screening mammogram recommendations.   --Discussed benefits of screening colonoscopy- age 45 unless FH  --Discussed pap smear screening recommendations

## 2023-02-15 ENCOUNTER — LAB (OUTPATIENT)
Dept: LAB | Facility: HOSPITAL | Age: 58
End: 2023-02-15
Payer: COMMERCIAL

## 2023-02-15 DIAGNOSIS — Z00.00 ANNUAL PHYSICAL EXAM: ICD-10-CM

## 2023-02-15 LAB
CHOLEST SERPL-MCNC: 176 MG/DL (ref 0–200)
HDLC SERPL-MCNC: 34 MG/DL (ref 40–60)
LDLC SERPL CALC-MCNC: 108 MG/DL (ref 0–100)
LDLC/HDLC SERPL: 3.05 {RATIO}
TRIGL SERPL-MCNC: 191 MG/DL (ref 0–150)
VLDLC SERPL-MCNC: 34 MG/DL (ref 5–40)

## 2023-02-15 PROCEDURE — 80061 LIPID PANEL: CPT

## 2023-02-15 PROCEDURE — 36415 COLL VENOUS BLD VENIPUNCTURE: CPT

## 2023-02-19 LAB
CYTOLOGIST CVX/VAG CYTO: NORMAL
CYTOLOGY CVX/VAG DOC CYTO: NORMAL
CYTOLOGY CVX/VAG DOC THIN PREP: NORMAL
DX ICD CODE: NORMAL
HIV 1 & 2 AB SER-IMP: NORMAL
HPV I/H RISK 4 DNA CVX QL PROBE+SIG AMP: NEGATIVE
Lab: NORMAL
OTHER STN SPEC: NORMAL
STAT OF ADQ CVX/VAG CYTO-IMP: NORMAL

## 2023-02-24 ENCOUNTER — TELEPHONE (OUTPATIENT)
Dept: FAMILY MEDICINE CLINIC | Age: 58
End: 2023-02-24
Payer: COMMERCIAL

## 2023-02-24 NOTE — TELEPHONE ENCOUNTER
Caller: Ema Cisse    Relationship: Self    Best call back number: [phone]    What medication are you requesting: WEGOVY    If a prescription is needed, what is your preferred pharmacy and phone number: [facility] - [place], [place] - [address] [facility] [phone] [facility] [phone] FX     Additional notes:    PATIENT STATES HER INSURANCE NEEDS A PRIOR AUTHORIZATION FOR THIS MEDICATION. PATIENT STATES TO PLEASE CALL AND LET HER KNOW IF SHE NEEDS AN APPT FIRST OR IF THE PA CAN BE SUBMITTED WITHOUT AN APPT.

## 2023-02-27 NOTE — TELEPHONE ENCOUNTER
Pt insurance told her that Wegovy needs a PA. It has never been prescribed for her - does she need an appointment or can you send in prescription?

## 2023-02-27 NOTE — TELEPHONE ENCOUNTER
Will need appt to document weight, BMI, and reason for prescribing as well as other things that have been tried

## 2023-03-06 ENCOUNTER — TELEPHONE (OUTPATIENT)
Dept: GASTROENTEROLOGY | Facility: CLINIC | Age: 58
End: 2023-03-06
Payer: COMMERCIAL

## 2023-03-06 NOTE — TELEPHONE ENCOUNTER
Spoke with patient in regards to Sucrose breath test orders patient states she still plans to do the testing, will postpone orders and follow up.

## 2023-03-09 ENCOUNTER — OFFICE VISIT (OUTPATIENT)
Dept: FAMILY MEDICINE CLINIC | Age: 58
End: 2023-03-09
Payer: COMMERCIAL

## 2023-03-09 VITALS
BODY MASS INDEX: 32.65 KG/M2 | DIASTOLIC BLOOD PRESSURE: 89 MMHG | TEMPERATURE: 98.4 F | WEIGHT: 196 LBS | SYSTOLIC BLOOD PRESSURE: 123 MMHG | HEIGHT: 65 IN | HEART RATE: 80 BPM

## 2023-03-09 DIAGNOSIS — R05.9 COUGH, UNSPECIFIED TYPE: ICD-10-CM

## 2023-03-09 DIAGNOSIS — J30.9 ALLERGIC RHINITIS, UNSPECIFIED SEASONALITY, UNSPECIFIED TRIGGER: Primary | ICD-10-CM

## 2023-03-09 DIAGNOSIS — E66.01 MORBID (SEVERE) OBESITY DUE TO EXCESS CALORIES: ICD-10-CM

## 2023-03-09 PROCEDURE — 99214 OFFICE O/P EST MOD 30 MIN: CPT | Performed by: NURSE PRACTITIONER

## 2023-03-09 RX ORDER — PEN NEEDLE, DIABETIC 32 GX 1/4"
1 NEEDLE, DISPOSABLE MISCELLANEOUS DAILY
Qty: 100 EACH | Refills: 0 | Status: SHIPPED | OUTPATIENT
Start: 2023-03-09

## 2023-03-09 RX ORDER — BENZONATATE 100 MG/1
100 CAPSULE ORAL 3 TIMES DAILY PRN
Qty: 40 CAPSULE | Refills: 0 | Status: SHIPPED | OUTPATIENT
Start: 2023-03-09 | End: 2023-03-23

## 2023-03-09 RX ORDER — AZELASTINE 1 MG/ML
2 SPRAY, METERED NASAL 2 TIMES DAILY
Qty: 60 ML | Refills: 1 | Status: SHIPPED | OUTPATIENT
Start: 2023-03-09

## 2023-03-09 RX ORDER — LEVOCETIRIZINE DIHYDROCHLORIDE 5 MG/1
5 TABLET, FILM COATED ORAL EVERY EVENING
Qty: 90 TABLET | Refills: 1 | Status: SHIPPED | OUTPATIENT
Start: 2023-03-09

## 2023-03-09 NOTE — PROGRESS NOTES
Chief Complaint  Ema Cisse presents to [facility] for Weight Loss (Discuss weight loss medication )    Subjective          History of Present Illness    Amy is here today with c/o sore throat, dry cough, PND. This has been intermittent over the last month. She has been seen and took course of Augmentin. She has been taking cetirizine, fluticasone, and singulair. She has been taking Robitussin.   She is interested in starting weight loss medication. She has in the past done Keto diet. She has been trying to eat more fruit and salads. She was previously on Saxenda with some weight loss. Struggling with weight loss. She has cut back on cheese and dairy.     Review of Systems      Allergies   Allergen Reactions   • Sulfa Antibiotics Rash   • Trintellix [Vortioxetine] Itching      Past Medical History:   Diagnosis Date   • Allergic    • Arthritis 2015   • Cholelithiasis 1985   • GERD (gastroesophageal reflux disease)    • Irritable bowel syndrome    • Low back pain 2009     Current Outpatient Medications   Medication Sig Dispense Refill   • albuterol sulfate  (90 Base) MCG/ACT inhaler Inhale 2 puffs Every 4 (Four) Hours As Needed.     • Cetirizine HCl 10 MG capsule Take 10 mg by mouth Daily.     • dicyclomine (BENTYL) 20 MG tablet Take 1 tablet by mouth 4 (Four) Times a Day Before Meals & at Bedtime As Needed (abdominal cramping). 360 tablet 1   • escitalopram (Lexapro) 20 MG tablet Take 1.5 tablets by mouth Daily. 145 tablet 0   • fluticasone (Flonase) 50 MCG/ACT nasal spray 2 sprays into the nostril(s) as directed by provider Daily. 18.2 mL 0   • hydrocortisone 1 % cream Apply 1 application topically to the appropriate area as directed 2 (Two) Times a Day As Needed for Irritation. 30 g 1   • montelukast (SINGULAIR) 10 MG tablet Take 1 tablet by mouth Daily. 90 tablet 1   • multivitamin with minerals tablet tablet Take 1 tablet by mouth Daily.     • naproxen (NAPROSYN)  500 MG tablet Take 1 tablet by mouth 2 (Two) Times a Day As Needed for Mild Pain. 180 tablet 1   • pantoprazole (PROTONIX) 40 MG EC tablet Take 1 tablet by mouth Daily. 90 tablet 1   • SUMAtriptan (IMITREX) 50 MG tablet TAKE 1 TABLET BY MOUTH ONE TIME AS NEEDED FOR MIGRAINE 9 tablet 0   • traZODone (DESYREL) 50 MG tablet Take 2 tablets by mouth Every Night. 180 tablet 1   • azelastine (ASTELIN) 0.1 % nasal spray 2 sprays into the nostril(s) as directed by provider 2 (Two) Times a Day. Use in each nostril as directed 60 mL 1   • benzonatate (Tessalon Perles) 100 MG capsule Take 1 capsule by mouth 3 (Three) Times a Day As Needed for Cough. 40 capsule 0   • Insulin Pen Needle (Novofine Pen Needle) 32G X 6 MM misc 1 each Daily. 100 each 0   • levocetirizine (XYZAL) 5 MG tablet Take 1 tablet by mouth Every Evening. 90 tablet 1   • Semaglutide-Weight Management 0.25 MG/0.5ML solution auto-injector Inject 0.25 mg under the skin into the appropriate area as directed Every 7 (Seven) Days. 5 mL 0     No current facility-administered medications for this visit.     Past Surgical History:   Procedure Laterality Date   • BUNIONECTOMY  2008   • CHOLECYSTECTOMY  1984   • COLONOSCOPY  2017   • COLONOSCOPY  2017   • COLONOSCOPY N/A 7/27/2022    Procedure: COLONOSCOPY WITH BIOPSIES;  Surgeon: Halima Fitzpatrick MD;  Location: [facility];  Service: Gastroenterology;  Laterality: N/A;  DIVERTICULOSIS, HEMORRHOIDS   • CYSTECTOMY     • EPIDURAL Right 11/3/2021    Procedure: Right L5 LUMBAR/SACRAL TRANSFORAMINAL EPIDURAL;  Surgeon: Jeronimo Lind MD;  [place] [facility] [place]  Service: Pain Management;  Laterality: Right;   • EPIDURAL Bilateral 3/17/2022    Procedure: Right L5 LUMBAR/SACRAL TRANSFORAMINAL EPIDURAL;  Surgeon: Jeronimo Lind MD;  [place] [facility] [place];  Service: Pain Management;  Laterality: Bilateral;   • KNEE SURGERY  07/2005    ACL Removal   • MEDIAL BRANCH BLOCK Left 12/14/2021    Procedure: Left  C3-C5 MEDIAL BRANCH BLOCK;  Surgeon: Jeronimo Lind MD;  [facility];  Service: Pain Management;  Laterality: Left;   • MEDIAL BRANCH BLOCK Left 2/8/2022    Procedure: Left C3-C5 MEDIAL BRANCH BLOCK;  Surgeon: Jeronimo Lind MD;  [facility];  Service: Pain Management;  Laterality: Left;   • RADIOFREQUENCY ABLATION Left 3/3/2022    Procedure: RADIOFREQUENCY ABLATION CERVICAL C3-C5;  Surgeon: Jeronimo Lind MD;  Location[facility];  Service: Pain Management;  Laterality: Left;   • SINUS SURGERY  2010   • UPPER GASTROINTESTINAL ENDOSCOPY  2002      Social History     Tobacco Use   • Smoking status: Never     Passive exposure: Never   • Smokeless tobacco: Never   Vaping Use   • Vaping Use: Never used   Substance Use Topics   • Alcohol use: Yes     Comment: socially   • Drug use: No     Family History   Problem Relation Age of Onset   • Hypertension Mother    • Heart disease Father    • Lung cancer Father    • No Known Problems Sister    • No Known Problems Daughter    • No Known Problems Maternal Aunt    • No Known Problems Paternal Aunt    • No Known Problems Maternal Grandmother    • No Known Problems Maternal Grandfather    • No Known Problems Paternal Grandmother    • No Known Problems Paternal Grandfather    • Hypertension Other    • Heart disease Other    • BRCA 1/2 Neg Hx    • Breast cancer Neg Hx    • Colon cancer Neg Hx    • Endometrial cancer Neg Hx    • Ovarian cancer Neg Hx    • Malig Hyperthermia Neg Hx      There are no preventive care reminders to display for this patient.   Immunization History   Administered Date(s) Administered   • COVID-19 (MODERNA) 1st, 2nd, 3rd Dose Only 02/05/2021, 03/05/2021, 11/19/2021   • Flu Vaccine Split Quad 09/25/2019, 09/23/2021   • Flucelvax Quad Vial =>4yrs 09/28/2020   • Hepatitis A 05/02/2018, 11/20/2018   • Influenza, Unspecified 10/01/2022   • Shingrix 05/17/2022, 01/11/2023   • Tdap 05/29/2019        Objective     Vitals:     "03/09/23 1417   BP: 123/89   BP Location: Left arm   Patient Position: Sitting   Pulse: 80   Temp: 98.4 °F (36.9 °C)   TempSrc: Oral   Weight: 88.9 kg (196 lb)   Height: 165.1 cm (65\")     Body mass index is 32.62 kg/m².     Physical Exam  Vitals reviewed.   Constitutional:       General: She is not in acute distress.     Appearance: Normal appearance. She is well-developed.   HENT:      Head: Normocephalic and atraumatic.      Right Ear: Tympanic membrane and ear canal normal.      Left Ear: Tympanic membrane and ear canal normal.      Nose: Nose normal.      Mouth/Throat:      Mouth: Mucous membranes are moist.      Comments: PND  Eyes:      Extraocular Movements: Extraocular movements intact.      Pupils: Pupils are equal, round, and reactive to light.   Cardiovascular:      Rate and Rhythm: Normal rate and regular rhythm.   Pulmonary:      Effort: Pulmonary effort is normal.      Breath sounds: Normal breath sounds.   Neurological:      Mental Status: She is alert and oriented to person, place, and time.   Psychiatric:         Mood and Affect: Mood and affect normal.           Result Review :                               Assessment and Plan      Diagnoses and all orders for this visit:    1. Allergic rhinitis, unspecified seasonality, unspecified trigger (Primary)  Comments:  Switch zyrtec to xyzal. Continue Flonase. Add azelastine.   Orders:  -     levocetirizine (XYZAL) 5 MG tablet; Take 1 tablet by mouth Every Evening.  Dispense: 90 tablet; Refill: 1  -     azelastine (ASTELIN) 0.1 % nasal spray; 2 sprays into the nostril(s) as directed by provider 2 (Two) Times a Day. Use in each nostril as directed  Dispense: 60 mL; Refill: 1    2. Cough, unspecified type  -     benzonatate (Tessalon Perles) 100 MG capsule; Take 1 capsule by mouth 3 (Three) Times a Day As Needed for Cough.  Dispense: 40 capsule; Refill: 0    3. Morbid (severe) obesity due to excess calories (HCC)  Comments:  Will start Wegovy along with " healthy diet and exercise. Food journal.   Orders:  -     Semaglutide-Weight Management 0.25 MG/0.5ML solution auto-injector; Inject 0.25 mg under the skin into the appropriate area as directed Every 7 (Seven) Days.  Dispense: 5 mL; Refill: 0  -     Insulin Pen Needle (Novofine Pen Needle) 32G X 6 MM misc; 1 each Daily.  Dispense: 100 each; Refill: 0              Follow Up     Return in about 4 weeks (around 4/6/2023) for Recheck.

## 2023-03-10 ENCOUNTER — PRIOR AUTHORIZATION (OUTPATIENT)
Dept: FAMILY MEDICINE CLINIC | Age: 58
End: 2023-03-10
Payer: COMMERCIAL

## 2023-03-13 ENCOUNTER — TELEPHONE (OUTPATIENT)
Dept: FAMILY MEDICINE CLINIC | Age: 58
End: 2023-03-13

## 2023-03-13 NOTE — TELEPHONE ENCOUNTER
"  Caller: Ema Cisse \"LEXI\"    Relationship: Self    Best call back number: [phone]     What was the call regarding:          THE PATIENT SAID SHE WAS IN TO SEE PCP ARNOL AND RECEIVED MEDICATION FOR HER COUGH BUT THEY ARE NOT HELPING. SHE SAID SHE THINKS IT IS LIKE AN ASTHMATIC COUGH AND WHAT WAS PRESCRIBE DID HELP WITH THE DRAINAGE BUT NOT THE COUGH.    SHE IS WANTING TO KNOW IF PCP WOULD PRESCRIBE SOMETHING ELSE FOR THE COUGH. SHE IS WANTING A CALL TO ADVISE WHAT WILL BE DONE       [facility] - [place], [place] - [address] [place] [address] [phone] [facility] [phone]   [phone]      Do you require a callback: YES                                    "

## 2023-03-15 DIAGNOSIS — R05.9 COUGH, UNSPECIFIED TYPE: Primary | ICD-10-CM

## 2023-03-15 RX ORDER — DEXTROMETHORPHAN HYDROBROMIDE AND PROMETHAZINE HYDROCHLORIDE 15; 6.25 MG/5ML; MG/5ML
5 SYRUP ORAL 4 TIMES DAILY PRN
Qty: 240 ML | Refills: 0 | Status: SHIPPED | OUTPATIENT
Start: 2023-03-15

## 2023-03-23 ENCOUNTER — OFFICE VISIT (OUTPATIENT)
Dept: FAMILY MEDICINE CLINIC | Age: 58
End: 2023-03-23
Payer: COMMERCIAL

## 2023-03-23 VITALS
DIASTOLIC BLOOD PRESSURE: 88 MMHG | BODY MASS INDEX: 32.62 KG/M2 | SYSTOLIC BLOOD PRESSURE: 120 MMHG | OXYGEN SATURATION: 98 % | WEIGHT: 195.8 LBS | HEART RATE: 81 BPM | TEMPERATURE: 97.7 F | HEIGHT: 65 IN

## 2023-03-23 DIAGNOSIS — J30.2 SEASONAL ALLERGIES: Primary | ICD-10-CM

## 2023-03-23 PROCEDURE — 99213 OFFICE O/P EST LOW 20 MIN: CPT | Performed by: NURSE PRACTITIONER

## 2023-03-23 RX ORDER — FLUTICASONE PROPIONATE AND SALMETEROL 100; 50 UG/1; UG/1
1 POWDER RESPIRATORY (INHALATION)
Qty: 60 EACH | Refills: 1 | Status: SHIPPED | OUTPATIENT
Start: 2023-03-23

## 2023-03-23 RX ORDER — METHYLPREDNISOLONE 4 MG/1
TABLET ORAL
Qty: 21 TABLET | Refills: 0 | Status: SHIPPED | OUTPATIENT
Start: 2023-03-23

## 2023-03-23 NOTE — PROGRESS NOTES
Chief Complaint  Ema Cisse presents to [facility] for Cough (Pt c/o cough, nasal congestion. Ongoing since the last time she was seen in office on 3/9/23. /Pt states that she was given a new allergy pill and cough syrup that has helped slightly./)    Subjective          History of Present Illness  Here for continued allergy symptoms, taking xyzal and inhaler prn and nasal spray but not clearing up , has hx of allergies and was on shots for years but hates to go back that route     Review of Systems   Constitutional: Negative for fatigue, fever, unexpected weight gain and unexpected weight loss.   HENT: Positive for congestion, postnasal drip and rhinorrhea.    Eyes: Negative.    Respiratory: Positive for cough. Negative for shortness of breath and wheezing.    Cardiovascular: Negative for chest pain, palpitations and leg swelling.   Gastrointestinal: Negative for abdominal pain.   Endocrine: Negative.    Genitourinary: Negative for breast lump, breast pain, dysuria, frequency and urgency.   Musculoskeletal: Negative for gait problem.   Skin: Negative.    Neurological: Negative for dizziness, tremors, seizures, weakness and memory problem.   Psychiatric/Behavioral: Negative for behavioral problems and suicidal ideas.         Allergies   Allergen Reactions   • Sulfa Antibiotics Rash   • Trintellix [Vortioxetine] Itching      Past Medical History:   Diagnosis Date   • Allergic    • Arthritis 2015   • Cholelithiasis 1985   • GERD (gastroesophageal reflux disease)    • Irritable bowel syndrome    • Low back pain 2009     Current Outpatient Medications   Medication Sig Dispense Refill   • albuterol sulfate  (90 Base) MCG/ACT inhaler Inhale 2 puffs Every 4 (Four) Hours As Needed.     • azelastine (ASTELIN) 0.1 % nasal spray 2 sprays into the nostril(s) as directed by provider 2 (Two) Times a Day. Use in each nostril as directed 60 mL 1   • Cetirizine HCl 10 MG capsule Take 10  mg by mouth Daily.     • dicyclomine (BENTYL) 20 MG tablet Take 1 tablet by mouth 4 (Four) Times a Day Before Meals & at Bedtime As Needed (abdominal cramping). 360 tablet 1   • escitalopram (Lexapro) 20 MG tablet Take 1.5 tablets by mouth Daily. 145 tablet 0   • fluticasone (Flonase) 50 MCG/ACT nasal spray 2 sprays into the nostril(s) as directed by provider Daily. 18.2 mL 0   • hydrocortisone 1 % cream Apply 1 application topically to the appropriate area as directed 2 (Two) Times a Day As Needed for Irritation. 30 g 1   • Insulin Pen Needle (Novofine Pen Needle) 32G X 6 MM misc 1 each Daily. 100 each 0   • levocetirizine (XYZAL) 5 MG tablet Take 1 tablet by mouth Every Evening. 90 tablet 1   • montelukast (SINGULAIR) 10 MG tablet Take 1 tablet by mouth Daily. 90 tablet 1   • multivitamin with minerals tablet tablet Take 1 tablet by mouth Daily.     • naproxen (NAPROSYN) 500 MG tablet Take 1 tablet by mouth 2 (Two) Times a Day As Needed for Mild Pain. 180 tablet 1   • pantoprazole (PROTONIX) 40 MG EC tablet Take 1 tablet by mouth Daily. 90 tablet 1   • promethazine-dextromethorphan (PROMETHAZINE-DM) 6.25-15 MG/5ML syrup Take 5 mL by mouth 4 (Four) Times a Day As Needed for Cough. 240 mL 0   • Semaglutide-Weight Management 0.25 MG/0.5ML solution auto-injector Inject 0.25 mg under the skin into the appropriate area as directed Every 7 (Seven) Days. 5 mL 0   • SUMAtriptan (IMITREX) 50 MG tablet TAKE 1 TABLET BY MOUTH ONE TIME AS NEEDED FOR MIGRAINE 9 tablet 0   • traZODone (DESYREL) 50 MG tablet Take 2 tablets by mouth Every Night. 180 tablet 1   • Fluticasone-Salmeterol (ADVAIR/WIXELA) 100-50 MCG/ACT DISKUS Inhale 1 puff 2 (Two) Times a Day. 60 each 1   • methylPREDNISolone (MEDROL) 4 MG dose pack Take as directed on package instructions. 21 tablet 0     No current facility-administered medications for this visit.     Past Surgical History:   Procedure Laterality Date   • BUNIONECTOMY  2008   • CHOLECYSTECTOMY  1984    • COLONOSCOPY  2017   • COLONOSCOPY  2017   • COLONOSCOPY N/A 7/27/2022    Procedure: COLONOSCOPY WITH BIOPSIES;  Surgeon: Halima Fitzpatrick MD;  Location: [facility] ENDOSCOPY;  Service: Gastroenterology;  Laterality: N/A;  DIVERTICULOSIS, HEMORRHOIDS   • CYSTECTOMY     • EPIDURAL Right 11/3/2021    Procedure: Right L5 LUMBAR/SACRAL TRANSFORAMINAL EPIDURAL;  Surgeon: Jeronimo Lind MD;  [facility];  Service: Pain Management;  Laterality: Right;   • EPIDURAL Bilateral 3/17/2022    Procedure: Right L5 LUMBAR/SACRAL TRANSFORAMINAL EPIDURAL;  Surgeon: Jeronimo Lind MD;  [facility];  Service: Pain Management;  Laterality: Bilateral;   • KNEE SURGERY  07/2005    ACL Removal   • MEDIAL BRANCH BLOCK Left 12/14/2021    Procedure: Left C3-C5 MEDIAL BRANCH BLOCK;  Surgeon: Jeronimo Lind MD;  [facility];  Service: Pain Management;  Laterality: Left;   • MEDIAL BRANCH BLOCK Left 2/8/2022    Procedure: Left C3-C5 MEDIAL BRANCH BLOCK;  Surgeon: Jeronimo Lind MD;  [facility];  Service: Pain Management;  Laterality: Left;   • RADIOFREQUENCY ABLATION Left 3/3/2022    Procedure: RADIOFREQUENCY ABLATION CERVICAL C3-C5;  Surgeon: Jeronimo Lind MD;  [facility];  Service: Pain Management;  Laterality: Left;   • SINUS SURGERY  2010   • UPPER GASTROINTESTINAL ENDOSCOPY  2002      Social History     Tobacco Use   • Smoking status: Never     Passive exposure: Never   • Smokeless tobacco: Never   Vaping Use   • Vaping Use: Never used   Substance Use Topics   • Alcohol use: Yes     Comment: socially   • Drug use: No     Family History   Problem Relation Age of Onset   • Hypertension Mother    • Heart disease Father    • Lung cancer Father    • No Known Problems Sister    • No Known Problems Daughter    • No Known Problems Maternal Aunt    • No Known Problems Paternal Aunt    • No Known Problems Maternal Grandmother    • No Known Problems Maternal  "Grandfather    • No Known Problems Paternal Grandmother    • No Known Problems Paternal Grandfather    • Hypertension Other    • Heart disease Other    • BRCA 1/2 Neg Hx    • Breast cancer Neg Hx    • Colon cancer Neg Hx    • Endometrial cancer Neg Hx    • Ovarian cancer Neg Hx    • Malig Hyperthermia Neg Hx      Health Maintenance Due   Topic Date Due   • COVID-19 Vaccine (4 - Booster for Moderna series) 01/14/2022      Immunization History   Administered Date(s) Administered   • COVID-19 (MODERNA) 1st, 2nd, 3rd Dose Only 02/05/2021, 03/05/2021, 11/19/2021   • Flu Vaccine Split Quad 09/25/2019, 09/23/2021   • Flucelvax Quad Vial =>4yrs 09/28/2020   • Hepatitis A 05/02/2018, 11/20/2018   • Influenza, Unspecified 10/01/2022   • Shingrix 05/17/2022, 01/11/2023   • Tdap 05/29/2019        Objective     Vitals:    03/23/23 1129   BP: 120/88   BP Location: Left arm   Patient Position: Sitting   Pulse: 81   Temp: 97.7 °F (36.5 °C)   TempSrc: Oral   SpO2: 98%   Weight: 88.8 kg (195 lb 12.8 oz)   Height: 165.1 cm (65\")     Body mass index is 32.58 kg/m².     Physical Exam  Vitals and nursing note reviewed.   Constitutional:       Appearance: Normal appearance.   HENT:      Right Ear: Tympanic membrane normal.      Left Ear: Tympanic membrane normal.      Nose: Rhinorrhea present.      Mouth/Throat:      Pharynx: No oropharyngeal exudate or posterior oropharyngeal erythema.   Neck:      Vascular: No carotid bruit.   Cardiovascular:      Rate and Rhythm: Normal rate and regular rhythm.      Heart sounds: Normal heart sounds.   Pulmonary:      Effort: Pulmonary effort is normal.      Breath sounds: Normal breath sounds.   Musculoskeletal:         General: Normal range of motion.   Lymphadenopathy:      Cervical: No cervical adenopathy.   Skin:     General: Skin is warm and dry.   Neurological:      General: No focal deficit present.      Mental Status: She is alert.   Psychiatric:         Mood and Affect: Mood normal.         " Behavior: Behavior normal.           Result Review :    Lab on 02/15/2023   Component Date Value Ref Range Status   • Total Cholesterol 02/15/2023 176  0 - 200 mg/dL Final   • Triglycerides 02/15/2023 191 (H)  0 - 150 mg/dL Final   • HDL Cholesterol 02/15/2023 34 (L)  40 - 60 mg/dL Final   • LDL Cholesterol  02/15/2023 108 (H)  0 - 100 mg/dL Final   • VLDL Cholesterol 02/15/2023 34  5 - 40 mg/dL Final   • LDL/HDL Ratio 02/15/2023 3.05   Final   Office Visit on 02/10/2023   Component Date Value Ref Range Status   • Diagnosis 02/10/2023 Comment   Final   • Specimen adequacy: 02/10/2023 Comment   Final   • Clinician Provided ICD-10: 02/10/2023 Comment   Final   • Performed by: 02/10/2023 Comment   Final   • QC reviewed by: 02/10/2023 Comment   Final   • . 02/10/2023 .   Final   • Note: 02/10/2023 Comment   Final   • Method: 02/10/2023 Comment   Final   • HPV Aptima 02/10/2023 Negative  Negative Final   Lab on 12/30/2022   Component Date Value Ref Range Status   • Glucose 12/30/2022 97  65 - 99 mg/dL Final   • BUN 12/30/2022 11  6 - 20 mg/dL Final   • Creatinine 12/30/2022 0.94  0.57 - 1.00 mg/dL Final   • Sodium 12/30/2022 141  136 - 145 mmol/L Final   • Potassium 12/30/2022 4.2  3.5 - 5.2 mmol/L Final   • Chloride 12/30/2022 108 (H)  98 - 107 mmol/L Final   • CO2 12/30/2022 21.6 (L)  22.0 - 29.0 mmol/L Final   • Calcium 12/30/2022 9.5  8.6 - 10.5 mg/dL Final   • Total Protein 12/30/2022 6.7  6.0 - 8.5 g/dL Final   • Albumin 12/30/2022 4.3  3.5 - 5.2 g/dL Final   • ALT (SGPT) 12/30/2022 11  1 - 33 U/L Final   • AST (SGOT) 12/30/2022 11  1 - 32 U/L Final   • Alkaline Phosphatase 12/30/2022 104  39 - 117 U/L Final   • Total Bilirubin 12/30/2022 0.2  0.0 - 1.2 mg/dL Final   • Globulin 12/30/2022 2.4  gm/dL Final   • A/G Ratio 12/30/2022 1.8  g/dL Final   • BUN/Creatinine Ratio 12/30/2022 11.7  7.0 - 25.0 Final   • Anion Gap 12/30/2022 11.4  5.0 - 15.0 mmol/L Final   • eGFR 12/30/2022 70.5  >60.0 mL/min/1.73 Final   • WBC  12/30/2022 6.95  3.40 - 10.80 10*3/mm3 Final   • RBC 12/30/2022 4.60  3.77 - 5.28 10*6/mm3 Final   • Hemoglobin 12/30/2022 13.5  12.0 - 15.9 g/dL Final   • Hematocrit 12/30/2022 40.7  34.0 - 46.6 % Final   • MCV 12/30/2022 88.5  79.0 - 97.0 fL Final   • MCH 12/30/2022 29.3  26.6 - 33.0 pg Final   • MCHC 12/30/2022 33.2  31.5 - 35.7 g/dL Final   • RDW 12/30/2022 13.3  12.3 - 15.4 % Final   • RDW-SD 12/30/2022 43.6  37.0 - 54.0 fl Final   • MPV 12/30/2022 9.8  6.0 - 12.0 fL Final   • Platelets 12/30/2022 281  140 - 450 10*3/mm3 Final   • Neutrophil % 12/30/2022 51.9  42.7 - 76.0 % Final   • Lymphocyte % 12/30/2022 34.0  19.6 - 45.3 % Final   • Monocyte % 12/30/2022 8.8  5.0 - 12.0 % Final   • Eosinophil % 12/30/2022 4.3  0.3 - 6.2 % Final   • Basophil % 12/30/2022 0.9  0.0 - 1.5 % Final   • Immature Grans % 12/30/2022 0.1  0.0 - 0.5 % Final   • Neutrophils, Absolute 12/30/2022 3.61  1.70 - 7.00 10*3/mm3 Final   • Lymphocytes, Absolute 12/30/2022 2.36  0.70 - 3.10 10*3/mm3 Final   • Monocytes, Absolute 12/30/2022 0.61  0.10 - 0.90 10*3/mm3 Final   • Eosinophils, Absolute 12/30/2022 0.30  0.00 - 0.40 10*3/mm3 Final   • Basophils, Absolute 12/30/2022 0.06  0.00 - 0.20 10*3/mm3 Final   • Immature Grans, Absolute 12/30/2022 0.01  0.00 - 0.05 10*3/mm3 Final   Office Visit on 12/30/2022   Component Date Value Ref Range Status   • Urine Culture 12/30/2022 No growth   Final   • Color, UA 12/30/2022 Yellow  Yellow, Straw Final   • Appearance, UA 12/30/2022 Clear  Clear Final   • pH, UA 12/30/2022 6.0  5.0 - 8.0 Final   • Specific Gravity, UA 12/30/2022 1.015  1.005 - 1.030 Final   • Glucose, UA 12/30/2022 Negative  Negative Final   • Ketones, UA 12/30/2022 Negative  Negative Final   • Bilirubin, UA 12/30/2022 Negative  Negative Final   • Blood, UA 12/30/2022 Negative  Negative Final   • Protein, UA 12/30/2022 Negative  Negative Final   • Leuk Esterase, UA 12/30/2022 Small (1+) (A)  Negative Final   • Nitrite, UA 12/30/2022  Negative  Negative Final   • Urobilinogen, UA 12/30/2022 0.2 E.U./dL  0.2 - 1.0 E.U./dL Final   • RBC, UA 12/30/2022 None Seen  None Seen /HPF Final   • WBC, UA 12/30/2022 3-5 (A)  None Seen /HPF Final   • Bacteria, UA 12/30/2022 None Seen  None Seen /HPF Final   • Squamous Epithelial Cells, UA 12/30/2022 0-2  None Seen, 0-2 /HPF Final   • Methodology 12/30/2022 Manual Light Microscopy   Final   Office Visit on 12/22/2022   Component Date Value Ref Range Status   • SARS Antigen 12/22/2022 Not Detected  Not Detected, Presumptive Negative Final   • Influenza A Antigen PATRIA 12/22/2022 Not Detected  Not Detected Final   • Influenza B Antigen PATRIA 12/22/2022 Not Detected  Not Detected Final   • Internal Control 12/22/2022 Passed  Passed Final   • Lot Number 12/22/2022 [id number]   Final   • Expiration Date 12/22/2022 02/11/23   Final   • Rapid Strep A Screen 12/22/2022 Negative  Negative, VALID, INVALID, Not Performed Final   • Internal Control 12/22/2022 Passed  Passed Final   • Lot Number 12/22/2022 [id number]   Final   • Expiration Date 12/22/2022 03/31/24   Final   • Throat Culture, Beta Strep 12/22/2022 No Beta Hemolytic Streptococcus Isolated   Final   Office Visit on 10/10/2022   Component Date Value Ref Range Status   • Rapid Influenza A Ag 10/10/2022 Negative  Negative Final   • Rapid Influenza B Ag 10/10/2022 Negative  Negative Final   • Internal Control 10/10/2022 Passed  Passed Final   • Lot Number 10/10/2022 [id number]   Final   • Expiration Date 10/10/2022 6/22/23   Final                        Assessment and Plan      Diagnoses and all orders for this visit:    1. Seasonal allergies (Primary)  Comments:  continue allergy meds from last ov, if not improving discussed need to see [facility]   Orders:  -     Fluticasone-Salmeterol (ADVAIR/WIXELA) 100-50 MCG/ACT DISKUS; Inhale 1 puff 2 (Two) Times a Day.  Dispense: 60 each; Refill: 1  -     methylPREDNISolone (MEDROL) 4 MG dose pack; Take as directed on  package instructions.  Dispense: 21 tablet; Refill: 0            Follow Up     No follow-ups on file.

## 2023-04-12 ENCOUNTER — OFFICE VISIT (OUTPATIENT)
Dept: FAMILY MEDICINE CLINIC | Age: 58
End: 2023-04-12
Payer: COMMERCIAL

## 2023-04-12 VITALS
BODY MASS INDEX: 31.46 KG/M2 | SYSTOLIC BLOOD PRESSURE: 125 MMHG | DIASTOLIC BLOOD PRESSURE: 93 MMHG | OXYGEN SATURATION: 99 % | TEMPERATURE: 97.6 F | WEIGHT: 188.8 LBS | HEIGHT: 65 IN | HEART RATE: 94 BPM

## 2023-04-12 DIAGNOSIS — J30.9 ALLERGIC RHINITIS, UNSPECIFIED SEASONALITY, UNSPECIFIED TRIGGER: ICD-10-CM

## 2023-04-12 DIAGNOSIS — E66.01 MORBID (SEVERE) OBESITY DUE TO EXCESS CALORIES: Primary | ICD-10-CM

## 2023-04-12 RX ORDER — TRIAMCINOLONE ACETONIDE 40 MG/ML
40 INJECTION, SUSPENSION INTRA-ARTICULAR; INTRAMUSCULAR ONCE
Status: COMPLETED | OUTPATIENT
Start: 2023-04-12 | End: 2023-04-12

## 2023-04-12 RX ORDER — CHLORCYCLIZINE HYDROCHLORIDE AND PSEUDOEPHEDRINE HYDROCHLORIDE 25; 60 MG/1; MG/1
1 TABLET ORAL EVERY 8 HOURS PRN
Qty: 42 TABLET | Refills: 0 | Status: SHIPPED | OUTPATIENT
Start: 2023-04-12

## 2023-04-12 RX ORDER — SEMAGLUTIDE 0.5 MG/.5ML
0.5 INJECTION, SOLUTION SUBCUTANEOUS
Qty: 6 ML | Refills: 0 | Status: SHIPPED | OUTPATIENT
Start: 2023-04-12 | End: 2023-07-11

## 2023-04-12 RX ADMIN — TRIAMCINOLONE ACETONIDE 40 MG: 40 INJECTION, SUSPENSION INTRA-ARTICULAR; INTRAMUSCULAR at 13:54

## 2023-04-12 NOTE — PROGRESS NOTES
Chief Complaint  Ema Cisse presents to [facility] for Osteoarthritis    Subjective          History of Present Illness    Amy is here today to follow up on obesity and weight loss. She started on Wegovy after visit on 3/9/2023. She is down 8 pounds. She is eating less and trying to make healthier choices. Not exercising much due to sinus and UR issues.   She reports ongoing issues with cough and congestion. She has noted some wheezing. She has been prescribed 2 steroid courses and course of Augmentin over the last couple of months. She has also been prescribed Advair. Also taking xyzal, singulair, flonase, astelin. She has taken at home covid test that was negative. She had previously seen allergist and was on allergy shots but is wanting to hold off on this at this time. She is not using her Albuterol inhaler much since starting the Advair. .     Review of Systems      Allergies   Allergen Reactions   • Sulfa Antibiotics Rash   • Trintellix [Vortioxetine] Itching      Past Medical History:   Diagnosis Date   • Allergic    • Arthritis 2015   • Cholelithiasis 1985   • GERD (gastroesophageal reflux disease)    • Irritable bowel syndrome    • Low back pain 2009     Current Outpatient Medications   Medication Sig Dispense Refill   • albuterol sulfate  (90 Base) MCG/ACT inhaler Inhale 2 puffs Every 4 (Four) Hours As Needed.     • azelastine (ASTELIN) 0.1 % nasal spray 2 sprays into the nostril(s) as directed by provider 2 (Two) Times a Day. Use in each nostril as directed 60 mL 1   • Cetirizine HCl 10 MG capsule Take 10 mg by mouth As Needed.     • dicyclomine (BENTYL) 20 MG tablet Take 1 tablet by mouth 4 (Four) Times a Day Before Meals & at Bedtime As Needed (abdominal cramping). 360 tablet 1   • escitalopram (Lexapro) 20 MG tablet Take 1.5 tablets by mouth Daily. 145 tablet 0   • fluticasone (Flonase) 50 MCG/ACT nasal spray 2 sprays into the nostril(s) as directed by  provider Daily. 18.2 mL 0   • Fluticasone-Salmeterol (ADVAIR/WIXELA) 100-50 MCG/ACT DISKUS Inhale 1 puff 2 (Two) Times a Day. 60 each 1   • hydrocortisone 1 % cream Apply 1 application topically to the appropriate area as directed 2 (Two) Times a Day As Needed for Irritation. 30 g 1   • Insulin Pen Needle (Novofine Pen Needle) 32G X 6 MM misc 1 each Daily. 100 each 0   • levocetirizine (XYZAL) 5 MG tablet Take 1 tablet by mouth Every Evening. 90 tablet 1   • montelukast (SINGULAIR) 10 MG tablet Take 1 tablet by mouth Daily. 90 tablet 1   • multivitamin with minerals tablet tablet Take 1 tablet by mouth Daily.     • naproxen (NAPROSYN) 500 MG tablet Take 1 tablet by mouth 2 (Two) Times a Day As Needed for Mild Pain. 180 tablet 1   • pantoprazole (PROTONIX) 40 MG EC tablet Take 1 tablet by mouth Daily. 90 tablet 1   • promethazine-dextromethorphan (PROMETHAZINE-DM) 6.25-15 MG/5ML syrup Take 5 mL by mouth 4 (Four) Times a Day As Needed for Cough. 240 mL 0   • SUMAtriptan (IMITREX) 50 MG tablet TAKE 1 TABLET BY MOUTH ONE TIME AS NEEDED FOR MIGRAINE 9 tablet 0   • traZODone (DESYREL) 50 MG tablet Take 2 tablets by mouth Every Night. 180 tablet 1   • Chlorcyclizine-Pseudoephed (Stahist AD) 25-60 MG tablet Take 1 tablet by mouth Every 8 (Eight) Hours As Needed (congestion). 42 tablet 0   • Semaglutide-Weight Management (Wegovy) 0.5 MG/0.5ML solution auto-injector Inject 0.5 mL under the skin into the appropriate area as directed Every 7 (Seven) Days for 90 days. 6 mL 0     No current facility-administered medications for this visit.     Past Surgical History:   Procedure Laterality Date   • BUNIONECTOMY  2008   • CHOLECYSTECTOMY  1984   • COLONOSCOPY  2017   • COLONOSCOPY  2017   • COLONOSCOPY N/A 7/27/2022    Procedure: COLONOSCOPY WITH BIOPSIES;  Surgeon: Halima Fitzpatrick MD;  Location: [facility];  Service: Gastroenterology;  Laterality: N/A;  DIVERTICULOSIS, HEMORRHOIDS   • CYSTECTOMY     • EPIDURAL Right  11/3/2021    Procedure: Right L5 LUMBAR/SACRAL TRANSFORAMINAL EPIDURAL;  Surgeon: Jeronimo Lind MD;  [facility];  Service: Pain Management;  Laterality: Right;   • EPIDURAL Bilateral 3/17/2022    Procedure: Right L5 LUMBAR/SACRAL TRANSFORAMINAL EPIDURAL;  Surgeon: Jeronimo Lind MD;  Location: [facility];  Service: Pain Management;  Laterality: Bilateral;   • KNEE SURGERY  07/2005    ACL Removal   • MEDIAL BRANCH BLOCK Left 12/14/2021    Procedure: Left C3-C5 MEDIAL BRANCH BLOCK;  Surgeon: Jeronimo Lind MD;  Location[facility];  Service: Pain Management;  Laterality: Left;   • MEDIAL BRANCH BLOCK Left 2/8/2022    Procedure: Left C3-C5 MEDIAL BRANCH BLOCK;  Surgeon: Jeronimo Lind MD;  [facility];  Service: Pain Management;  Laterality: Left;   • RADIOFREQUENCY ABLATION Left 3/3/2022    Procedure: RADIOFREQUENCY ABLATION CERVICAL C3-C5;  Surgeon: Jeronimo Lind MD;  [facility];  Service: Pain Management;  Laterality: Left;   • SINUS SURGERY  2010   • UPPER GASTROINTESTINAL ENDOSCOPY  2002      Social History     Tobacco Use   • Smoking status: Never     Passive exposure: Never   • Smokeless tobacco: Never   Vaping Use   • Vaping Use: Never used   Substance Use Topics   • Alcohol use: Yes     Comment: socially   • Drug use: No     Family History   Problem Relation Age of Onset   • Hypertension Mother    • Heart disease Father    • Lung cancer Father    • No Known Problems Sister    • No Known Problems Daughter    • No Known Problems Maternal Aunt    • No Known Problems Paternal Aunt    • No Known Problems Maternal Grandmother    • No Known Problems Maternal Grandfather    • No Known Problems Paternal Grandmother    • No Known Problems Paternal Grandfather    • Hypertension Other    • Heart disease Other    • BRCA 1/2 Neg Hx    • Breast cancer Neg Hx    • Colon cancer Neg Hx    • Endometrial cancer Neg Hx    • Ovarian cancer Neg Hx    • Malig  "Hyperthermia Neg Hx      There are no preventive care reminders to display for this patient.   Immunization History   Administered Date(s) Administered   • COVID-19 (MODERNA) 1st, 2nd, 3rd Dose Only 02/05/2021, 03/05/2021, 11/19/2021   • Flu Vaccine Split Quad 09/25/2019, 09/23/2021, 09/15/2022   • Flucelvax Quad Vial =>4yrs 09/28/2020   • Hepatitis A 05/02/2018, 11/20/2018   • Influenza, Unspecified 10/01/2022   • Shingrix 05/17/2022, 01/11/2023   • Tdap 05/29/2019        Objective     Vitals:    04/12/23 1314 04/12/23 1318   BP: 132/96 125/93   BP Location: Right arm Left arm   Patient Position: Sitting Sitting   Cuff Size: Adult Adult   Pulse: 84 94   Temp: 97.6 °F (36.4 °C)    TempSrc: Oral    SpO2: 99%    Weight: 85.6 kg (188 lb 12.8 oz)    Height: 165.1 cm (65\")      Body mass index is 31.42 kg/m².     Physical Exam  Vitals reviewed.   Constitutional:       General: She is not in acute distress.     Appearance: Normal appearance. She is well-developed.   HENT:      Head: Normocephalic and atraumatic.      Right Ear: Ear canal normal. A middle ear effusion is present.      Left Ear: Ear canal normal. A middle ear effusion is present.      Mouth/Throat:      Mouth: Mucous membranes are moist.      Comments: PND  Eyes:      Extraocular Movements: Extraocular movements intact.      Pupils: Pupils are equal, round, and reactive to light.   Cardiovascular:      Rate and Rhythm: Normal rate and regular rhythm.   Pulmonary:      Effort: Pulmonary effort is normal.      Breath sounds: Wheezing present.   Neurological:      Mental Status: She is alert and oriented to person, place, and time.   Psychiatric:         Mood and Affect: Mood and affect normal.           Result Review :                               Assessment and Plan      Diagnoses and all orders for this visit:    1. Morbid (severe) obesity due to excess calories (Primary)  Comments:  Can increase Wegovy dose. To be taken in conjunction with healthy diet " and exercise.   Orders:  -     Semaglutide-Weight Management (Wegovy) 0.5 MG/0.5ML solution auto-injector; Inject 0.5 mL under the skin into the appropriate area as directed Every 7 (Seven) Days for 90 days.  Dispense: 6 mL; Refill: 0    2. Allergic rhinitis, unspecified seasonality, unspecified trigger  Comments:  Kenalog injection today. Will add stahist for short term. Go back to using Albuterol inhaler as well. Declines cxray today.   Orders:  -     Chlorcyclizine-Pseudoephed (Stahist AD) 25-60 MG tablet; Take 1 tablet by mouth Every 8 (Eight) Hours As Needed (congestion).  Dispense: 42 tablet; Refill: 0  -     triamcinolone acetonide (KENALOG-40) injection 40 mg              Follow Up     Return in about 3 months (around 7/12/2023) for Recheck.

## 2023-05-12 DIAGNOSIS — F34.9 PERSISTENT MOOD (AFFECTIVE) DISORDER, UNSPECIFIED: ICD-10-CM

## 2023-05-12 DIAGNOSIS — E66.01 MORBID (SEVERE) OBESITY DUE TO EXCESS CALORIES: ICD-10-CM

## 2023-05-12 RX ORDER — SEMAGLUTIDE 0.5 MG/.5ML
INJECTION, SOLUTION SUBCUTANEOUS
Qty: 2 ML | Refills: 0 | Status: SHIPPED | OUTPATIENT
Start: 2023-05-12

## 2023-05-12 RX ORDER — ESCITALOPRAM OXALATE 20 MG/1
TABLET ORAL
Qty: 135 TABLET | Refills: 0 | Status: SHIPPED | OUTPATIENT
Start: 2023-05-12

## 2023-05-17 ENCOUNTER — TELEPHONE (OUTPATIENT)
Dept: GASTROENTEROLOGY | Facility: CLINIC | Age: 58
End: 2023-05-17
Payer: COMMERCIAL

## 2023-05-17 NOTE — TELEPHONE ENCOUNTER
----- Message from SHELBIE Driscoll sent at 5/16/2023  4:11 PM EDT -----  Please let patient know that test was performed and accurately.  Will need to recollect.

## 2023-05-19 ENCOUNTER — TELEPHONE (OUTPATIENT)
Dept: FAMILY MEDICINE CLINIC | Age: 58
End: 2023-05-19
Payer: COMMERCIAL

## 2023-05-19 DIAGNOSIS — E66.01 MORBID (SEVERE) OBESITY DUE TO EXCESS CALORIES: Primary | ICD-10-CM

## 2023-05-19 NOTE — TELEPHONE ENCOUNTER
She has only recently been taking the 0.5 mg dose so the 1.7 and 2.4 dosing would be to much to jump to. Does she want to try another pharmacy?

## 2023-05-19 NOTE — TELEPHONE ENCOUNTER
Per pharmacy, Wegovy 1mg is on backorder until Sept, they can get 1.7mg or 2.4mg dose. Please advise.

## 2023-05-19 NOTE — TELEPHONE ENCOUNTER
Pt inf, she will call around to other pharmacies and either call us back to send in a new rx or contact [facility] to have rx transferred.

## 2023-06-12 ENCOUNTER — TELEPHONE (OUTPATIENT)
Dept: FAMILY MEDICINE CLINIC | Age: 58
End: 2023-06-12

## 2023-06-12 NOTE — TELEPHONE ENCOUNTER
"    Caller: Ema Cisse \"LEXI\"    Relationship to patient: Self    Best call back number: [phone]    Patient is needing: PATIENT CALLED IN AND IS REQUESTING A CALL BACK WITH GUIDANCE PLEASE. PATIENT SAID SINCE SHE CAN NO LONGER FIND WEGOVY AT PHARMACY SHE WOULD LIKE TO KNOW IF THERE IS AND ALTERNATIVE FOR WEIGHT LOSE THAT WOULD BE SUITABLE AND IS REQUESTING A CALL BACK PLEASE      [facility] - [place], [place] - [address] [phone] [facility] [phone]  [phone]       "

## 2023-06-12 NOTE — TELEPHONE ENCOUNTER
Has she tried calling other pharmacies to see if they have in stock including in other cities? The only other FDA approved weight loss medication similar to Wegovy is Saxenda

## 2023-06-29 ENCOUNTER — TELEPHONE (OUTPATIENT)
Dept: FAMILY MEDICINE CLINIC | Age: 58
End: 2023-06-29

## 2023-06-29 NOTE — TELEPHONE ENCOUNTER
"    Caller: Ema Cisse \"LEXI\"    Relationship to patient: Self    Best call back number: [phone]    Patient is needing: PATIENT CALLED IN AND SAID PHARMACY CAN'T GET WEGOVY AND SHE IS REQUESTING A CALL BACK TO DISCUSS ALTERNATIVES. PATIENT SAID SHE WOULD BE WILLING TO GO BACK TO  . PATIENT WOULD LIKE A CALL BACK TO DISCUSS OPTIONS PLEASE.      "

## 2023-06-29 NOTE — TELEPHONE ENCOUNTER
Stefanie the pharmacist said that Wegovy is on national back order so some patients are switching to Saxenda. If she would like to try that again which pharmacy would she like sent to?

## 2023-08-22 ENCOUNTER — TELEPHONE (OUTPATIENT)
Dept: GASTROENTEROLOGY | Facility: CLINIC | Age: 58
End: 2023-08-22
Payer: COMMERCIAL

## 2023-08-22 NOTE — TELEPHONE ENCOUNTER
----- Message from SHELBIE Driscoll sent at 8/22/2023  1:37 PM EDT -----  Sucrose breath test c/w low sucrase activity.  Recommend sucraid with each meal.  Please obtain rx form.

## 2023-08-28 ENCOUNTER — TELEPHONE (OUTPATIENT)
Dept: GASTROENTEROLOGY | Facility: CLINIC | Age: 58
End: 2023-08-28
Payer: COMMERCIAL

## 2023-08-28 NOTE — TELEPHONE ENCOUNTER
Contacted the patient and verified information, I advised the patient that I was calling to get her appointment with Eugenie rescheduled due to her being out of office today. Patient verbalized understanding but stated that she normally sees Mignon Álvarez, I verbalized understanding and advised that I was going to put her on a brief hold to look into her chart, upon review the patient was last seen in office by Rosalie, I called Stella to see if I could offer the patient one of the openings for tomorrow with Stella Wiggins approved and I advised the patient. She has been scheduled for 8/29 at 2:45 pm.

## 2023-08-29 ENCOUNTER — OFFICE VISIT (OUTPATIENT)
Dept: GASTROENTEROLOGY | Facility: CLINIC | Age: 58
End: 2023-08-29
Payer: COMMERCIAL

## 2023-08-29 VITALS
SYSTOLIC BLOOD PRESSURE: 126 MMHG | BODY MASS INDEX: 32.15 KG/M2 | HEART RATE: 68 BPM | DIASTOLIC BLOOD PRESSURE: 96 MMHG | WEIGHT: 193 LBS | HEIGHT: 65 IN

## 2023-08-29 DIAGNOSIS — E74.31 SUCROSE INTOLERANCE DUE TO SUCRASE-ISOMALTASE DEFICIENCY: ICD-10-CM

## 2023-08-29 DIAGNOSIS — K58.2 IRRITABLE BOWEL SYNDROME WITH BOTH CONSTIPATION AND DIARRHEA: Primary | ICD-10-CM

## 2023-08-29 DIAGNOSIS — R14.0 ABDOMINAL BLOATING: ICD-10-CM

## 2023-08-29 RX ORDER — TENAPANOR HYDROCHLORIDE 53.2 MG/1
50 TABLET ORAL 2 TIMES DAILY
Qty: 60 TABLET | Refills: 3 | Status: SHIPPED | OUTPATIENT
Start: 2023-08-29 | End: 2023-09-01 | Stop reason: CLARIF

## 2023-08-29 NOTE — PROGRESS NOTES
Chief Complaint     Irritable Bowel Syndrome and Heartburn    History of Present Illness     Ema Cisse is a 57 y.o. female who presents to [facility] for follow-up of IBS-mixed, GERD and abdominal bloating.    Reports that she continues to experience alternating between constipation and diarrhea.  Right now she is more constipated.  She's tried fiber in the past, but didn't like it.  Previously tried miralax without improvement.  Admits straining with bowel movements.      Sucraid prescribed however patient has not received it yet.       History      Past Medical History:   Diagnosis Date    Allergic     Arthritis 2015    Cholelithiasis 1985    GERD (gastroesophageal reflux disease)     Irritable bowel syndrome     Low back pain 2009     Past Surgical History:   Procedure Laterality Date    BUNIONECTOMY  2008    CHOLECYSTECTOMY  1984    COLONOSCOPY  2017    COLONOSCOPY  2017    COLONOSCOPY N/A 7/27/2022    Procedure: COLONOSCOPY WITH BIOPSIES;  Surgeon: Halima Fitzpatrick MD;  Location: [facility];  Service: Gastroenterology;  Laterality: N/A;  DIVERTICULOSIS, HEMORRHOIDS    CYSTECTOMY      EPIDURAL Right 11/3/2021    Procedure: Right L5 LUMBAR/SACRAL TRANSFORAMINAL EPIDURAL;  Surgeon: Jeronimo Lind MD;  [place] [facility];  Service: Pain Management;  Laterality: Right;    EPIDURAL Bilateral 3/17/2022    Procedure: Right L5 LUMBAR/SACRAL TRANSFORAMINAL EPIDURAL;  Surgeon: Jeronimo Lind MD;  [place] [facility];  Service: Pain Management;  Laterality: Bilateral;    KNEE SURGERY  07/2005    ACL Removal    MEDIAL BRANCH BLOCK Left 12/14/2021    Procedure: Left C3-C5 MEDIAL BRANCH BLOCK;  Surgeon: Jeronimo Lind MD;  [place]: [facility];  Service: Pain Management;  Laterality: Left;    MEDIAL BRANCH BLOCK Left 2/8/2022    Procedure: Left C3-C5 MEDIAL BRANCH BLOCK;  Surgeon: Jeronimo Lind MD;  [place]: [facility];  Service: Pain  Management;  Laterality: Left;    RADIOFREQUENCY ABLATION Left 3/3/2022    Procedure: RADIOFREQUENCY ABLATION CERVICAL C3-C5;  Surgeon: Jeronimo Lind MD;  [place] [facility];  Service: Pain Management;  Laterality: Left;    SINUS SURGERY  2010    UPPER GASTROINTESTINAL ENDOSCOPY  2002     Family History   Problem Relation Age of Onset    Hypertension Mother     Heart disease Father     Lung cancer Father     No Known Problems Sister     No Known Problems Daughter     No Known Problems Maternal Aunt     No Known Problems Paternal Aunt     No Known Problems Maternal Grandmother     No Known Problems Maternal Grandfather     No Known Problems Paternal Grandmother     No Known Problems Paternal Grandfather     Hypertension Other     Heart disease Other     BRCA 1/2 Neg Hx     Breast cancer Neg Hx     Colon cancer Neg Hx     Endometrial cancer Neg Hx     Ovarian cancer Neg Hx     Malig Hyperthermia Neg Hx         Current Medications       Current Outpatient Medications:     albuterol sulfate  (90 Base) MCG/ACT inhaler, Inhale 2 puffs Every 4 (Four) Hours As Needed., Disp: , Rfl:     azelastine (ASTELIN) 0.1 % nasal spray, 2 sprays into the nostril(s) as directed by provider 2 (Two) Times a Day. Use in each nostril as directed, Disp: 60 mL, Rfl: 1    Cetirizine HCl 10 MG capsule, Take 10 mg by mouth As Needed., Disp: , Rfl:     Chlorcyclizine-Pseudoephed (Stahist AD) 25-60 MG tablet, Take 1 tablet by mouth Every 8 (Eight) Hours As Needed (congestion)., Disp: 42 tablet, Rfl: 0    dicyclomine (BENTYL) 20 MG tablet, Take 1 tablet by mouth 4 (Four) Times a Day Before Meals & at Bedtime As Needed (abdominal cramping)., Disp: 360 tablet, Rfl: 1    escitalopram (LEXAPRO) 20 MG tablet, TAKE 1 & 1/2 TABLETS BY MOUTH ONCE DAILY, Disp: 135 tablet, Rfl: 0    fluticasone (Flonase) 50 MCG/ACT nasal spray, 2 sprays into the nostril(s) as directed by provider Daily., Disp: 18.2 mL, Rfl: 0    Fluticasone-Salmeterol  "(ADVAIR/WIXELA) 100-50 MCG/ACT DISKUS, Inhale 1 puff 2 (Two) Times a Day., Disp: 60 each, Rfl: 1    hydrocortisone 1 % cream, Apply 1 application topically to the appropriate area as directed 2 (Two) Times a Day As Needed for Irritation., Disp: 30 g, Rfl: 1    Insulin Pen Needle (Novofine Pen Needle) 32G X 6 MM misc, 1 each Daily., Disp: 100 each, Rfl: 0    levocetirizine (XYZAL) 5 MG tablet, Take 1 tablet by mouth Every Evening., Disp: 90 tablet, Rfl: 1    Liraglutide (SAXENDA) 18 MG/3ML injection pen, Inject 0.6mg under skin daily for week one, THEN 1.2mg daily for week two, THEN 1.8mg daily for week three, then 2.4mg daily for week four., Disp: 15 mL, Rfl: 0    montelukast (SINGULAIR) 10 MG tablet, Take 1 tablet by mouth Daily., Disp: 90 tablet, Rfl: 1    multivitamin with minerals tablet tablet, Take 1 tablet by mouth Daily., Disp: , Rfl:     naproxen (NAPROSYN) 500 MG tablet, Take 1 tablet by mouth 2 (Two) Times a Day As Needed for Mild Pain., Disp: 180 tablet, Rfl: 1    pantoprazole (PROTONIX) 40 MG EC tablet, Take 1 tablet by mouth Daily., Disp: 90 tablet, Rfl: 1    promethazine-dextromethorphan (PROMETHAZINE-DM) 6.25-15 MG/5ML syrup, Take 5 mL by mouth 4 (Four) Times a Day As Needed for Cough., Disp: 240 mL, Rfl: 0    SUMAtriptan (IMITREX) 50 MG tablet, TAKE 1 TABLET BY MOUTH ONE TIME AS NEEDED FOR MIGRAINE, Disp: 9 tablet, Rfl: 0    traZODone (DESYREL) 50 MG tablet, Take 2 tablets by mouth Every Night., Disp: 180 tablet, Rfl: 1    Tenapanor HCl (Ibsrela) 50 MG tablet, Take 1 tablet by mouth 2 (Two) Times a Day., Disp: 60 tablet, Rfl: 3     Allergies     Allergies   Allergen Reactions    Sulfa Antibiotics Rash    Trintellix [Vortioxetine] Itching       Social History       Social History     Social History Narrative    Not on file         Objective       /96 (BP Location: Left arm, Patient Position: Sitting, Cuff Size: Adult)   Pulse 68   Ht 165.1 cm (65\")   Wt 87.5 kg (193 lb)   BMI 32.12 kg/mý   "     Physical Exam    Results       Result Review :    The following data was reviewed by: SHELBIE Driscoll on 08/29/2023:    8/17/2023 sucrose breath test-low sucrose activity.  Recommend Sucraid with each meal.               Assessment and Plan              Diagnoses and all orders for this visit:    1. Irritable bowel syndrome with both constipation and diarrhea (Primary)  -     Tenapanor HCl (Ibsrela) 50 MG tablet; Take 1 tablet by mouth 2 (Two) Times a Day.  Dispense: 60 tablet; Refill: 3    2. Abdominal bloating    3. 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        Follow Up     Follow Up   Return in about 5 months (around 1/29/2024) for constipation.  Patient was given instructions and counseling regarding her condition or for health maintenance advice. Please see specific information pulled into the AVS if appropriate.

## 2023-08-31 DIAGNOSIS — F34.9 PERSISTENT MOOD (AFFECTIVE) DISORDER, UNSPECIFIED: ICD-10-CM

## 2023-08-31 RX ORDER — ESCITALOPRAM OXALATE 20 MG/1
TABLET ORAL
Qty: 135 TABLET | Refills: 0 | Status: SHIPPED | OUTPATIENT
Start: 2023-08-31

## 2023-09-01 ENCOUNTER — TELEPHONE (OUTPATIENT)
Dept: GASTROENTEROLOGY | Facility: CLINIC | Age: 58
End: 2023-09-01
Payer: COMMERCIAL

## 2023-09-01 NOTE — TELEPHONE ENCOUNTER
Please let pt know that this is not covered unless she tries and fails linzess.  I have sent linzess to her pharmacy.

## 2023-09-13 ENCOUNTER — TELEPHONE (OUTPATIENT)
Dept: GASTROENTEROLOGY | Facility: CLINIC | Age: 58
End: 2023-09-13
Payer: COMMERCIAL

## 2023-09-13 NOTE — TELEPHONE ENCOUNTER
PA for Sucraid has been approved, Left voicemail requesting a returned call to advise. Approval scanned into chart.

## 2023-10-04 ENCOUNTER — TELEPHONE (OUTPATIENT)
Dept: FAMILY MEDICINE CLINIC | Age: 58
End: 2023-10-04

## 2023-10-04 NOTE — TELEPHONE ENCOUNTER
"    Caller: Ema Cisse \"LEXI\"    Relationship to patient: Self    Best call back number: [phone] OKAY TO LEAVE MESSAGE ON PHONE IF NEEDED    Patient is needing: PATIENT CALLED IN AND SAID SHE HAS BEEN TAKING SAXENDA BECAUSE WEGOVY WAS UNAVAILABLE LOCALLY AND SHE IS REQUESTING THAT ORIGINAL PRESCRIPTION FOR WEGOVY BE PRESCRIBED AND SENT TO [facility] PLEASE. PATIENT SAID IT IS OKAY TO LEAVE MESSAGE ON PHONE      [facility] - [place], [place] - [address] - [phone] [facility] [phone]  [phone]   " "Physical Therapy   Daily Treatment     Patient Name: Zachary Moore  Age:  71 y.o., Sex:  male  Medical Record #: [id number]  Today's Date: 11/9/2021     Precautions  Precautions: Fall Risk;Toe Touch Weight Bearing Right Lower Extremity;Swallow Precautions ( See Comments)  Comments: 10/13: R femur ORIF done [place], has been in hospital or at SNF since then    Assessment    Pt reports feeling tired after dialysis and \"doesn't want to do this\" re trying sit to stand, but agreeable with education. Pt is better able to understand TTWB, though still struggles with this during sit to stand. Will likely need to pursue slide board instead.     Plan    Continue current treatment plan.    DC Equipment Recommendations: Unable to determine at this time  Discharge Recommendations: Recommend post-acute placement for additional physical therapy services prior to discharge home       Objective       11/09/21 1604   Total Time Spent   Total Time Spent (Mins) 35   Charge Group   Charges  Yes   PT Therapeutic Activities 1   Precautions   Precautions Fall Risk;Toe Touch Weight Bearing Right Lower Extremity;Swallow Precautions ( See Comments)   Comments 10/13: R femur ORIF done [place], has been in hospital or at SNF since then   Pain 0 - 10 Group   Therapist Pain Assessment During Activity;Nurse Notified  (appears fearful, pain not rated)   Cognition    Cognition / Consciousness X   Level of Consciousness Alert   Ability To Follow Commands 1 Step   Safety Awareness Impaired   New Learning Impaired   Attention Impaired   Sequencing Impaired   Comments pt reports feeling tired after dialysis, but does show improved ability to follow TTWB   Active ROM Lower Body    Active ROM Lower Body  X   Comments R LE limited by post op status   Strength Lower Body   Lower Body Strength  X   Comments L LE remains weak, struggles with sit to stand given TTWB R   Balance   Sitting Balance (Static) Fair   Sitting Balance (Dynamic) Fair -   Standing " "Balance (Static) Trace +   Standing Balance (Dynamic) Trace   Weight Shift Sitting Fair   Weight Shift Standing Absent   Skilled Intervention Verbal Cuing;Sequencing   Comments standing withFWW   Gait Analysis   Gait Level Of Assist Unable to Participate   Bed Mobility    Supine to Sit Moderate Assist   Sit to Supine Minimal Assist   Scooting Moderate Assist   Rolling Minimum Assist to Lt.;Minimal Assist to Rt.   Skilled Intervention Sequencing;Verbal Cuing   Functional Mobility   Sit to Stand Moderate Assist   Comments pt reporting that he \"does not want to do this\", agreeable to attempt with education.    How much difficulty does the patient currently have...   Turning over in bed (including adjusting bedclothes, sheets and blankets)? 2   Sitting down on and standing up from a chair with arms (e.g., wheelchair, bedside commode, etc.) 2   Moving from lying on back to sitting on the side of the bed? 2   How much help from another person does the patient currently need...   Moving to and from a bed to a chair (including a wheelchair)? 1   Need to walk in a hospital room? 1   Climbing 3-5 steps with a railing? 1   6 clicks Mobility Score 9   Activity Tolerance   Standing 1   Short Term Goals    Short Term Goal # 1 Pt will perform bed mobility with mod indep in 6 vistis.    Goal Outcome # 1 goal not met   Short Term Goal # 2 pt will transfer bed to  via seated slide board with min A in 6 visits to improve functional indep.    Goal Outcome # 2 Goal not met   Education Group   Role of Physical Therapist Patient Response Patient;Acceptance;Explanation;Reinforcement Needed   Anticipated Discharge Equipment and Recommendations   DC Equipment Recommendations Unable to determine at this time   Discharge Recommendations Recommend post-acute placement for additional physical therapy services prior to discharge home   Interdisciplinary Plan of Care Collaboration   IDT Collaboration with  Nursing   Patient Position at End of " Therapy In Bed;Call Light within Reach;Tray Table within Reach;Phone within Reach;Family / Friend in Room   Collaboration Comments Wife in room, questions answered. nsg updated.    Session Information   Date / Session Number  119-2 (2/3, 11/10)

## 2023-10-05 ENCOUNTER — TELEPHONE (OUTPATIENT)
Dept: FAMILY MEDICINE CLINIC | Age: 58
End: 2023-10-05

## 2023-10-05 NOTE — TELEPHONE ENCOUNTER
Caller: [facility] - [place], [place] - [address] - [phone] [facility] [phone] FX    Relationship: Pharmacy    Best call back number: [phone]     What medication are you requesting: WEGOVY - .5 MG WITH .5 MG PENS    If a prescription is needed, what is your preferred pharmacy and phone number: [facility] - [place], [place] - [address] [place] - [phone] [facility] [phone] FX     Additional notes:    [facility] STATES WE SENT IN THE PRESCRIPTION AS .5 MG DOSAGE WITH .25 MG PENS, BUT THAT WOULD REQUIRE TWO SEPARATE PENS AND INJECTIONS PER DOSAGE.

## 2023-10-11 ENCOUNTER — OFFICE VISIT (OUTPATIENT)
Dept: FAMILY MEDICINE CLINIC | Age: 58
End: 2023-10-11
Payer: COMMERCIAL

## 2023-10-11 VITALS
DIASTOLIC BLOOD PRESSURE: 84 MMHG | HEART RATE: 78 BPM | SYSTOLIC BLOOD PRESSURE: 132 MMHG | OXYGEN SATURATION: 99 % | HEIGHT: 65 IN | BODY MASS INDEX: 31.99 KG/M2 | WEIGHT: 192 LBS | TEMPERATURE: 98.7 F

## 2023-10-11 DIAGNOSIS — F51.01 PRIMARY INSOMNIA: ICD-10-CM

## 2023-10-11 DIAGNOSIS — F34.9 PERSISTENT MOOD (AFFECTIVE) DISORDER, UNSPECIFIED: ICD-10-CM

## 2023-10-11 DIAGNOSIS — M25.552 LEFT HIP PAIN: ICD-10-CM

## 2023-10-11 DIAGNOSIS — J30.9 ALLERGIC RHINITIS, UNSPECIFIED SEASONALITY, UNSPECIFIED TRIGGER: Primary | ICD-10-CM

## 2023-10-11 DIAGNOSIS — Z23 IMMUNIZATION DUE: ICD-10-CM

## 2023-10-11 DIAGNOSIS — K21.9 GASTROESOPHAGEAL REFLUX DISEASE, UNSPECIFIED WHETHER ESOPHAGITIS PRESENT: ICD-10-CM

## 2023-10-11 DIAGNOSIS — E66.9 CLASS 1 OBESITY WITH SERIOUS COMORBIDITY AND BODY MASS INDEX (BMI) OF 31.0 TO 31.9 IN ADULT, UNSPECIFIED OBESITY TYPE: ICD-10-CM

## 2023-10-11 DIAGNOSIS — G43.109 MIGRAINE WITH AURA, NOT INTRACTABLE, WITHOUT STATUS MIGRAINOSUS: ICD-10-CM

## 2023-10-11 RX ORDER — SUMATRIPTAN 50 MG/1
50 TABLET, FILM COATED ORAL ONCE AS NEEDED
Qty: 9 TABLET | Refills: 0 | Status: SHIPPED | OUTPATIENT
Start: 2023-10-11

## 2023-10-11 RX ORDER — TRAZODONE HYDROCHLORIDE 50 MG/1
100 TABLET ORAL NIGHTLY
Qty: 180 TABLET | Refills: 1 | Status: SHIPPED | OUTPATIENT
Start: 2023-10-11

## 2023-10-11 RX ORDER — PANTOPRAZOLE SODIUM 40 MG/1
40 TABLET, DELAYED RELEASE ORAL DAILY
Qty: 90 TABLET | Refills: 1 | Status: SHIPPED | OUTPATIENT
Start: 2023-10-11

## 2023-10-11 RX ORDER — LIRAGLUTIDE 6 MG/ML
INJECTION, SOLUTION SUBCUTANEOUS DAILY
COMMUNITY

## 2023-10-11 RX ORDER — ESCITALOPRAM OXALATE 20 MG/1
30 TABLET ORAL DAILY
Qty: 135 TABLET | Refills: 1 | Status: SHIPPED | OUTPATIENT
Start: 2023-10-11

## 2023-10-11 RX ORDER — MONTELUKAST SODIUM 10 MG/1
10 TABLET ORAL DAILY
Qty: 90 TABLET | Refills: 1 | Status: SHIPPED | OUTPATIENT
Start: 2023-10-11

## 2023-10-11 NOTE — PROGRESS NOTES
Chief Complaint  Ema Cisse presents to [facility] for Obesity (Follow up)    Subjective          History of Present Illness    Amy is here today to follow up on obesity and weight loss. She is currently on Saxenda at 2.4 mg dose. She does not find that this is very effective to help curb her appetite. She was previously prescribed Wegovy but was previously unable to find from the pharmacy. She recently had rx for Wegovy sent to [facility] but has not received yet.   Amy is also following up on anxiety today. Current medication includes Lexapro 30 mg daily. She is also taking trazodone 50 mg nightly to help with sleep. She has been on several medication in the past for anxiety. Buspirone was ineffective. Trintellix caused rash. Stable on current treatment.   She is on sumatriptan as needed for migraines as well.   She c/o left hip pain. Radiating to groin. Hip xray 3/22/22 with mild bilateral SI joint DJD. 2 weeks ago noticed some veins in that area. She has been to PT in the past. Was seeing pain management for low back pain and sciatica but no longer seeing as did not find effective. She had injections and ablations. Has been seeing chiropractor more recently. Adjustments seem to help for a short time. Worse at night. Stiff in the morning.       Review of Systems      Allergies   Allergen Reactions    Sulfa Antibiotics Rash    Trintellix [Vortioxetine] Itching      Past Medical History:   Diagnosis Date    Allergic     Arthritis 2015    Cholelithiasis 1985    GERD (gastroesophageal reflux disease)     Irritable bowel syndrome     Low back pain 2009     Current Outpatient Medications   Medication Sig Dispense Refill    albuterol sulfate  (90 Base) MCG/ACT inhaler Inhale 2 puffs Every 4 (Four) Hours As Needed.      azelastine (ASTELIN) 0.1 % nasal spray 2 sprays into the nostril(s) as directed by provider 2 (Two) Times a Day. Use in each nostril as directed 60  mL 1    Cetirizine HCl 10 MG capsule Take 10 mg by mouth As Needed.      Chlorcyclizine-Pseudoephed (Stahist AD) 25-60 MG tablet Take 1 tablet by mouth Every 8 (Eight) Hours As Needed (congestion). 42 tablet 0    escitalopram (LEXAPRO) 20 MG tablet Take 1.5 tablets by mouth Daily. 135 tablet 1    fluticasone (Flonase) 50 MCG/ACT nasal spray 2 sprays into the nostril(s) as directed by provider Daily. 18.2 mL 0    Fluticasone-Salmeterol (ADVAIR/WIXELA) 100-50 MCG/ACT DISKUS Inhale 1 puff 2 (Two) Times a Day. 60 each 1    hydrocortisone 1 % cream Apply 1 application topically to the appropriate area as directed 2 (Two) Times a Day As Needed for Irritation. 30 g 1    Insulin Pen Needle (Novofine Pen Needle) 32G X 6 MM misc 1 each Daily. 100 each 0    levocetirizine (XYZAL) 5 MG tablet Take 1 tablet by mouth Every Evening. 90 tablet 1    linaclotide (Linzess) 72 MCG capsule capsule Take 1 capsule by mouth Every Morning Before Breakfast for 90 days. 90 capsule 1    Liraglutide (Saxenda) 18 MG/3ML injection pen Inject  under the skin into the appropriate area as directed Daily.      montelukast (SINGULAIR) 10 MG tablet Take 1 tablet by mouth Daily. 90 tablet 1    multivitamin with minerals tablet tablet Take 1 tablet by mouth Daily.      naproxen (NAPROSYN) 500 MG tablet Take 1 tablet by mouth 2 (Two) Times a Day As Needed for Mild Pain. 180 tablet 1    pantoprazole (PROTONIX) 40 MG EC tablet Take 1 tablet by mouth Daily. 90 tablet 1    promethazine-dextromethorphan (PROMETHAZINE-DM) 6.25-15 MG/5ML syrup Take 5 mL by mouth 4 (Four) Times a Day As Needed for Cough. 240 mL 0    Semaglutide-Weight Management 0.5 MG/0.5ML solution auto-injector Inject 0.5 mL under the skin into the appropriate area as directed Every 7 (Seven) Days. 6 mL 0    SUMAtriptan (IMITREX) 50 MG tablet Take 1 tablet by mouth 1 (One) Time As Needed for Migraine. Take one tablet at onset of headache. May repeat dose one time in 2 hours if headache not  relieved. 9 tablet 0    traZODone (DESYREL) 50 MG tablet Take 2 tablets by mouth Every Night. 180 tablet 1     No current facility-administered medications for this visit.     Past Surgical History:   Procedure Laterality Date    BUNIONECTOMY  2008    CHOLECYSTECTOMY  1984    COLONOSCOPY  2017    COLONOSCOPY  2017    COLONOSCOPY N/A 7/27/2022    Procedure: COLONOSCOPY WITH BIOPSIES;  Surgeon: Halima Fitzpatrick MD;  Location: [facility] ENDOSCOPY;  Service: Gastroenterology;  Laterality: N/A;  DIVERTICULOSIS, HEMORRHOIDS    CYSTECTOMY      EPIDURAL Right 11/3/2021    Procedure: Right L5 LUMBAR/SACRAL TRANSFORAMINAL EPIDURAL;  Surgeon: Jeronimo Lind MD;  [place] [facility];  Service: Pain Management;  Laterality: Right;    EPIDURAL Bilateral 3/17/2022    Procedure: Right L5 LUMBAR/SACRAL TRANSFORAMINAL EPIDURAL;  Surgeon: Jeronimo Lind MD;  [facility];  Service: Pain Management;  Laterality: Bilateral;    KNEE SURGERY  07/2005    ACL Removal    MEDIAL BRANCH BLOCK Left 12/14/2021    Procedure: Left C3-C5 MEDIAL BRANCH BLOCK;  Surgeon: Jeronimo Lind MD;  Location: [facility];  Service: Pain Management;  Laterality: Left;    MEDIAL BRANCH BLOCK Left 2/8/2022    Procedure: Left C3-C5 MEDIAL BRANCH BLOCK;  Surgeon: Jeronimo Lind MD;  Location[facility];  Service: Pain Management;  Laterality: Left;    RADIOFREQUENCY ABLATION Left 3/3/2022    Procedure: RADIOFREQUENCY ABLATION CERVICAL C3-C5;  Surgeon: Jeronimo Lind MD;  [place] [facility] [place];  Service: Pain Management;  Laterality: Left;    SINUS SURGERY  2010    UPPER GASTROINTESTINAL ENDOSCOPY  2002      Social History     Tobacco Use    Smoking status: Never     Passive exposure: Never    Smokeless tobacco: Never   Vaping Use    Vaping Use: Never used   Substance Use Topics    Alcohol use: Yes     Comment: socially    Drug use: No     Family History   Problem Relation Age of Onset    Hypertension Mother     Heart  "disease Father     Lung cancer Father     No Known Problems Sister     No Known Problems Daughter     No Known Problems Maternal Aunt     No Known Problems Paternal Aunt     No Known Problems Maternal Grandmother     No Known Problems Maternal Grandfather     No Known Problems Paternal Grandmother     No Known Problems Paternal Grandfather     Hypertension Other     Heart disease Other     BRCA 1/2 Neg Hx     Breast cancer Neg Hx     Colon cancer Neg Hx     Endometrial cancer Neg Hx     Ovarian cancer Neg Hx     Malig Hyperthermia Neg Hx      There are no preventive care reminders to display for this patient.     Immunization History   Administered Date(s) Administered    COVID-19 (MODERNA) 1st,2nd,3rd Dose Monovalent 02/05/2021, 03/05/2021, 11/19/2021    Flu Vaccine Split Quad 09/25/2019, 09/23/2021, 09/15/2022    Flublok 18+yrs 09/15/2022    Flucelvax Quad Vial =>4yrs 09/28/2020    Fluzone (or Fluarix & Flulaval for VFC) >6mos 10/11/2023    Hepatitis A 05/02/2018, 11/20/2018    Influenza, Unspecified 10/01/2022    Shingrix 05/17/2022, 01/11/2023    Tdap 05/29/2019        Objective     Vitals:    10/11/23 1151   BP: 132/84   BP Location: Left arm   Patient Position: Sitting   Cuff Size: Large Adult   Pulse: 78   Temp: 98.7 øF (37.1 øC)   TempSrc: Oral   SpO2: 99%   Weight: 87.1 kg (192 lb)   Height: 165.1 cm (65\")     Body mass index is 31.95 kg/mý.     BMI is >= 30 and <35. (Class 1 Obesity). The following options were offered after discussion;: exercise counseling/recommendations and nutrition counseling/recommendations       Physical Exam  Vitals reviewed.   Constitutional:       General: She is not in acute distress.     Appearance: Normal appearance. She is well-developed.   HENT:      Head: Normocephalic and atraumatic.   Cardiovascular:      Rate and Rhythm: Normal rate and regular rhythm.   Pulmonary:      Effort: Pulmonary effort is normal.      Breath sounds: Normal breath sounds.   Skin:     Comments: " Area of what appears to be broken capillaries to left hip   Neurological:      Mental Status: She is alert and oriented to person, place, and time.   Psychiatric:         Mood and Affect: Mood and affect normal.           Result Review :                               Assessment and Plan      Diagnoses and all orders for this visit:    1. Allergic rhinitis, unspecified seasonality, unspecified trigger (Primary)  Comments:    Medical condition is stable.  Continue same therapy.  Will recheck at next regular appointment  Orders:  -     montelukast (SINGULAIR) 10 MG tablet; Take 1 tablet by mouth Daily.  Dispense: 90 tablet; Refill: 1    2. Immunization due  -     Fluzone >6 Months (7172-3401)    3. Persistent mood (affective) disorder, unspecified  Comments:  Medical condition is stable.  Continue same therapy.  Will recheck at next regular appointment  Orders:  -     escitalopram (LEXAPRO) 20 MG tablet; Take 1.5 tablets by mouth Daily.  Dispense: 135 tablet; Refill: 1    4. Gastroesophageal reflux disease, unspecified whether esophagitis present  Comments:  Continue PPI. Short term may add famotidine.  Orders:  -     pantoprazole (PROTONIX) 40 MG EC tablet; Take 1 tablet by mouth Daily.  Dispense: 90 tablet; Refill: 1    5. Primary insomnia  Comments:  Medical condition is stable.  Continue same therapy.  Will recheck at next regular appointment  Orders:  -     traZODone (DESYREL) 50 MG tablet; Take 2 tablets by mouth Every Night.  Dispense: 180 tablet; Refill: 1    6. Migraine with aura, not intractable, without status migrainosus  Comments:  Medical condition is stable.  Continue same therapy.  Will recheck at next regular appointment.  Orders:  -     SUMAtriptan (IMITREX) 50 MG tablet; Take 1 tablet by mouth 1 (One) Time As Needed for Migraine. Take one tablet at onset of headache. May repeat dose one time in 2 hours if headache not relieved.  Dispense: 9 tablet; Refill: 0    7. Left hip pain  Comments:  Will get  her set up for MRI for further evaluation. Consider ortho referral.  Orders:  -     MRI Hip Left Without Contrast; Future    8. Class 1 obesity with serious comorbidity and body mass index (BMI) of 31.0 to 31.9 in adult, unspecified obesity type  Comments:  Healthy diet and exercise. Will switch to Wegovy once receives from pharmacy                Follow Up     Return in about 3 months (around 1/11/2024) for Annual physical.

## 2023-10-16 ENCOUNTER — PATIENT MESSAGE (OUTPATIENT)
Dept: FAMILY MEDICINE CLINIC | Age: 58
End: 2023-10-16
Payer: COMMERCIAL

## 2023-10-17 ENCOUNTER — TELEPHONE (OUTPATIENT)
Dept: FAMILY MEDICINE CLINIC | Age: 58
End: 2023-10-17
Payer: COMMERCIAL

## 2023-10-17 NOTE — TELEPHONE ENCOUNTER
"Caller: Ema Cisse \"LEXI\"    Relationship: Self    Best call back number:[phone]    What medication are you requesting: WEGOVY    If a prescription is needed, what is your preferred pharmacy and phone number: [facility] - [place], [place] - [facility] [facility] [address] - [phone] [facility] [phone] FX     Additional notes:  SINCE THIS MEDICATION KEEPS BEING DENIED BY [employer] PATIENT BELIEVES A NEW PRIOR AUTHORIZATION NEEDS TO BE SUBMITTED  PLEASE CONTACT IF THERE ARE ANY QUESTIONS            "

## 2023-10-27 ENCOUNTER — TELEPHONE (OUTPATIENT)
Dept: GASTROENTEROLOGY | Facility: CLINIC | Age: 58
End: 2023-10-27
Payer: COMMERCIAL

## 2023-10-27 NOTE — TELEPHONE ENCOUNTER
Patient called and said that she had to take Linzess per her insurance company and she has been taking it since the middle of September, she had to stop it about a week ago because it was causing severe cramping and diarrhea, she wants to know what else you recommend for her to try as now that she has stopped she is back to being constipated.

## 2023-12-04 ENCOUNTER — HOSPITAL ENCOUNTER (OUTPATIENT)
Dept: MRI IMAGING | Facility: HOSPITAL | Age: 58
Discharge: HOME | End: 2023-12-04
Admitting: NURSE PRACTITIONER
Payer: COMMERCIAL

## 2023-12-04 DIAGNOSIS — M25.552 LEFT HIP PAIN: ICD-10-CM

## 2023-12-04 PROCEDURE — 73721 MRI JNT OF LWR EXTRE W/O DYE: CPT

## 2023-12-05 ENCOUNTER — TELEPHONE (OUTPATIENT)
Dept: FAMILY MEDICINE CLINIC | Age: 58
End: 2023-12-05

## 2023-12-05 NOTE — TELEPHONE ENCOUNTER
"  Caller: Ema Cisse \"LEXI\"    Relationship: Self    Best call back number: [phone]     What is the best time to reach you: ANYTIME     Who are you requesting to speak with (clinical staff, provider,  specific staff member): CLINICAL       What was the call regarding: PATIENT IS CALLING REQUESTING TO SPEAK WITH THE NURSE FOLLOWING, HER MRI RESULTS WHICH SHE WAS ABLE TO VIEW VIA [facility], AND WANTED TO KNOW THE NEXT STEPS TO BE TAKEN.   "

## 2023-12-11 DIAGNOSIS — S76.319A: Primary | ICD-10-CM

## 2024-01-30 ENCOUNTER — TELEPHONE (OUTPATIENT)
Dept: GASTROENTEROLOGY | Facility: CLINIC | Age: 59
End: 2024-01-30
Payer: COMMERCIAL

## 2024-01-30 NOTE — TELEPHONE ENCOUNTER
Left voice message for patient to return call if she would like a sooner appointment. Rosalie has an opening on 1/31@3 or 2/6/24. If patient returns call and the appointments isn't available, she can stay on the cancellation list.

## 2024-01-31 ENCOUNTER — TELEPHONE (OUTPATIENT)
Dept: FAMILY MEDICINE CLINIC | Age: 59
End: 2024-01-31

## 2024-01-31 NOTE — TELEPHONE ENCOUNTER
Pt states she is needing refill on Wegovy, but would like dose increased. Please send to [facility].

## 2024-02-07 ENCOUNTER — OFFICE VISIT (OUTPATIENT)
Dept: FAMILY MEDICINE CLINIC | Age: 59
End: 2024-02-07
Payer: COMMERCIAL

## 2024-02-07 VITALS
DIASTOLIC BLOOD PRESSURE: 89 MMHG | WEIGHT: 187.2 LBS | TEMPERATURE: 98.1 F | SYSTOLIC BLOOD PRESSURE: 130 MMHG | HEART RATE: 86 BPM | HEIGHT: 65 IN | BODY MASS INDEX: 31.19 KG/M2

## 2024-02-07 DIAGNOSIS — J30.9 ALLERGIC RHINITIS, UNSPECIFIED SEASONALITY, UNSPECIFIED TRIGGER: ICD-10-CM

## 2024-02-07 DIAGNOSIS — F34.9 PERSISTENT MOOD (AFFECTIVE) DISORDER, UNSPECIFIED: ICD-10-CM

## 2024-02-07 DIAGNOSIS — F51.01 PRIMARY INSOMNIA: ICD-10-CM

## 2024-02-07 DIAGNOSIS — E66.9 CLASS 1 OBESITY WITH BODY MASS INDEX (BMI) OF 31.0 TO 31.9 IN ADULT, UNSPECIFIED OBESITY TYPE, UNSPECIFIED WHETHER SERIOUS COMORBIDITY PRESENT: ICD-10-CM

## 2024-02-07 DIAGNOSIS — G43.109 MIGRAINE WITH AURA, NOT INTRACTABLE, WITHOUT STATUS MIGRAINOSUS: ICD-10-CM

## 2024-02-07 DIAGNOSIS — Z13.6 SCREENING FOR CARDIOVASCULAR CONDITION: ICD-10-CM

## 2024-02-07 DIAGNOSIS — H69.91 EUSTACHIAN TUBE DYSFUNCTION, RIGHT: Primary | ICD-10-CM

## 2024-02-07 DIAGNOSIS — K21.9 GASTROESOPHAGEAL REFLUX DISEASE, UNSPECIFIED WHETHER ESOPHAGITIS PRESENT: ICD-10-CM

## 2024-02-07 PROCEDURE — 99214 OFFICE O/P EST MOD 30 MIN: CPT | Performed by: NURSE PRACTITIONER

## 2024-02-07 RX ORDER — ESCITALOPRAM OXALATE 20 MG/1
30 TABLET ORAL DAILY
Qty: 135 TABLET | Refills: 0 | Status: SHIPPED | OUTPATIENT
Start: 2024-02-07

## 2024-02-07 RX ORDER — MONTELUKAST SODIUM 10 MG/1
10 TABLET ORAL DAILY
Qty: 90 TABLET | Refills: 0 | Status: SHIPPED | OUTPATIENT
Start: 2024-02-07

## 2024-02-07 RX ORDER — SACROSIDASE 8500 [IU]/ML
SOLUTION ORAL
COMMUNITY

## 2024-02-07 RX ORDER — DICLOFENAC SODIUM 75 MG/1
75 TABLET, DELAYED RELEASE ORAL 2 TIMES DAILY
COMMUNITY

## 2024-02-07 RX ORDER — PANTOPRAZOLE SODIUM 40 MG/1
40 TABLET, DELAYED RELEASE ORAL DAILY
Qty: 90 TABLET | Refills: 0 | Status: SHIPPED | OUTPATIENT
Start: 2024-02-07

## 2024-02-07 RX ORDER — TRAZODONE HYDROCHLORIDE 50 MG/1
100 TABLET ORAL NIGHTLY
Qty: 180 TABLET | Refills: 0 | Status: SHIPPED | OUTPATIENT
Start: 2024-02-07

## 2024-02-07 NOTE — PROGRESS NOTES
Chief Complaint  Ema Cisse presents to [facility] for Weight Check and Earache    Subjective          History of Present Illness    Amy is here today with c/o right sided ear ache. First noticed one week ago. Feeling better today. She has been taking tylenol.   Also following up on weight loss and obesity. She is currently on wegovy. She had breast reduction 11/2023. She had to stop her wegovy in order to have the surgery. She is currently on the 0.5 mg dose and will start the 1 mg dose with next injection. She restarted wegovy back in January. She has been doing stretching exercises at home. She has cut back on sweets. Working on cutting back on sodas, trying to quit drinking sodas.   Amy is also following up on anxiety today. Current medication includes Lexapro 30 mg daily. She is also taking trazodone 50 mg nightly to help with sleep. She has been on several medication in the past for anxiety. Buspirone was ineffective. Trintellix caused rash. Stable on current treatment.   She is on sumatriptan as needed for migraines as well.     Review of Systems      Allergies   Allergen Reactions    Sulfa Antibiotics Rash    Trintellix [Vortioxetine] Itching      Past Medical History:   Diagnosis Date    Allergic     Arthritis 2015    Cholelithiasis 1985    GERD (gastroesophageal reflux disease)     Irritable bowel syndrome     Low back pain 2009     Current Outpatient Medications   Medication Sig Dispense Refill    albuterol sulfate  (90 Base) MCG/ACT inhaler Inhale 2 puffs Every 4 (Four) Hours As Needed.      azelastine (ASTELIN) 0.1 % nasal spray 2 sprays into the nostril(s) as directed by provider 2 (Two) Times a Day. Use in each nostril as directed 60 mL 1    Cetirizine HCl 10 MG capsule Take 10 mg by mouth As Needed.      Chlorcyclizine-Pseudoephed (Stahist AD) 25-60 MG tablet Take 1 tablet by mouth Every 8 (Eight) Hours As Needed (congestion). 42 tablet 0     diclofenac (VOLTAREN) 75 MG EC tablet Take 1 tablet by mouth 2 (Two) Times a Day.      escitalopram (LEXAPRO) 20 MG tablet Take 1.5 tablets by mouth Daily. 135 tablet 0    fluticasone (Flonase) 50 MCG/ACT nasal spray 2 sprays into the nostril(s) as directed by provider Daily. 18.2 mL 0    Fluticasone-Salmeterol (ADVAIR/WIXELA) 100-50 MCG/ACT DISKUS Inhale 1 puff 2 (Two) Times a Day. 60 each 1    hydrocortisone 1 % cream Apply 1 application topically to the appropriate area as directed 2 (Two) Times a Day As Needed for Irritation. 30 g 1    Insulin Pen Needle (Novofine Pen Needle) 32G X 6 MM misc 1 each Daily. 100 each 0    montelukast (SINGULAIR) 10 MG tablet Take 1 tablet by mouth Daily. 90 tablet 0    multivitamin with minerals tablet tablet Take 1 tablet by mouth Daily.      naproxen (NAPROSYN) 500 MG tablet Take 1 tablet by mouth 2 (Two) Times a Day As Needed for Mild Pain. 180 tablet 1    pantoprazole (PROTONIX) 40 MG EC tablet Take 1 tablet by mouth Daily. 90 tablet 0    Semaglutide-Weight Management 1 MG/0.5ML solution auto-injector Inject 0.5 mL under the skin into the appropriate area as directed Every 7 (Seven) Days. 6 mL 0    Sucraid 8500 UNIT/ML solution 3 (Three) Times a Day With Meals.      SUMAtriptan (IMITREX) 50 MG tablet Take 1 tablet by mouth 1 (One) Time As Needed for Migraine. Take one tablet at onset of headache. May repeat dose one time in 2 hours if headache not relieved. 9 tablet 0    traZODone (DESYREL) 50 MG tablet Take 2 tablets by mouth Every Night. 180 tablet 0     No current facility-administered medications for this visit.     Past Surgical History:   Procedure Laterality Date    BILATERAL BREAST REDUCTION  11/13/2023    BUNIONECTOMY  2008    CHOLECYSTECTOMY  1984    COLONOSCOPY  2017    COLONOSCOPY  2017    COLONOSCOPY N/A 07/27/2022    Procedure: COLONOSCOPY WITH BIOPSIES;  Surgeon: Halima Fitzpatrick MD;  Location: [facility];  Service: Gastroenterology;  Laterality:  N/A;  DIVERTICULOSIS, HEMORRHOIDS    CYSTECTOMY      EPIDURAL Right 11/03/2021    Procedure: Right L5 LUMBAR/SACRAL TRANSFORAMINAL EPIDURAL;  Surgeon: Jeronimo Lind MD;  [facility];  Service: Pain Management;  Laterality: Right;    EPIDURAL Bilateral 03/17/2022    Procedure: Right L5 LUMBAR/SACRAL TRANSFORAMINAL EPIDURAL;  Surgeon: Jeronimo Lind MD;  Location[facility];  Service: Pain Management;  Laterality: Bilateral;    KNEE SURGERY  07/2005    ACL Removal    MEDIAL BRANCH BLOCK Left 12/14/2021    Procedure: Left C3-C5 MEDIAL BRANCH BLOCK;  Surgeon: Jeronimo Lind MD;  [facility];  Service: Pain Management;  Laterality: Left;    MEDIAL BRANCH BLOCK Left 02/08/2022    Procedure: Left C3-C5 MEDIAL BRANCH BLOCK;  Surgeon: Jeronimo Lind MD;  [facility];  Service: Pain Management;  Laterality: Left;    RADIOFREQUENCY ABLATION Left 03/03/2022    Procedure: RADIOFREQUENCY ABLATION CERVICAL C3-C5;  Surgeon: Jeronimo Lind MD;  Location[facility];  Service: Pain Management;  Laterality: Left;    SINUS SURGERY  2010    UPPER GASTROINTESTINAL ENDOSCOPY  2002      Social History     Tobacco Use    Smoking status: Never     Passive exposure: Never    Smokeless tobacco: Never   Vaping Use    Vaping Use: Never used   Substance Use Topics    Alcohol use: Yes     Comment: socially    Drug use: No     Family History   Problem Relation Age of Onset    Hypertension Mother     Heart disease Father     Lung cancer Father     No Known Problems Sister     No Known Problems Daughter     No Known Problems Maternal Aunt     No Known Problems Paternal Aunt     No Known Problems Maternal Grandmother     No Known Problems Maternal Grandfather     No Known Problems Paternal Grandmother     No Known Problems Paternal Grandfather     Hypertension Other     Heart disease Other     BRCA 1/2 Neg Hx     Breast cancer Neg Hx     Colon cancer Neg Hx     Endometrial cancer Neg Hx   "   Ovarian cancer Neg Hx     Malig Hyperthermia Neg Hx      Health Maintenance Due   Topic Date Due    ANNUAL PHYSICAL  01/11/2024      Immunization History   Administered Date(s) Administered    COVID-19 (MODERNA) 1st,2nd,3rd Dose Monovalent 02/05/2021, 03/05/2021, 11/19/2021    Flu Vaccine Split Quad 09/25/2019, 09/23/2021, 09/15/2022    Flublok 18+yrs 09/15/2022    Flucelvax Quad Vial =>4yrs 09/28/2020    Fluzone (or Fluarix & Flulaval for VFC) >6mos 10/11/2023    Hepatitis A 05/02/2018, 11/20/2018    Influenza, Unspecified 10/01/2022    Shingrix 05/17/2022, 01/11/2023    Tdap 05/29/2019        Objective     Vitals:    02/07/24 1536   BP: 130/89   BP Location: Left arm   Patient Position: Sitting   Pulse: 86   Temp: 98.1 °F (36.7 °C)   TempSrc: Oral   Weight: 84.9 kg (187 lb 3.2 oz)   Height: 165.1 cm (65\")     Body mass index is 31.15 kg/m².             Physical Exam  Vitals reviewed.   Constitutional:       General: She is not in acute distress.     Appearance: Normal appearance. She is well-developed.   HENT:      Head: Normocephalic and atraumatic.      Right Ear: Ear canal normal. A middle ear effusion is present.      Left Ear: Tympanic membrane and ear canal normal.      Mouth/Throat:      Mouth: Mucous membranes are moist.      Comments: Uvula midline  Eyes:      Pupils: Pupils are equal, round, and reactive to light.   Cardiovascular:      Rate and Rhythm: Normal rate and regular rhythm.   Pulmonary:      Effort: Pulmonary effort is normal.      Breath sounds: Normal breath sounds.   Neurological:      Mental Status: She is alert and oriented to person, place, and time.   Psychiatric:         Mood and Affect: Mood and affect normal.           Result Review :                               Assessment and Plan      Diagnoses and all orders for this visit:    1. Eustachian tube dysfunction, right (Primary)  Comments:  May use nasal saline.    2. Primary insomnia  Comments:  Medical condition is stable.  " Continue same therapy.  Will recheck at next regular appointment  Orders:  -     traZODone (DESYREL) 50 MG tablet; Take 2 tablets by mouth Every Night.  Dispense: 180 tablet; Refill: 0    3. Migraine with aura, not intractable, without status migrainosus  Comments:  Medical condition is stable.  Continue same therapy.  Will recheck at next regular appointment.    4. Gastroesophageal reflux disease, unspecified whether esophagitis present  Comments:  Medical condition is stable.  Continue same therapy.  Will recheck at next regular appointment  Orders:  -     pantoprazole (PROTONIX) 40 MG EC tablet; Take 1 tablet by mouth Daily.  Dispense: 90 tablet; Refill: 0    5. Allergic rhinitis, unspecified seasonality, unspecified trigger  Comments:    Medical condition is stable.  Continue same therapy.  Will recheck at next regular appointment  Orders:  -     montelukast (SINGULAIR) 10 MG tablet; Take 1 tablet by mouth Daily.  Dispense: 90 tablet; Refill: 0    6. Persistent mood (affective) disorder, unspecified  Comments:  Medical condition is stable.  Continue same therapy.  Will recheck at next regular appointment  Orders:  -     escitalopram (LEXAPRO) 20 MG tablet; Take 1.5 tablets by mouth Daily.  Dispense: 135 tablet; Refill: 0    7. Screening for cardiovascular condition  Comments:  Will return for fasting labs  Orders:  -     CBC No Differential; Future  -     Comprehensive metabolic panel; Future  -     Lipid panel; Future    8. Class 1 obesity with body mass index (BMI) of 31.0 to 31.9 in adult, unspecified obesity type, unspecified whether serious comorbidity present  Comments:  Continue efforts with healthy diet and exercise.              Follow Up     Return in about 3 months (around 5/7/2024) for Annual physical.

## 2024-02-19 ENCOUNTER — OFFICE VISIT (OUTPATIENT)
Dept: OBSTETRICS AND GYNECOLOGY | Age: 59
End: 2024-02-19
Payer: COMMERCIAL

## 2024-02-19 VITALS
WEIGHT: 184 LBS | DIASTOLIC BLOOD PRESSURE: 76 MMHG | BODY MASS INDEX: 30.66 KG/M2 | SYSTOLIC BLOOD PRESSURE: 114 MMHG | HEIGHT: 65 IN

## 2024-02-19 DIAGNOSIS — Z01.419 ENCOUNTER FOR GYNECOLOGICAL EXAMINATION WITHOUT ABNORMAL FINDING: Primary | ICD-10-CM

## 2024-02-19 DIAGNOSIS — Z12.31 SCREENING MAMMOGRAM FOR BREAST CANCER: Primary | ICD-10-CM

## 2024-02-19 PROCEDURE — 99396 PREV VISIT EST AGE 40-64: CPT | Performed by: OBSTETRICS & GYNECOLOGY

## 2024-02-19 NOTE — PROGRESS NOTES
Routine Annual Visit    2024    Patient: Ema Cisse          MR#:[id number]      Chief Complaint   Patient presents with    Gynecologic Exam     Annual Exam - last pap 2/10/23 neg, Pt was scheduled for mammo today but cancelled due to recent breast reduction surgery, colonoscopy 22, pt has no complaints today       History of Present Illness    58 y.o. female  who presents for annual exam.     Patient feels well today  Pap is up-to-date  Patient will reschedule her mammogram for later in the summer due to her recent breast reduction  She is having some issues with healing specially on the left breast  Colonoscopy is up-to-date  No other complaints      No LMP recorded (lmp unknown). Patient is postmenopausal.  Obstetric History:  OB History          1    Para   1    Term   1            AB        Living   1         SAB        IAB        Ectopic        Molar        Multiple        Live Births   1               Menstrual History:     No LMP recorded (lmp unknown). Patient is postmenopausal.       Sexual History:       ________________________________________  Patient Active Problem List   Diagnosis    Somatic dysfunction of sacroiliac joint    Lumbar disc disorder    Chronic pain    Lumbar facet arthropathy    Metatarsalgia of left foot    Other osteonecrosis, left foot    Vitamin D deficiency    Gastroesophageal reflux disease    Irritable bowel syndrome with both constipation and diarrhea    Impingement syndrome of left shoulder    DDD (degenerative disc disease), cervical    Migraine with aura, not intractable, without status migrainosus    Persistent mood (affective) disorder, unspecified    Primary insomnia    Cervicalgia    Primary generalized (osteo)arthritis    DDD (degenerative disc disease), lumbar    Lumbar radiculopathy    Lumbar foraminal stenosis    Arthropathy of cervical facet joint    History of Clostridium difficile infection    LLQ pain    Allergic rhinitis     Dysuria       Past Medical History:   Diagnosis Date    Allergic     Arthritis 2015    Cholelithiasis 1985    GERD (gastroesophageal reflux disease)     Irritable bowel syndrome     Low back pain 2009       Past Surgical History:   Procedure Laterality Date    BILATERAL BREAST REDUCTION  11/13/2023    BUNIONECTOMY  2008    CHOLECYSTECTOMY  1984    COLONOSCOPY  2017    COLONOSCOPY  2017    COLONOSCOPY N/A 07/27/2022    Procedure: COLONOSCOPY WITH BIOPSIES;  Surgeon: Halima Fitzpatrick MD;  Location: [facility];  Service: Gastroenterology;  Laterality: N/A;  DIVERTICULOSIS, HEMORRHOIDS    CYSTECTOMY      EPIDURAL Right 11/03/2021    Procedure: Right L5 LUMBAR/SACRAL TRANSFORAMINAL EPIDURAL;  Surgeon: Jeronimo Lind MD;  Location[facility];  Service: Pain Management;  Laterality: Right;    EPIDURAL Bilateral 03/17/2022    Procedure: Right L5 LUMBAR/SACRAL TRANSFORAMINAL EPIDURAL;  Surgeon: Jeronimo Lind MD;  Location: [facility];  Service: Pain Management;  Laterality: Bilateral;    KNEE SURGERY  07/2005    ACL Removal    MEDIAL BRANCH BLOCK Left 12/14/2021    Procedure: Left C3-C5 MEDIAL BRANCH BLOCK;  Surgeon: Jeronimo Lind MD;  Location[facility];  Service: Pain Management;  Laterality: Left;    MEDIAL BRANCH BLOCK Left 02/08/2022    Procedure: Left C3-C5 MEDIAL BRANCH BLOCK;  Surgeon: Jeronimo Lind MD;  [facility];  Service: Pain Management;  Laterality: Left;    RADIOFREQUENCY ABLATION Left 03/03/2022    Procedure: RADIOFREQUENCY ABLATION CERVICAL C3-C5;  Surgeon: Jeronimo Lind MD;  Location[facility];  Service: Pain Management;  Laterality: Left;    SINUS SURGERY  2010    UPPER GASTROINTESTINAL ENDOSCOPY  2002       Social History     Tobacco Use   Smoking Status Never    Passive exposure: Never   Smokeless Tobacco Never       has a current medication list which includes the following prescription(s): albuterol sulfate hfa, cetirizine hcl, stahist  "ad, diclofenac, escitalopram, fluticasone, novofine pen needle, montelukast, multivitamin with minerals, naproxen, pantoprazole, sucraid, sumatriptan, and trazodone.  ________________________________________    Current contraception: post menopausal status  History of abnormal Pap smear: no  Family history of Breast cancer: no  Family history of uterine or ovarian cancer: no  Family History of colon cancer/colon polyps: no  History of abnormal mammogram: no      The following portions of the patient's history were reviewed and updated as appropriate: allergies, current medications, past family history, past medical history, past social history, past surgical history, and problem list.    Review of Systems    Pertinent items are noted in HPI.     Objective   Physical Exam    /76   Ht 165.1 cm (65\")   Wt 83.5 kg (184 lb)   LMP  (LMP Unknown)   BMI 30.62 kg/m²    BP Readings from Last 3 Encounters:   02/19/24 114/76   02/07/24 130/89   10/11/23 132/84      Wt Readings from Last 3 Encounters:   02/19/24 83.5 kg (184 lb)   02/07/24 84.9 kg (187 lb 3.2 oz)   12/04/23 87.1 kg (192 lb)      BMI: Estimated body mass index is 30.62 kg/m² as calculated from the following:    Height as of this encounter: 165.1 cm (65\").    Weight as of this encounter: 83.5 kg (184 lb).      General:   alert, appears stated age, and cooperative   Abdomen: soft, non-tender, without masses or organomegaly   Breast: inspection negative, no nipple discharge or bleeding, no masses or nodularity palpable   Vulva: normal   Vagina: normal mucosa   Cervix: no cervical motion tenderness and no lesions   Uterus: normal size, mobile, and non-tender   Adnexa: no mass, fullness, tenderness     Assessment:    1. Normal annual exam   Assessment     ICD-10-CM ICD-9-CM   1. Encounter for gynecological examination without abnormal finding  Z01.419 V72.31     Plan:    Plan     [x]  Mammogram request made  []  PAP done  []  Labs:   []  GC/Chl/TV  []  DEXA " scan   []  Referral for colonoscopy:       Diagnoses and all orders for this visit:    1. Encounter for gynecological examination without abnormal finding (Primary)            Counseling:  --Nutrition: Stressed importance of moderation and caloric balance, stressed fresh fruit and vegetables  --Exercise: Stressed the importance of regular exercise. 3-5 times weekly   - Discussed screening mammogram recommendations.   --Discussed benefits of screening colonoscopy- age 45 unless FH  --Discussed pap smear screening recommendations

## 2024-03-12 ENCOUNTER — TELEPHONE (OUTPATIENT)
Dept: GASTROENTEROLOGY | Facility: CLINIC | Age: 59
End: 2024-03-12
Payer: COMMERCIAL

## 2024-03-12 NOTE — TELEPHONE ENCOUNTER
TRIED CALLING PATIENT FROM THE CANCELLATION LIST TO SEE IF SHE WOULD LIKE A SOONER APPOINTMENT WITH CHRISTY ALEJO. SHE HAS OPENINGS ON 3/12 AND   3/14.

## 2024-03-13 ENCOUNTER — LAB (OUTPATIENT)
Dept: LAB | Facility: HOSPITAL | Age: 59
End: 2024-03-13
Payer: COMMERCIAL

## 2024-03-13 ENCOUNTER — OFFICE VISIT (OUTPATIENT)
Dept: GASTROENTEROLOGY | Facility: CLINIC | Age: 59
End: 2024-03-13
Payer: COMMERCIAL

## 2024-03-13 VITALS
HEART RATE: 101 BPM | HEIGHT: 65 IN | WEIGHT: 182 LBS | BODY MASS INDEX: 30.32 KG/M2 | SYSTOLIC BLOOD PRESSURE: 126 MMHG | DIASTOLIC BLOOD PRESSURE: 79 MMHG

## 2024-03-13 DIAGNOSIS — E74.31 SUCROSE INTOLERANCE DUE TO SUCRASE-ISOMALTASE DEFICIENCY: ICD-10-CM

## 2024-03-13 DIAGNOSIS — Z13.6 SCREENING FOR CARDIOVASCULAR CONDITION: ICD-10-CM

## 2024-03-13 DIAGNOSIS — K58.2 IRRITABLE BOWEL SYNDROME WITH BOTH CONSTIPATION AND DIARRHEA: ICD-10-CM

## 2024-03-13 DIAGNOSIS — K21.9 GASTROESOPHAGEAL REFLUX DISEASE, UNSPECIFIED WHETHER ESOPHAGITIS PRESENT: Primary | ICD-10-CM

## 2024-03-13 DIAGNOSIS — R10.32 LEFT LOWER QUADRANT ABDOMINAL PAIN: ICD-10-CM

## 2024-03-13 LAB
DEPRECATED RDW RBC AUTO: 45.6 FL (ref 37–54)
ERYTHROCYTE [DISTWIDTH] IN BLOOD BY AUTOMATED COUNT: 13.5 % (ref 12.3–15.4)
HCT VFR BLD AUTO: 41.3 % (ref 34–46.6)
HGB BLD-MCNC: 13.5 G/DL (ref 12–15.9)
MCH RBC QN AUTO: 29.6 PG (ref 26.6–33)
MCHC RBC AUTO-ENTMCNC: 32.7 G/DL (ref 31.5–35.7)
MCV RBC AUTO: 90.6 FL (ref 79–97)
PLATELET # BLD AUTO: 312 10*3/MM3 (ref 140–450)
PMV BLD AUTO: 10.1 FL (ref 6–12)
RBC # BLD AUTO: 4.56 10*6/MM3 (ref 3.77–5.28)
WBC NRBC COR # BLD AUTO: 7.59 10*3/MM3 (ref 3.4–10.8)

## 2024-03-13 PROCEDURE — 36415 COLL VENOUS BLD VENIPUNCTURE: CPT

## 2024-03-13 PROCEDURE — 85027 COMPLETE CBC AUTOMATED: CPT

## 2024-03-13 PROCEDURE — 99214 OFFICE O/P EST MOD 30 MIN: CPT | Performed by: NURSE PRACTITIONER

## 2024-03-13 RX ORDER — FAMOTIDINE 40 MG/1
40 TABLET, FILM COATED ORAL 2 TIMES DAILY PRN
Qty: 180 TABLET | Refills: 1 | Status: SHIPPED | OUTPATIENT
Start: 2024-03-13

## 2024-03-13 RX ORDER — SEMAGLUTIDE 1 MG/.5ML
INJECTION, SOLUTION SUBCUTANEOUS
COMMUNITY
Start: 2024-02-29

## 2024-03-13 RX ORDER — TENAPANOR HYDROCHLORIDE 53.2 MG/1
50 TABLET ORAL 2 TIMES DAILY
Qty: 60 TABLET | Refills: 3 | Status: SHIPPED | OUTPATIENT
Start: 2024-03-13

## 2024-03-13 NOTE — PROGRESS NOTES
Chief Complaint     Constipation    History of Present Illness     Ema Cisse is a 58 y.o. female who presents to [facility] for follow-up of IBS-mixed, abdominal bloating and sucrase deficiency.      Ibsrela was denied by her insurance.  Linzess was sent and she tried this but reports severe cramping and diarrhea.  Ibsrela was then approved.  She hasn't picked this up at the pharmacy.      Reports that she's continuing to have issues with constipation. Will also experience diarrhea somedays. She's been on wegovy since September.      Reports pain in LLQ that's been present for a few months.  Reports that it's sporadic.  She describes it to be in the left hip area and radiates around to the front.      She received sucraid but is struggling to take with each meal due to it needing to be refrigerated.       History      Past Medical History:   Diagnosis Date    Allergic     Arthritis 2015    Cholelithiasis 1985    GERD (gastroesophageal reflux disease)     Irritable bowel syndrome     Low back pain 2009     Past Surgical History:   Procedure Laterality Date    BILATERAL BREAST REDUCTION  11/13/2023    BUNIONECTOMY  2008    CHOLECYSTECTOMY  1984    COLONOSCOPY  2017    COLONOSCOPY  2017    COLONOSCOPY N/A 07/27/2022    Procedure: COLONOSCOPY WITH BIOPSIES;  Surgeon: Halima Fitzpatrick MD;  Location: [facility] ENDOSCOPY;  Service: Gastroenterology;  Laterality: N/A;  DIVERTICULOSIS, HEMORRHOIDS    CYSTECTOMY      EPIDURAL Right 11/03/2021    Procedure: Right L5 LUMBAR/SACRAL TRANSFORAMINAL EPIDURAL;  Surgeon: Jeronimo Lind MD;  [facility];  Service: Pain Management;  Laterality: Right;    EPIDURAL Bilateral 03/17/2022    Procedure: Right L5 LUMBAR/SACRAL TRANSFORAMINAL EPIDURAL;  Surgeon: Jeronimo Lind MD;  [facility];  Service: Pain Management;  Laterality: Bilateral;    KNEE SURGERY  07/2005    ACL Removal    MEDIAL BRANCH BLOCK Left  12/14/2021    Procedure: Left C3-C5 MEDIAL BRANCH BLOCK;  Surgeon: Jeronimo Lind MD;  [facility];  Service: Pain Management;  Laterality: Left;    MEDIAL BRANCH BLOCK Left 02/08/2022    Procedure: Left C3-C5 MEDIAL BRANCH BLOCK;  Surgeon: Jeronimo Lind MD;  [facility];  Service: Pain Management;  Laterality: Left;    RADIOFREQUENCY ABLATION Left 03/03/2022    Procedure: RADIOFREQUENCY ABLATION CERVICAL C3-C5;  Surgeon: Jeronimo Lind MD;  [facility];  Service: Pain Management;  Laterality: Left;    SINUS SURGERY  2010    UPPER GASTROINTESTINAL ENDOSCOPY  2002     Family History   Problem Relation Age of Onset    Hypertension Mother     Heart disease Father     Lung cancer Father     No Known Problems Sister     No Known Problems Daughter     No Known Problems Maternal Aunt     No Known Problems Paternal Aunt     No Known Problems Maternal Grandmother     No Known Problems Maternal Grandfather     No Known Problems Paternal Grandmother     No Known Problems Paternal Grandfather     Hypertension Other     Heart disease Other     BRCA 1/2 Neg Hx     Breast cancer Neg Hx     Colon cancer Neg Hx     Endometrial cancer Neg Hx     Ovarian cancer Neg Hx     Malig Hyperthermia Neg Hx         Current Medications       Current Outpatient Medications:     albuterol sulfate  (90 Base) MCG/ACT inhaler, Inhale 2 puffs Every 4 (Four) Hours As Needed., Disp: , Rfl:     Cetirizine HCl 10 MG capsule, Take 10 mg by mouth As Needed., Disp: , Rfl:     Chlorcyclizine-Pseudoephed (Stahist AD) 25-60 MG tablet, Take 1 tablet by mouth Every 8 (Eight) Hours As Needed (congestion)., Disp: 42 tablet, Rfl: 0    diclofenac (VOLTAREN) 75 MG EC tablet, Take 1 tablet by mouth 2 (Two) Times a Day., Disp: , Rfl:     escitalopram (LEXAPRO) 20 MG tablet, Take 1.5 tablets by mouth Daily., Disp: 135 tablet, Rfl: 0    fluticasone (Flonase) 50 MCG/ACT nasal spray, 2 sprays into the nostril(s) as  "directed by provider Daily., Disp: 18.2 mL, Rfl: 0    Insulin Pen Needle (Novofine Pen Needle) 32G X 6 MM misc, 1 each Daily., Disp: 100 each, Rfl: 0    montelukast (SINGULAIR) 10 MG tablet, Take 1 tablet by mouth Daily., Disp: 90 tablet, Rfl: 0    multivitamin with minerals tablet tablet, Take 1 tablet by mouth Daily., Disp: , Rfl:     naproxen (NAPROSYN) 500 MG tablet, Take 1 tablet by mouth 2 (Two) Times a Day As Needed for Mild Pain., Disp: 180 tablet, Rfl: 1    pantoprazole (PROTONIX) 40 MG EC tablet, Take 1 tablet by mouth Daily., Disp: 90 tablet, Rfl: 0    Sucraid 8500 UNIT/ML solution, 3 (Three) Times a Day With Meals., Disp: , Rfl:     SUMAtriptan (IMITREX) 50 MG tablet, Take 1 tablet by mouth 1 (One) Time As Needed for Migraine. Take one tablet at onset of headache. May repeat dose one time in 2 hours if headache not relieved., Disp: 9 tablet, Rfl: 0    traZODone (DESYREL) 50 MG tablet, Take 2 tablets by mouth Every Night., Disp: 180 tablet, Rfl: 0    Wegovy 1 MG/0.5ML solution auto-injector, , Disp: , Rfl:     famotidine (Pepcid) 40 MG tablet, Take 1 tablet by mouth 2 (Two) Times a Day As Needed for Heartburn., Disp: 180 tablet, Rfl: 1    Tenapanor HCl (Ibsrela) 50 MG tablet, Take 1 tablet by mouth 2 (Two) Times a Day., Disp: 60 tablet, Rfl: 3     Allergies     Allergies   Allergen Reactions    Sulfa Antibiotics Rash    Trintellix [Vortioxetine] Itching       Social History       Social History     Social History Narrative    Not on file         Objective       /79 (BP Location: Left arm, Patient Position: Sitting, Cuff Size: Adult)   Pulse 101   Ht 165.1 cm (65\")   Wt 82.6 kg (182 lb)   BMI 30.29 kg/m²       Physical Exam    Results       Result Review :    The following data was reviewed by: SHELBIE Driscoll on 03/13/2024:    10/18/2023 CBC: Hemoglobin 13.4, hematocrit 39.2, platelets 342.               Assessment and Plan              Diagnoses and all orders for this visit:    1. " Gastroesophageal reflux disease, unspecified whether esophagitis present (Primary)  -     famotidine (Pepcid) 40 MG tablet; Take 1 tablet by mouth 2 (Two) Times a Day As Needed for Heartburn.  Dispense: 180 tablet; Refill: 1    2. 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 Irritable bowel syndrome with both constipation and diarrhea  -     Tenapanor HCl (Ibsrela) 50 MG tablet; Take 1 tablet by mouth 2 (Two) Times a Day.  Dispense: 60 tablet; Refill: 3    4. Left lower quadrant abdominal pain      Patient will call if LLQ pain persistent and CT can be ordered.      Follow Up     Follow Up   Return in about 6 months (around 9/13/2024).  Patient was given instructions and counseling regarding her condition or for health maintenance advice. Please see specific information pulled into the AVS if appropriate.

## 2024-03-14 DIAGNOSIS — Z13.6 SCREENING FOR CARDIOVASCULAR CONDITION: ICD-10-CM

## 2024-03-19 ENCOUNTER — TELEPHONE (OUTPATIENT)
Dept: GASTROENTEROLOGY | Facility: CLINIC | Age: 59
End: 2024-03-19
Payer: COMMERCIAL

## 2024-03-19 NOTE — TELEPHONE ENCOUNTER
Trulance would be an option for her.  I can see that it was prescribed for her in the past, but I don't see any information on if she started it and how it worked for her.

## 2024-03-19 NOTE — TELEPHONE ENCOUNTER
Patient left voicemail stating she tried a sample of Ibsrela she states it made her very sick and dehydrated she would like to know what other options there are. Please advise

## 2024-03-20 RX ORDER — PLECANATIDE 3 MG/1
3 TABLET ORAL DAILY
Qty: 90 TABLET | Refills: 1 | Status: SHIPPED | OUTPATIENT
Start: 2024-03-20 | End: 2024-06-18

## 2024-03-20 NOTE — TELEPHONE ENCOUNTER
Pt states she believes she has a sample of this at home and she will try it, she states if you want to go ahead and send it to her pharmacy she will get it filled if she wants to after trying the sample.

## 2024-04-16 DIAGNOSIS — Z13.6 SCREENING FOR CARDIOVASCULAR CONDITION: ICD-10-CM

## 2024-05-14 ENCOUNTER — LAB (OUTPATIENT)
Dept: LAB | Facility: HOSPITAL | Age: 59
End: 2024-05-14
Payer: COMMERCIAL

## 2024-05-14 ENCOUNTER — OFFICE VISIT (OUTPATIENT)
Dept: FAMILY MEDICINE CLINIC | Age: 59
End: 2024-05-14
Payer: COMMERCIAL

## 2024-05-14 VITALS
TEMPERATURE: 97.5 F | WEIGHT: 186.2 LBS | HEIGHT: 65 IN | HEART RATE: 82 BPM | SYSTOLIC BLOOD PRESSURE: 130 MMHG | DIASTOLIC BLOOD PRESSURE: 90 MMHG | OXYGEN SATURATION: 100 % | BODY MASS INDEX: 31.02 KG/M2

## 2024-05-14 DIAGNOSIS — R35.0 URINARY FREQUENCY: Primary | ICD-10-CM

## 2024-05-14 DIAGNOSIS — R35.0 URINARY FREQUENCY: ICD-10-CM

## 2024-05-14 DIAGNOSIS — R10.9 LEFT FLANK PAIN: ICD-10-CM

## 2024-05-14 LAB
ANION GAP SERPL CALCULATED.3IONS-SCNC: 9.2 MMOL/L (ref 5–15)
BASOPHILS # BLD AUTO: 0.08 10*3/MM3 (ref 0–0.2)
BASOPHILS NFR BLD AUTO: 1.1 % (ref 0–1.5)
BILIRUB UR QL STRIP: NEGATIVE
BUN SERPL-MCNC: 12 MG/DL (ref 6–20)
BUN/CREAT SERPL: 14.3 (ref 7–25)
CALCIUM SPEC-SCNC: 9.8 MG/DL (ref 8.6–10.5)
CHLORIDE SERPL-SCNC: 103 MMOL/L (ref 98–107)
CLARITY UR: CLEAR
CO2 SERPL-SCNC: 26.8 MMOL/L (ref 22–29)
COLOR UR: YELLOW
CREAT SERPL-MCNC: 0.84 MG/DL (ref 0.57–1)
DEPRECATED RDW RBC AUTO: 42.5 FL (ref 37–54)
EGFRCR SERPLBLD CKD-EPI 2021: 80.2 ML/MIN/1.73
EOSINOPHIL # BLD AUTO: 0.14 10*3/MM3 (ref 0–0.4)
EOSINOPHIL NFR BLD AUTO: 1.9 % (ref 0.3–6.2)
ERYTHROCYTE [DISTWIDTH] IN BLOOD BY AUTOMATED COUNT: 13 % (ref 12.3–15.4)
GLUCOSE SERPL-MCNC: 88 MG/DL (ref 65–99)
GLUCOSE UR STRIP-MCNC: NEGATIVE MG/DL
HCT VFR BLD AUTO: 40.8 % (ref 34–46.6)
HGB BLD-MCNC: 13.6 G/DL (ref 12–15.9)
HGB UR QL STRIP.AUTO: NEGATIVE
HOLD SPECIMEN: NORMAL
IMM GRANULOCYTES # BLD AUTO: 0.02 10*3/MM3 (ref 0–0.05)
IMM GRANULOCYTES NFR BLD AUTO: 0.3 % (ref 0–0.5)
KETONES UR QL STRIP: NEGATIVE
LEUKOCYTE ESTERASE UR QL STRIP.AUTO: NEGATIVE
LYMPHOCYTES # BLD AUTO: 2.8 10*3/MM3 (ref 0.7–3.1)
LYMPHOCYTES NFR BLD AUTO: 38 % (ref 19.6–45.3)
MCH RBC QN AUTO: 29.8 PG (ref 26.6–33)
MCHC RBC AUTO-ENTMCNC: 33.3 G/DL (ref 31.5–35.7)
MCV RBC AUTO: 89.5 FL (ref 79–97)
MONOCYTES # BLD AUTO: 0.58 10*3/MM3 (ref 0.1–0.9)
MONOCYTES NFR BLD AUTO: 7.9 % (ref 5–12)
NEUTROPHILS NFR BLD AUTO: 3.75 10*3/MM3 (ref 1.7–7)
NEUTROPHILS NFR BLD AUTO: 50.8 % (ref 42.7–76)
NITRITE UR QL STRIP: NEGATIVE
NRBC BLD AUTO-RTO: 0 /100 WBC (ref 0–0.2)
PH UR STRIP.AUTO: 7.5 [PH] (ref 5–8)
PLATELET # BLD AUTO: 306 10*3/MM3 (ref 140–450)
PMV BLD AUTO: 11 FL (ref 6–12)
POTASSIUM SERPL-SCNC: 4.1 MMOL/L (ref 3.5–5.2)
PROT UR QL STRIP: NEGATIVE
RBC # BLD AUTO: 4.56 10*6/MM3 (ref 3.77–5.28)
SODIUM SERPL-SCNC: 139 MMOL/L (ref 136–145)
SP GR UR STRIP: <=1.005 (ref 1–1.03)
UROBILINOGEN UR QL STRIP: NORMAL
WBC NRBC COR # BLD AUTO: 7.37 10*3/MM3 (ref 3.4–10.8)

## 2024-05-14 PROCEDURE — 80048 BASIC METABOLIC PNL TOTAL CA: CPT

## 2024-05-14 PROCEDURE — 81003 URINALYSIS AUTO W/O SCOPE: CPT | Performed by: NURSE PRACTITIONER

## 2024-05-14 PROCEDURE — 85025 COMPLETE CBC W/AUTO DIFF WBC: CPT

## 2024-05-14 PROCEDURE — 99213 OFFICE O/P EST LOW 20 MIN: CPT | Performed by: NURSE PRACTITIONER

## 2024-05-14 PROCEDURE — 36415 COLL VENOUS BLD VENIPUNCTURE: CPT

## 2024-05-14 NOTE — PROGRESS NOTES
Ema Cisse presents to [facility].    Chief Complaint:  Back Pain and Urinary Frequency         History of Present Illness:  UTI:  Symptoms started: 3 weeks ago, was intermittent    Associated symptoms: urinary frequency and bilateral flank pain L>R, dark urine   Treatments tried:nothing, does not drink enough water since taking Wegovy       PMH change:    Allergies  GERD  IBS/sees [facility]     Surgery:    CLN   Breast reduction  Cholecystectomy  Bunionectomy  Cystectomy  Epidural's  Knee surgery  Sinus surgery  EGD    Family:    Mother: HTN  Father: ; heart disease & lung cancer    Social:    Retired but works at [employer] part time   Children : 1         Review of Systems:  Review of Systems   Constitutional:  Negative for fever.   Gastrointestinal:  Positive for constipation (chronic issue/ sees GE). Negative for abdominal pain.   Genitourinary:  Negative for hematuria.          Current Outpatient Medications:     albuterol sulfate  (90 Base) MCG/ACT inhaler, Inhale 2 puffs Every 4 (Four) Hours As Needed., Disp: , Rfl:     Cetirizine HCl 10 MG capsule, Take 10 mg by mouth As Needed., Disp: , Rfl:     Chlorcyclizine-Pseudoephed (Stahist AD) 25-60 MG tablet, Take 1 tablet by mouth Every 8 (Eight) Hours As Needed (congestion)., Disp: 42 tablet, Rfl: 0    diclofenac (VOLTAREN) 75 MG EC tablet, Take 1 tablet by mouth 2 (Two) Times a Day., Disp: , Rfl:     escitalopram (LEXAPRO) 20 MG tablet, Take 1.5 tablets by mouth Daily., Disp: 135 tablet, Rfl: 0    famotidine (Pepcid) 40 MG tablet, Take 1 tablet by mouth 2 (Two) Times a Day As Needed for Heartburn., Disp: 180 tablet, Rfl: 1    fluticasone (Flonase) 50 MCG/ACT nasal spray, 2 sprays into the nostril(s) as directed by provider Daily., Disp: 18.2 mL, Rfl: 0    Insulin Pen Needle (Novofine Pen Needle) 32G X 6 MM misc, 1 each Daily., Disp: 100 each, Rfl: 0    montelukast (SINGULAIR) 10 MG tablet, Take  "1 tablet by mouth Daily., Disp: 90 tablet, Rfl: 0    multivitamin with minerals tablet tablet, Take 1 tablet by mouth Daily., Disp: , Rfl:     naproxen (NAPROSYN) 500 MG tablet, Take 1 tablet by mouth 2 (Two) Times a Day As Needed for Mild Pain., Disp: 180 tablet, Rfl: 1    pantoprazole (PROTONIX) 40 MG EC tablet, Take 1 tablet by mouth Daily., Disp: 90 tablet, Rfl: 0    Sucraid 8500 UNIT/ML solution, 3 (Three) Times a Day With Meals., Disp: , Rfl:     SUMAtriptan (IMITREX) 50 MG tablet, Take 1 tablet by mouth 1 (One) Time As Needed for Migraine. Take one tablet at onset of headache. May repeat dose one time in 2 hours if headache not relieved., Disp: 9 tablet, Rfl: 0    traZODone (DESYREL) 50 MG tablet, Take 2 tablets by mouth Every Night., Disp: 180 tablet, Rfl: 0    Wegovy 1 MG/0.5ML solution auto-injector, , Disp: , Rfl:     Vital Signs:   Vitals:    05/14/24 1642   BP: 130/90   Pulse: 82   Temp: 97.5 °F (36.4 °C)   SpO2: 100%   Weight: 84.5 kg (186 lb 3.2 oz)   Height: 165.1 cm (65\")              Physical Exam:  Physical Exam  Constitutional:       Appearance: Normal appearance.   Cardiovascular:      Rate and Rhythm: Normal rate and regular rhythm.      Heart sounds: No murmur heard.  Pulmonary:      Effort: Pulmonary effort is normal.      Breath sounds: Normal breath sounds.   Abdominal:      Tenderness: There is left CVA tenderness. There is no right CVA tenderness.   Neurological:      Mental Status: She is alert.   Psychiatric:         Mood and Affect: Mood normal.         Behavior: Behavior normal.         Result Review      The following data was reviewed by: SHELBIE Borges on 05/14/2024:    Results for orders placed or performed in visit on 03/13/24   CBC No Differential    Specimen: Blood   Result Value Ref Range    WBC 7.59 3.40 - 10.80 10*3/mm3    RBC 4.56 3.77 - 5.28 10*6/mm3    Hemoglobin 13.5 12.0 - 15.9 g/dL    Hematocrit 41.3 34.0 - 46.6 %    MCV 90.6 79.0 - 97.0 fL    MCH 29.6 26.6 " - 33.0 pg    MCHC 32.7 31.5 - 35.7 g/dL    RDW 13.5 12.3 - 15.4 %    RDW-SD 45.6 37.0 - 54.0 fl    MPV 10.1 6.0 - 12.0 fL    Platelets 312 140 - 450 10*3/mm3               Assessment and Plan:          Diagnoses and all orders for this visit:    1. Urinary frequency (Primary)  Assessment & Plan:  Checking urinalysis, other labs, UTI treatment:  Increase water intake, call or return in 2 days if not improving.       Orders:  -     Urinalysis With Culture If Indicated - Urine, Clean Catch  -     Basic metabolic panel; Future  -     CBC w AUTO Differential; Future    2. Left flank pain  Assessment & Plan:  Checking u/a and labs, increase water intake                     Follow Up   Return for followup pending lab results.  Patient was given instructions and counseling regarding her condition or for health maintenance advice. Please see specific information pulled into the AVS if appropriate.

## 2024-05-14 NOTE — ASSESSMENT & PLAN NOTE
Checking urinalysis, other labs, UTI treatment:  Increase water intake, call or return in 2 days if not improving.

## 2024-05-15 DIAGNOSIS — G43.109 MIGRAINE WITH AURA, NOT INTRACTABLE, WITHOUT STATUS MIGRAINOSUS: ICD-10-CM

## 2024-05-15 RX ORDER — SUMATRIPTAN 50 MG/1
50 TABLET, FILM COATED ORAL
Qty: 9 TABLET | Refills: 0 | Status: SHIPPED | OUTPATIENT
Start: 2024-05-15

## 2024-05-22 ENCOUNTER — OFFICE VISIT (OUTPATIENT)
Dept: FAMILY MEDICINE CLINIC | Age: 59
End: 2024-05-22
Payer: COMMERCIAL

## 2024-05-22 VITALS
OXYGEN SATURATION: 99 % | DIASTOLIC BLOOD PRESSURE: 84 MMHG | WEIGHT: 178.6 LBS | HEART RATE: 74 BPM | SYSTOLIC BLOOD PRESSURE: 126 MMHG | HEIGHT: 65 IN | TEMPERATURE: 97.7 F | BODY MASS INDEX: 29.76 KG/M2

## 2024-05-22 DIAGNOSIS — M47.816 LUMBAR FACET ARTHROPATHY: ICD-10-CM

## 2024-05-22 DIAGNOSIS — G43.109 MIGRAINE WITH AURA, NOT INTRACTABLE, WITHOUT STATUS MIGRAINOSUS: ICD-10-CM

## 2024-05-22 DIAGNOSIS — Z00.00 ANNUAL PHYSICAL EXAM: Primary | ICD-10-CM

## 2024-05-22 DIAGNOSIS — K59.04 CHRONIC IDIOPATHIC CONSTIPATION: ICD-10-CM

## 2024-05-22 DIAGNOSIS — F34.9 PERSISTENT MOOD (AFFECTIVE) DISORDER, UNSPECIFIED: ICD-10-CM

## 2024-05-22 PROCEDURE — 99396 PREV VISIT EST AGE 40-64: CPT | Performed by: NURSE PRACTITIONER

## 2024-05-22 RX ORDER — AMOXICILLIN 250 MG
1 CAPSULE ORAL DAILY
Qty: 90 TABLET | Refills: 1 | Status: SHIPPED | OUTPATIENT
Start: 2024-05-22

## 2024-05-22 NOTE — ASSESSMENT & PLAN NOTE
Medical condition is stable.  Continue same therapy.  Will recheck at next regular appointment

## 2024-05-22 NOTE — ASSESSMENT & PLAN NOTE
This could be cause of low back discomfort. Declines PT at this time. Will let me know if she needs PT order or if she wants to see pain mgmt again. May continue diclofenac. Can also try salonpas patch, voltaren gel.

## 2024-05-22 NOTE — PROGRESS NOTES
Chief Complaint  Ema Cisse presents to [facility] for Annual Exam    Subjective          History of Present Illness    Amy is here today for annual preventive exam.  Has received covid vaccine X 3.   UTD Tdap.  Pap smear UTD with GYN. Normal on 2/10/23.   Normal screening mammogram on 2/10/23 as well. Postponed as healing from breast reduction sx. She plans to schedule at GYN office.   Had screening colonoscopy on 7/27/22 with Dr Fitzpatrick. Advised 10 year repeat.   Never smoker.     She is on wegovy for weight loss. Down 9 pounds since last visit with me. Struggling with getting in exercise and getting in water. She has met with health  with her insurance. She is supposed to met once monthly. Some nausea so wishes to continue current dose of medication rather than increasing at this time.   She is also on medication for anxiety. Current medication includes Lexapro 30 mg daily. She is also taking trazodone 50 mg nightly to help with sleep. She has been on several medication in the past for anxiety. Buspirone was ineffective. Trintellix caused rash. Stable on current treatment.   She is on sumatriptan as needed for migraines as well.   Was seen on 5/14/24 by another provider for bilateral CVA tenderness and urinary pressure. UA, CBC, BMP normal. Still with left sided low back pain. She previously had left sided hip pain. MRI showed mild bilateral hip osteoarthritis, advanced disc degeneration of the visualized lower lumbar spine. Previously went to pain management. Taking diclofenac. Declines PT at this time.   Has been dealing with constipation. Taking metamucil. Saw GI and prescribed Linzess, Trulance, Ibsrela caused diarrhea. Has follow up scheduled with gastroenterology.       Review of Systems   Constitutional:  Negative for chills and fever.   HENT:  Negative for ear pain and sore throat.    Eyes:  Negative for blurred vision and redness.   Respiratory:  Negative  for shortness of breath and wheezing.    Cardiovascular:  Negative for chest pain and palpitations.   Gastrointestinal:  Positive for constipation. Negative for vomiting.   Genitourinary:  Negative for hematuria and urgency.   Musculoskeletal:  Positive for back pain.   Skin:  Negative for rash.   Neurological:  Negative for seizures and syncope.   Psychiatric/Behavioral:  Negative for suicidal ideas and depressed mood.          Allergies   Allergen Reactions    Sulfa Antibiotics Rash    Trintellix [Vortioxetine] Itching      Past Medical History:   Diagnosis Date    Allergic     Arthritis 2015    Cholelithiasis 1985    GERD (gastroesophageal reflux disease)     Irritable bowel syndrome     Low back pain 2009     Current Outpatient Medications   Medication Sig Dispense Refill    albuterol sulfate  (90 Base) MCG/ACT inhaler Inhale 2 puffs Every 4 (Four) Hours As Needed.      Cetirizine HCl 10 MG capsule Take 10 mg by mouth As Needed.      Chlorcyclizine-Pseudoephed (Stahist AD) 25-60 MG tablet Take 1 tablet by mouth Every 8 (Eight) Hours As Needed (congestion). 42 tablet 0    diclofenac (VOLTAREN) 75 MG EC tablet Take 1 tablet by mouth 2 (Two) Times a Day.      escitalopram (LEXAPRO) 20 MG tablet Take 1.5 tablets by mouth Daily. 135 tablet 0    famotidine (Pepcid) 40 MG tablet Take 1 tablet by mouth 2 (Two) Times a Day As Needed for Heartburn. 180 tablet 1    fluticasone (Flonase) 50 MCG/ACT nasal spray 2 sprays into the nostril(s) as directed by provider Daily. 18.2 mL 0    Insulin Pen Needle (Novofine Pen Needle) 32G X 6 MM misc 1 each Daily. 100 each 0    montelukast (SINGULAIR) 10 MG tablet Take 1 tablet by mouth Daily. 90 tablet 0    multivitamin with minerals tablet tablet Take 1 tablet by mouth Daily.      naproxen (NAPROSYN) 500 MG tablet Take 1 tablet by mouth 2 (Two) Times a Day As Needed for Mild Pain. 180 tablet 1    pantoprazole (PROTONIX) 40 MG EC tablet Take 1 tablet by mouth Daily. 90 tablet 0     Sucraid 8500 UNIT/ML solution 3 (Three) Times a Day With Meals.      SUMAtriptan (IMITREX) 50 MG tablet TAKE ONE TABLET AT ONSET OF HEADACHE. MAY REPEAT DOSE ONE TIME IN 2 HOURS IF HEADACHE NOT RELIEVED. 9 tablet 0    traZODone (DESYREL) 50 MG tablet Take 2 tablets by mouth Every Night. 180 tablet 0    Wegovy 1 MG/0.5ML solution auto-injector 1 (One) Time Per Week.      sennosides-docusate (senna-docusate sodium) 8.6-50 MG per tablet Take 1 tablet by mouth Daily. 90 tablet 1     No current facility-administered medications for this visit.     Past Surgical History:   Procedure Laterality Date    BILATERAL BREAST REDUCTION  11/13/2023    BUNIONECTOMY  2008    CHOLECYSTECTOMY  1984    COLONOSCOPY  2017    COLONOSCOPY  2017    COLONOSCOPY N/A 07/27/2022    Procedure: COLONOSCOPY WITH BIOPSIES;  Surgeon: Halima Fitzpatrick MD;  Location: [facility] ENDOSCOPY;  Service: Gastroenterology;  Laterality: N/A;  DIVERTICULOSIS, HEMORRHOIDS    CYSTECTOMY      EPIDURAL Right 11/03/2021    Procedure: Right L5 LUMBAR/SACRAL TRANSFORAMINAL EPIDURAL;  Surgeon: Jeronimo Lind MD;  [facility];  Service: Pain Management;  Laterality: Right;    EPIDURAL Bilateral 03/17/2022    Procedure: Right L5 LUMBAR/SACRAL TRANSFORAMINAL EPIDURAL;  Surgeon: Jeronimo Lind MD;  [facility];  Service: Pain Management;  Laterality: Bilateral;    KNEE SURGERY  07/2005    ACL Removal    MEDIAL BRANCH BLOCK Left 12/14/2021    Procedure: Left C3-C5 MEDIAL BRANCH BLOCK;  Surgeon: Jeronimo Lind MD;  [facility];  Service: Pain Management;  Laterality: Left;    MEDIAL BRANCH BLOCK Left 02/08/2022    Procedure: Left C3-C5 MEDIAL BRANCH BLOCK;  Surgeon: Jeronimo Lind MD;  [facility];  Service: Pain Management;  Laterality: Left;    RADIOFREQUENCY ABLATION Left 03/03/2022    Procedure: RADIOFREQUENCY ABLATION CERVICAL C3-C5;  Surgeon: Jeronimo Lind MD;  Location: [facility];   "Service: Pain Management;  Laterality: Left;    SINUS SURGERY  2010    UPPER GASTROINTESTINAL ENDOSCOPY  2002      Social History     Tobacco Use    Smoking status: Never     Passive exposure: Never    Smokeless tobacco: Never   Vaping Use    Vaping status: Never Used   Substance Use Topics    Alcohol use: Yes     Alcohol/week: 1.0 - 2.0 standard drink of alcohol     Types: 1 - 2 Drinks containing 0.5 oz of alcohol per week     Comment: Socially    Drug use: Never     Family History   Problem Relation Age of Onset    Hypertension Mother     Heart disease Father     Lung cancer Father     No Known Problems Sister     No Known Problems Daughter     No Known Problems Maternal Aunt     No Known Problems Paternal Aunt     No Known Problems Maternal Grandmother     No Known Problems Maternal Grandfather     No Known Problems Paternal Grandmother     No Known Problems Paternal Grandfather     Hypertension Other     Heart disease Other     BRCA 1/2 Neg Hx     Breast cancer Neg Hx     Colon cancer Neg Hx     Endometrial cancer Neg Hx     Ovarian cancer Neg Hx     Malig Hyperthermia Neg Hx      Health Maintenance Due   Topic Date Due    MAMMOGRAM  02/10/2024      Immunization History   Administered Date(s) Administered    COVID-19 (MODERNA) 1st,2nd,3rd Dose Monovalent 02/05/2021, 03/05/2021, 11/19/2021    Flu Vaccine Split Quad 09/25/2019, 09/23/2021, 09/15/2022    Flublok 18+yrs 09/15/2022    Flucelvax Quad Vial =>4yrs 09/28/2020    Fluzone (or Fluarix & Flulaval for VFC) >6mos 10/11/2023    Hepatitis A 05/02/2018, 11/20/2018    Influenza, Unspecified 10/01/2022    Shingrix 05/17/2022, 01/11/2023    Tdap 05/29/2019        Objective     Vitals:    05/22/24 0824   BP: 126/84   BP Location: Right arm   Patient Position: Lying   Cuff Size: Large Adult   Pulse: 74   Temp: 97.7 °F (36.5 °C)   TempSrc: Oral   SpO2: 99%   Weight: 81 kg (178 lb 9.6 oz)   Height: 165.1 cm (65\")     Body mass index is 29.72 kg/m².                No " results found.    Physical Exam  Vitals reviewed.   Constitutional:       General: She is not in acute distress.     Appearance: Normal appearance. She is well-developed.   HENT:      Head: Normocephalic and atraumatic.      Right Ear: Hearing, tympanic membrane and ear canal normal.      Left Ear: Hearing, tympanic membrane and ear canal normal.      Mouth/Throat:      Mouth: Mucous membranes are moist.   Eyes:      Extraocular Movements: Extraocular movements intact.      Pupils: Pupils are equal, round, and reactive to light.   Cardiovascular:      Rate and Rhythm: Normal rate and regular rhythm.      Pulses: Normal pulses.      Heart sounds: Normal heart sounds.   Pulmonary:      Effort: Pulmonary effort is normal.      Breath sounds: Normal breath sounds.   Abdominal:      General: Bowel sounds are decreased.      Palpations: Abdomen is soft.      Tenderness: There is no abdominal tenderness.   Musculoskeletal:      Cervical back: Normal range of motion and neck supple.   Skin:     General: Skin is warm and dry.   Neurological:      Mental Status: She is alert and oriented to person, place, and time.   Psychiatric:         Mood and Affect: Mood normal.           Result Review :                               Assessment and Plan      Assessment & Plan  Annual physical exam  Appropriate screenings and vaccinations were reviewed with the pt and offered as indicated.  Pt counseled on healthy lifestyle including healthy diet, exercise.    Chronic idiopathic constipation  Rx Senokot. Continue to follow up with GI. Increase water intake.   Lumbar facet arthropathy  This could be cause of low back discomfort. Declines PT at this time. Will let me know if she needs PT order or if she wants to see pain mgmt again. May continue diclofenac. Can also try salonpas patch, voltaren gel.   Migraine with aura, not intractable, without status migrainosus    Medical condition is stable.  Continue same therapy.  Will recheck at next  regular appointment          Persistent mood (affective) disorder, unspecified    Medical condition is stable.  Continue same therapy.  Will recheck at next regular appointment       New Medications Ordered This Visit   Medications    sennosides-docusate (senna-docusate sodium) 8.6-50 MG per tablet     Sig: Take 1 tablet by mouth Daily.     Dispense:  90 tablet     Refill:  1                 Follow Up     Return in about 3 months (around 8/22/2024).

## 2024-06-03 ENCOUNTER — TELEPHONE (OUTPATIENT)
Dept: GASTROENTEROLOGY | Facility: CLINIC | Age: 59
End: 2024-06-03
Payer: COMMERCIAL

## 2024-06-03 NOTE — TELEPHONE ENCOUNTER
Attempted to contact pt to advise [facility] has made several failed attempts to contact her to ship her medication. Left voicemail requesting a returned call to provide [facility]raids number

## 2024-06-08 DIAGNOSIS — F34.9 PERSISTENT MOOD (AFFECTIVE) DISORDER, UNSPECIFIED: ICD-10-CM

## 2024-06-10 RX ORDER — ESCITALOPRAM OXALATE 20 MG/1
30 TABLET ORAL DAILY
Qty: 135 TABLET | Refills: 0 | Status: SHIPPED | OUTPATIENT
Start: 2024-06-10

## 2024-06-28 ENCOUNTER — TELEPHONE (OUTPATIENT)
Dept: GASTROENTEROLOGY | Facility: CLINIC | Age: 59
End: 2024-06-28
Payer: COMMERCIAL

## 2024-06-28 DIAGNOSIS — K58.2 IRRITABLE BOWEL SYNDROME WITH BOTH CONSTIPATION AND DIARRHEA: Primary | ICD-10-CM

## 2024-06-28 DIAGNOSIS — R10.32 LEFT LOWER QUADRANT ABDOMINAL PAIN: ICD-10-CM

## 2024-06-28 NOTE — TELEPHONE ENCOUNTER
"Caller: Ema Cisse \"LEXI\"    Relationship to patient: Self    Best call back number: [phone]    Patient is needing: PT CALLED TO SPEAK TO A NURSE, SHE SAID SHE'S BEEN HAVING A LOT OF ISSUES WITH CONSTIPATION AND DIARRHEA FOR A WHILE AND IT WAS DISCUSSED AT HER LAST APPT SHE COULD MAYBE GET AN MRI ABDOMEN DONE, PT IS NOW ALSO HAVING PAIN IN HER LEFT SIDE. SO SHE WANTED TO TALK TO SOMEONE ABOUT MAYBE SCHEDULING THAT OR IF THERE'S ANY OTHER OPTIONS.    "

## 2024-07-12 ENCOUNTER — HOSPITAL ENCOUNTER (OUTPATIENT)
Dept: CT IMAGING | Facility: HOSPITAL | Age: 59
Discharge: HOME | End: 2024-07-12
Admitting: NURSE PRACTITIONER
Payer: COMMERCIAL

## 2024-07-12 DIAGNOSIS — R10.32 LEFT LOWER QUADRANT ABDOMINAL PAIN: ICD-10-CM

## 2024-07-12 DIAGNOSIS — K58.2 IRRITABLE BOWEL SYNDROME WITH BOTH CONSTIPATION AND DIARRHEA: ICD-10-CM

## 2024-07-12 PROCEDURE — 25510000001 IOPAMIDOL PER 1 ML: Performed by: NURSE PRACTITIONER

## 2024-07-12 PROCEDURE — 74177 CT ABD & PELVIS W/CONTRAST: CPT

## 2024-07-12 RX ADMIN — IOPAMIDOL 100 ML: 755 INJECTION, SOLUTION INTRAVENOUS at 16:27

## 2024-07-15 ENCOUNTER — TELEPHONE (OUTPATIENT)
Dept: GASTROENTEROLOGY | Facility: CLINIC | Age: 59
End: 2024-07-15
Payer: COMMERCIAL

## 2024-07-15 NOTE — TELEPHONE ENCOUNTER
Looks like she also experienced diarrhea with linzess.  Given that she's alternating between constipation and diarrhea, recommend trial of probiotic such as align or fiber supplement such as benefiber.

## 2024-07-15 NOTE — TELEPHONE ENCOUNTER
----- Message from Mignon Álvarez sent at 7/15/2024  1:17 PM EDT -----  Please let patient know that CT is overall reassuring.  No signs of colitis or diverticulitis.  It does indicate mild constipation.  Is she taking ibsrela?  If so, how often is she having a bowel movement?

## 2024-07-15 NOTE — TELEPHONE ENCOUNTER
Pt contacted office and requested a returned call      Returned call pt is aware of results, pt states she is NOT taking Ibsrela as it gave her diarrhea. She states she goes inbetween constipation and diarrhea and could not give me a time frame of how often she is going to the restroom.

## 2024-07-18 DIAGNOSIS — K21.9 GASTROESOPHAGEAL REFLUX DISEASE, UNSPECIFIED WHETHER ESOPHAGITIS PRESENT: ICD-10-CM

## 2024-07-18 DIAGNOSIS — J30.9 ALLERGIC RHINITIS, UNSPECIFIED SEASONALITY, UNSPECIFIED TRIGGER: ICD-10-CM

## 2024-07-18 RX ORDER — MONTELUKAST SODIUM 10 MG/1
10 TABLET ORAL DAILY
Qty: 90 TABLET | Refills: 0 | Status: SHIPPED | OUTPATIENT
Start: 2024-07-18

## 2024-07-18 RX ORDER — PANTOPRAZOLE SODIUM 40 MG/1
40 TABLET, DELAYED RELEASE ORAL DAILY
Qty: 90 TABLET | Refills: 0 | Status: SHIPPED | OUTPATIENT
Start: 2024-07-18

## 2024-08-08 ENCOUNTER — OFFICE VISIT (OUTPATIENT)
Dept: FAMILY MEDICINE CLINIC | Age: 59
End: 2024-08-08
Payer: COMMERCIAL

## 2024-08-08 VITALS
WEIGHT: 177.4 LBS | TEMPERATURE: 98.7 F | HEIGHT: 65 IN | HEART RATE: 82 BPM | SYSTOLIC BLOOD PRESSURE: 110 MMHG | BODY MASS INDEX: 29.56 KG/M2 | OXYGEN SATURATION: 98 % | DIASTOLIC BLOOD PRESSURE: 80 MMHG | RESPIRATION RATE: 16 BRPM

## 2024-08-08 DIAGNOSIS — U07.1 COVID-19: Primary | ICD-10-CM

## 2024-08-08 DIAGNOSIS — U07.1 POSITIVE SELF-ADMINISTERED ANTIGEN TEST FOR COVID-19: ICD-10-CM

## 2024-08-08 DIAGNOSIS — J30.9 ALLERGIC RHINITIS, UNSPECIFIED SEASONALITY, UNSPECIFIED TRIGGER: ICD-10-CM

## 2024-08-08 LAB
EXPIRATION DATE: ABNORMAL
EXPIRATION DATE: NORMAL
FLUAV AG UPPER RESP QL IA.RAPID: NOT DETECTED
FLUBV AG UPPER RESP QL IA.RAPID: NOT DETECTED
INTERNAL CONTROL: ABNORMAL
INTERNAL CONTROL: NORMAL
Lab: ABNORMAL
Lab: NORMAL
S PYO AG THROAT QL: NEGATIVE
SARS-COV-2 AG UPPER RESP QL IA.RAPID: DETECTED

## 2024-08-08 PROCEDURE — 99214 OFFICE O/P EST MOD 30 MIN: CPT | Performed by: NURSE PRACTITIONER

## 2024-08-08 PROCEDURE — 87880 STREP A ASSAY W/OPTIC: CPT | Performed by: NURSE PRACTITIONER

## 2024-08-08 PROCEDURE — 87081 CULTURE SCREEN ONLY: CPT | Performed by: NURSE PRACTITIONER

## 2024-08-08 PROCEDURE — 87428 SARSCOV & INF VIR A&B AG IA: CPT | Performed by: NURSE PRACTITIONER

## 2024-08-08 RX ORDER — BENZONATATE 100 MG/1
100 CAPSULE ORAL 3 TIMES DAILY PRN
Qty: 30 CAPSULE | Refills: 0 | Status: SHIPPED | OUTPATIENT
Start: 2024-08-08

## 2024-08-08 RX ORDER — CHLORCYCLIZINE HYDROCHLORIDE AND PSEUDOEPHEDRINE HYDROCHLORIDE 25; 60 MG/1; MG/1
1 TABLET ORAL EVERY 8 HOURS PRN
Qty: 42 TABLET | Refills: 0 | Status: SHIPPED | OUTPATIENT
Start: 2024-08-08

## 2024-08-08 NOTE — PROGRESS NOTES
Chief Complaint  Exposure To Known Illness (+ covid home test this morning. ), Sore Throat (Symptoms started 2 days ago ), Headache, and Generalized Body Aches    Subjective      Ema Cisse is a 58 year old female that presents to [facility] with c/o sore throat, cough, headache and body aches. Symptoms started 2 days ago. States she had a positive home covid test this morning.  Currently taking zyrtec and ibuprofen.   Has previously had covid and recovered without issues.          History of Present Illness    Current Outpatient Medications   Medication Instructions    albuterol sulfate  (90 Base) MCG/ACT inhaler 2 puffs, Inhalation, Every 4 Hours PRN    benzonatate (TESSALON PERLES) 100 mg, Oral, 3 Times Daily PRN    Cetirizine HCl 10 mg, Oral, As Needed    Chlorcyclizine-Pseudoephed (Stahist AD) 25-60 MG tablet 1 tablet, Oral, Every 8 Hours PRN    diclofenac (VOLTAREN) 75 mg, Oral, 2 Times Daily    escitalopram (LEXAPRO) 30 mg, Oral, Daily    famotidine (PEPCID) 40 mg, Oral, 2 Times Daily PRN    fluticasone (Flonase) 50 MCG/ACT nasal spray 2 sprays, Nasal, Daily    Insulin Pen Needle (Novofine Pen Needle) 32G X 6 MM misc 1 each, Does not apply, Daily    montelukast (SINGULAIR) 10 mg, Oral, Daily    multivitamin with minerals tablet tablet 1 tablet, Oral, Daily    naproxen (NAPROSYN) 500 mg, Oral, 2 Times Daily PRN    pantoprazole (PROTONIX) 40 mg, Oral, Daily    sennosides-docusate (senna-docusate sodium) 8.6-50 MG per tablet 1 tablet, Oral, Daily    Sucraid 8500 UNIT/ML solution 3 Times Daily With Meals    SUMAtriptan (IMITREX) 50 mg, Oral, Once at onset of headache. May repeat in 2 hours if headache not relieved.    traZODone (DESYREL) 100 mg, Oral, Nightly    Wegovy 1 MG/0.5ML solution auto-injector 1 (One) Time Per Week.       The following portions of the patient's history were reviewed and updated as appropriate: allergies, current medications, past family  "history, past medical history, past social history, past surgical history, and problem list.    Objective   Vital Signs:   /80 (BP Location: Left arm, Patient Position: Sitting, Cuff Size: Adult)   Pulse 82   Temp 98.7 °F (37.1 °C) (Oral)   Resp 16   Ht 165.1 cm (65\")   Wt 80.5 kg (177 lb 6.4 oz)   SpO2 98% Comment: room air  BMI 29.52 kg/m²     Wt Readings from Last 3 Encounters:   08/08/24 80.5 kg (177 lb 6.4 oz)   05/22/24 81 kg (178 lb 9.6 oz)   05/14/24 84.5 kg (186 lb 3.2 oz)     BP Readings from Last 3 Encounters:   08/08/24 110/80   05/22/24 126/84   05/14/24 130/90     Physical Exam  Vitals and nursing note reviewed.   Constitutional:       Appearance: Normal appearance. She is not ill-appearing.   HENT:      Head: Normocephalic and atraumatic.   Eyes:      Conjunctiva/sclera: Conjunctivae normal.      Pupils: Pupils are equal, round, and reactive to light.   Cardiovascular:      Rate and Rhythm: Normal rate and regular rhythm.      Heart sounds: Normal heart sounds.   Pulmonary:      Effort: Pulmonary effort is normal.      Breath sounds: Normal breath sounds.   Skin:     General: Skin is warm and dry.      Capillary Refill: Capillary refill takes less than 2 seconds.   Neurological:      Mental Status: She is alert and oriented to person, place, and time.   Psychiatric:         Mood and Affect: Mood normal.         Behavior: Behavior normal.          Result Review :  The following data was reviewed by: SHELBIE Ding on 08/08/2024:      Common labs          3/13/2024    12:31 5/14/2024    17:11   Common Labs   Glucose  88    BUN  12    Creatinine  0.84    Sodium  139    Potassium  4.1    Chloride  103    Calcium  9.8    WBC 7.59  7.37    Hemoglobin 13.5  13.6    Hematocrit 41.3  40.8    Platelets 312  306        Lab Results (last 72 hours)       Procedure Component Value Units Date/Time    POCT SARS-CoV-2 Antigen PATRIA + Flu [[id number]]  (Abnormal) Collected: 08/08/24 1223    " Specimen: Swab Updated: 08/08/24 1224     SARS Antigen Detected     Influenza A Antigen PATRIA Not Detected     Influenza B Antigen PATRIA Not Detected     Internal Control Passed     Lot Number [id number]     Expiration Date 11-2-24    POCT rapid strep A [[id number]] Collected: 08/08/24 1224    Specimen: Swab Updated: 08/08/24 1224     Rapid Strep A Screen Negative     Internal Control Passed     Lot Number [id number]     Expiration Date 8-31-25               Lab Results   Component Value Date    SARSANTIGEN Detected (A) 08/08/2024    COVID19 Not Detected 12/28/2020    RAPFLUA Negative 10/10/2022    RAPFLUB Negative 10/10/2022    FLUAAG Not Detected 08/08/2024    FLUBAG Not Detected 08/08/2024    RAPSCRN Negative 08/08/2024    BILIRUBINUR Negative 05/14/2024       Procedures        Assessment and Plan   Diagnoses and all orders for this visit:    1. COVID-19 (Primary)  -     POCT rapid strep A  -     POCT SARS-CoV-2 Antigen PATRIA + Flu  -     benzonatate (Tessalon Perles) 100 MG capsule; Take 1 capsule by mouth 3 (Three) Times a Day As Needed for Cough.  Dispense: 30 capsule; Refill: 0  -     Beta Strep Culture, Throat - , Throat; Future  -     Beta Strep Culture, Throat - Swab, Throat    2. Positive self-administered antigen test for COVID-19  -     POCT rapid strep A  -     POCT SARS-CoV-2 Antigen PATRIA + Flu         Pt was interested in paxlovid. Given pts minimal history and age under 65, I do not recommend at this time.         There are no discontinued medications.       Follow Up   No follow-ups on file.  Patient was given instructions and counseling regarding her condition or for health maintenance advice. Please see specific information pulled into the AVS if appropriate.       Taryn Almanza, SHELBIE  08/08/24  12:50 EDT

## 2024-08-08 NOTE — TELEPHONE ENCOUNTER
"Caller: Ema Cisse \"LEXI\"    Relationship: Self    Best call back number: [phone]     Requested Prescriptions:   Requested Prescriptions     Pending Prescriptions Disp Refills    Chlorcyclizine-Pseudoephed (Stahist AD) 25-60 MG tablet 42 tablet 0     Sig: Take 1 tablet by mouth Every 8 (Eight) Hours As Needed (congestion).        [facility] [address] [phone] [facility] [phone]      Last office visit with prescribing clinician: 5/22/2024   Last telemedicine visit with prescribing clinician: Visit date not found   Next office visit with prescribing clinician: 9/10/2024     Additional details provided by patient:     Does the patient have less than a 3 day supply:  [x] Yes  [] No    Would you like a call back once the refill request has been completed: [x] Yes [] No    If the office needs to give you a call back, can they leave a voicemail: [x] Yes [] No    Katie Zuluaga   08/08/24 09:53 EDT         "

## 2024-08-08 NOTE — TELEPHONE ENCOUNTER
"Caller: Ema Cisse \"LEXI\"    Relationship: Self    Best call back number: [phone]     What medication are you requesting: PAXLOVID    What are your current symptoms: HEADACHE, BODY ACHES, CONGESTION    How long have you been experiencing symptoms: 8/7/24    Have you had these symptoms before:    [x] Yes  [] No    Have you been treated for these symptoms before:   [] Yes  [x] No    If a prescription is needed, what is your preferred pharmacy and phone number: [facility] - [place], [place] - [address] [facility] [facility] [phone] [facility] [phone] [facility]     Additional notes: PATIENT TOOK HOME COVID TEST 8/8/24 AND IT WAS POSITIVE          "

## 2024-08-08 NOTE — PATIENT INSTRUCTIONS
Supportive care with rest, plenty of fluids, tylenol/ibuprofen for pain and/or fever as needed per label instructions.  You may find a cool-mist humidifier helpful as well as throat lozenges, Chloraseptic spray and warm salt water gargles.  Quarantine through Sunday this weekend and you may resume normal activities on Monday if symptoms are improved and you are fever free.  Should you develop shortness of breath, chest pain or worsening of symptoms please go to the ER.    If You Test Positive for COVID-19 (Isolate)  Everyone, regardless of vaccination status.    Stay home for a minimum of 5 days.  If you have no symptoms or your symptoms are resolving after 5 days, you can leave your house.  Continue to wear a mask around others for 5 additional days.  If you have a fever, continue to stay home until your fever resolves.    If You Were Exposed to Someone with COVID-19 (Quarantine)  If you:    Have been boosted  OR  Completed the primary series of Pfizer or Moderna vaccine within the last 6 months  OR  Completed the primary series of J&J vaccine within the last 2 months    Wear a mask around others for 10 days.  Test on day 5, if possible.  If you develop symptoms get a test and stay home.    If you:    Completed the primary series of Pfizer or Moderna vaccine over 6 months ago and are not boosted  OR  Completed the primary series of J&J over 2 months ago and are not boosted  OR  Are unvaccinated    Stay home for 5 days. After that continue to wear a mask around others for 5 additional days.  If you can´t quarantine you must wear a mask for 10 days.  Test on day 5 if possible.  If you develop symptoms get a test and stay home

## 2024-08-09 DIAGNOSIS — M15.0 PRIMARY GENERALIZED (OSTEO)ARTHRITIS: ICD-10-CM

## 2024-08-09 RX ORDER — NAPROXEN 500 MG/1
500 TABLET ORAL 2 TIMES DAILY PRN
Qty: 60 TABLET | Refills: 0 | Status: SHIPPED | OUTPATIENT
Start: 2024-08-09

## 2024-08-10 LAB — BACTERIA SPEC AEROBE CULT: NORMAL

## 2024-08-16 ENCOUNTER — TELEPHONE (OUTPATIENT)
Dept: GASTROENTEROLOGY | Facility: CLINIC | Age: 59
End: 2024-08-16
Payer: COMMERCIAL

## 2024-08-19 DIAGNOSIS — E74.31 SUCROSE INTOLERANCE DUE TO SUCRASE-ISOMALTASE DEFICIENCY: Primary | ICD-10-CM

## 2024-08-19 NOTE — TELEPHONE ENCOUNTER
Received a fax from [facility] stating that they need a new prescription for Sucraid.    Last OV: 03/13/2024  Next OV:09/19/2024

## 2024-08-20 RX ORDER — SACROSIDASE 8500 [IU]/ML
2 SOLUTION ORAL
Qty: 180 ML | Refills: 5 | Status: SHIPPED | OUTPATIENT
Start: 2024-08-20

## 2024-08-28 DIAGNOSIS — F51.01 PRIMARY INSOMNIA: ICD-10-CM

## 2024-08-28 RX ORDER — TRAZODONE HYDROCHLORIDE 50 MG/1
100 TABLET, FILM COATED ORAL NIGHTLY
Qty: 180 TABLET | Refills: 0 | Status: SHIPPED | OUTPATIENT
Start: 2024-08-28

## 2024-09-06 NOTE — PATIENT INSTRUCTIONS
Aerobic Exercise for a Healthy Heart  Exercise is a lot more than an energy booster and a stress reliever. It also strengthens your heart muscle, lowers your blood pressure and cholesterol, and burns calories. It can also improve your resting muscle tone, and your mood.     Choose an aerobic activity  Choose an activity that makes your heart and lungs work harder than they do when you rest or walk normally. This aerobic exercise can improve the way your heart and other muscles use oxygen. Make it fun by exercising with a friend and choosing an activity you enjoy. Here are some ideas:  Walking  Swimming  Bicycling  Stair climbing  Dancing  Jogging  Gardening  Exercise regularly  If you haven’t been exercising regularly,  get your doctor’s OK first. Then start slowly.  Here are some tips:  Begin exercising 3 times a week for 5 to 10 minutes at a time.  When you feel comfortable, add a few minutes each session.  Slowly build up to exercising 3 to 4 times each week. Each session should last for 40 minutes, on average, and involve moderate- to vigorous-intensity physical activity.  Your goal should be at least 30 minutes of moderate-intensity aerobic activity at least 5 days per week for a total of 150 or at least 25 minutes of vigorous aerobic activity at least 3 days per week for a total of 75 minutes  If you have been given nitroglycerin, be sure to carry it when you exercise.  If you get chest pain (angina) when you’re exercising, stop what you’re doing, take your nitroglycerin, and call your doctor.  [employer] last reviewed this educational content on 6/1/2019 © 2000-2020 [employer]. [address] [place] [address], [place] [address]. All rights reserved. This information is not intended as a substitute for professional medical care. Always follow your healthcare professional's instructions.        Exercise for a Healthier Heart     Exercise with a friend. When activity is fun, you're more likely to stick  When you know when test is scheduled, call office immediately to make appt for at least 3 days after test is complete     with it.   You may wonder how you can improve the health of your heart. If you’re thinking about exercise, you’re on the right track. You don’t need to become an athlete. But you do need a certain amount of brisk exercise to help strengthen your heart. If you have been diagnosed with a heart condition, your healthcare provider may advise exercise to help stabilize your condition. To help make exercise a habit, choose safe, fun activities.   Before you start  Check with your healthcare provider before starting an exercise program. This is especially important if you have not been active for a while. It's also important if you have a long-term (chronic) health problem such as heart disease, diabetes, or obesity. Or if you are at high risk for having these problems.   Why exercise?  Exercising regularly offers many healthy rewards. It can help you do all of the following:  Improve your blood cholesterol level to help prevent further heart trouble  Lower your blood pressure to help prevent a stroke or heart attack  Control diabetes, or reduce your risk of getting this disease  Improve your heart and lung function  Reach and stay at a healthy weight  Make your muscles stronger so you can stay active  Prevent falls and fractures by slowing the loss of bone mass (osteoporosis)  Manage stress better  Reduce your blood pressure  Improve your sense of self and your body image  Exercise tips    Ease into your routine. Set small goals. Then build on them. If you are not sure what your activity level should be, talk with your healthcare provider first before starting an exercise routine.  Exercise on most days. Aim for a total of 150 minutes (2 hours and 30 minutes) or more of moderate-intensity aerobic activity each week. Or 75 minutes (1 hour and 15 minutes) or more of vigorous-intensity aerobic activity each week. Or try for a combination of both. Moderate activity means that you breathe heavier and your heart rate increases  but you can still talk. Think about doing 40 minutes of moderate exercise, 3 to 4 times a week. For best results, activity should last for about 40 minutes to lower blood pressure and cholesterol. It's OK to work up to the 40-minute period over time. Examples of moderate-intensity activity are walking 1 mile in 15 minutes. Or doing 30 to 45 minutes of yard work.  Step up your daily activity level.  Along with your exercise program, try being more active the whole day. Walk instead of drive. Or park further away so that you take more steps each day. Do more household tasks or yard work. You may not be able to meet the advised mount of physical activity. But doing some moderate- or vigorous-intensity aerobic activity can help reduce your risk for heart disease. Your healthcare provider can help you figure out what is best for you.  Choose 1 or more activities you enjoy.  Walking is one of the easiest things you can do. You can also try swimming, riding a bike, dancing, or taking an exercise class.    When to call your healthcare provider  Call your healthcare provider if you have any of these:   Chest pain or feel dizzy or lightheaded  Burning, tightness, pressure, or heaviness in your chest, neck, shoulders, back, or arms  Abnormal shortness of breath  More joint or muscle pain  A very fast or irregular heartbeat (palpitations)  [employer] last reviewed this educational content on 7/1/2019 © 2000-2020 [employer]. [address] [place] [address]. All rights reserved. This information is not intended as a substitute for professional medical care. Always follow your healthcare professional's instructions.        Eating Heart-Healthy Foods  Eating has a big impact on your heart health. In fact, eating healthier can improve several of your heart risks at once. For instance, it helps you manage weight, cholesterol, and blood pressure. Here are ideas to help you make heart-healthy changes without  giving up all the foods and flavors you love.  Getting started  Talk with your healthcare provider about eating plans, such as the DASH or Mediterranean diet. You may also be referred to a dietitian.  Change a few things at a time. Give yourself time to get used to a few eating changes before adding more.  Work to create a tasty, healthy eating plan that you can stick to for the rest of your life.    Goals for healthy eating  Below are some tips to improve your eating habits:  Limit saturated fats and trans fats. Saturated fats raise your levels of cholesterol, so keep these fats to a minimum. They are found in foods such as fatty meats, whole milk, cheese, and palm and coconut oils. Avoid trans fats because they lower good cholesterol as well as raise bad cholesterol. Trans fats are most often found in processed foods.  Reduce sodium (salt) intake. Eating too much salt may increase your blood pressure. Limit your sodium intake to 2,300 milligrams (mg) per day (the amount in 1 teaspoon of salt), or less if your healthcare provider recommends it. Dining out less often and eating fewer processed foods are two great ways to decrease the amount of salt you consume.  Managing calories. A calorie is a unit of energy. Your body burns calories for fuel, but if you eat more calories than your body burns, the extras are stored as fat. Your healthcare provider can help you create a diet plan to manage your calories. This will likely include eating healthier foods as well as exercising regularly. To help you track your progress, keep a diary to record what you eat and how often you exercise.  Choose the right foods  Aim to make these foods staples of your diet. If you have diabetes, you may have different recommendations than what is listed here:  Fruits and vegetables provide plenty of nutrients without a lot of calories. At meals, fill half your plate with these foods. Split the other half of your plate between whole grains  and lean protein.  Whole grains are high in fiber and rich in vitamins and nutrients. Good choices include whole-wheat bread, pasta, and brown rice.  Lean proteins give you nutrition with less fat. Good choices include fish, skinless chicken, and beans.  Low-fat or nonfat dairy provides nutrients without a lot of fat. Try low-fat or nonfat milk, cheese, or yogurt.  Healthy fats can be good for you in small amounts. These are unsaturated fats, such as olive oil, nuts, and fish. Try to have at least 2 servings per week of fatty fish, such as salmon, sardines, mackerel, rainbow trout, and albacore tuna. These contain omega-3 fatty acids, which are good for your heart. Flaxseed is another source of a heart-healthy fat.  More on heart-healthy eating  Read food labels  Healthy eating starts at the grocery store. Be sure to pay attention to food labels on packaged foods. Look for products that are high in fiber and protein, and low in saturated fat, cholesterol, and sodium. Avoid products that contain trans fat. And pay close attention to serving size. For instance, if you plan to eat two servings, double all the numbers on the label.  Prepare food right  A key part of healthy cooking is cutting down on added fat and salt. Look on the internet for lower-fat, lower-sodium recipes. Also, try these tips:  Remove fat from meat and skin from poultry before cooking.  Skim fat from the surface of soups and sauces.  Broil, boil, bake, steam, grill, and microwave food without added fats.  Choose ingredients that spice up your food without adding calories, fat, or sodium. Try these items: horseradish, hot sauce, lemon, mustard, nonfat salad dressings, and vinegar. For salt-free herbs and spices, try basil, cilantro, cinnamon, pepper, and rosemary.  Date Last Reviewed: 10/1/2017  © 5594-0495 [employer]. [address] [place] [address], [place] [address]. All rights reserved. This information is not intended as a substitute for  professional medical care. Always follow your healthcare professional's instructions.

## 2024-09-10 ENCOUNTER — OFFICE VISIT (OUTPATIENT)
Dept: FAMILY MEDICINE CLINIC | Age: 59
End: 2024-09-10
Payer: COMMERCIAL

## 2024-09-10 VITALS
WEIGHT: 178 LBS | TEMPERATURE: 97.8 F | BODY MASS INDEX: 29.66 KG/M2 | HEART RATE: 79 BPM | DIASTOLIC BLOOD PRESSURE: 83 MMHG | HEIGHT: 65 IN | OXYGEN SATURATION: 97 % | SYSTOLIC BLOOD PRESSURE: 123 MMHG

## 2024-09-10 DIAGNOSIS — K59.04 CHRONIC IDIOPATHIC CONSTIPATION: Primary | ICD-10-CM

## 2024-09-10 DIAGNOSIS — F34.9 PERSISTENT MOOD (AFFECTIVE) DISORDER, UNSPECIFIED: ICD-10-CM

## 2024-09-10 DIAGNOSIS — G43.109 MIGRAINE WITH AURA, NOT INTRACTABLE, WITHOUT STATUS MIGRAINOSUS: ICD-10-CM

## 2024-09-10 PROCEDURE — 99214 OFFICE O/P EST MOD 30 MIN: CPT | Performed by: NURSE PRACTITIONER

## 2024-09-10 RX ORDER — SEMAGLUTIDE 1.7 MG/.75ML
1.7 INJECTION, SOLUTION SUBCUTANEOUS
Qty: 10 ML | Refills: 1 | Status: SHIPPED | OUTPATIENT
Start: 2024-09-10

## 2024-09-10 RX ORDER — AMOXICILLIN 250 MG
1 CAPSULE ORAL DAILY
Qty: 90 TABLET | Refills: 1 | Status: SHIPPED | OUTPATIENT
Start: 2024-09-10

## 2024-09-10 RX ORDER — LUBIPROSTONE 8 UG/1
8 CAPSULE ORAL 2 TIMES DAILY WITH MEALS
Qty: 180 CAPSULE | Refills: 0 | Status: SHIPPED | OUTPATIENT
Start: 2024-09-10

## 2024-09-10 RX ORDER — ESCITALOPRAM OXALATE 20 MG/1
30 TABLET ORAL DAILY
Qty: 135 TABLET | Refills: 0 | Status: SHIPPED | OUTPATIENT
Start: 2024-09-10

## 2024-09-10 NOTE — ASSESSMENT & PLAN NOTE
Will order amitiza to start. Keep scheduled GI follow up for continued management. GLP-1 use may be contributing as well.

## 2024-09-10 NOTE — PROGRESS NOTES
Chief Complaint  Ema Cisse presents to [facility] for Chronic idiopathic constipation    Subjective          History of Present Illness    Amy is here today for follow up.   She is on wegovy for weight loss. Her weight is the same as last visit with me. She reports that she has been watching what she eats. No longer eating sweets. She is meeting with  monthly with her insurance. Does do some exercise with walking.   She is also on medication for anxiety. Current medication includes Lexapro 30 mg daily. She is also taking trazodone 50 mg nightly to help with sleep. She has been on several medication in the past for anxiety. Buspirone was ineffective. Trintellix caused rash. Stable on current treatment.   She is on sumatriptan as needed for migraines as well.   Has been dealing with constipation which she feels is contributing to her difficulty in losing weight. Taking metamucil and stool softener. Saw GI and prescribed Linzess, Trulance, Ibsrela caused diarrhea. Had CT abd/pelvis. Has follow up scheduled with gastroenterology.     Review of Systems      Allergies   Allergen Reactions    Sulfa Antibiotics Rash    Trintellix [Vortioxetine] Itching      Past Medical History:   Diagnosis Date    Allergic     Arthritis 2015    Cholelithiasis 1985    GERD (gastroesophageal reflux disease)     Irritable bowel syndrome     Low back pain 2009     Current Outpatient Medications   Medication Sig Dispense Refill    albuterol sulfate  (90 Base) MCG/ACT inhaler Inhale 2 puffs Every 4 (Four) Hours As Needed.      benzonatate (Tessalon Perles) 100 MG capsule Take 1 capsule by mouth 3 (Three) Times a Day As Needed for Cough. 30 capsule 0    Cetirizine HCl 10 MG capsule Take 10 mg by mouth As Needed.      Chlorcyclizine-Pseudoephed (Stahist AD) 25-60 MG tablet Take 1 tablet by mouth Every 8 (Eight) Hours As Needed (congestion). 42 tablet 0    diclofenac (VOLTAREN) 75 MG EC tablet  Take 1 tablet by mouth 2 (Two) Times a Day.      escitalopram (LEXAPRO) 20 MG tablet Take 1.5 tablets by mouth Daily. 135 tablet 0    famotidine (Pepcid) 40 MG tablet Take 1 tablet by mouth 2 (Two) Times a Day As Needed for Heartburn. 180 tablet 1    fluticasone (Flonase) 50 MCG/ACT nasal spray 2 sprays into the nostril(s) as directed by provider Daily. 18.2 mL 0    Insulin Pen Needle (Novofine Pen Needle) 32G X 6 MM misc 1 each Daily. 100 each 0    montelukast (SINGULAIR) 10 MG tablet TAKE 1 TABLET BY MOUTH DAILY. 90 tablet 0    multivitamin with minerals tablet tablet Take 1 tablet by mouth Daily.      naproxen (NAPROSYN) 500 MG tablet TAKE 1 TABLET BY MOUTH TWICE DAILY AS NEEDED FOR MILD PAIN 60 tablet 0    pantoprazole (PROTONIX) 40 MG EC tablet TAKE 1 TABLET BY MOUTH DAILY. 90 tablet 0    sennosides-docusate (senna-docusate sodium) 8.6-50 MG per tablet Take 1 tablet by mouth Daily. 90 tablet 1    Sucraid 8500 UNIT/ML solution Take 2 mL by mouth 3 (Three) Times a Day With Meals. 180 mL 5    SUMAtriptan (IMITREX) 50 MG tablet TAKE ONE TABLET AT ONSET OF HEADACHE. MAY REPEAT DOSE ONE TIME IN 2 HOURS IF HEADACHE NOT RELIEVED. 9 tablet 0    traZODone (DESYREL) 50 MG tablet TAKE 2 TABLETS BY MOUTH EVERY NIGHT. 180 tablet 0    lubiprostone (Amitiza) 8 MCG capsule Take 1 capsule by mouth 2 (Two) Times a Day With Meals. 180 capsule 0    Semaglutide-Weight Management (Wegovy) 1.7 MG/0.75ML solution auto-injector Inject 0.75 mL under the skin into the appropriate area as directed Every 7 (Seven) Days. 10 mL 1     No current facility-administered medications for this visit.     Past Surgical History:   Procedure Laterality Date    BILATERAL BREAST REDUCTION  11/13/2023    BUNIONECTOMY  2008    CHOLECYSTECTOMY  1984    COLONOSCOPY  2017    COLONOSCOPY  2017    COLONOSCOPY N/A 07/27/2022    Procedure: COLONOSCOPY WITH BIOPSIES;  Surgeon: Halima Fitzpatrick MD;  Location: [facility];  Service: Gastroenterology;   Laterality: N/A;  DIVERTICULOSIS, HEMORRHOIDS    CYSTECTOMY      EPIDURAL Right 11/03/2021    Procedure: Right L5 LUMBAR/SACRAL TRANSFORAMINAL EPIDURAL;  Surgeon: Jeronimo Lind MD;  Location: [facility];  Service: Pain Management;  Laterality: Right;    EPIDURAL Bilateral 03/17/2022    Procedure: Right L5 LUMBAR/SACRAL TRANSFORAMINAL EPIDURAL;  Surgeon: Jeronimo Lind MD;  Location: [facility];  Service: Pain Management;  Laterality: Bilateral;    KNEE SURGERY  07/2005    ACL Removal    MEDIAL BRANCH BLOCK Left 12/14/2021    Procedure: Left C3-C5 MEDIAL BRANCH BLOCK;  Surgeon: Jeronimo Lind MD;  Location: [facility];  Service: Pain Management;  Laterality: Left;    MEDIAL BRANCH BLOCK Left 02/08/2022    Procedure: Left C3-C5 MEDIAL BRANCH BLOCK;  Surgeon: Jeronimo Lind MD;  Location: [facility];  Service: Pain Management;  Laterality: Left;    RADIOFREQUENCY ABLATION Left 03/03/2022    Procedure: RADIOFREQUENCY ABLATION CERVICAL C3-C5;  Surgeon: Jeronimo Lind MD;  Location: [facility];  Service: Pain Management;  Laterality: Left;    SINUS SURGERY  2010    UPPER GASTROINTESTINAL ENDOSCOPY  2002      Social History     Tobacco Use    Smoking status: Never     Passive exposure: Never    Smokeless tobacco: Never   Vaping Use    Vaping status: Never Used   Substance Use Topics    Alcohol use: Yes     Alcohol/week: 1.0 - 2.0 standard drink of alcohol     Types: 1 - 2 Drinks containing 0.5 oz of alcohol per week     Comment: Socially    Drug use: Never     Family History   Problem Relation Age of Onset    Hypertension Mother     Heart disease Father     Lung cancer Father     No Known Problems Sister     No Known Problems Daughter     No Known Problems Maternal Aunt     No Known Problems Paternal Aunt     No Known Problems Maternal Grandmother     No Known Problems Maternal Grandfather     No Known Problems Paternal Grandmother     No Known Problems Paternal Grandfather      "Hypertension Other     Heart disease Other     BRCA 1/2 Neg Hx     Breast cancer Neg Hx     Colon cancer Neg Hx     Endometrial cancer Neg Hx     Ovarian cancer Neg Hx     Malig Hyperthermia Neg Hx      Health Maintenance Due   Topic Date Due    MAMMOGRAM  02/10/2024    COVID-19 Vaccine (4 - 2023-24 season) 09/01/2024    INFLUENZA VACCINE  08/01/2024      Immunization History   Administered Date(s) Administered    COVID-19 (MODERNA) 1st,2nd,3rd Dose Monovalent 02/05/2021, 03/05/2021, 11/19/2021    Flu Vaccine Split Quad 09/25/2019, 09/23/2021, 09/15/2022    Flublok 18+yrs 09/15/2022    Flucelvax Quad Vial =>4yrs 09/28/2020    Fluzone (or Fluarix & Flulaval for VFC) >6mos 10/11/2023    Hepatitis A 05/02/2018, 11/20/2018    Influenza, Unspecified 10/01/2022    Shingrix 05/17/2022, 01/11/2023    Tdap 05/29/2019        Objective     Vitals:    09/10/24 1609   BP: 123/83   Pulse: 79   Temp: 97.8 °F (36.6 °C)   TempSrc: Oral   SpO2: 97%   Weight: 80.7 kg (178 lb)   Height: 165.1 cm (65\")     Body mass index is 29.62 kg/m².                No results found.    Physical Exam  Vitals reviewed.   Constitutional:       General: She is not in acute distress.     Appearance: Normal appearance. She is well-developed.   HENT:      Head: Normocephalic and atraumatic.   Cardiovascular:      Rate and Rhythm: Normal rate and regular rhythm.   Pulmonary:      Effort: Pulmonary effort is normal.      Breath sounds: Normal breath sounds.   Neurological:      Mental Status: She is alert and oriented to person, place, and time.   Psychiatric:         Mood and Affect: Mood and affect normal.           Result Review :                               Assessment and Plan      Assessment & Plan  Chronic idiopathic constipation  Will order amitiza to start. Keep scheduled GI follow up for continued management. GLP-1 use may be contributing as well.   BMI 29.0-29.9,adult  Increase efforts with healthy diet and exercise. More protein, fiber, reduce " carbohydrates. Drink plenty of water. Will increase Wegovy dose.   Persistent mood (affective) disorder, unspecified    Medical condition is stable.  Continue same therapy.  Will recheck at next regular appointment    Migraine with aura, not intractable, without status migrainosus    Medical condition is stable.  Continue same therapy.  Will recheck at next regular appointment             New Medications Ordered This Visit   Medications    lubiprostone (Amitiza) 8 MCG capsule     Sig: Take 1 capsule by mouth 2 (Two) Times a Day With Meals.     Dispense:  180 capsule     Refill:  0    Semaglutide-Weight Management (Wegovy) 1.7 MG/0.75ML solution auto-injector     Sig: Inject 0.75 mL under the skin into the appropriate area as directed Every 7 (Seven) Days.     Dispense:  10 mL     Refill:  1    sennosides-docusate (senna-docusate sodium) 8.6-50 MG per tablet     Sig: Take 1 tablet by mouth Daily.     Dispense:  90 tablet     Refill:  1    escitalopram (LEXAPRO) 20 MG tablet     Sig: Take 1.5 tablets by mouth Daily.     Dispense:  135 tablet     Refill:  0                 Follow Up     Return in about 3 months (around 12/10/2024).

## 2024-09-11 ENCOUNTER — TELEPHONE (OUTPATIENT)
Dept: FAMILY MEDICINE CLINIC | Age: 59
End: 2024-09-11
Payer: COMMERCIAL

## 2024-09-11 NOTE — TELEPHONE ENCOUNTER
Approved  Reference number -n/[id number]  From :09/11/2024 to 09/11/2025.  Patient notified via voicemail.bre

## 2024-10-29 DIAGNOSIS — K21.9 GASTROESOPHAGEAL REFLUX DISEASE, UNSPECIFIED WHETHER ESOPHAGITIS PRESENT: ICD-10-CM

## 2024-10-29 RX ORDER — PANTOPRAZOLE SODIUM 40 MG/1
40 TABLET, DELAYED RELEASE ORAL DAILY
Qty: 90 TABLET | Refills: 0 | Status: SHIPPED | OUTPATIENT
Start: 2024-10-29

## 2024-11-01 NOTE — TELEPHONE ENCOUNTER
Patient informed [FreeTextEntry1] : Pt is a 83 yo Female with PMHx of hypothyroidism, back pain and peripheral neuropathy 9/23 Hospitalization. S/P inferolateral STEMI  S/p PCI/JOVON OM1 x 2 on 9/13/23 and staged PCI with JOVON to mRCA.  TTE 09/14/23 50%-55% Grade I diastolic dysfunction  Pt denies chest pain, no SOB, no edema  Tolerates all medications well.  No s.s of bleeding Pt c/o chronic back pain was on tramadol QD prior to MI  OOB with R/W  + fatigue, takes short naps during the day ET decreased due to pain  10/22/24  Chol 130 LDL 53 TRIG 106 HgbA1C 5.9

## 2024-11-04 ENCOUNTER — PRE-ADMISSION TESTING (OUTPATIENT)
Dept: PREADMISSION TESTING | Facility: HOSPITAL | Age: 59
End: 2024-11-04
Payer: COMMERCIAL

## 2024-11-04 VITALS
HEIGHT: 62 IN | WEIGHT: 167.7 LBS | DIASTOLIC BLOOD PRESSURE: 79 MMHG | SYSTOLIC BLOOD PRESSURE: 121 MMHG | OXYGEN SATURATION: 94 % | RESPIRATION RATE: 16 BRPM | BODY MASS INDEX: 30.86 KG/M2 | TEMPERATURE: 97 F | HEART RATE: 93 BPM

## 2024-11-04 LAB
ANION GAP SERPL CALCULATED.3IONS-SCNC: 10 MMOL/L (ref 5–15)
BUN SERPL-MCNC: 10 MG/DL (ref 8–23)
BUN/CREAT SERPL: 10.1 (ref 7–25)
CALCIUM SPEC-SCNC: 9.1 MG/DL (ref 8.6–10.5)
CHLORIDE SERPL-SCNC: 106 MMOL/L (ref 98–107)
CO2 SERPL-SCNC: 21 MMOL/L (ref 22–29)
CREAT SERPL-MCNC: 0.99 MG/DL (ref 0.57–1)
DEPRECATED RDW RBC AUTO: 42.4 FL (ref 37–54)
EGFRCR SERPLBLD CKD-EPI 2021: 65.4 ML/MIN/1.73
ERYTHROCYTE [DISTWIDTH] IN BLOOD BY AUTOMATED COUNT: 12.8 % (ref 12.3–15.4)
GLUCOSE SERPL-MCNC: 74 MG/DL (ref 65–99)
HCT VFR BLD AUTO: 40.2 % (ref 34–46.6)
HGB BLD-MCNC: 13.7 G/DL (ref 12–15.9)
MCH RBC QN AUTO: 31.1 PG (ref 26.6–33)
MCHC RBC AUTO-ENTMCNC: 34.1 G/DL (ref 31.5–35.7)
MCV RBC AUTO: 91.2 FL (ref 79–97)
PLATELET # BLD AUTO: 300 10*3/MM3 (ref 140–450)
PMV BLD AUTO: 10.2 FL (ref 6–12)
POTASSIUM SERPL-SCNC: 3.6 MMOL/L (ref 3.5–5.2)
RBC # BLD AUTO: 4.41 10*6/MM3 (ref 3.77–5.28)
SODIUM SERPL-SCNC: 137 MMOL/L (ref 136–145)
WBC NRBC COR # BLD AUTO: 7.43 10*3/MM3 (ref 3.4–10.8)

## 2024-11-04 PROCEDURE — 85027 COMPLETE CBC AUTOMATED: CPT

## 2024-11-04 PROCEDURE — 36415 COLL VENOUS BLD VENIPUNCTURE: CPT

## 2024-11-04 PROCEDURE — 80048 BASIC METABOLIC PNL TOTAL CA: CPT

## 2024-11-04 NOTE — DISCHARGE INSTRUCTIONS
Take the following medications the morning of surgery:    PANTOPRAZOLE  LEXAPRO    If you are on prescription narcotic pain medication to control your pain you may also take that medication the morning of surgery.      General Instructions:     Do not eat solid food after midnight the night before surgery.  Clear liquids day of surgery are allowed but must be stopped at least two hours before your hospital arrival time.       Allowed clear liquids      Water, sodas, and tea or coffee with no cream or milk added.       12 to 20 ounces of a clear liquid that contains carbohydrates is recommended.  If non-diabetic, have Gatorade or Powerade.  If diabetic, have G2 or Powerade Zero.     Do not have liquids red in color.  Do not consume chicken, beef, pork or vegetable broth or bouillon cubes of any variety as they are not considered clear liquids and are not allowed.      Infants may have breast milk up to four hours before surgery.  Infants drinking formula may drink formula up to six hours before surgery.   Patients who avoid smoking, chewing tobacco and alcohol for 4 weeks prior to surgery have a reduced risk of post-operative complications.  Quit smoking as many days before surgery as you can.  Do not smoke, use chewing tobacco or drink alcohol the day of surgery.   If applicable bring your C-PAP/ BI-PAP machine in with you to preop day of surgery.  Bring any papers given to you in the doctor’s office.  Wear clean comfortable clothes.  Do not wear contact lenses, false eyelashes or make-up.  Bring a case for your glasses.   Bring crutches or walker if applicable.  Remove all piercings.  Leave jewelry and any other valuables at home.  Hair extensions with metal clips must be removed prior to surgery.  The Pre-Admission Testing nurse will instruct you to bring medications if unable to obtain an accurate list in Pre-Admission Testing.        If you were given a blood bank ID arm band remember to bring it with you the  day of surgery.    Preventing a Surgical Site Infection:  For 2 to 3 days before surgery, avoid shaving with a razor because the razor can irritate skin and make it easier to develop an infection.    Any areas of open skin can increase the risk of a post-operative wound infection by allowing bacteria to enter and travel throughout the body.  Notify your surgeon if you have any skin wounds / rashes even if it is not near the expected surgical site.  The area will need assessed to determine if surgery should be delayed until it is healed.  The night prior to surgery shower using a fresh bar of anti-bacterial soap (such as Dial) and clean washcloth.  Sleep in a clean bed with clean clothing.  Do not allow pets to sleep with you.  Shower on the morning of surgery using a fresh bar of anti-bacterial soap (such as Dial) and clean washcloth.  Dry with a clean towel and dress in clean clothing.  Ask your surgeon if you will be receiving antibiotics prior to surgery.  Make sure you, your family, and all healthcare providers clean their hands with soap and water or an alcohol based hand  before caring for you or your wound.    Day of surgery:  Your arrival time is approximately two hours before your scheduled surgery time.  Please note if you have an early arrival time the surgery doors do not open before 5:00 AM.  Upon arrival, a Pre-op nurse and Anesthesiologist will review your health history, obtain vital signs, and answer questions you may have.  The only belongings needed at this time will be a list of your home medications and if applicable your C-PAP/BI-PAP machine.  A Pre-op nurse will start an IV and you may receive medication in preparation for surgery, including something to help you relax.     Please be aware that surgery does come with discomfort.  We want to make every effort to control your discomfort so please discuss any uncontrolled symptoms with your nurse.   Your doctor will most likely have  prescribed pain medications.      If you are going home after surgery you will receive individualized written care instructions before being discharged.  A responsible adult must drive you to and from the hospital on the day of your surgery and ideally stay with you through the night.   .  Discharge prescriptions can be filled by the hospital pharmacy during regular pharmacy hours.  If you are having surgery late in the day/evening your prescription may be e-prescribed to your pharmacy.  Please verify your pharmacy hours or chose a 24 hour pharmacy to avoid not having access to your prescription because your pharmacy has closed for the day.    If you are staying overnight following surgery, you will be transported to your hospital room following the recovery period.  [facility] has all private rooms.    If you have any questions please call Pre-Admission Testing at [phone].  Deductibles and co-payments are collected on the day of service. Please be prepared to pay the required co-pay, deductible or deposit on the day of service as defined by your plan.    Call your surgeon immediately if you experience any of the following symptoms:  Sore Throat  Shortness of Breath or difficulty breathing  Cough  Chills  Body soreness or muscle pain  Headache  Fever  New loss of taste or smell  Do not arrive for your surgery ill.  Your procedure will need to be rescheduled to another time.  You will need to call your physician before the day of surgery to avoid any unnecessary exposure to hospital staff as well as other patients.

## 2024-11-19 ENCOUNTER — ANESTHESIA EVENT (OUTPATIENT)
Dept: PERIOP | Facility: HOSPITAL | Age: 59
End: 2024-11-19
Payer: COMMERCIAL

## 2024-11-19 ENCOUNTER — HOSPITAL ENCOUNTER (OUTPATIENT)
Facility: HOSPITAL | Age: 59
Setting detail: HOSPITAL OUTPATIENT SURGERY
Discharge: HOME | End: 2024-11-19
Attending: PLASTIC SURGERY | Admitting: PLASTIC SURGERY
Payer: COMMERCIAL

## 2024-11-19 ENCOUNTER — ANESTHESIA (OUTPATIENT)
Dept: PERIOP | Facility: HOSPITAL | Age: 59
End: 2024-11-19
Payer: COMMERCIAL

## 2024-11-19 VITALS
OXYGEN SATURATION: 91 % | RESPIRATION RATE: 18 BRPM | HEART RATE: 97 BPM | DIASTOLIC BLOOD PRESSURE: 75 MMHG | TEMPERATURE: 97.8 F | SYSTOLIC BLOOD PRESSURE: 114 MMHG

## 2024-11-19 DIAGNOSIS — Z98.890 S/P SCAR REVISION: Primary | ICD-10-CM

## 2024-11-19 PROCEDURE — 25810000003 LACTATED RINGERS PER 1000 ML: Performed by: ANESTHESIOLOGY

## 2024-11-19 PROCEDURE — 25010000002 HYDROMORPHONE 1 MG/ML SOLUTION: Performed by: REGISTERED NURSE

## 2024-11-19 PROCEDURE — 25010000002 DEXAMETHASONE PER 1 MG: Performed by: REGISTERED NURSE

## 2024-11-19 PROCEDURE — 25010000002 CEFAZOLIN PER 500 MG: Performed by: PLASTIC SURGERY

## 2024-11-19 PROCEDURE — 25010000002 KETOROLAC TROMETHAMINE PER 15 MG: Performed by: REGISTERED NURSE

## 2024-11-19 PROCEDURE — 25010000002 PROPOFOL 500 MG/50ML EMULSION: Performed by: REGISTERED NURSE

## 2024-11-19 PROCEDURE — 25010000002 ONDANSETRON PER 1 MG: Performed by: REGISTERED NURSE

## 2024-11-19 PROCEDURE — 25010000002 MIDAZOLAM PER 1 MG: Performed by: ANESTHESIOLOGY

## 2024-11-19 PROCEDURE — 25010000002 LIDOCAINE 2% SOLUTION: Performed by: REGISTERED NURSE

## 2024-11-19 PROCEDURE — 25010000002 PROPOFOL 10 MG/ML EMULSION: Performed by: REGISTERED NURSE

## 2024-11-19 PROCEDURE — 25010000002 DROPERIDOL PER 5 MG: Performed by: REGISTERED NURSE

## 2024-11-19 RX ORDER — FLUMAZENIL 0.1 MG/ML
0.2 INJECTION INTRAVENOUS AS NEEDED
Status: DISCONTINUED | OUTPATIENT
Start: 2024-11-19 | End: 2024-11-19 | Stop reason: HOSPADM

## 2024-11-19 RX ORDER — BUPIVACAINE HYDROCHLORIDE AND EPINEPHRINE 5; 5 MG/ML; UG/ML
INJECTION, SOLUTION EPIDURAL; INTRACAUDAL; PERINEURAL AS NEEDED
Status: DISCONTINUED | OUTPATIENT
Start: 2024-11-19 | End: 2024-11-19 | Stop reason: HOSPADM

## 2024-11-19 RX ORDER — LIDOCAINE HYDROCHLORIDE 10 MG/ML
0.5 INJECTION, SOLUTION INFILTRATION; PERINEURAL ONCE AS NEEDED
Status: DISCONTINUED | OUTPATIENT
Start: 2024-11-19 | End: 2024-11-19 | Stop reason: HOSPADM

## 2024-11-19 RX ORDER — HYDROMORPHONE HYDROCHLORIDE 1 MG/ML
0.5 INJECTION, SOLUTION INTRAMUSCULAR; INTRAVENOUS; SUBCUTANEOUS
Status: DISCONTINUED | OUTPATIENT
Start: 2024-11-19 | End: 2024-11-19 | Stop reason: HOSPADM

## 2024-11-19 RX ORDER — ONDANSETRON 2 MG/ML
4 INJECTION INTRAMUSCULAR; INTRAVENOUS ONCE AS NEEDED
Status: DISCONTINUED | OUTPATIENT
Start: 2024-11-19 | End: 2024-11-19 | Stop reason: HOSPADM

## 2024-11-19 RX ORDER — HYDRALAZINE HYDROCHLORIDE 20 MG/ML
5 INJECTION INTRAMUSCULAR; INTRAVENOUS
Status: DISCONTINUED | OUTPATIENT
Start: 2024-11-19 | End: 2024-11-19 | Stop reason: HOSPADM

## 2024-11-19 RX ORDER — MAGNESIUM HYDROXIDE 1200 MG/15ML
LIQUID ORAL AS NEEDED
Status: DISCONTINUED | OUTPATIENT
Start: 2024-11-19 | End: 2024-11-19 | Stop reason: HOSPADM

## 2024-11-19 RX ORDER — KETOROLAC TROMETHAMINE 30 MG/ML
INJECTION, SOLUTION INTRAMUSCULAR; INTRAVENOUS AS NEEDED
Status: DISCONTINUED | OUTPATIENT
Start: 2024-11-19 | End: 2024-11-19 | Stop reason: SURG

## 2024-11-19 RX ORDER — LIDOCAINE HYDROCHLORIDE 20 MG/ML
INJECTION, SOLUTION INFILTRATION; PERINEURAL AS NEEDED
Status: DISCONTINUED | OUTPATIENT
Start: 2024-11-19 | End: 2024-11-19 | Stop reason: SURG

## 2024-11-19 RX ORDER — DIPHENHYDRAMINE HYDROCHLORIDE 50 MG/ML
12.5 INJECTION INTRAMUSCULAR; INTRAVENOUS
Status: DISCONTINUED | OUTPATIENT
Start: 2024-11-19 | End: 2024-11-19 | Stop reason: HOSPADM

## 2024-11-19 RX ORDER — FAMOTIDINE 10 MG/ML
20 INJECTION, SOLUTION INTRAVENOUS ONCE
Status: COMPLETED | OUTPATIENT
Start: 2024-11-19 | End: 2024-11-19

## 2024-11-19 RX ORDER — EPHEDRINE SULFATE 50 MG/ML
5 INJECTION, SOLUTION INTRAVENOUS ONCE AS NEEDED
Status: DISCONTINUED | OUTPATIENT
Start: 2024-11-19 | End: 2024-11-19 | Stop reason: HOSPADM

## 2024-11-19 RX ORDER — PROMETHAZINE HYDROCHLORIDE 25 MG/1
25 SUPPOSITORY RECTAL ONCE AS NEEDED
Status: DISCONTINUED | OUTPATIENT
Start: 2024-11-19 | End: 2024-11-19 | Stop reason: HOSPADM

## 2024-11-19 RX ORDER — FENTANYL CITRATE 50 UG/ML
50 INJECTION, SOLUTION INTRAMUSCULAR; INTRAVENOUS
Status: DISCONTINUED | OUTPATIENT
Start: 2024-11-19 | End: 2024-11-19 | Stop reason: HOSPADM

## 2024-11-19 RX ORDER — HYDROCODONE BITARTRATE AND ACETAMINOPHEN 5; 325 MG/1; MG/1
1 TABLET ORAL ONCE AS NEEDED
Status: DISCONTINUED | OUTPATIENT
Start: 2024-11-19 | End: 2024-11-19 | Stop reason: HOSPADM

## 2024-11-19 RX ORDER — FENTANYL CITRATE 50 UG/ML
50 INJECTION, SOLUTION INTRAMUSCULAR; INTRAVENOUS ONCE AS NEEDED
Status: DISCONTINUED | OUTPATIENT
Start: 2024-11-19 | End: 2024-11-19 | Stop reason: HOSPADM

## 2024-11-19 RX ORDER — SODIUM CHLORIDE, SODIUM LACTATE, POTASSIUM CHLORIDE, CALCIUM CHLORIDE 600; 310; 30; 20 MG/100ML; MG/100ML; MG/100ML; MG/100ML
9 INJECTION, SOLUTION INTRAVENOUS CONTINUOUS
Status: DISCONTINUED | OUTPATIENT
Start: 2024-11-19 | End: 2024-11-19 | Stop reason: HOSPADM

## 2024-11-19 RX ORDER — HYDROCODONE BITARTRATE AND ACETAMINOPHEN 5; 325 MG/1; MG/1
1 TABLET ORAL EVERY 6 HOURS PRN
Qty: 16 TABLET | Refills: 0 | Status: SHIPPED | OUTPATIENT
Start: 2024-11-19

## 2024-11-19 RX ORDER — MIDAZOLAM HYDROCHLORIDE 1 MG/ML
1 INJECTION, SOLUTION INTRAMUSCULAR; INTRAVENOUS
Status: DISCONTINUED | OUTPATIENT
Start: 2024-11-19 | End: 2024-11-19 | Stop reason: HOSPADM

## 2024-11-19 RX ORDER — ONDANSETRON 2 MG/ML
INJECTION INTRAMUSCULAR; INTRAVENOUS AS NEEDED
Status: DISCONTINUED | OUTPATIENT
Start: 2024-11-19 | End: 2024-11-19 | Stop reason: SURG

## 2024-11-19 RX ORDER — NALOXONE HCL 0.4 MG/ML
0.2 VIAL (ML) INJECTION AS NEEDED
Status: DISCONTINUED | OUTPATIENT
Start: 2024-11-19 | End: 2024-11-19 | Stop reason: HOSPADM

## 2024-11-19 RX ORDER — EPHEDRINE SULFATE 50 MG/ML
INJECTION, SOLUTION INTRAVENOUS AS NEEDED
Status: DISCONTINUED | OUTPATIENT
Start: 2024-11-19 | End: 2024-11-19 | Stop reason: SURG

## 2024-11-19 RX ORDER — SODIUM CHLORIDE 0.9 % (FLUSH) 0.9 %
3 SYRINGE (ML) INJECTION EVERY 12 HOURS SCHEDULED
Status: DISCONTINUED | OUTPATIENT
Start: 2024-11-19 | End: 2024-11-19 | Stop reason: HOSPADM

## 2024-11-19 RX ORDER — DEXAMETHASONE SODIUM PHOSPHATE 4 MG/ML
INJECTION, SOLUTION INTRA-ARTICULAR; INTRALESIONAL; INTRAMUSCULAR; INTRAVENOUS; SOFT TISSUE AS NEEDED
Status: DISCONTINUED | OUTPATIENT
Start: 2024-11-19 | End: 2024-11-19 | Stop reason: SURG

## 2024-11-19 RX ORDER — SCOLOPAMINE TRANSDERMAL SYSTEM 1 MG/1
1 PATCH, EXTENDED RELEASE TRANSDERMAL ONCE
Status: DISCONTINUED | OUTPATIENT
Start: 2024-11-19 | End: 2024-11-19 | Stop reason: HOSPADM

## 2024-11-19 RX ORDER — DROPERIDOL 2.5 MG/ML
0.62 INJECTION, SOLUTION INTRAMUSCULAR; INTRAVENOUS
Status: DISCONTINUED | OUTPATIENT
Start: 2024-11-19 | End: 2024-11-19 | Stop reason: HOSPADM

## 2024-11-19 RX ORDER — IPRATROPIUM BROMIDE AND ALBUTEROL SULFATE 2.5; .5 MG/3ML; MG/3ML
3 SOLUTION RESPIRATORY (INHALATION) ONCE AS NEEDED
Status: DISCONTINUED | OUTPATIENT
Start: 2024-11-19 | End: 2024-11-19 | Stop reason: HOSPADM

## 2024-11-19 RX ORDER — PROPOFOL 10 MG/ML
INJECTION, EMULSION INTRAVENOUS AS NEEDED
Status: DISCONTINUED | OUTPATIENT
Start: 2024-11-19 | End: 2024-11-19 | Stop reason: SURG

## 2024-11-19 RX ORDER — PROMETHAZINE HYDROCHLORIDE 25 MG/1
25 TABLET ORAL ONCE AS NEEDED
Status: DISCONTINUED | OUTPATIENT
Start: 2024-11-19 | End: 2024-11-19 | Stop reason: HOSPADM

## 2024-11-19 RX ORDER — LABETALOL HYDROCHLORIDE 5 MG/ML
5 INJECTION, SOLUTION INTRAVENOUS
Status: DISCONTINUED | OUTPATIENT
Start: 2024-11-19 | End: 2024-11-19 | Stop reason: HOSPADM

## 2024-11-19 RX ORDER — SODIUM CHLORIDE 0.9 % (FLUSH) 0.9 %
3-10 SYRINGE (ML) INJECTION AS NEEDED
Status: DISCONTINUED | OUTPATIENT
Start: 2024-11-19 | End: 2024-11-19 | Stop reason: HOSPADM

## 2024-11-19 RX ORDER — OXYCODONE AND ACETAMINOPHEN 7.5; 325 MG/1; MG/1
1 TABLET ORAL EVERY 4 HOURS PRN
Status: DISCONTINUED | OUTPATIENT
Start: 2024-11-19 | End: 2024-11-19 | Stop reason: HOSPADM

## 2024-11-19 RX ADMIN — DEXAMETHASONE SODIUM PHOSPHATE 8 MG: 4 INJECTION, SOLUTION INTRA-ARTICULAR; INTRALESIONAL; INTRAMUSCULAR; INTRAVENOUS; SOFT TISSUE at 13:55

## 2024-11-19 RX ADMIN — ONDANSETRON 4 MG: 2 INJECTION INTRAMUSCULAR; INTRAVENOUS at 13:55

## 2024-11-19 RX ADMIN — SODIUM CHLORIDE, SODIUM LACTATE, POTASSIUM CHLORIDE, CALCIUM CHLORIDE 9 ML/HR: 20; 30; 600; 310 INJECTION, SOLUTION INTRAVENOUS at 12:38

## 2024-11-19 RX ADMIN — PROPOFOL 160 MG: 10 INJECTION, EMULSION INTRAVENOUS at 13:45

## 2024-11-19 RX ADMIN — KETOROLAC TROMETHAMINE 30 MG: 30 INJECTION, SOLUTION INTRAMUSCULAR at 14:55

## 2024-11-19 RX ADMIN — SODIUM CHLORIDE 2000 MG: 900 INJECTION INTRAVENOUS at 13:55

## 2024-11-19 RX ADMIN — PROPOFOL 25 MCG/KG/MIN: 10 INJECTION, EMULSION INTRAVENOUS at 13:47

## 2024-11-19 RX ADMIN — LIDOCAINE HYDROCHLORIDE 80 MG: 20 INJECTION, SOLUTION INFILTRATION; PERINEURAL at 13:45

## 2024-11-19 RX ADMIN — FAMOTIDINE 20 MG: 10 INJECTION INTRAVENOUS at 12:32

## 2024-11-19 RX ADMIN — SCOPOLAMINE 1 PATCH: 1.5 PATCH, EXTENDED RELEASE TRANSDERMAL at 12:32

## 2024-11-19 RX ADMIN — HYDROMORPHONE HYDROCHLORIDE 0.25 MG: 1 INJECTION, SOLUTION INTRAMUSCULAR; INTRAVENOUS; SUBCUTANEOUS at 14:05

## 2024-11-19 RX ADMIN — EPHEDRINE SULFATE 10 MG: 50 INJECTION INTRAVENOUS at 14:14

## 2024-11-19 RX ADMIN — DROPERIDOL 0.62 MG: 2.5 INJECTION, SOLUTION INTRAMUSCULAR; INTRAVENOUS at 15:20

## 2024-11-19 RX ADMIN — SODIUM CHLORIDE, SODIUM LACTATE, POTASSIUM CHLORIDE, CALCIUM CHLORIDE: 20; 30; 600; 310 INJECTION, SOLUTION INTRAVENOUS at 14:58

## 2024-11-19 RX ADMIN — MIDAZOLAM 1 MG: 1 INJECTION INTRAMUSCULAR; INTRAVENOUS at 12:33

## 2024-11-19 RX ADMIN — HYDROMORPHONE HYDROCHLORIDE 0.25 MG: 1 INJECTION, SOLUTION INTRAMUSCULAR; INTRAVENOUS; SUBCUTANEOUS at 13:52

## 2024-11-19 NOTE — H&P
Patient Care Team:  Chary Hartman APRN as PCP - General (Nurse Practitioner)  Sharmila Gandhi MD as Consulting Physician (Obstetrics and Gynecology)  Jeronimo Lind MD as Consulting Physician (Pain Medicine)  Halima Fitzpatrick MD as Consulting Physician (Gastroenterology)  Mignon Álvarez APRN as Nurse Practitioner (Nurse Practitioner)    Chief complaint pain at widened scars right and left breasts    Subjective     Patient is a 59 y.o. female presents with irregular scars s/p breast reduction last year.       Review of Systems   Pertinent items are noted in HPI, all other systems reviewed and negative    History  Past Medical History:   Diagnosis Date    Allergic     Anxiety     Arthritis 2015    Cholelithiasis 1985    GERD (gastroesophageal reflux disease)     Irritable bowel syndrome     Low back pain 2009    PONV (postoperative nausea and vomiting)      Past Surgical History:   Procedure Laterality Date    BILATERAL BREAST REDUCTION  11/13/2023    BUNIONECTOMY  2008    CHOLECYSTECTOMY  1984    COLONOSCOPY  2017    COLONOSCOPY  2017    COLONOSCOPY N/A 07/27/2022    Procedure: COLONOSCOPY WITH BIOPSIES;  Surgeon: Halima Fitzpatrick MD;  [facility] ENDOSCOPY;  Service: Gastroenterology;  Laterality: N/A;  DIVERTICULOSIS, HEMORRHOIDS    CYSTECTOMY      EPIDURAL Right 11/03/2021    Procedure: Right L5 LUMBAR/SACRAL TRANSFORAMINAL EPIDURAL;  Surgeon: Jeronimo Lind MD;  Location: [facility] [place];  Service: Pain Management;  Laterality: Right;    EPIDURAL Bilateral 03/17/2022    Procedure: Right L5 LUMBAR/SACRAL TRANSFORAMINAL EPIDURAL;  Surgeon: Jeronimo Lind MD;  Location: [facility] [place] [place];  Service: Pain Management;  Laterality: Bilateral;    KNEE SURGERY  07/2005    ACL Removal    MEDIAL BRANCH BLOCK Left 12/14/2021    Procedure: Left C3-C5 MEDIAL BRANCH BLOCK;  Surgeon: Jeronimo Lind MD;  Location: [facility] [place] [place];  Service: Pain Management;  Laterality: Left;    MEDIAL  BRANCH BLOCK Left 02/08/2022    Procedure: Left C3-C5 MEDIAL BRANCH BLOCK;  Surgeon: Jeronimo Lind MD;  [facility];  Service: Pain Management;  Laterality: Left;    RADIOFREQUENCY ABLATION Left 03/03/2022    Procedure: RADIOFREQUENCY ABLATION CERVICAL C3-C5;  Surgeon: Jeronimo Lind MD;  Location: [facility] MAIN OR;  Service: Pain Management;  Laterality: Left;    SINUS SURGERY  2010    UPPER GASTROINTESTINAL ENDOSCOPY  2002     Medications Prior to Admission   Medication Sig Dispense Refill Last Dose/Taking    Cetirizine HCl 10 MG capsule Take 10 mg by mouth As Needed.   11/18/2024 Morning    escitalopram (LEXAPRO) 20 MG tablet Take 1.5 tablets by mouth Daily. 135 tablet 0 11/19/2024 at  8:00 AM    famotidine (Pepcid) 40 MG tablet Take 1 tablet by mouth 2 (Two) Times a Day As Needed for Heartburn. 180 tablet 1 11/19/2024 at  8:00 AM    montelukast (SINGULAIR) 10 MG tablet TAKE 1 TABLET BY MOUTH DAILY. 90 tablet 0 11/18/2024 Morning    multivitamin with minerals tablet tablet Take 1 tablet by mouth Daily. HOLD PER MD INSTR   11/18/2024 Morning    naproxen (NAPROSYN) 500 MG tablet TAKE 1 TABLET BY MOUTH TWICE DAILY AS NEEDED FOR MILD PAIN (Patient taking differently: Take 1 tablet by mouth 2 (Two) Times a Day As Needed for Mild Pain. HOLD PER MD INSTR) 60 tablet 0 Past Week    pantoprazole (PROTONIX) 40 MG EC tablet TAKE 1 TABLET BY MOUTH DAILY. 90 tablet 0 11/18/2024 Morning    sennosides-docusate (senna-docusate sodium) 8.6-50 MG per tablet Take 1 tablet by mouth Daily. 90 tablet 1 Past Week    SUMAtriptan (IMITREX) 50 MG tablet TAKE ONE TABLET AT ONSET OF HEADACHE. MAY REPEAT DOSE ONE TIME IN 2 HOURS IF HEADACHE NOT RELIEVED. 9 tablet 0 Past Week    traZODone (DESYREL) 50 MG tablet TAKE 2 TABLETS BY MOUTH EVERY NIGHT. 180 tablet 0 11/18/2024 Bedtime    albuterol sulfate  (90 Base) MCG/ACT inhaler Inhale 2 puffs Every 4 (Four) Hours As Needed.   More than a month    benzonatate (Tessalon  Perles) 100 MG capsule Take 1 capsule by mouth 3 (Three) Times a Day As Needed for Cough. 30 capsule 0 More than a month    Chlorcyclizine-Pseudoephed (Stahist AD) 25-60 MG tablet Take 1 tablet by mouth Every 8 (Eight) Hours As Needed (congestion). 42 tablet 0 More than a month    diclofenac (VOLTAREN) 75 MG EC tablet Take 1 tablet by mouth 2 (Two) Times a Day.   Unknown    fluticasone (Flonase) 50 MCG/ACT nasal spray 2 sprays into the nostril(s) as directed by provider Daily. 18.2 mL 0 Unknown    Insulin Pen Needle (Novofine Pen Needle) 32G X 6 MM misc 1 each Daily. 100 each 0     lubiprostone (Amitiza) 8 MCG capsule Take 1 capsule by mouth 2 (Two) Times a Day With Meals. 180 capsule 0 Unknown    Semaglutide-Weight Management (Wegovy) 1.7 MG/0.75ML solution auto-injector Inject 0.75 mL under the skin into the appropriate area as directed Every 7 (Seven) Days. (Patient taking differently: Inject 0.75 mL under the skin into the appropriate area as directed Every 7 (Seven) Days. HOLD PER MD CLANCY-LAST DOSE ON THURSDAY 11/7/24.) 10 mL 1 11/5/2024    Sucraid 8500 UNIT/ML solution Take 2 mL by mouth 3 (Three) Times a Day With Meals. 180 mL 5 Unknown     Allergies:  Sulfa antibiotics and Trintellix [vortioxetine]    Objective     Vital Signs  Temp:  [97.8 °F (36.6 °C)] 97.8 °F (36.6 °C)  Heart Rate:  [72] 72  Resp:  [18] 18  BP: (126)/(95) 126/95    Physical Exam:      General Appearance:    Alert, cooperative, in no acute distress   Head:    Normocephalic, without obvious abnormality, atraumatic   Eyes:            Lids and lashes normal, conjunctivae and sclerae normal, no   icterus, no pallor, corneas clear, PERRLA   Ears:    Ears appear intact with no abnormalities noted   Throat:   No oral lesions, no thrush, oral mucosa moist   Neck:   No adenopathy, supple, trachea midline, no thyromegaly, no     carotid bruit, no JVD   Back:     No kyphosis present, no scoliosis present, no skin lesions,       erythema or scars, no  tenderness to percussion or                   palpation,   range of motion normal   Lungs:     Clear to auscultation,respirations regular, even and                   unlabored    Heart:    Regular rhythm and normal rate, normal S1 and S2, no            murmur, no gallop, no rub, no click   Breast Exam:    Breast reduction scars widenrd and stretched from secondary healing right lower areola and vertical on left   Abdomen:     Normal bowel sounds, no masses, no organomegaly, soft        non-tender, non-distended, no guarding, no rebound                 tenderness   Genitalia:    Deferred   Extremities:   Moves all extremities well, no edema, no cyanosis, no              redness   Pulses:   Pulses palpable and equal bilaterally   Skin:   No bleeding, bruising or rash   Lymph nodes:   No palpable adenopathy   Neurologic:   Cranial nerves 2 - 12 grossly intact, sensation intact, DTR        present and equal bilaterally              Assessment & Plan       * No active hospital problems. *      Unstable breast scars-revision    I discussed the patients findings and my recommendations with patient.     Maurine Waterhouse, MD  11/19/24  13:28 EST

## 2024-11-19 NOTE — ANESTHESIA PREPROCEDURE EVALUATION
Anesthesia Evaluation     Patient summary reviewed and Nursing notes reviewed   history of anesthetic complications:  PONV  NPO Solid Status: > 8 hours  NPO Liquid Status: > 2 hours           Airway   Mallampati: II  TM distance: <3 FB  Neck ROM: full  Possible difficult intubation  Dental - normal exam     Pulmonary - normal exam    breath sounds clear to auscultation  Cardiovascular - normal exam    Rhythm: regular  Rate: normal        Neuro/Psych  (+) headaches, numbness, psychiatric history Anxiety    ROS Comment: Migraines  GI/Hepatic/Renal/Endo    (+) obesity, GERD    Musculoskeletal     (+) back pain, neck pain      ROS comment: Cervical and lumbar DDD/lumbar stenosis  Abdominal   (+) obese   Substance History      OB/GYN          Other   arthritis,   history of cancer      Other Comment: Hx melanoma                Anesthesia Plan    ASA 2     general     (Consider propofol TIVA with LMA/may want CMAC if GETA )  intravenous induction     Anesthetic plan, risks, benefits, and alternatives have been provided, discussed and informed consent has been obtained with: patient.    CODE STATUS:

## 2024-11-19 NOTE — ANESTHESIA PROCEDURE NOTES
Airway  Urgency: elective    Date/Time: 11/19/2024 1:46 PM  Airway not difficult    General Information and Staff    Patient location during procedure: OR  CRNA/CAA: Arely Johnson CRNA    Indications and Patient Condition  Indications for airway management: airway protection    Preoxygenated: yes  MILS maintained throughout  Mask difficulty assessment: 0 - not attempted    Final Airway Details  Final airway type: supraglottic airway      Successful airway: LMA  Size 4     Number of attempts at approach: 1  Assessment: lips, teeth, and gum same as pre-op and atraumatic intubation    Additional Comments  Atraumatic insertion

## 2024-11-19 NOTE — OP NOTE
PREOPERATIVE DIAGNOSES: Irregular unstable scars, lower right areola, and left breast lower areola and vertical incision, status post bilateral breast reduction.     POSTOPERATIVE DIAGNOSES: Irregular unstable scars, lower right areola, and left breast lower areola and vertical incision, status post bilateral breast reduction.    PROCEDURES:   Excision and primary closure, chronic scar, right lower breast areola, 9 cm in length.  Left breast scar excision and primary closure, 12 cm total length, along lower areola and vertical incision.     SURGEON: Maurine Waterhouse, MD    ANESTHESIA: General endotracheal anesthesia, supplemented with local injection using 0.25% Marcaine with epinephrine.      INDICATIONS: This is a 59-year-old female who underwent breast reduction 1 year ago.  She had some secondary healing of both breasts resulting in a widened and thinned out scar in the lower right areola, and on the left breast the lower areola vertically.  These incisions were allowed to heal secondarily resulting in some thickened and thinned areas of scarring which sometimes break down with slight pressure and bleed.  Due to pain and irritation, the scars will now be excised and reclosed.    DESCRIPTION OF PROCEDURE: The patient was marked preoperatively in a seated position to leslie the widened scar of the right lower areola, and the widened scar of the left lower areola and vertical incision.  Kefzol was administered preoperatively.  The patient was then brought to the operating room where she was placed under general endotracheal anesthesia.  The chest was prepped and draped out.  Each marked incision was injected with 0.25% Marcaine with epinephrine.  The right side was treated first incising the areola and then closing this lower areolar scar with interrupted 3-0 and 4-0 Vicryl and Monocryl sutures, followed by a running 4-0 Monocryl subcuticular skin stitch.    A similar procedure was then done on the left side,  excising the scar of the lower areola and vertically along the vertical incision.  A small amount of underlying breast tissue was resected with this.  Cautery was used for hemostasis.  The vertical incision was close with interrupted and running 2-0 PDS and dermal stitches followed by skin closure with 3-0 Vicryl subcuticular skin stitch.  The areola was closed with additional 3-0 Vicryl and 4-0 Monocryl dermal stitches, followed by 4-0 Monocryl subcuticular skin stitch.  The incisions were then cleaned and dressed with Benzoin and Steri-Strips, followed by sterile wrap and Ace wrap.      There were no specimens and no cultures taken.  Blood loss was scant.  The patient tolerated the procedure well and was discharged to recovery room in stable condition.

## 2024-11-25 DIAGNOSIS — F51.01 PRIMARY INSOMNIA: ICD-10-CM

## 2024-11-25 DIAGNOSIS — G43.109 MIGRAINE WITH AURA, NOT INTRACTABLE, WITHOUT STATUS MIGRAINOSUS: ICD-10-CM

## 2024-11-25 DIAGNOSIS — J30.9 ALLERGIC RHINITIS, UNSPECIFIED SEASONALITY, UNSPECIFIED TRIGGER: ICD-10-CM

## 2024-11-25 RX ORDER — SUMATRIPTAN 50 MG/1
50 TABLET, FILM COATED ORAL
Qty: 9 TABLET | Refills: 0 | Status: SHIPPED | OUTPATIENT
Start: 2024-11-25

## 2024-11-25 RX ORDER — MONTELUKAST SODIUM 10 MG/1
10 TABLET ORAL DAILY
Qty: 90 TABLET | Refills: 0 | Status: SHIPPED | OUTPATIENT
Start: 2024-11-25

## 2024-11-25 RX ORDER — TRAZODONE HYDROCHLORIDE 50 MG/1
100 TABLET, FILM COATED ORAL NIGHTLY
Qty: 180 TABLET | Refills: 0 | Status: SHIPPED | OUTPATIENT
Start: 2024-11-25

## 2024-12-10 ENCOUNTER — OFFICE VISIT (OUTPATIENT)
Dept: FAMILY MEDICINE CLINIC | Age: 59
End: 2024-12-10
Payer: COMMERCIAL

## 2024-12-10 VITALS
TEMPERATURE: 97.7 F | SYSTOLIC BLOOD PRESSURE: 123 MMHG | OXYGEN SATURATION: 96 % | DIASTOLIC BLOOD PRESSURE: 83 MMHG | WEIGHT: 173 LBS | HEART RATE: 98 BPM | HEIGHT: 62 IN | BODY MASS INDEX: 31.83 KG/M2

## 2024-12-10 DIAGNOSIS — Z23 ENCOUNTER FOR IMMUNIZATION: ICD-10-CM

## 2024-12-10 DIAGNOSIS — F51.01 PRIMARY INSOMNIA: ICD-10-CM

## 2024-12-10 DIAGNOSIS — K59.04 CHRONIC IDIOPATHIC CONSTIPATION: ICD-10-CM

## 2024-12-10 DIAGNOSIS — J30.9 ALLERGIC RHINITIS, UNSPECIFIED SEASONALITY, UNSPECIFIED TRIGGER: ICD-10-CM

## 2024-12-10 DIAGNOSIS — G43.109 MIGRAINE WITH AURA, NOT INTRACTABLE, WITHOUT STATUS MIGRAINOSUS: ICD-10-CM

## 2024-12-10 DIAGNOSIS — F34.9 PERSISTENT MOOD (AFFECTIVE) DISORDER, UNSPECIFIED: ICD-10-CM

## 2024-12-10 DIAGNOSIS — K21.9 GASTROESOPHAGEAL REFLUX DISEASE, UNSPECIFIED WHETHER ESOPHAGITIS PRESENT: ICD-10-CM

## 2024-12-10 PROCEDURE — 99214 OFFICE O/P EST MOD 30 MIN: CPT | Performed by: NURSE PRACTITIONER

## 2024-12-10 PROCEDURE — 90480 ADMN SARSCOV2 VAC 1/ONLY CMP: CPT | Performed by: NURSE PRACTITIONER

## 2024-12-10 PROCEDURE — 90471 IMMUNIZATION ADMIN: CPT | Performed by: NURSE PRACTITIONER

## 2024-12-10 PROCEDURE — 91320 SARSCV2 VAC 30MCG TRS-SUC IM: CPT | Performed by: NURSE PRACTITIONER

## 2024-12-10 PROCEDURE — 90656 IIV3 VACC NO PRSV 0.5 ML IM: CPT | Performed by: NURSE PRACTITIONER

## 2024-12-10 RX ORDER — ESCITALOPRAM OXALATE 20 MG/1
30 TABLET ORAL DAILY
Qty: 135 TABLET | Refills: 1 | Status: SHIPPED | OUTPATIENT
Start: 2024-12-10

## 2024-12-10 RX ORDER — LUBIPROSTONE 8 UG/1
8 CAPSULE ORAL
Qty: 90 CAPSULE | Refills: 1 | Status: SHIPPED | OUTPATIENT
Start: 2024-12-10

## 2024-12-10 RX ORDER — FAMOTIDINE 40 MG/1
40 TABLET, FILM COATED ORAL 2 TIMES DAILY PRN
Qty: 180 TABLET | Refills: 1 | Status: SHIPPED | OUTPATIENT
Start: 2024-12-10

## 2024-12-10 RX ORDER — CHLORCYCLIZINE HYDROCHLORIDE AND PSEUDOEPHEDRINE HYDROCHLORIDE 25; 60 MG/1; MG/1
1 TABLET ORAL EVERY 8 HOURS PRN
Qty: 60 TABLET | Refills: 0 | Status: SHIPPED | OUTPATIENT
Start: 2024-12-10

## 2024-12-10 RX ORDER — TRAZODONE HYDROCHLORIDE 50 MG/1
100 TABLET, FILM COATED ORAL NIGHTLY
Qty: 180 TABLET | Refills: 1 | Status: SHIPPED | OUTPATIENT
Start: 2024-12-10

## 2024-12-10 RX ORDER — MONTELUKAST SODIUM 10 MG/1
10 TABLET ORAL DAILY
Qty: 90 TABLET | Refills: 1 | Status: SHIPPED | OUTPATIENT
Start: 2024-12-10

## 2024-12-10 RX ORDER — SUMATRIPTAN 50 MG/1
50 TABLET, FILM COATED ORAL
Qty: 9 TABLET | Refills: 1 | Status: SHIPPED | OUTPATIENT
Start: 2024-12-10

## 2024-12-10 NOTE — ASSESSMENT & PLAN NOTE
Medical condition is stable.  Continue same therapy.  Will recheck at next regular appointment    Orders:    SUMAtriptan (IMITREX) 50 MG tablet; Take 1 tablet by mouth Every 2 (Two) Hours As Needed for Migraine for up to 1 dose. Once at onset of headache. May repeat in 2 hours if headache not relieved.

## 2024-12-10 NOTE — ASSESSMENT & PLAN NOTE
Rx Stahist to use only occasionally.     Orders:    montelukast (SINGULAIR) 10 MG tablet; Take 1 tablet by mouth Daily.    Chlorcyclizine-Pseudoephed (Stahist AD) 25-60 MG tablet; Take 1 tablet by mouth Every 8 (Eight) Hours As Needed (congestion).

## 2024-12-10 NOTE — ASSESSMENT & PLAN NOTE
Medical condition is stable.  Continue same therapy.  Will recheck at next regular appointment    Orders:    escitalopram (LEXAPRO) 20 MG tablet; Take 1.5 tablets by mouth Daily.

## 2024-12-10 NOTE — ASSESSMENT & PLAN NOTE
Orders:    montelukast (SINGULAIR) 10 MG tablet; Take 1 tablet by mouth Daily.    Chlorcyclizine-Pseudoephed (Stahist AD) 25-60 MG tablet; Take 1 tablet by mouth Every 8 (Eight) Hours As Needed (congestion).

## 2024-12-10 NOTE — ASSESSMENT & PLAN NOTE
Discussed importance of follow up with specialist. She can try taking the Amitiza every other day to see if that helps to reduce loose stools.   Orders:    lubiprostone (Amitiza) 8 MCG capsule; Take 1 capsule by mouth Daily With Breakfast.

## 2024-12-10 NOTE — ASSESSMENT & PLAN NOTE
Medical condition is stable.  Continue same therapy.  Will recheck at next regular appointment    Orders:    famotidine (Pepcid) 40 MG tablet; Take 1 tablet by mouth 2 (Two) Times a Day As Needed for Heartburn.

## 2024-12-10 NOTE — ASSESSMENT & PLAN NOTE
Medical condition is stable.  Continue same therapy.  Will recheck at next regular appointment    Orders:    traZODone (DESYREL) 50 MG tablet; Take 2 tablets by mouth Every Night.

## 2024-12-10 NOTE — PROGRESS NOTES
Chief Complaint  Ema Cisse presents to [facility] for Constipation    Subjective          History of Present Illness    Amy is here today for follow up.   She is on wegovy for weight loss. Her weight is down 5 pounds since last visit at our office. She had to stop for procedure. Has not restarted. Continues with meeting with health . She is trying to watch what she eats.   She is also on medication for anxiety. Current medication includes Lexapro 30 mg daily. She is also taking trazodone 50 mg nightly to help with sleep. She has been on several medication in the past for anxiety. Buspirone was ineffective. Trintellix caused rash. Stable on current treatment.   She is on sumatriptan as needed for migraines as well. Desires refill.   Has been dealing with constipation which she feels is contributing to her difficulty in losing weight. Taking metamucil and stool softener. Saw GI and has been prescribed Linzess, Trulance, Ibsrela caused diarrhea. Had CT abd/pelvis. Amitiza prescribed at last visit but feels that this causes diarrhea. She continues to have bloating. She has not rescheduled with GI. Had to cancel her September appointment.       Review of Systems      Allergies   Allergen Reactions    Sulfa Antibiotics Rash    Trintellix [Vortioxetine] Itching      Past Medical History:   Diagnosis Date    Allergic     Anxiety     Arthritis 2015    Cholelithiasis 1985    GERD (gastroesophageal reflux disease)     Irritable bowel syndrome     Low back pain 2009    PONV (postoperative nausea and vomiting)      Current Outpatient Medications   Medication Sig Dispense Refill    albuterol sulfate  (90 Base) MCG/ACT inhaler Inhale 2 puffs Every 4 (Four) Hours As Needed.      benzonatate (Tessalon Perles) 100 MG capsule Take 1 capsule by mouth 3 (Three) Times a Day As Needed for Cough. 30 capsule 0    Cetirizine HCl 10 MG capsule Take 10 mg by mouth As Needed.       Chlorcyclizine-Pseudoephed (Stahist AD) 25-60 MG tablet Take 1 tablet by mouth Every 8 (Eight) Hours As Needed (congestion). 60 tablet 0    diclofenac (VOLTAREN) 75 MG EC tablet Take 1 tablet by mouth 2 (Two) Times a Day.      escitalopram (LEXAPRO) 20 MG tablet Take 1.5 tablets by mouth Daily. 135 tablet 1    famotidine (Pepcid) 40 MG tablet Take 1 tablet by mouth 2 (Two) Times a Day As Needed for Heartburn. 180 tablet 1    fluticasone (Flonase) 50 MCG/ACT nasal spray 2 sprays into the nostril(s) as directed by provider Daily. 18.2 mL 0    HYDROcodone-acetaminophen (NORCO) 5-325 MG per tablet Take 1 tablet by mouth Every 6 (Six) Hours As Needed for Moderate Pain (Pain) for up to 16 doses. 16 tablet 0    Insulin Pen Needle (Novofine Pen Needle) 32G X 6 MM misc 1 each Daily. 100 each 0    lubiprostone (Amitiza) 8 MCG capsule Take 1 capsule by mouth Daily With Breakfast. 90 capsule 1    montelukast (SINGULAIR) 10 MG tablet Take 1 tablet by mouth Daily. 90 tablet 1    multivitamin with minerals tablet tablet Take 1 tablet by mouth Daily. HOLD PER MD INSTR      pantoprazole (PROTONIX) 40 MG EC tablet TAKE 1 TABLET BY MOUTH DAILY. 90 tablet 0    Semaglutide-Weight Management (Wegovy) 1.7 MG/0.75ML solution auto-injector Inject 0.75 mL under the skin into the appropriate area as directed Every 7 (Seven) Days. (Patient taking differently: Inject 0.75 mL under the skin into the appropriate area as directed Every 7 (Seven) Days. HOLD PER MD INSTR-LAST DOSE ON THURSDAY 11/7/24.) 10 mL 1    sennosides-docusate (senna-docusate sodium) 8.6-50 MG per tablet Take 1 tablet by mouth Daily. 90 tablet 1    SUMAtriptan (IMITREX) 50 MG tablet Take 1 tablet by mouth Every 2 (Two) Hours As Needed for Migraine for up to 1 dose. Once at onset of headache. May repeat in 2 hours if headache not relieved. 9 tablet 1    traZODone (DESYREL) 50 MG tablet Take 2 tablets by mouth Every Night. 180 tablet 1     No current facility-administered  medications for this visit.     Past Surgical History:   Procedure Laterality Date    BILATERAL BREAST REDUCTION  11/13/2023    BUNIONECTOMY  2008    CHOLECYSTECTOMY  1984    COLONOSCOPY  2017    COLONOSCOPY  2017    COLONOSCOPY N/A 07/27/2022    Procedure: COLONOSCOPY WITH BIOPSIES;  Surgeon: Halima Fitzpatrick MD;  Location: [facility] ENDOSCOPY;  Service: Gastroenterology;  Laterality: N/A;  DIVERTICULOSIS, HEMORRHOIDS    CYSTECTOMY      EPIDURAL Right 11/03/2021    Procedure: Right L5 LUMBAR/SACRAL TRANSFORAMINAL EPIDURAL;  Surgeon: Jeronimo Lind MD;  Location: [facility] MAIN OR;  Service: Pain Management;  Laterality: Right;    EPIDURAL Bilateral 03/17/2022    Procedure: Right L5 LUMBAR/SACRAL TRANSFORAMINAL EPIDURAL;  Surgeon: Jeronimo Lind MD;  Location: [facility] MAIN OR;  Service: Pain Management;  Laterality: Bilateral;    KNEE SURGERY  07/2005    ACL Removal    MEDIAL BRANCH BLOCK Left 12/14/2021    Procedure: Left C3-C5 MEDIAL BRANCH BLOCK;  Surgeon: Jeronimo Lind MD;  Location: [facility] MAIN OR;  Service: Pain Management;  Laterality: Left;    MEDIAL BRANCH BLOCK Left 02/08/2022    Procedure: Left C3-C5 MEDIAL BRANCH BLOCK;  Surgeon: Jeronimo Lind MD;  Location: [facility];  Service: Pain Management;  Laterality: Left;    RADIOFREQUENCY ABLATION Left 03/03/2022    Procedure: RADIOFREQUENCY ABLATION CERVICAL C3-C5;  Surgeon: Jeronimo Lind MD;  Location: [facility] MAIN OR;  Service: Pain Management;  Laterality: Left;    SCAR REVISION BREAST Bilateral 11/19/2024    Procedure: BILATERAL BREAT SCAR REVISION;  Surgeon: Waterhouse, Maurine, MD;  Location: [facility] OR;  Service: Plastics;  Laterality: Bilateral;    SINUS SURGERY  2010    UPPER GASTROINTESTINAL ENDOSCOPY  2002      Social History     Tobacco Use    Smoking status: Never     Passive exposure: Never    Smokeless tobacco: Never   Vaping Use    Vaping status: Never Used   Substance Use Topics    Alcohol use: Yes     Alcohol/week:  "1.0 - 2.0 standard drink of alcohol     Types: 1 - 2 Drinks containing 0.5 oz of alcohol per week     Comment: Socially    Drug use: Never     Family History   Problem Relation Age of Onset    Hypertension Mother     Heart disease Father     Lung cancer Father     No Known Problems Sister     No Known Problems Daughter     No Known Problems Maternal Aunt     No Known Problems Paternal Aunt     No Known Problems Maternal Grandmother     No Known Problems Maternal Grandfather     No Known Problems Paternal Grandmother     No Known Problems Paternal Grandfather     Hypertension Other     Heart disease Other     BRCA 1/2 Neg Hx     Breast cancer Neg Hx     Colon cancer Neg Hx     Endometrial cancer Neg Hx     Ovarian cancer Neg Hx     Malig Hyperthermia Neg Hx      Health Maintenance Due   Topic Date Due    MAMMOGRAM  02/10/2024      Immunization History   Administered Date(s) Administered    COVID-19 (MODERNA) 1st,2nd,3rd Dose Monovalent 02/05/2021, 03/05/2021, 11/19/2021    COVID-19 (PFIZER) 12YRS+ (COMIRNATY) 12/10/2024    Flu Vaccine Split Quad 09/25/2019, 09/23/2021, 09/15/2022    Flublok 18+yrs 09/15/2022    Flucelvax Quad Vial =>4yrs 09/28/2020    Fluzone  >6mos 12/10/2024    Fluzone (or Fluarix & Flulaval for VFC) >6mos 10/11/2023    Hepatitis A 05/02/2018, 11/20/2018    Influenza, Unspecified 10/01/2022    Shingrix 05/17/2022, 01/11/2023    Tdap 05/29/2019        Objective     Vitals:    12/10/24 1603   BP: 123/83   BP Location: Left arm   Patient Position: Sitting   Cuff Size: Adult   Pulse: 98   Temp: 97.7 °F (36.5 °C)   TempSrc: Oral   SpO2: 96%   Weight: 78.5 kg (173 lb)   Height: 157.5 cm (62.01\")     Body mass index is 31.63 kg/m².               No results found.    Physical Exam  Vitals reviewed.   Constitutional:       General: She is not in acute distress.     Appearance: Normal appearance. She is well-developed.   HENT:      Head: Normocephalic and atraumatic.      Right Ear: Ear canal normal. A " middle ear effusion is present.      Left Ear: Ear canal normal. A middle ear effusion is present.   Cardiovascular:      Rate and Rhythm: Normal rate and regular rhythm.   Pulmonary:      Effort: Pulmonary effort is normal.      Breath sounds: Normal breath sounds.   Neurological:      Mental Status: She is alert and oriented to person, place, and time.   Psychiatric:         Mood and Affect: Mood and affect normal.           Result Review :                               Assessment and Plan      Assessment & Plan  Chronic idiopathic constipation  Discussed importance of follow up with specialist. She can try taking the Amitiza every other day to see if that helps to reduce loose stools.   Orders:    lubiprostone (Amitiza) 8 MCG capsule; Take 1 capsule by mouth Daily With Breakfast.    Gastroesophageal reflux disease, unspecified whether esophagitis present  Medical condition is stable.  Continue same therapy.  Will recheck at next regular appointment    Orders:    famotidine (Pepcid) 40 MG tablet; Take 1 tablet by mouth 2 (Two) Times a Day As Needed for Heartburn.    Persistent mood (affective) disorder, unspecified    Medical condition is stable.  Continue same therapy.  Will recheck at next regular appointment    Orders:    escitalopram (LEXAPRO) 20 MG tablet; Take 1.5 tablets by mouth Daily.    Allergic rhinitis, unspecified seasonality, unspecified trigger  Rx Stahist to use only occasionally.     Orders:    montelukast (SINGULAIR) 10 MG tablet; Take 1 tablet by mouth Daily.    Chlorcyclizine-Pseudoephed (Stahist AD) 25-60 MG tablet; Take 1 tablet by mouth Every 8 (Eight) Hours As Needed (congestion).    Primary insomnia  Medical condition is stable.  Continue same therapy.  Will recheck at next regular appointment    Orders:    traZODone (DESYREL) 50 MG tablet; Take 2 tablets by mouth Every Night.    Migraine with aura, not intractable, without status migrainosus    Medical condition is stable.  Continue same  therapy.  Will recheck at next regular appointment    Orders:    SUMAtriptan (IMITREX) 50 MG tablet; Take 1 tablet by mouth Every 2 (Two) Hours As Needed for Migraine for up to 1 dose. Once at onset of headache. May repeat in 2 hours if headache not relieved.    Encounter for immunization  Updating covid and influenza vaccines.   Orders:    Fluzone >6mos    COVID-19 (Pfizer) 12yrs+ (COMIRNATY)    BMI 31.0-31.9,adult  Continue efforts with healthy diet and exercise                 Follow Up     Return in about 6 months (around 6/10/2025) for Annual physical.

## 2025-01-07 ENCOUNTER — TELEPHONE (OUTPATIENT)
Dept: FAMILY MEDICINE CLINIC | Age: 60
End: 2025-01-07
Payer: COMMERCIAL

## 2025-01-07 NOTE — TELEPHONE ENCOUNTER
"  Caller: Ema Cisse \"LEXI\"    Relationship: Self    Best call back number: [phone]     Caller requesting test results: PATIENT    What test was performed: LABS     When was the test performed: 11.2024    Where was the test performed: [facility]    Additional notes: PATIENT WAS ADVISED BY ALEIDA LOPEZ THAT IS A WEIGHT  THROUGH [facility] THAT HAS ADVISED TO PATIENT THAT LABS THAT WERE DONE IN NOVEMBER HAD SOME CONCERNS WITH THE KIDNEY PANEL. PATIENT IS REQUESTING THAT SHELBIE AMBROSE LOOK OVER THOSE RESULTS AND ADVISED TO PATIENT WHAT NEEDS TO BE DONE.            "

## 2025-01-08 NOTE — TELEPHONE ENCOUNTER
Labs in chart from 11/4/24 showed normal kidney function. If she had additional labs performed at [facility], please request those as I do not see them in chart

## 2025-01-08 NOTE — TELEPHONE ENCOUNTER
Pt inf, she states she had some labs done at [facility]. Per [facility], they have results from September 2024 that they will fax over.

## 2025-01-08 NOTE — TELEPHONE ENCOUNTER
Labs from 9/2024 from [facility] with very mild abnormality to kidney labs. Lab work on file from 11/2024 and prior labs in our system with normal kidney labs. As more recent labs are normal, would not recommend any further testing at this time. This is part of the lab work that is typically performed. She may have been mildly dehydrated with the September labs were drawn.

## 2025-01-17 NOTE — PROGRESS NOTES
CHIEF COMPLAINT  Follow-up for back pain.    Subjective   Ema Cisse is a 55 y.o. female  who presents to the office for follow-up of procedure.  She completed a Bilateral S1-S3  Branch Radiofrequency Lesioning   on  10/20/2020 performed by Dr. Lind for management of back pain. Patient reports no relief from the procedure yet.     C/o multiple joint pain, worst in the back, neck and left shoulder. Pain today 6/10 in severity.  Takes Naproxen 500 mg bid. Helps some with her pain.  Wants to know if something else might help more.  Did not tolerate Robaxin, caused nausea/vomiting.  Tried Meloxicam and Chlorzoxazone without benefit.   Says she could not function with Tramadol.      Has been reporting worsening neck pain over the past couple of months. Has been going to physical therapy which has helped some.  Most severe on the left side. Does radiate into shoulder.  Hurts to turn head to the left.  Improves a little with stretching.      Saw Dr. Buck (ortho) On 8/27/2020 for her left shoulder pain.  Recommended physical therapy which she has completed.  Offered patient steroid injection to the AC joint which patient said she would think about.  Also discussed consideration for an MRI of the shoulder to rule out any metastatic lesion and also make sure underlying rotator cuff functioning well.    Back Pain  This is a chronic problem. The current episode started more than 1 year ago. The problem occurs daily. The problem has been waxing and waning since onset. The pain is present in the lumbar spine and sacro-iliac. The pain is at a severity of 6/10. The pain is moderate. Exacerbated by: prolonged walking, standing. Stiffness is present in the morning. Associated symptoms include numbness and weakness. Risk factors include obesity. She has tried NSAIDs and analgesics for the symptoms. The treatment provided significant (with RFL) relief.          PEG Assessment   What number best describes your pain on average in  The patient has been examined and the H&P has been reviewed:    I concur with the findings and no changes have occurred since H&P was written.        There are no hospital problems to display for this patient.     "the past week?6  What number best describes how, during the past week, pain has interfered with your enjoyment of life?6  What number best describes how, during the past week, pain has interfered with your general activity?  6    The following portions of the patient's history were reviewed and updated as appropriate: allergies, current medications, past family history, past medical history, past social history, past surgical history and problem list.    Review of Systems   Constitutional: Negative for fatigue.   HENT: Negative for congestion.    Eyes: Negative for visual disturbance.   Respiratory: Positive for cough. Negative for shortness of breath and wheezing.    Cardiovascular: Negative.    Gastrointestinal: Negative for constipation and diarrhea.   Genitourinary: Negative for difficulty urinating.   Musculoskeletal: Positive for back pain and neck pain.   Neurological: Positive for weakness and numbness.   Psychiatric/Behavioral: Positive for sleep disturbance. Negative for suicidal ideas. The patient is nervous/anxious.      I have reviewed and confirmed the accuracy of the ROS as documented by the MA/LPN/RN SHELBIE Najera     Vitals:    11/10/20 1523   BP: 134/88   Pulse: 99   Resp: 18   Temp: 97.1 °F (36.2 °C)   SpO2: 98%   Weight: 81.5 kg (179 lb 9.6 oz)   Height: 165.1 cm (65\")   PainSc:   6   PainLoc: Back     Objective   Physical Exam  Vitals signs and nursing note reviewed.   Constitutional:       General: She is not in acute distress.     Appearance: Normal appearance. She is not ill-appearing.   HENT:      Head: Atraumatic.   Eyes:      Conjunctiva/sclera: Conjunctivae normal.   Cardiovascular:      Rate and Rhythm: Normal rate.   Pulmonary:      Effort: Pulmonary effort is normal. No respiratory distress.   Musculoskeletal:      Left shoulder: She exhibits tenderness and bony tenderness. She exhibits normal range of motion, no swelling, no effusion, no crepitus and no deformity.      " Cervical back: She exhibits tenderness and bony tenderness.      Lumbar back: She exhibits tenderness and bony tenderness.   Skin:     General: Skin is warm and dry.   Neurological:      Mental Status: She is alert and oriented to person, place, and time.      Motor: Motor function is intact. No weakness.      Gait: Gait normal.   Psychiatric:         Mood and Affect: Mood normal.         Behavior: Behavior normal.         Assessment/Plan   Diagnoses and all orders for this visit:    1. Other chronic pain (Primary)    2. Somatic dysfunction of sacroiliac joint    3. Lumbar facet arthropathy    4. Neck pain  -     Cancel: MRI Cervical Spine Without Contrast; Future  -     MRI Cervical Spine With & Without Contrast; Future    5. Impingement syndrome of left shoulder  -     MRI shoulder left w wo contrast; Future    Other orders  -     gabapentin (NEURONTIN) 300 MG capsule; Take 1 capsule by mouth 3 (Three) Times a Day. 1 po hs x 5 days, then bid x 5 days, then tid thereafter as tolerated  Dispense: 90 capsule; Refill: 1      --- MRI cervical spine  --- MRI left shoulder   --- Continue to watch/wait on low back.  Patient will hopefully begin to notice benefit from RFA over the next couple of weeks.  --- The patient will be started on a trial of gabapentin. she will be started on gabapentin 300 mg once nightly x 5days. Will then increase to twice daily x 5 days. Patient will then increase to three times daily as tolerated. Reviewed potential side effects, including somnolence and dizziness.   --- Follow-up approximately 1 month/after imaging completed          PATTI REPORT  PATTI report has been reviewed and scanned into the patient's chart.    As the clinician, I personally reviewed the PATTI from 11/10/2020 while the patient was in the office today.    EMR Dragon/Transcription disclaimer:   Much of this encounter note is an electronic transcription/translation of spoken language to printed text. The electronic  translation of spoken language may permit erroneous, or at times, nonsensical words or phrases to be inadvertently transcribed; Although I have reviewed the note for such errors, some may still exist.

## 2025-01-22 DIAGNOSIS — K21.9 GASTROESOPHAGEAL REFLUX DISEASE, UNSPECIFIED WHETHER ESOPHAGITIS PRESENT: ICD-10-CM

## 2025-01-22 RX ORDER — PANTOPRAZOLE SODIUM 40 MG/1
40 TABLET, DELAYED RELEASE ORAL DAILY
Qty: 90 TABLET | Refills: 0 | Status: SHIPPED | OUTPATIENT
Start: 2025-01-22

## 2025-02-12 ENCOUNTER — OFFICE VISIT (OUTPATIENT)
Dept: FAMILY MEDICINE CLINIC | Age: 60
End: 2025-02-12
Payer: COMMERCIAL

## 2025-02-12 VITALS
TEMPERATURE: 97.4 F | OXYGEN SATURATION: 99 % | HEIGHT: 62 IN | BODY MASS INDEX: 32.02 KG/M2 | DIASTOLIC BLOOD PRESSURE: 78 MMHG | WEIGHT: 174 LBS | SYSTOLIC BLOOD PRESSURE: 120 MMHG | HEART RATE: 76 BPM

## 2025-02-12 DIAGNOSIS — B34.9 ACUTE VIRAL SYNDROME: Primary | ICD-10-CM

## 2025-02-12 PROCEDURE — 99213 OFFICE O/P EST LOW 20 MIN: CPT | Performed by: NURSE PRACTITIONER

## 2025-02-12 PROCEDURE — 87880 STREP A ASSAY W/OPTIC: CPT | Performed by: NURSE PRACTITIONER

## 2025-02-12 PROCEDURE — 87428 SARSCOV & INF VIR A&B AG IA: CPT | Performed by: NURSE PRACTITIONER

## 2025-02-12 RX ORDER — ACETAMINOPHEN AND CODEINE PHOSPHATE 120; 12 MG/5ML; MG/5ML
5 SOLUTION ORAL EVERY 8 HOURS PRN
Qty: 120 ML | Refills: 0 | Status: SHIPPED | OUTPATIENT
Start: 2025-02-12

## 2025-02-12 RX ORDER — ACETAMINOPHEN AND CODEINE PHOSPHATE 120; 12 MG/5ML; MG/5ML
5 SOLUTION ORAL EVERY 8 HOURS PRN
Qty: 120 ML | Refills: 0 | Status: SHIPPED | OUTPATIENT
Start: 2025-02-12 | End: 2025-02-12

## 2025-02-12 NOTE — PROGRESS NOTES
Chief Complaint  Sore Throat (X 4 days /Taking [facility] brand cold and flu OTC for Sx ), Cough (Dry cough X 4 days ), and Hoarse (X 4 days )    Subjective        Ema Cisse presents to [facility] today for 4-day history of sore throat cough, hoarseness, denies fever , aches or chills, does have some sinus congestion, sore throat and hoarseness. 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 Taking otc meds with some improvement.       Current Outpatient Medications:     acetaminophen-codeine (TYLENOL with CODEINE) 120-12 MG/5ML solution, Take 5 mL by mouth Every 8 (Eight) Hours As Needed for Moderate Pain., Disp: 120 mL, Rfl: 0    albuterol sulfate  (90 Base) MCG/ACT inhaler, Inhale 2 puffs Every 4 (Four) Hours As Needed., Disp: , Rfl:     Cetirizine HCl 10 MG capsule, Take 10 mg by mouth As Needed., Disp: , Rfl:     diclofenac (VOLTAREN) 75 MG EC tablet, Take 1 tablet by mouth 2 (Two) Times a Day., Disp: , Rfl:     escitalopram (LEXAPRO) 20 MG tablet, Take 1.5 tablets by mouth Daily., Disp: 135 tablet, Rfl: 1    famotidine (Pepcid) 40 MG tablet, Take 1 tablet by mouth 2 (Two) Times a Day As Needed for Heartburn., Disp: 180 tablet, Rfl: 1    fluticasone (Flonase) 50 MCG/ACT nasal spray, 2 sprays into the nostril(s) as directed by provider Daily., Disp: 18.2 mL, Rfl: 0    lubiprostone (Amitiza) 8 MCG capsule, Take 1 capsule by mouth Daily With Breakfast., Disp: 90 capsule, Rfl: 1    montelukast (SINGULAIR) 10 MG tablet, Take 1 tablet by mouth Daily., Disp: 90 tablet, Rfl: 1    multivitamin with minerals tablet tablet, Take 1 tablet by mouth Daily. HOLD PER MD INSTR, Disp: , Rfl:     pantoprazole (PROTONIX) 40 MG EC tablet, TAKE 1 TABLET BY MOUTH DAILY., Disp: 90 tablet, Rfl: 0    Semaglutide-Weight Management (Wegovy) 1.7 MG/0.75ML solution auto-injector, Inject 0.75 mL under the skin into the appropriate area as  "directed Every 7 (Seven) Days. (Patient taking differently: Inject 0.75 mL under the skin into the appropriate area as directed Every 7 (Seven) Days. HOLD PER MD CLANCY-LAST DOSE ON THURSDAY 11/7/24.), Disp: 10 mL, Rfl: 1    sennosides-docusate (senna-docusate sodium) 8.6-50 MG per tablet, Take 1 tablet by mouth Daily., Disp: 90 tablet, Rfl: 1    SUMAtriptan (IMITREX) 50 MG tablet, Take 1 tablet by mouth Every 2 (Two) Hours As Needed for Migraine for up to 1 dose. Once at onset of headache. May repeat in 2 hours if headache not relieved., Disp: 9 tablet, Rfl: 1    traZODone (DESYREL) 50 MG tablet, Take 2 tablets by mouth Every Night., Disp: 180 tablet, Rfl: 1    acetaminophen-codeine (TYLENOL with CODEINE) 120-12 MG/5ML solution, Take 5 mL by mouth Every 8 (Eight) Hours As Needed (cough)., Disp: 120 mL, Rfl: 0    Chlorcyclizine-Pseudoephed (Stahist AD) 25-60 MG tablet, Take 1 tablet by mouth Every 8 (Eight) Hours As Needed (congestion). (Patient not taking: Reported on 2/12/2025), Disp: 60 tablet, Rfl: 0    Insulin Pen Needle (Novofine Pen Needle) 32G X 6 MM misc, 1 each Daily. (Patient not taking: Reported on 2/12/2025), Disp: 100 each, Rfl: 0  Medications Discontinued During This Encounter   Medication Reason    benzonatate (Tessalon Perles) 100 MG capsule *Therapy completed    HYDROcodone-acetaminophen (NORCO) 5-325 MG per tablet *Therapy completed    acetaminophen-codeine (TYLENOL with CODEINE) 120-12 MG/5ML solution          Allergies:  Sulfa antibiotics and Trintellix [vortioxetine]      Objective   Vital Signs:   Vitals:    02/12/25 1617 02/12/25 1644   BP: 138/90 120/78   BP Location: Left arm Right arm   Patient Position: Sitting    Cuff Size: Adult    Pulse: 76    Temp: 97.4 °F (36.3 °C)    TempSrc: Oral    SpO2: 99%    Weight: 78.9 kg (174 lb)    Height: 157.5 cm (62.01\")      Body mass index is 31.81 kg/m².           Physical Exam  Constitutional:       Appearance: Normal appearance.   HENT:      Right " Ear: Tympanic membrane normal.      Left Ear: Tympanic membrane normal.      Nose: Congestion present.      Mouth/Throat:      Pharynx: No oropharyngeal exudate or posterior oropharyngeal erythema.   Neck:      Vascular: No carotid bruit.   Cardiovascular:      Rate and Rhythm: Normal rate and regular rhythm.      Heart sounds: Normal heart sounds.   Pulmonary:      Effort: Pulmonary effort is normal.      Breath sounds: Normal breath sounds.   Musculoskeletal:         General: Normal range of motion.   Skin:     General: Skin is warm and dry.   Neurological:      General: No focal deficit present.      Mental Status: She is alert.   Psychiatric:         Mood and Affect: Mood normal.         Behavior: Behavior normal.             Lab Results   Component Value Date    GLUCOSE 74 11/04/2024    BUN 10 11/04/2024    CREATININE 0.99 11/04/2024    EGFRIFNONA 70 01/12/2022    BCR 10.1 11/04/2024    K 3.6 11/04/2024    CO2 21.0 (L) 11/04/2024    CALCIUM 9.1 11/04/2024    ALBUMIN 4.3 12/30/2022    AST 11 12/30/2022    ALT 11 12/30/2022       Lab Results   Component Value Date    CHOL 176 02/15/2023    TRIG 191 (H) 02/15/2023    HDL 34 (L) 02/15/2023     (H) 02/15/2023       Lab Results   Component Value Date    WBC 7.43 11/04/2024    HGB 13.7 11/04/2024    HCT 40.2 11/04/2024    MCV 91.2 11/04/2024     11/04/2024           Procedures    Lab Results (last 24 hours)       Procedure Component Value Units Date/Time    POCT rapid strep A [[id number]] Collected: 02/12/25 1636    Specimen: Swab Updated: 02/12/25 1636     Rapid Strep A Screen Negative     Internal Control Passed     Lot Number [id number]     Expiration Date 11-30-25    POCT SARS-CoV-2 + Flu Antigen PATRIA [[id number]] Collected: 02/12/25 1656    Specimen: Swab Updated: 02/12/25 1657     SARS Antigen Not Detected     Influenza A Antigen PATRIA Not Detected     Influenza B Antigen PATRIA Not Detected     Internal Control Passed     Lot Number [id number]     Expiration  Date 7-               Diagnoses and all orders for this visit:    1. Acute viral syndrome (Primary)  -     POCT rapid strep A  -     POCT SARS-CoV-2 + Flu Antigen PATRIA  -     Discontinue: acetaminophen-codeine (TYLENOL with CODEINE) 120-12 MG/5ML solution; Take 5 mL by mouth Every 8 (Eight) Hours As Needed for Moderate Pain.  Dispense: 120 mL; Refill: 0  -     acetaminophen-codeine (TYLENOL with CODEINE) 120-12 MG/5ML solution; Take 5 mL by mouth Every 8 (Eight) Hours As Needed for Moderate Pain.  Dispense: 120 mL; Refill: 0  -     acetaminophen-codeine (TYLENOL with CODEINE) 120-12 MG/5ML solution; Take 5 mL by mouth Every 8 (Eight) Hours As Needed (cough).  Dispense: 120 mL; Refill: 0            Follow Up  Return if symptoms worsen or fail to improve, for If not improving or symptoms are getting worse.  Patient was given instructions and counseling regarding her condition or for health maintenance advice. Please see specific information pulled into the AVS if appropriate.       Blood pressure originally elevated at office visit however repeat manually was normal.    Anais Mari, APRN  02/12/2025    Please note that portions of this document were completed using a voice recognition program.

## 2025-02-13 ENCOUNTER — TELEPHONE (OUTPATIENT)
Dept: FAMILY MEDICINE CLINIC | Age: 60
End: 2025-02-13
Payer: COMMERCIAL

## 2025-02-13 DIAGNOSIS — B37.0 ORAL THRUSH: Primary | ICD-10-CM

## 2025-02-13 DIAGNOSIS — J30.9 ALLERGIC RHINITIS, UNSPECIFIED SEASONALITY, UNSPECIFIED TRIGGER: Primary | ICD-10-CM

## 2025-02-13 RX ORDER — METHYLPREDNISOLONE 4 MG/1
TABLET ORAL
Qty: 1 EACH | Refills: 0 | Status: SHIPPED | OUTPATIENT
Start: 2025-02-13

## 2025-02-13 NOTE — TELEPHONE ENCOUNTER
Called patient and she would like something sent in for the white bumps in her mouth as well please.

## 2025-02-13 NOTE — TELEPHONE ENCOUNTER
Caller: [facility] - [place], [place] - [address] [phone] [facility] [phone] FX    Relationship to patient: Pharmacy    Best call back number: [phone]    Patient is needing: PHARMACY CALLED NEEDING TO SPEAK WITH CLINICAL STAFF REGARDING THE TYLENOL WITH CODEINE THEY RECEIVED YESTERDAY. CALLER STATES THEY DO NOT CARRY THIS MEDICATION BUT THEY DO HAVE AN ALTERNATIVE IN STOCK BUT ARE NEEDING VERBAL PERMISSION BEFORE CHANGING THIS MEDICATION. CALLER STATES PATIENT IS CURRENTLY AT THE PHARMACY AND WOULD LIKE A RESPONSE ASAP.

## 2025-02-13 NOTE — TELEPHONE ENCOUNTER
Pt called and stated she is feeling much worse today. She has white bumps in her mouth today as well. She stated she would like to start on steroids. Please advise.

## 2025-02-17 ENCOUNTER — TELEPHONE (OUTPATIENT)
Dept: FAMILY MEDICINE CLINIC | Age: 60
End: 2025-02-17
Payer: COMMERCIAL

## 2025-02-17 NOTE — TELEPHONE ENCOUNTER
Pt calling to report she is still having sinus drainage, yellow in color, completed steroid tami you sent in, she denies any fever, or ear pain. She states she has some sinus pressure and a h/a off and on. She would like something else called in, please adv. Lv was 2/12/25. Would you like her to be seen again by pcp?

## 2025-02-18 RX ORDER — SEMAGLUTIDE 1.7 MG/.75ML
INJECTION, SOLUTION SUBCUTANEOUS
Qty: 9 ML | Refills: 0 | Status: SHIPPED | OUTPATIENT
Start: 2025-02-18

## 2025-03-10 ENCOUNTER — TELEPHONE (OUTPATIENT)
Dept: FAMILY MEDICINE CLINIC | Age: 60
End: 2025-03-10
Payer: COMMERCIAL

## 2025-03-10 RX ORDER — SEMAGLUTIDE 1 MG/.5ML
INJECTION, SOLUTION SUBCUTANEOUS
Qty: 6 ML | Refills: 0 | OUTPATIENT
Start: 2025-03-10

## 2025-03-10 NOTE — TELEPHONE ENCOUNTER
"Caller: Ema Cisse \"LEXI\"    Relationship: Self    Best call back number: [phone]     What medication are you requesting:     Wegovy        If a prescription is needed, what is your preferred pharmacy and phone number: [facility] - [place], [place] - [address] [facility] [address] - [phone]  - [phone] FX     Additional notes: DUE TO THE PATIENT BEING OFF MEDICATION FOR 3 WEEKS SHE IS REQUESTING A PRESCRIPTION BE SENT FOR THE 1 MG SO THAT SHE CAN GRADUALLY GET BACK TO CURRENT DOSE.       "

## 2025-03-12 ENCOUNTER — OFFICE VISIT (OUTPATIENT)
Dept: FAMILY MEDICINE CLINIC | Age: 60
End: 2025-03-12
Payer: COMMERCIAL

## 2025-03-12 VITALS
WEIGHT: 173 LBS | DIASTOLIC BLOOD PRESSURE: 89 MMHG | TEMPERATURE: 98 F | OXYGEN SATURATION: 99 % | HEART RATE: 73 BPM | HEIGHT: 62 IN | SYSTOLIC BLOOD PRESSURE: 132 MMHG | BODY MASS INDEX: 31.83 KG/M2

## 2025-03-12 DIAGNOSIS — B37.0 CANDIDA INFECTION OF MOUTH: Primary | ICD-10-CM

## 2025-03-12 PROCEDURE — 99213 OFFICE O/P EST LOW 20 MIN: CPT | Performed by: NURSE PRACTITIONER

## 2025-03-12 RX ORDER — NYSTATIN 100000 [USP'U]/ML
500000 SUSPENSION ORAL 4 TIMES DAILY
Qty: 280 ML | Refills: 0 | Status: SHIPPED | OUTPATIENT
Start: 2025-03-12 | End: 2025-03-26

## 2025-03-12 NOTE — TELEPHONE ENCOUNTER
If she has been off that long, will need to start back at lower dose. Let me know if she needs the lowest dose sent

## 2025-03-12 NOTE — PROGRESS NOTES
Chief Complaint  Ema Cisse presents to [facility] for Sore Throat (Sores in mouth per pt. This has been ongoing since her visit with Anais Mari )    Subjective     History of Present Illness  Has been having sore throat for a month.  Taking stahist, sudafed and allergy medication.  She reports that her throat is red, coughing and left ear pain  she does have some production with her cough.  Has had fever and chills .  She was given magic mouthwash but this did not help.    Assessment and Plan     Diagnoses and all orders for this visit:    1. Candida infection of mouth (Primary)  Comments:  will treat for possible thrush - advised to call if persists  may need to get in with ENT - also advised to take probiotic  Orders:  -     nystatin (MYCOSTATIN) 100,000 unit/mL suspension; Swish and swallow 5 mL 4 (Four) Times a Day for 14 days.  Dispense: 280 mL; Refill: 0            Follow Up   Return if symptoms worsen or fail to improve.  Future Appointments   Date Time Provider Department Center   6/10/2025  4:30 PM Chary Hartman APRN [facility]       New Medications Ordered This Visit   Medications    nystatin (MYCOSTATIN) 100,000 unit/mL suspension     Sig: Swish and swallow 5 mL 4 (Four) Times a Day for 14 days.     Dispense:  280 mL     Refill:  0       Medications Discontinued During This Encounter   Medication Reason    acetaminophen-codeine (TYLENOL with CODEINE) 120-12 MG/5ML solution *Therapy completed    acetaminophen-codeine (TYLENOL with CODEINE) 120-12 MG/5ML solution *Therapy completed    Chlorcyclizine-Pseudoephed (Stahist AD) 25-60 MG tablet *Therapy completed    methylPREDNISolone (Medrol) 4 MG dose pack *Therapy completed          Review of Systems    Objective     Vitals:    03/12/25 1600   BP: 132/89   BP Location: Left arm   Patient Position: Sitting   Cuff Size: Adult   Pulse: 73   Temp: 98 °F (36.7 °C)   TempSrc: Oral   SpO2: 99%   Weight: 78.5 kg  "(173 lb)   Height: 157.5 cm (62.01\")         Physical Exam  Constitutional:       General: She is not in acute distress.     Appearance: Normal appearance.   HENT:      Head: Normocephalic.      Mouth/Throat:      Tongue: Lesions (enlarged taste buds posterior tongue) present.      Pharynx: Posterior oropharyngeal erythema present.   Cardiovascular:      Rate and Rhythm: Normal rate and regular rhythm.   Pulmonary:      Effort: Pulmonary effort is normal.      Breath sounds: Normal breath sounds.   Musculoskeletal:         General: Normal range of motion.   Neurological:      General: No focal deficit present.      Mental Status: She is alert and oriented to person, place, and time.   Psychiatric:         Mood and Affect: Mood normal.         Behavior: Behavior normal.               Result Review          Allergies   Allergen Reactions    Sulfa Antibiotics Rash    Trintellix [Vortioxetine] Itching      Past Medical History:   Diagnosis Date    Allergic     Anxiety     Arthritis 2015    Cholelithiasis 1985    GERD (gastroesophageal reflux disease)     Irritable bowel syndrome     Low back pain 2009    PONV (postoperative nausea and vomiting)      Current Outpatient Medications   Medication Sig Dispense Refill    albuterol sulfate  (90 Base) MCG/ACT inhaler Inhale 2 puffs Every 4 (Four) Hours As Needed.      Cetirizine HCl 10 MG capsule Take 10 mg by mouth As Needed.      diclofenac (VOLTAREN) 75 MG EC tablet Take 1 tablet by mouth 2 (Two) Times a Day.      escitalopram (LEXAPRO) 20 MG tablet Take 1.5 tablets by mouth Daily. 135 tablet 1    famotidine (Pepcid) 40 MG tablet Take 1 tablet by mouth 2 (Two) Times a Day As Needed for Heartburn. 180 tablet 1    fluticasone (Flonase) 50 MCG/ACT nasal spray 2 sprays into the nostril(s) as directed by provider Daily. 18.2 mL 0    Insulin Pen Needle (Novofine Pen Needle) 32G X 6 MM misc 1 each Daily. 100 each 0    lubiprostone (Amitiza) 8 MCG capsule Take 1 capsule by " mouth Daily With Breakfast. (Patient taking differently: Take 1 capsule by mouth Daily With Breakfast. PRN) 90 capsule 1    Magic Mouthwash Oral Suspension (diphenhydrAMINE HCl - aluminum & magnesium hydroxide-simethicone - lidocaine - nystatin) Swish and spit 10 mL Every 4 (Four) Hours As Needed for Mucositis. 240 mL 0    montelukast (SINGULAIR) 10 MG tablet Take 1 tablet by mouth Daily. 90 tablet 1    multivitamin with minerals tablet tablet Take 1 tablet by mouth Daily. HOLD PER MD INSTR      pantoprazole (PROTONIX) 40 MG EC tablet TAKE 1 TABLET BY MOUTH DAILY. 90 tablet 0    sennosides-docusate (senna-docusate sodium) 8.6-50 MG per tablet Take 1 tablet by mouth Daily. 90 tablet 1    SUMAtriptan (IMITREX) 50 MG tablet Take 1 tablet by mouth Every 2 (Two) Hours As Needed for Migraine for up to 1 dose. Once at onset of headache. May repeat in 2 hours if headache not relieved. 9 tablet 1    traZODone (DESYREL) 50 MG tablet Take 2 tablets by mouth Every Night. 180 tablet 1    nystatin (MYCOSTATIN) 100,000 unit/mL suspension Swish and swallow 5 mL 4 (Four) Times a Day for 14 days. 280 mL 0    Semaglutide-Weight Management 0.25 MG/0.5ML solution auto-injector Inject 0.5 mL under the skin into the appropriate area as directed Every 7 (Seven) Days. 2 mL 2     No current facility-administered medications for this visit.     Past Surgical History:   Procedure Laterality Date    BILATERAL BREAST REDUCTION  11/13/2023    BUNIONECTOMY  2008    CHOLECYSTECTOMY  1984    COLONOSCOPY  2017    COLONOSCOPY  2017    COLONOSCOPY N/A 07/27/2022    Procedure: COLONOSCOPY WITH BIOPSIES;  Surgeon: Halima Fitzpatrick MD;  Location: [facility] ENDOSCOPY;  Service: Gastroenterology;  Laterality: N/A;  DIVERTICULOSIS, HEMORRHOIDS    CYSTECTOMY      EPIDURAL Right 11/03/2021    Procedure: Right L5 LUMBAR/SACRAL TRANSFORAMINAL EPIDURAL;  Surgeon: Jeronimo Lind MD;  Location: [facility] OR;  Service: Pain Management;  Laterality: Right;     EPIDURAL Bilateral 03/17/2022    Procedure: Right L5 LUMBAR/SACRAL TRANSFORAMINAL EPIDURAL;  Surgeon: Jeronimo Lind MD;  Location: [facility];  Service: Pain Management;  Laterality: Bilateral;    KNEE SURGERY  07/2005    ACL Removal    MEDIAL BRANCH BLOCK Left 12/14/2021    Procedure: Left C3-C5 MEDIAL BRANCH BLOCK;  Surgeon: Jeronimo Lind MD;  Location: [facility];  Service: Pain Management;  Laterality: Left;    MEDIAL BRANCH BLOCK Left 02/08/2022    Procedure: Left C3-C5 MEDIAL BRANCH BLOCK;  Surgeon: Jeroinmo Lind MD;  Location: [facility];  Service: Pain Management;  Laterality: Left;    RADIOFREQUENCY ABLATION Left 03/03/2022    Procedure: RADIOFREQUENCY ABLATION CERVICAL C3-C5;  Surgeon: Jeronimo Lind MD;  Location: [facility];  Service: Pain Management;  Laterality: Left;    SCAR REVISION BREAST Bilateral 11/19/2024    Procedure: BILATERAL BREAT SCAR REVISION;  Surgeon: Waterhouse, Maurine, MD;  Location: [facility] MAIN OR;  Service: Plastics;  Laterality: Bilateral;    SINUS SURGERY  2010    UPPER GASTROINTESTINAL ENDOSCOPY  2002      Health Maintenance Due   Topic Date Due    Pneumococcal Vaccine 50+ (1 of 1 - PCV) Never done    MAMMOGRAM  02/10/2024    LIPID PANEL  03/13/2025      Immunization History   Administered Date(s) Administered    COVID-19 (MODERNA) 1st,2nd,3rd Dose Monovalent 02/05/2021, 03/05/2021, 11/19/2021    COVID-19 (MODERNA) BIVALENT 12+YRS 08/21/2023    COVID-19 (PFIZER) 12YRS+ (COMIRNATY) 12/10/2024    Flu Vaccine Split Quad 09/25/2019, 09/23/2021, 09/15/2022    Flublok 18+yrs 09/15/2022    Flucelvax Quad Vial =>4yrs 09/28/2020    Fluzone  >6mos 12/10/2024    Fluzone (or Fluarix & Flulaval for VFC) >6mos 10/11/2023    Hepatitis A 05/02/2018, 11/20/2018    Influenza, Unspecified 10/01/2022    Shingrix 05/17/2022, 01/11/2023    Tdap 05/29/2019         Part of this note may be an electronic transcription/translation of spoken language to printed   text  using the Dragon Dictation System.      SHELBIE Monreal

## 2025-03-12 NOTE — TELEPHONE ENCOUNTER
Pt is fine with starting at lower dose, would like rx sent to [facility]

## 2025-04-03 DIAGNOSIS — M15.0 PRIMARY GENERALIZED (OSTEO)ARTHRITIS: ICD-10-CM

## 2025-04-03 RX ORDER — NAPROXEN 500 MG/1
500 TABLET ORAL 2 TIMES DAILY PRN
Qty: 60 TABLET | Refills: 0 | Status: SHIPPED | OUTPATIENT
Start: 2025-04-03

## 2025-04-17 ENCOUNTER — TELEPHONE (OUTPATIENT)
Dept: OBSTETRICS AND GYNECOLOGY | Age: 60
End: 2025-04-17

## 2025-04-17 NOTE — TELEPHONE ENCOUNTER
"Caller: Ema Cisse \"LEXI\"    Relationship to patient: Self    Best call back number: [phone] / LVM    Type of visit: MAMMO APPT    Requested date: 05/08/25     If rescheduling, when is the original appointment: NA     Additional notes: PT HAS ANNUAL SCHEDULED FOR 05/08/25 @ 2pm AND WOULD LIKE TO HAVE MAMMO AS WELL    PLEASE CALL THE PT TO LET HER KNOW     THANK YOU!    "

## 2025-04-24 DIAGNOSIS — K21.9 GASTROESOPHAGEAL REFLUX DISEASE, UNSPECIFIED WHETHER ESOPHAGITIS PRESENT: ICD-10-CM

## 2025-04-24 RX ORDER — PANTOPRAZOLE SODIUM 40 MG/1
40 TABLET, DELAYED RELEASE ORAL DAILY
Qty: 90 TABLET | Refills: 0 | Status: SHIPPED | OUTPATIENT
Start: 2025-04-24

## 2025-05-05 ENCOUNTER — TELEPHONE (OUTPATIENT)
Dept: OBSTETRICS AND GYNECOLOGY | Age: 60
End: 2025-05-05
Payer: COMMERCIAL

## 2025-05-05 NOTE — TELEPHONE ENCOUNTER
"Hub staff attempted to follow warm transfer process and was unsuccessful     Caller: Ema Cisse \"LEXI\"    Relationship to patient: Self    Best call back number: [phone] V/M     Patient is needing: NEEDING TO R/S MAMMO ON 5/19/25 @ 3:00    PLS CALL PT TO RESCHEDULE     THANK YOU   "

## 2025-05-12 ENCOUNTER — TELEPHONE (OUTPATIENT)
Dept: FAMILY MEDICINE CLINIC | Age: 60
End: 2025-05-12
Payer: COMMERCIAL

## 2025-05-12 NOTE — TELEPHONE ENCOUNTER
"    Caller: Ema Cisse \"LEXI\"    Relationship: Self    Best call back number: [phone]     What medication are you requesting: NEXT DOSE UP FOR PATIENTS     Semaglutide-Weight Management 0.25 MG/0.5ML solution auto-injector       If a prescription is needed, what is your preferred pharmacy and phone number: [email or web address] - [place], [place] - [address] [facility] [address] - [phone] [facility] [phone] FX     Additional notes:  PATIENT CALLED IN REQUESTING THE NEXT DOSE UP FOR THIS MEDICATION PLEASE ADVISE   "

## 2025-05-30 ENCOUNTER — TELEPHONE (OUTPATIENT)
Dept: OBSTETRICS AND GYNECOLOGY | Age: 60
End: 2025-05-30
Payer: COMMERCIAL

## 2025-05-30 ENCOUNTER — HOSPITAL ENCOUNTER (OUTPATIENT)
Facility: HOSPITAL | Age: 60
Discharge: HOME | End: 2025-05-30
Payer: COMMERCIAL

## 2025-05-30 DIAGNOSIS — Z12.31 SCREENING MAMMOGRAM FOR BREAST CANCER: ICD-10-CM

## 2025-05-30 DIAGNOSIS — N63.15 BREAST LUMP ON RIGHT SIDE AT 6 O'CLOCK POSITION: Primary | ICD-10-CM

## 2025-05-30 PROCEDURE — 77063 BREAST TOMOSYNTHESIS BI: CPT

## 2025-05-30 PROCEDURE — 77067 SCR MAMMO BI INCL CAD: CPT

## 2025-05-30 NOTE — TELEPHONE ENCOUNTER
5/30/2025 Pt went for mammogram today and has a R breast lump, mammo canceled. I scheduled a gyn f/u with Dr Gandhi on 6/4/25 and ordered dx imaging per Dr Gandhi request. Pt will call [facility] to schedule

## 2025-06-05 ENCOUNTER — APPOINTMENT (OUTPATIENT)
Dept: WOMENS IMAGING | Facility: HOSPITAL | Age: 60
End: 2025-06-05
Payer: COMMERCIAL

## 2025-06-05 ENCOUNTER — OFFICE VISIT (OUTPATIENT)
Dept: OBSTETRICS AND GYNECOLOGY | Age: 60
End: 2025-06-05
Payer: COMMERCIAL

## 2025-06-05 VITALS
WEIGHT: 175 LBS | HEIGHT: 65 IN | BODY MASS INDEX: 29.16 KG/M2 | DIASTOLIC BLOOD PRESSURE: 74 MMHG | SYSTOLIC BLOOD PRESSURE: 114 MMHG

## 2025-06-05 DIAGNOSIS — N63.13 MASS OF LOWER OUTER QUADRANT OF RIGHT BREAST: Primary | ICD-10-CM

## 2025-06-05 PROCEDURE — 76642 ULTRASOUND BREAST LIMITED: CPT | Performed by: RADIOLOGY

## 2025-06-05 PROCEDURE — 99213 OFFICE O/P EST LOW 20 MIN: CPT | Performed by: OBSTETRICS & GYNECOLOGY

## 2025-06-05 PROCEDURE — 77066 DX MAMMO INCL CAD BI: CPT | Performed by: RADIOLOGY

## 2025-06-05 PROCEDURE — 77062 BREAST TOMOSYNTHESIS BI: CPT | Performed by: RADIOLOGY

## 2025-06-05 PROCEDURE — G0279 TOMOSYNTHESIS, MAMMO: HCPCS | Performed by: RADIOLOGY

## 2025-06-05 NOTE — PROGRESS NOTES
GYN Visit    2025    Patient: Ema Cisse          MR#:[id number]      Chief Complaint   Patient presents with    Follow-up     Gyn F/u - Rt Breast Lump, Pt has diagnostic mammo scheduled today @ 3:00       History of Present Illness    59 y.o. female  who presents for  problem visit    Patient has a breast lump in the right breast  It is pea-sized  She has noticed it for about a month  She is had a breast reduction in the past 2 years  She skipped a mammogram last year due to this  She had the original surgery and then had to have it revised  She was coming in for screening mammogram but complained about the breast lump therefore it was switched to a diagnostic mammogram which is scheduled today  On exam there is a mobile pea-sized lump in the right breast but no lymphadenopathy and no skin changes  The area is mobile and nontender and compressible    The patient also complains of a chronic sore throat and left supraclavicular and neck swelling  On exam there is no evidence of discrete lymph nodes  She has seen urgent care for the sore throat and does have allergies  She will be seeing her primary care soon about this          No LMP recorded (lmp unknown). Patient is postmenopausal.    ________________________________________  Patient Active Problem List   Diagnosis    Somatic dysfunction of sacroiliac joint    Lumbar disc disorder    Chronic pain    Lumbar facet arthropathy    Metatarsalgia of left foot    Other osteonecrosis, left foot    Vitamin D deficiency    Gastroesophageal reflux disease    Irritable bowel syndrome with both constipation and diarrhea    Impingement syndrome of left shoulder    DDD (degenerative disc disease), cervical    Migraine with aura, not intractable, without status migrainosus    Persistent mood (affective) disorder, unspecified    Primary insomnia    Cervicalgia    Primary generalized (osteo)arthritis    DDD (degenerative disc disease), lumbar    Lumbar radiculopathy     Lumbar foraminal stenosis    Arthropathy of cervical facet joint    History of Clostridium difficile infection    Allergic rhinitis    Chronic idiopathic constipation       Past Medical History:   Diagnosis Date    Allergic     Anxiety 2018    Arthritis 2015    Cancer Melanoma june 2019    On leg    Cholelithiasis 1985    Endometriosis 2015    GERD (gastroesophageal reflux disease)     Irritable bowel syndrome     Have had for a very long time    Low back pain 2009    Migraine 1984    Ovarian cyst 2009    PONV (postoperative nausea and vomiting) 2015       Past Surgical History:   Procedure Laterality Date    BILATERAL BREAST REDUCTION  11/13/2023    BUNIONECTOMY  2008    CHOLECYSTECTOMY  1984    COLONOSCOPY  2017    COLONOSCOPY N/A 07/27/2022    Procedure: COLONOSCOPY WITH BIOPSIES;  Surgeon: Halima Fitzpatrick MD;  [facility];  Service: Gastroenterology;  Laterality: N/A;  DIVERTICULOSIS, HEMORRHOIDS    CYSTECTOMY      EPIDURAL Right 11/03/2021    Procedure: Right L5 LUMBAR/SACRAL TRANSFORAMINAL EPIDURAL;  Surgeon: Jeronimo Lind MD;  [place] [facility];  Service: Pain Management;  Laterality: Right;    EPIDURAL Bilateral 03/17/2022    Procedure: Right L5 LUMBAR/SACRAL TRANSFORAMINAL EPIDURAL;  Surgeon: Jeronimo Lind MD;  Location: [facility];  Service: Pain Management;  Laterality: Bilateral;    KNEE SURGERY  07/2005    ACL Removal    MEDIAL BRANCH BLOCK Left 12/14/2021    Procedure: Left C3-C5 MEDIAL BRANCH BLOCK;  Surgeon: Jeronimo Lind MD;  Location: [facility] OR;  Service: Pain Management;  Laterality: Left;    MEDIAL BRANCH BLOCK Left 02/08/2022    Procedure: Left C3-C5 MEDIAL BRANCH BLOCK;  Surgeon: Jeronimo Lind MD;  Location: [facility] OR;  Service: Pain Management;  Laterality: Left;    OVARIAN CYST SURGERY  2009    RADIOFREQUENCY ABLATION Left 03/03/2022    Procedure: RADIOFREQUENCY ABLATION CERVICAL C3-C5;  Surgeon: Jeronimo Lind MD;  [facility]:  [facility];  Service: Pain Management;  Laterality: Left;    SCAR REVISION BREAST Bilateral 11/19/2024    Procedure: BILATERAL BREAT SCAR REVISION;  Surgeon: Waterhouse, Maurine, MD;  Location: [facility] MAIN OR;  Service: Plastics;  Laterality: Bilateral;    SINUS SURGERY  2010    UPPER GASTROINTESTINAL ENDOSCOPY  2002       Social History     Tobacco Use   Smoking Status Never    Passive exposure: Never   Smokeless Tobacco Never       has a current medication list which includes the following prescription(s): albuterol sulfate hfa, cetirizine hcl, diclofenac, escitalopram, famotidine, novofine pen needle, lubiprostone, Magic Mouthwash Oral Suspension (diphenhydrAMINE HCl - aluminum & magnesium hydroxide-simethicone - lidocaine - nystatin), montelukast, multivitamin with minerals, naproxen, pantoprazole, semaglutide-weight management, sennosides-docusate, sumatriptan, trazodone, and fluticasone.  ________________________________________    Current contraception: post menopausal status      The following portions of the patient's history were reviewed and updated as appropriate: allergies, current medications, past family history, past medical history, past social history, past surgical history, and problem list.    Review of Systems    Pertinent items are noted in HPI.     Objective   Physical Exam  Chest:          Comments: Mobile pea sized lump  Compressible  Non tender    Left superclavicular area and base of neck, perceived swelling per pt, no discrete mass or lymph node appreciated  No thyromegaly      /74 (BP Location: Left arm, Patient Position: Sitting)   Ht 165.1 cm (65\")   Wt 79.4 kg (175 lb)   LMP  (LMP Unknown)   BMI 29.12 kg/m²    BP Readings from Last 3 Encounters:   06/05/25 114/74   03/12/25 132/89   02/12/25 120/78      Wt Readings from Last 3 Encounters:   06/05/25 79.4 kg (175 lb)   03/12/25 78.5 kg (173 lb)   02/12/25 78.9 kg (174 lb)      BMI: Estimated body mass index is 29.12 kg/m² " "as calculated from the following:    Height as of this encounter: 165.1 cm (65\").    Weight as of this encounter: 79.4 kg (175 lb).    Lungs: non labored breathing, no wheezing or tachpnea  Extremities: extremities normal, atraumatic, no cyanosis or edema  Skin: Skin color, texture, turgor normal. No rashes or lesions  Neurologic: Grossly normal  General:   alert, appears stated age, and cooperative                                 Assessment:      Diagnoses and all orders for this visit:    1. Mass of lower outer quadrant of right breast (Primary)    Imaging today  I will correlate imaging with clinical exam  She may need breast surgery consult if they do not correlate  The area of concern does feel benign at this time  Patient has primary care follow-up for her nonspecific swelling of the left side of her neck and upper clavicle area          "

## 2025-06-10 ENCOUNTER — OFFICE VISIT (OUTPATIENT)
Dept: FAMILY MEDICINE CLINIC | Age: 60
End: 2025-06-10
Payer: COMMERCIAL

## 2025-06-10 ENCOUNTER — RESULTS FOLLOW-UP (OUTPATIENT)
Dept: FAMILY MEDICINE CLINIC | Age: 60
End: 2025-06-10

## 2025-06-10 VITALS
OXYGEN SATURATION: 98 % | HEART RATE: 74 BPM | WEIGHT: 176 LBS | DIASTOLIC BLOOD PRESSURE: 86 MMHG | HEIGHT: 65 IN | BODY MASS INDEX: 29.32 KG/M2 | SYSTOLIC BLOOD PRESSURE: 120 MMHG | TEMPERATURE: 98.1 F

## 2025-06-10 DIAGNOSIS — G43.109 MIGRAINE WITH AURA, NOT INTRACTABLE, WITHOUT STATUS MIGRAINOSUS: ICD-10-CM

## 2025-06-10 DIAGNOSIS — K21.9 GASTROESOPHAGEAL REFLUX DISEASE, UNSPECIFIED WHETHER ESOPHAGITIS PRESENT: ICD-10-CM

## 2025-06-10 DIAGNOSIS — F51.01 PRIMARY INSOMNIA: ICD-10-CM

## 2025-06-10 DIAGNOSIS — Z12.31 SCREENING MAMMOGRAM FOR BREAST CANCER: Primary | ICD-10-CM

## 2025-06-10 DIAGNOSIS — R22.1 NECK SWELLING: ICD-10-CM

## 2025-06-10 DIAGNOSIS — Z00.00 ANNUAL PHYSICAL EXAM: Primary | ICD-10-CM

## 2025-06-10 DIAGNOSIS — F34.9 PERSISTENT MOOD (AFFECTIVE) DISORDER, UNSPECIFIED: ICD-10-CM

## 2025-06-10 DIAGNOSIS — J30.9 ALLERGIC RHINITIS, UNSPECIFIED SEASONALITY, UNSPECIFIED TRIGGER: ICD-10-CM

## 2025-06-10 DIAGNOSIS — J02.9 SORE THROAT: ICD-10-CM

## 2025-06-10 DIAGNOSIS — K59.04 CHRONIC IDIOPATHIC CONSTIPATION: ICD-10-CM

## 2025-06-10 LAB
EXPIRATION DATE: NORMAL
INTERNAL CONTROL: NORMAL
Lab: NORMAL
S PYO AG THROAT QL: NEGATIVE

## 2025-06-10 PROCEDURE — 87081 CULTURE SCREEN ONLY: CPT | Performed by: NURSE PRACTITIONER

## 2025-06-10 RX ORDER — SUMATRIPTAN 50 MG/1
50 TABLET, FILM COATED ORAL
Qty: 9 TABLET | Refills: 1 | Status: SHIPPED | OUTPATIENT
Start: 2025-06-10

## 2025-06-10 RX ORDER — CHLORCYCLIZINE HYDROCHLORIDE AND PSEUDOEPHEDRINE HYDROCHLORIDE 25; 60 MG/1; MG/1
1 TABLET ORAL EVERY 8 HOURS PRN
Qty: 42 TABLET | Refills: 0 | Status: SHIPPED | OUTPATIENT
Start: 2025-06-10

## 2025-06-10 RX ORDER — AMOXICILLIN 250 MG
1 CAPSULE ORAL DAILY PRN
Qty: 90 TABLET | Refills: 1 | Status: SHIPPED | OUTPATIENT
Start: 2025-06-10

## 2025-06-10 RX ORDER — TRAZODONE HYDROCHLORIDE 50 MG/1
100 TABLET ORAL NIGHTLY
Qty: 180 TABLET | Refills: 1 | Status: SHIPPED | OUTPATIENT
Start: 2025-06-10

## 2025-06-10 RX ORDER — ESCITALOPRAM OXALATE 20 MG/1
30 TABLET ORAL DAILY
Qty: 135 TABLET | Refills: 1 | Status: SHIPPED | OUTPATIENT
Start: 2025-06-10

## 2025-06-10 RX ORDER — MONTELUKAST SODIUM 10 MG/1
10 TABLET ORAL DAILY
Qty: 90 TABLET | Refills: 1 | Status: SHIPPED | OUTPATIENT
Start: 2025-06-10

## 2025-06-10 RX ORDER — FAMOTIDINE 40 MG/1
40 TABLET, FILM COATED ORAL 2 TIMES DAILY PRN
Qty: 180 TABLET | Refills: 1 | Status: SHIPPED | OUTPATIENT
Start: 2025-06-10

## 2025-06-10 RX ORDER — PANTOPRAZOLE SODIUM 40 MG/1
40 TABLET, DELAYED RELEASE ORAL DAILY
Qty: 90 TABLET | Refills: 1 | Status: SHIPPED | OUTPATIENT
Start: 2025-06-10

## 2025-06-10 RX ORDER — TRIAMCINOLONE ACETONIDE 40 MG/ML
40 INJECTION, SUSPENSION INTRA-ARTICULAR; INTRAMUSCULAR ONCE
Status: COMPLETED | OUTPATIENT
Start: 2025-06-10 | End: 2025-06-10

## 2025-06-10 RX ADMIN — TRIAMCINOLONE ACETONIDE 40 MG: 40 INJECTION, SUSPENSION INTRA-ARTICULAR; INTRAMUSCULAR at 17:05

## 2025-06-10 NOTE — ASSESSMENT & PLAN NOTE
Medical condition is stable.  Continue same therapy.  Will recheck at next regular appointment    Orders:    montelukast (SINGULAIR) 10 MG tablet; Take 1 tablet by mouth Daily.

## 2025-06-10 NOTE — ASSESSMENT & PLAN NOTE
Medical condition is stable.  Continue same therapy.  Will recheck at next regular appointment    Orders:    sennosides-docusate (senna-docusate sodium) 8.6-50 MG per tablet; Take 1 tablet by mouth Daily As Needed for Constipation.

## 2025-06-10 NOTE — ASSESSMENT & PLAN NOTE
Medical condition is stable.  Continue same therapy.  Will recheck at next regular appointment    Orders:    famotidine (Pepcid) 40 MG tablet; Take 1 tablet by mouth 2 (Two) Times a Day As Needed for Heartburn.    pantoprazole (PROTONIX) 40 MG EC tablet; Take 1 tablet by mouth Daily.

## 2025-06-10 NOTE — PROGRESS NOTES
Chief Complaint  Ema Cisse presents to [facility] for Annual Exam    Subjective          History of Present Illness    Amy is here today for preventive exam.  Has received covid vaccine X 3.   UTD Tdap vaccine.  Pap smear UTD with GYN. Normal on 2/10/23. She is scheduled for annual exam on 7/28/25.  Mammogram ordered by GYN. She had recently on 5/30/25. Had mass in right breast that appeared to be a benign cyst.   Had screening colonoscopy on 7/27/22 with Dr Fitzpatrick. Advised 10 year repeat.   Never smoker.     She is also following up on anxiety. Current medication includes Lexapro 30 mg daily. She is also taking trazodone 50 mg nightly to help with sleep. She has been on several medication in the past for anxiety. Buspirone was ineffective. Trintellix caused rash. Reports stable on current treatment.   She is on sumatriptan as needed for migraines. Requesting refill.   She is on semaglutide. She started over and has increased dose. Admits to not being strict with diet and exercise for some time. She has restarted back at the gym recently.   C/o sore throat. She was seen back in February for similar symptoms. Was seen at  and prescribed amoxicillin. She had another recurrence of symptoms that started 2 weeks ago. Was seen at  again. Prescribed steroids which helped until she stopped taking. She was not swabbed this past time. She has been using chloraseptic spray. Had cough last week. Had Robitussin that she was taking.   She has also noticed some swelling to left side of neck that concerns her.     Review of Systems   Constitutional:  Negative for chills and fever.   HENT:  Negative for ear pain and sore throat.    Eyes:  Negative for blurred vision and redness.   Respiratory:  Positive for cough. Negative for shortness of breath.    Cardiovascular:  Negative for chest pain and palpitations.   Gastrointestinal:  Negative for abdominal pain and vomiting.    Genitourinary:  Negative for frequency and urgency.   Skin:  Negative for rash.   Neurological:  Negative for seizures and syncope.   Psychiatric/Behavioral:  Negative for suicidal ideas and depressed mood.          Allergies   Allergen Reactions    Sulfa Antibiotics Rash    Trintellix [Vortioxetine] Itching      Past Medical History:   Diagnosis Date    Allergic     Anxiety 2018    Arthritis 2015    Cancer Melanoma june 2019    On leg    Cholelithiasis 1985    Endometriosis 2015    GERD (gastroesophageal reflux disease)     Irritable bowel syndrome     Have had for a very long time    Low back pain 2009    Migraine 1984    Ovarian cyst 2009    PONV (postoperative nausea and vomiting) 2015     Current Outpatient Medications   Medication Sig Dispense Refill    albuterol sulfate  (90 Base) MCG/ACT inhaler Inhale 2 puffs Every 4 (Four) Hours As Needed.      Cetirizine HCl 10 MG capsule Take 10 mg by mouth As Needed.      diclofenac (VOLTAREN) 75 MG EC tablet Take 1 tablet by mouth 2 (Two) Times a Day.      escitalopram (LEXAPRO) 20 MG tablet Take 1.5 tablets by mouth Daily. 135 tablet 1    famotidine (Pepcid) 40 MG tablet Take 1 tablet by mouth 2 (Two) Times a Day As Needed for Heartburn. 180 tablet 1    fluticasone (Flonase) 50 MCG/ACT nasal spray 2 sprays into the nostril(s) as directed by provider Daily. 18.2 mL 0    Insulin Pen Needle (Novofine Pen Needle) 32G X 6 MM misc 1 each Daily. 100 each 0    Magic Mouthwash Oral Suspension (diphenhydrAMINE HCl - aluminum & magnesium hydroxide-simethicone - lidocaine - nystatin) Swish and spit 10 mL Every 4 (Four) Hours As Needed for Mucositis. 240 mL 0    montelukast (SINGULAIR) 10 MG tablet Take 1 tablet by mouth Daily. 90 tablet 1    multivitamin with minerals tablet tablet Take 1 tablet by mouth Daily. HOLD PER MD CLANCY      naproxen (NAPROSYN) 500 MG tablet TAKE 1 TABLET BY MOUTH TWICE DAILY AS NEEDED FOR MILD PAIN 60 tablet 0    pantoprazole (PROTONIX) 40 MG  EC tablet Take 1 tablet by mouth Daily. 90 tablet 1    Semaglutide-Weight Management 0.5 MG/0.5ML solution auto-injector Inject 0.5 mL under the skin into the appropriate area as directed Every 7 (Seven) Days. 2 mL 0    sennosides-docusate (senna-docusate sodium) 8.6-50 MG per tablet Take 1 tablet by mouth Daily As Needed for Constipation. 90 tablet 1    SUMAtriptan (IMITREX) 50 MG tablet Take 1 tablet by mouth Every 2 (Two) Hours As Needed for Migraine for up to 1 dose. Once at onset of headache. May repeat in 2 hours if headache not relieved. 9 tablet 1    traZODone (DESYREL) 50 MG tablet Take 2 tablets by mouth Every Night. 180 tablet 1    Chlorcyclizine-Pseudoephed (Stahist AD) 25-60 MG tablet Take 1 tablet by mouth Every 8 (Eight) Hours As Needed (congestion). 42 tablet 0     No current facility-administered medications for this visit.     Past Surgical History:   Procedure Laterality Date    BILATERAL BREAST REDUCTION  11/13/2023    BUNIONECTOMY  2008    CHOLECYSTECTOMY  1984    COLONOSCOPY  2017    COLONOSCOPY N/A 07/27/2022    Procedure: COLONOSCOPY WITH BIOPSIES;  Surgeon: Halima Fitzpatrick MD;  Location: [facility] ENDOSCOPY;  Service: Gastroenterology;  Laterality: N/A;  DIVERTICULOSIS, HEMORRHOIDS    CYSTECTOMY      EPIDURAL Right 11/03/2021    Procedure: Right L5 LUMBAR/SACRAL TRANSFORAMINAL EPIDURAL;  Surgeon: Jeronimo Lind MD;  Location: [facility] MAIN OR;  Service: Pain Management;  Laterality: Right;    EPIDURAL Bilateral 03/17/2022    Procedure: Right L5 LUMBAR/SACRAL TRANSFORAMINAL EPIDURAL;  Surgeon: Jeronimo Lind MD;  Location: [facility] MAIN OR;  Service: Pain Management;  Laterality: Bilateral;    KNEE SURGERY  07/2005    ACL Removal    MEDIAL BRANCH BLOCK Left 12/14/2021    Procedure: Left C3-C5 MEDIAL BRANCH BLOCK;  Surgeon: Jeronimo Lind MD;  Location: [facility] MAIN OR;  Service: Pain Management;  Laterality: Left;    MEDIAL BRANCH BLOCK Left 02/08/2022    Procedure: Left C3-C5 MEDIAL  BRANCH BLOCK;  Surgeon: Jeronimo Lind MD;  [facility];  Service: Pain Management;  Laterality: Left;    OVARIAN CYST SURGERY  2009    RADIOFREQUENCY ABLATION Left 03/03/2022    Procedure: RADIOFREQUENCY ABLATION CERVICAL C3-C5;  Surgeon: Jeronimo Lind MD;  Location[facility];  Service: Pain Management;  Laterality: Left;    SCAR REVISION BREAST Bilateral 11/19/2024    Procedure: BILATERAL BREAT SCAR REVISION;  Surgeon: Waterhouse, Maurine, MD;  Location: [facility] MAIN OR;  Service: Plastics;  Laterality: Bilateral;    SINUS SURGERY  2010    UPPER GASTROINTESTINAL ENDOSCOPY  2002      Social History     Tobacco Use    Smoking status: Never     Passive exposure: Never    Smokeless tobacco: Never   Vaping Use    Vaping status: Never Used   Substance Use Topics    Alcohol use: Yes     Alcohol/week: 1.0 - 2.0 standard drink of alcohol     Types: 1 - 2 Drinks containing 0.5 oz of alcohol per week     Comment: Socially    Drug use: Never     Family History   Problem Relation Age of Onset    Hypertension Mother     Heart disease Father     Lung cancer Father     No Known Problems Sister     No Known Problems Daughter     No Known Problems Maternal Aunt     No Known Problems Paternal Aunt     No Known Problems Maternal Grandmother     No Known Problems Maternal Grandfather     No Known Problems Paternal Grandmother     No Known Problems Paternal Grandfather     Hypertension Other     Heart disease Other     BRCA 1/2 Neg Hx     Breast cancer Neg Hx     Colon cancer Neg Hx     Endometrial cancer Neg Hx     Ovarian cancer Neg Hx     Malig Hyperthermia Neg Hx      Health Maintenance Due   Topic Date Due    ANNUAL PHYSICAL  05/22/2025      Immunization History   Administered Date(s) Administered    COVID-19 (MODERNA) 1st,2nd,3rd Dose Monovalent 02/05/2021, 03/05/2021, 11/19/2021    COVID-19 (MODERNA) BIVALENT 12+YRS 08/21/2023    COVID-19 (PFIZER) 12YRS+ (COMIRNATY) 12/10/2024    Flu Vaccine Split Quad  "09/25/2019, 09/23/2021, 09/15/2022    Flublok 18+yrs 09/15/2022    Flucelvax Quad Vial =>4yrs 09/28/2020    Fluzone  >6mos 12/10/2024    Fluzone (or Fluarix & Flulaval for VFC) >6mos 10/11/2023    Hepatitis A 05/02/2018, 11/20/2018    Influenza, Unspecified 10/01/2022    Shingrix 05/17/2022, 01/11/2023    Tdap 05/29/2019        Objective     Vitals:    06/10/25 1613   BP: 120/86   Pulse: 74   Temp: 98.1 °F (36.7 °C)   TempSrc: Oral   SpO2: 98%   Weight: 79.8 kg (176 lb)   Height: 165.1 cm (65\")     Body mass index is 29.29 kg/m².                No results found.    Physical Exam  Vitals reviewed.   Constitutional:       General: She is not in acute distress.     Appearance: Normal appearance. She is well-developed.   HENT:      Head: Normocephalic and atraumatic.      Right Ear: Hearing, tympanic membrane and ear canal normal.      Left Ear: Hearing, tympanic membrane and ear canal normal.      Mouth/Throat:      Mouth: Mucous membranes are moist.   Eyes:      Extraocular Movements: Extraocular movements intact.      Pupils: Pupils are equal, round, and reactive to light.   Cardiovascular:      Rate and Rhythm: Normal rate and regular rhythm.      Pulses: Normal pulses.      Heart sounds: Normal heart sounds.   Pulmonary:      Effort: Pulmonary effort is normal.      Breath sounds: Normal breath sounds.   Abdominal:      Palpations: Abdomen is soft.      Tenderness: There is no abdominal tenderness.   Musculoskeletal:         General: Normal range of motion.      Cervical back: Normal range of motion and neck supple.   Skin:     General: Skin is warm and dry.   Neurological:      Mental Status: She is alert and oriented to person, place, and time.   Psychiatric:         Mood and Affect: Mood normal.           Result Review :                               Assessment and Plan      Assessment & Plan  Annual physical exam  Appropriate screenings and vaccinations were reviewed with the pt and offered as indicated.  Pt " counseled on healthy lifestyle including healthy diet, exercise.'  Orders:    TSH; Future    CBC (No Diff); Future    Lipid Panel; Future    Comprehensive Metabolic Panel; Future    Persistent mood (affective) disorder, unspecified    Medical condition is stable.  Continue same therapy.  Will recheck at next regular appointment    Orders:    escitalopram (LEXAPRO) 20 MG tablet; Take 1.5 tablets by mouth Daily.    Migraine with aura, not intractable, without status migrainosus    Medical condition is stable.  Continue same therapy.  Will recheck at next regular appointment    Orders:    SUMAtriptan (IMITREX) 50 MG tablet; Take 1 tablet by mouth Every 2 (Two) Hours As Needed for Migraine for up to 1 dose. Once at onset of headache. May repeat in 2 hours if headache not relieved.    Primary insomnia  Medical condition is stable.  Continue same therapy.  Will recheck at next regular appointment    Orders:    traZODone (DESYREL) 50 MG tablet; Take 2 tablets by mouth Every Night.    Allergic rhinitis, unspecified seasonality, unspecified trigger  Medical condition is stable.  Continue same therapy.  Will recheck at next regular appointment    Orders:    montelukast (SINGULAIR) 10 MG tablet; Take 1 tablet by mouth Daily.    Gastroesophageal reflux disease, unspecified whether esophagitis present  Medical condition is stable.  Continue same therapy.  Will recheck at next regular appointment    Orders:    famotidine (Pepcid) 40 MG tablet; Take 1 tablet by mouth 2 (Two) Times a Day As Needed for Heartburn.    pantoprazole (PROTONIX) 40 MG EC tablet; Take 1 tablet by mouth Daily.    Chronic idiopathic constipation  Medical condition is stable.  Continue same therapy.  Will recheck at next regular appointment    Orders:    sennosides-docusate (senna-docusate sodium) 8.6-50 MG per tablet; Take 1 tablet by mouth Daily As Needed for Constipation.    BMI 29.0-29.9,adult  Increase efforts with healthy diet and exercise. Discussed  that use of semaglutide may not be the best option for her as she has gained weight since I saw her in office last.   Orders:    Semaglutide-Weight Management 0.5 MG/0.5ML solution auto-injector; Inject 0.5 mL under the skin into the appropriate area as directed Every 7 (Seven) Days.    Sore throat  RSS negative. Awaiting throat culture and will treat as indicated. Likely from allergies/viral. Steroid injection given in clinic today per request. Recommend switching antihistamine as she has been taking cetirizine for some time. Will prescribe Stahist AD for short term use only.   Orders:    POCT rapid strep A    triamcinolone acetonide (KENALOG-40) injection 40 mg    Chlorcyclizine-Pseudoephed (Stahist AD) 25-60 MG tablet; Take 1 tablet by mouth Every 8 (Eight) Hours As Needed (congestion).    Beta Strep Culture, Throat - , Throat; Future    Neck swelling  Will further evaluate with US. No red flags on exam.   Orders:    US Head Neck Soft Tissue; Future              Follow Up     Return in about 6 months (around 12/10/2025) for Recheck.

## 2025-06-11 ENCOUNTER — TELEPHONE (OUTPATIENT)
Dept: FAMILY MEDICINE CLINIC | Age: 60
End: 2025-06-11
Payer: COMMERCIAL

## 2025-06-11 NOTE — TELEPHONE ENCOUNTER
Caller: Ema Cisse    Relationship: Self    Best call back number: [phone]    What is the best time to reach you: ANYTIME    Who are you requesting to speak with (clinical staff, provider,  specific staff member): NURSE     What was the call regarding: PATIENT IS CALLING REGARDING A SYMPTOM THAT SHE FORGOT TO DISCUSS WITH PCP ABOUT NIGHT SWEATS. PATIENT STATES THAT IT IS PRETTY MUCH EVERY NIGHT.

## 2025-06-12 LAB — BACTERIA SPEC AEROBE CULT: NORMAL

## 2025-06-12 NOTE — TELEPHONE ENCOUNTER
We are checking thyroid labs with her lab work. I would also recommend that she discuss with her GYN at her appt next month.

## 2025-06-13 ENCOUNTER — LAB (OUTPATIENT)
Dept: LAB | Facility: HOSPITAL | Age: 60
End: 2025-06-13
Payer: COMMERCIAL

## 2025-06-13 DIAGNOSIS — Z00.00 ANNUAL PHYSICAL EXAM: ICD-10-CM

## 2025-06-13 LAB
ALBUMIN SERPL-MCNC: 4.1 G/DL (ref 3.5–5.2)
ALBUMIN/GLOB SERPL: 1.8 G/DL
ALP SERPL-CCNC: 109 U/L (ref 39–117)
ALT SERPL W P-5'-P-CCNC: 47 U/L (ref 1–33)
ANION GAP SERPL CALCULATED.3IONS-SCNC: 10.1 MMOL/L (ref 5–15)
AST SERPL-CCNC: 23 U/L (ref 1–32)
BILIRUB SERPL-MCNC: 0.2 MG/DL (ref 0–1.2)
BUN SERPL-MCNC: 10 MG/DL (ref 8–23)
BUN/CREAT SERPL: 8.3 (ref 7–25)
CALCIUM SPEC-SCNC: 9.5 MG/DL (ref 8.6–10.5)
CHLORIDE SERPL-SCNC: 108 MMOL/L (ref 98–107)
CHOLEST SERPL-MCNC: 156 MG/DL (ref 0–200)
CO2 SERPL-SCNC: 22.9 MMOL/L (ref 22–29)
CREAT SERPL-MCNC: 1.2 MG/DL (ref 0.57–1)
DEPRECATED RDW RBC AUTO: 45.3 FL (ref 37–54)
EGFRCR SERPLBLD CKD-EPI 2021: 51.9 ML/MIN/1.73
ERYTHROCYTE [DISTWIDTH] IN BLOOD BY AUTOMATED COUNT: 13.1 % (ref 12.3–15.4)
GLOBULIN UR ELPH-MCNC: 2.3 GM/DL
GLUCOSE SERPL-MCNC: 92 MG/DL (ref 65–99)
HCT VFR BLD AUTO: 39.7 % (ref 34–46.6)
HDLC SERPL-MCNC: 35 MG/DL (ref 40–60)
HGB BLD-MCNC: 12.8 G/DL (ref 12–15.9)
LDLC SERPL CALC-MCNC: 85 MG/DL (ref 0–100)
LDLC/HDLC SERPL: 2.22 {RATIO}
MCH RBC QN AUTO: 30.1 PG (ref 26.6–33)
MCHC RBC AUTO-ENTMCNC: 32.2 G/DL (ref 31.5–35.7)
MCV RBC AUTO: 93.4 FL (ref 79–97)
PLATELET # BLD AUTO: 318 10*3/MM3 (ref 140–450)
PMV BLD AUTO: 9.8 FL (ref 6–12)
POTASSIUM SERPL-SCNC: 4.1 MMOL/L (ref 3.5–5.2)
PROT SERPL-MCNC: 6.4 G/DL (ref 6–8.5)
RBC # BLD AUTO: 4.25 10*6/MM3 (ref 3.77–5.28)
SODIUM SERPL-SCNC: 141 MMOL/L (ref 136–145)
TRIGL SERPL-MCNC: 216 MG/DL (ref 0–150)
TSH SERPL DL<=0.05 MIU/L-ACNC: 1.02 UIU/ML (ref 0.27–4.2)
VLDLC SERPL-MCNC: 36 MG/DL (ref 5–40)
WBC NRBC COR # BLD AUTO: 7.15 10*3/MM3 (ref 3.4–10.8)

## 2025-06-13 PROCEDURE — 80050 GENERAL HEALTH PANEL: CPT

## 2025-06-13 PROCEDURE — 36415 COLL VENOUS BLD VENIPUNCTURE: CPT

## 2025-06-13 PROCEDURE — 80061 LIPID PANEL: CPT

## 2025-06-18 RX ORDER — SEMAGLUTIDE 0.5 MG/.5ML
INJECTION, SOLUTION SUBCUTANEOUS
Qty: 2 ML | Refills: 0 | OUTPATIENT
Start: 2025-06-18

## 2025-07-15 ENCOUNTER — HOSPITAL ENCOUNTER (OUTPATIENT)
Dept: ULTRASOUND IMAGING | Facility: HOSPITAL | Age: 60
Discharge: HOME | End: 2025-07-15
Admitting: NURSE PRACTITIONER
Payer: COMMERCIAL

## 2025-07-15 DIAGNOSIS — R22.1 NECK SWELLING: ICD-10-CM

## 2025-07-15 PROCEDURE — 76536 US EXAM OF HEAD AND NECK: CPT

## 2025-07-28 ENCOUNTER — OFFICE VISIT (OUTPATIENT)
Dept: OBSTETRICS AND GYNECOLOGY | Age: 60
End: 2025-07-28
Payer: COMMERCIAL

## 2025-07-28 VITALS
DIASTOLIC BLOOD PRESSURE: 76 MMHG | HEIGHT: 65 IN | BODY MASS INDEX: 30.02 KG/M2 | SYSTOLIC BLOOD PRESSURE: 112 MMHG | WEIGHT: 180.2 LBS

## 2025-07-28 DIAGNOSIS — Z01.419 WELL FEMALE EXAM WITH ROUTINE GYNECOLOGICAL EXAM: Primary | ICD-10-CM

## 2025-07-28 DIAGNOSIS — Z12.31 SCREENING MAMMOGRAM FOR BREAST CANCER: ICD-10-CM

## 2025-07-28 DIAGNOSIS — Z11.51 SCREENING FOR HUMAN PAPILLOMAVIRUS (HPV): ICD-10-CM

## 2025-07-28 DIAGNOSIS — Z12.4 SCREENING FOR MALIGNANT NEOPLASM OF CERVIX: ICD-10-CM

## 2025-07-28 PROCEDURE — 99396 PREV VISIT EST AGE 40-64: CPT | Performed by: OBSTETRICS & GYNECOLOGY

## 2025-07-28 NOTE — PROGRESS NOTES
Routine Annual Visit    2025    Patient: Ema Cisse          MR#:[id number]      Chief Complaint   Patient presents with    Gynecologic Exam     Annual- last pap 2/10/23 (neg) HPV (neg),  last screening mammo 2/10/23, diagnostic mammo 25, colonoscopy 22, no complaints       History of Present Illness    59 y.o. female  who presents for annual exam.     Is feeling well  No complaints  Pap smear is due  Mammogram is up-to-date        No LMP recorded (lmp unknown). Patient is postmenopausal.  Obstetric History:  OB History          1    Para   1    Term   1            AB        Living   1         SAB        IAB        Ectopic        Molar        Multiple        Live Births   1               Menstrual History:     No LMP recorded (lmp unknown). Patient is postmenopausal.       Sexual History:       ________________________________________  Patient Active Problem List   Diagnosis    Somatic dysfunction of sacroiliac joint    Lumbar disc disorder    Chronic pain    Lumbar facet arthropathy    Metatarsalgia of left foot    Other osteonecrosis, left foot    Vitamin D deficiency    Gastroesophageal reflux disease    Irritable bowel syndrome with both constipation and diarrhea    Impingement syndrome of left shoulder    DDD (degenerative disc disease), cervical    Migraine with aura, not intractable, without status migrainosus    Persistent mood (affective) disorder, unspecified    Primary insomnia    Cervicalgia    Primary generalized (osteo)arthritis    DDD (degenerative disc disease), lumbar    Lumbar radiculopathy    Lumbar foraminal stenosis    Arthropathy of cervical facet joint    History of Clostridium difficile infection    Allergic rhinitis    Chronic idiopathic constipation       Past Medical History:   Diagnosis Date    Allergic     Anxiety 2018    Arthritis 2015    Cancer Melanoma 2019    On leg    Cholelithiasis 1985    Endometriosis     GERD (gastroesophageal  reflux disease)     Irritable bowel syndrome     Have had for a very long time    Low back pain 2009    Migraine 1984    Ovarian cyst 2009    PONV (postoperative nausea and vomiting) 2015       Past Surgical History:   Procedure Laterality Date    BILATERAL BREAST REDUCTION  11/13/2023    BUNIONECTOMY  2008    CHOLECYSTECTOMY  1984    COLONOSCOPY  2017    COLONOSCOPY N/A 07/27/2022    Procedure: COLONOSCOPY WITH BIOPSIES;  Surgeon: Halima Fitzpatrick MD;  Location: [facility] ENDOSCOPY;  Service: Gastroenterology;  Laterality: N/A;  DIVERTICULOSIS, HEMORRHOIDS    CYSTECTOMY      EPIDURAL Right 11/03/2021    Procedure: Right L5 LUMBAR/SACRAL TRANSFORAMINAL EPIDURAL;  Surgeon: Jeronimo Lind MD;  Location: [facility];  Service: Pain Management;  Laterality: Right;    EPIDURAL Bilateral 03/17/2022    Procedure: Right L5 LUMBAR/SACRAL TRANSFORAMINAL EPIDURAL;  Surgeon: Jeronimo Lind MD;  Location: [facility];  Service: Pain Management;  Laterality: Bilateral;    KNEE SURGERY  07/2005    ACL Removal    MEDIAL BRANCH BLOCK Left 12/14/2021    Procedure: Left C3-C5 MEDIAL BRANCH BLOCK;  Surgeon: Jeronimo Lind MD;  Location: [facility];  Service: Pain Management;  Laterality: Left;    MEDIAL BRANCH BLOCK Left 02/08/2022    Procedure: Left C3-C5 MEDIAL BRANCH BLOCK;  Surgeon: Jeronimo Lind MD;  Location: [facility];  Service: Pain Management;  Laterality: Left;    OVARIAN CYST SURGERY  2009    RADIOFREQUENCY ABLATION Left 03/03/2022    Procedure: RADIOFREQUENCY ABLATION CERVICAL C3-C5;  Surgeon: Jeronimo Lind MD;  Location: [facility];  Service: Pain Management;  Laterality: Left;    SCAR REVISION BREAST Bilateral 11/19/2024    Procedure: BILATERAL BREAT SCAR REVISION;  Surgeon: Waterhouse, Maurine, MD;  [facility] MA[facility];  Service: Plastics;  Laterality: Bilateral;    SINUS SURGERY  2010    UPPER GASTROINTESTINAL ENDOSCOPY  2002       Social History     Tobacco Use   Smoking  "Status Never    Passive exposure: Never   Smokeless Tobacco Never       has a current medication list which includes the following prescription(s): albuterol sulfate hfa, cetirizine hcl, stahist ad, diclofenac, escitalopram, famotidine, fluticasone, novofine pen needle, montelukast, multivitamin with minerals, naproxen, pantoprazole, semaglutide-weight management, sennosides-docusate, sumatriptan, and trazodone.  ________________________________________    Current contraception: post menopausal status  History of abnormal Pap smear: no  Family history of Breast cancer: no  Family history of uterine or ovarian cancer: no  Family History of colon cancer/colon polyps: no  History of abnormal mammogram: yes - call back , oil cyst      The following portions of the patient's history were reviewed and updated as appropriate: allergies, current medications, past family history, past medical history, past social history, past surgical history, and problem list.    Review of Systems    Pertinent items are noted in HPI.     Objective   Physical Exam    /76   Ht 165.1 cm (65\")   Wt 81.7 kg (180 lb 3.2 oz)   LMP  (LMP Unknown)   BMI 29.99 kg/m²    BP Readings from Last 3 Encounters:   07/28/25 112/76   06/10/25 120/86   06/05/25 114/74      Wt Readings from Last 3 Encounters:   07/28/25 81.7 kg (180 lb 3.2 oz)   06/10/25 79.8 kg (176 lb)   06/05/25 79.4 kg (175 lb)      BMI: Estimated body mass index is 29.99 kg/m² as calculated from the following:    Height as of this encounter: 165.1 cm (65\").    Weight as of this encounter: 81.7 kg (180 lb 3.2 oz).      General:   alert, appears stated age, and cooperative   Abdomen: soft, non-tender, without masses or organomegaly   Breast: inspection negative, no nipple discharge or bleeding, no masses or nodularity palpable and small benign cyst still present and unchanged at 6:00 right breast   Vulva: normal, Bartholin's, Urethra, Minford's normal   Vagina: normal mucosa "   Cervix: no cervical motion tenderness and no lesions   Uterus: normal size, mobile, and non-tender   Adnexa: no mass, fullness, tenderness     Assessment:    1. Normal annual exam   Assessment     ICD-10-CM ICD-9-CM   1. Well female exam with routine gynecological exam  Z01.419 V72.31   2. Screening for human papillomavirus (HPV)  Z11.51 V73.81   3. Screening for malignant neoplasm of cervix  Z12.4 V76.2   4. Screening mammogram for breast cancer  Z12.31 V76.12     Plan:    Plan     [x]  Mammogram request made  [x]  PAP done  []  Labs:   []  GC/Chl/TV  []  DEXA scan   []  Referral for colonoscopy:       Diagnoses and all orders for this visit:    1. Well female exam with routine gynecological exam (Primary)  -     IGP, Apt HPV,rfx 16 / 18,45    2. Screening for human papillomavirus (HPV)  -     IGP, Apt HPV,rfx 16 / 18,45    3. Screening for malignant neoplasm of cervix  -     IGP, Apt HPV,rfx 16 / 18,45    4. Screening mammogram for breast cancer  -     Mammo Screening Digital Tomosynthesis Bilateral With CAD; Future            Counseling:  --Nutrition: Stressed importance of moderation and caloric balance, stressed fresh fruit and vegetables  --Exercise: Stressed the importance of regular exercise. 3-5 times weekly   - Discussed screening mammogram recommendations.   --Discussed benefits of screening colonoscopy- age 45 unless FH  --Discussed pap smear screening recommendations

## 2025-07-30 LAB
CYTOLOGIST CVX/VAG CYTO: NORMAL
CYTOLOGY CVX/VAG DOC CYTO: NORMAL
CYTOLOGY CVX/VAG DOC THIN PREP: NORMAL
DX ICD CODE: NORMAL
HPV I/H RISK 4 DNA CVX QL PROBE+SIG AMP: NEGATIVE
OTHER STN SPEC: NORMAL
SERVICE CMNT-IMP: NORMAL
STAT OF ADQ CVX/VAG CYTO-IMP: NORMAL

## (undated) DEVICE — STRIP CLS WND CURAD MEDI/STRIP HYPOALLERG 1X5IN STRL EA/PK/4

## (undated) DEVICE — EPIDURAL TRAY: Brand: MEDLINE INDUSTRIES, INC.

## (undated) DEVICE — ANTIBACTERIAL UNDYED BRAIDED (POLYGLACTIN 910), SYNTHETIC ABSORBABLE SUTURE: Brand: COATED VICRYL

## (undated) DEVICE — PK CHST BRST 40

## (undated) DEVICE — NDL SPINE 22G 5IN BLK

## (undated) DEVICE — SOL NACL 0.9PCT 1000ML

## (undated) DEVICE — SOL IRRG H2O PL/BG 1000ML STRL

## (undated) DEVICE — INTENDED FOR TISSUE SEPARATION, AND OTHER PROCEDURES THAT REQUIRE A SHARP SURGICAL BLADE TO PUNCTURE OR CUT.: Brand: BARD-PARKER ® CARBON RIB-BACK BLADES

## (undated) DEVICE — NDL HYPO PRECISIONGLIDE REG 25G 1 1/2

## (undated) DEVICE — NDL SPINE 25G 31/2IN BLU

## (undated) DEVICE — GLV SURG TRIUMPH PF LTX 7.5 STRL

## (undated) DEVICE — Device: Brand: PORTEX

## (undated) DEVICE — PAD GRND E/S NT200IX RF/GEN W/CABL DISP

## (undated) DEVICE — NDL SPINE 22G 31/2IN BLK

## (undated) DEVICE — COLON KIT: Brand: MEDLINE INDUSTRIES, INC.

## (undated) DEVICE — SKIN PREP TRAY W/CHG: Brand: MEDLINE INDUSTRIES, INC.

## (undated) DEVICE — STRIP CLS WND SUTURESTRIP/PLS 0.5X4IN TP1103

## (undated) DEVICE — SINGLE-USE BIOPSY FORCEPS: Brand: RADIAL JAW 4

## (undated) DEVICE — Device: Brand: DEFENDO AIR/WATER/SUCTION AND BIOPSY VALVE

## (undated) DEVICE — TOWEL,OR,DSP,ST,BLUE,STD,4/PK,20PK/CS: Brand: MEDLINE

## (undated) DEVICE — GLV SURG BIOGEL LTX PF 6 1/2

## (undated) DEVICE — SYR LL TP 10ML STRL

## (undated) DEVICE — Device